# Patient Record
Sex: FEMALE | Race: BLACK OR AFRICAN AMERICAN
[De-identification: names, ages, dates, MRNs, and addresses within clinical notes are randomized per-mention and may not be internally consistent; named-entity substitution may affect disease eponyms.]

---

## 2017-03-27 ENCOUNTER — TRANSCRIPTION ENCOUNTER (OUTPATIENT)
Age: 67
End: 2017-03-27

## 2017-04-09 ENCOUNTER — EMERGENCY (EMERGENCY)
Facility: HOSPITAL | Age: 67
LOS: 1 days | Discharge: LEFT BEFORE DISCHARGE | End: 2017-04-09
Attending: EMERGENCY MEDICINE
Payer: COMMERCIAL

## 2017-04-09 VITALS — HEIGHT: 65 IN | WEIGHT: 220.02 LBS

## 2017-04-09 VITALS
WEIGHT: 167.99 LBS | RESPIRATION RATE: 18 BRPM | OXYGEN SATURATION: 98 % | HEIGHT: 62 IN | DIASTOLIC BLOOD PRESSURE: 66 MMHG | SYSTOLIC BLOOD PRESSURE: 141 MMHG | TEMPERATURE: 98 F | HEART RATE: 78 BPM

## 2017-04-09 DIAGNOSIS — R56.9 UNSPECIFIED CONVULSIONS: ICD-10-CM

## 2017-04-09 PROCEDURE — 99283 EMERGENCY DEPT VISIT LOW MDM: CPT | Mod: 25

## 2017-04-09 PROCEDURE — 93010 ELECTROCARDIOGRAM REPORT: CPT

## 2017-04-09 PROCEDURE — 99284 EMERGENCY DEPT VISIT MOD MDM: CPT

## 2017-04-09 PROCEDURE — 93005 ELECTROCARDIOGRAM TRACING: CPT

## 2017-04-09 NOTE — ED PROVIDER NOTE - MEDICAL DECISION MAKING DETAILS
Patient refuses CT head or labs at this time and is signing out AMA.  Pt understands risks including death and verbalized understanding.  PAtient states will f/u with neuro w/o fail and not drive until cleared by Neuro.  Will f/u for EEG.

## 2017-04-09 NOTE — ED ADULT TRIAGE NOTE - CHIEF COMPLAINT QUOTE
Pt with seizure lasting approximately 10-15 seconds as per EMS, pt assisted to ground, no obvious trauma noted, pt A&O x's 3, c/o headache and neck pain 8/10, pt with bells palsy Pt with seizure lasting approximately 10-15 seconds as per EMS, pt assisted to ground, no obvious trauma noted, pt A&O x's 3, c/o headache and neck pain 8/10, pt with bells palsy, pt not currently take anti-seizure medication

## 2017-04-09 NOTE — ED ADULT NURSE NOTE - CHIEF COMPLAINT QUOTE
Pt with seizure lasting approximately 10-15 seconds as per EMS, pt assisted to ground, no obvious trauma noted, pt A&O x's 3, c/o headache and neck pain 8/10, pt with bells palsy, pt not currently take anti-seizure medication

## 2017-04-09 NOTE — ED ADULT NURSE NOTE - OBJECTIVE STATEMENT
received pt AOx3 with family at bedside. s/p seizure acitivity while at [employer], family states lasting approximately 10-15 seconds, pt assisted to ground, no obvious trauma noted, c/o headache and neck pain 8/10, pt with bells palsy, pt not currently take anti-seizure medication.

## 2017-04-09 NOTE — ED ADULT NURSE REASSESSMENT NOTE - NS ED NURSE REASSESS COMMENT FT1
Pt in no acute distress, alert and oriented x 3, verbalized she wishes to leave against medical advise, all AMA forms signed by pt and Dr Duran, pt ambulates steadily and is accompanied by female visitor.

## 2017-04-09 NOTE — ED ADULT NURSE REASSESSMENT NOTE - NS ED NURSE REASSESS COMMENT FT1
Pt in no acute distress, awake, alert, verbalized understanding of need for urine sample and reports she is unable to provide sample at this time.

## 2017-04-09 NOTE — ED PROVIDER NOTE - OBJECTIVE STATEMENT
CC: seizure  Presenting symptoms: 65yo female c/o single witnessed seizure.  Patient had short post ictal period and now at baseline.  PAtient has no complaints at this time.  Patient states she has had seizures in the past, but not for a couple of years and is not currently on any antiepileptics.  Patient states she had the flu last week and has not been sleeping well and works the overnight shift.  Pertinent Positives: Seizure  Pertinent Negatives: no trauma, no CP, no SOB, no urinary symptoms, no rash, no abd pain, no N/V/D  Timing: sudden  Radiation: none  Severity: moderate  Aggravating Factors: none  Relieving Factors: none

## 2017-08-13 ENCOUNTER — TRANSCRIPTION ENCOUNTER (OUTPATIENT)
Age: 67
End: 2017-08-13

## 2018-03-24 ENCOUNTER — INPATIENT (INPATIENT)
Facility: HOSPITAL | Age: 68
LOS: 5 days | Discharge: HOME | End: 2018-03-30
Attending: INTERNAL MEDICINE | Admitting: INTERNAL MEDICINE
Payer: COMMERCIAL

## 2018-03-24 VITALS
WEIGHT: 220.02 LBS | DIASTOLIC BLOOD PRESSURE: 105 MMHG | HEART RATE: 88 BPM | OXYGEN SATURATION: 96 % | HEIGHT: 60 IN | SYSTOLIC BLOOD PRESSURE: 207 MMHG | RESPIRATION RATE: 14 BRPM

## 2018-03-24 LAB
ABO RH CONFIRMATION: SIGNIFICANT CHANGE UP
ALBUMIN SERPL ELPH-MCNC: 3.4 G/DL — SIGNIFICANT CHANGE UP (ref 3.3–5)
ALP SERPL-CCNC: 132 U/L — HIGH (ref 40–120)
ALT FLD-CCNC: 37 U/L — SIGNIFICANT CHANGE UP (ref 12–78)
ANION GAP SERPL CALC-SCNC: 7 MMOL/L — SIGNIFICANT CHANGE UP (ref 5–17)
APPEARANCE UR: CLEAR — SIGNIFICANT CHANGE UP
APTT BLD: 32.3 SEC — SIGNIFICANT CHANGE UP (ref 27.5–37.4)
AST SERPL-CCNC: 34 U/L — SIGNIFICANT CHANGE UP (ref 15–37)
BACTERIA # UR AUTO: (no result)
BASOPHILS # BLD AUTO: 0 K/UL — SIGNIFICANT CHANGE UP (ref 0–0.2)
BASOPHILS NFR BLD AUTO: 0 % — SIGNIFICANT CHANGE UP (ref 0–2)
BILIRUB SERPL-MCNC: 0.8 MG/DL — SIGNIFICANT CHANGE UP (ref 0.2–1.2)
BILIRUB UR-MCNC: NEGATIVE — SIGNIFICANT CHANGE UP
BLD GP AB SCN SERPL QL: SIGNIFICANT CHANGE UP
BUN SERPL-MCNC: 21 MG/DL — SIGNIFICANT CHANGE UP (ref 7–23)
CALCIUM SERPL-MCNC: 9 MG/DL — SIGNIFICANT CHANGE UP (ref 8.5–10.1)
CHLORIDE SERPL-SCNC: 111 MMOL/L — HIGH (ref 96–108)
CO2 SERPL-SCNC: 27 MMOL/L — SIGNIFICANT CHANGE UP (ref 22–31)
COLOR SPEC: YELLOW — SIGNIFICANT CHANGE UP
CREAT SERPL-MCNC: 0.94 MG/DL — SIGNIFICANT CHANGE UP (ref 0.5–1.3)
DIFF PNL FLD: (no result)
EOSINOPHIL # BLD AUTO: 0 K/UL — SIGNIFICANT CHANGE UP (ref 0–0.5)
EOSINOPHIL NFR BLD AUTO: 0 % — SIGNIFICANT CHANGE UP (ref 0–6)
GLUCOSE SERPL-MCNC: 105 MG/DL — HIGH (ref 70–99)
GLUCOSE UR QL: NEGATIVE MG/DL — SIGNIFICANT CHANGE UP
HCT VFR BLD CALC: 47.6 % — HIGH (ref 34.5–45)
HGB BLD-MCNC: 14.9 G/DL — SIGNIFICANT CHANGE UP (ref 11.5–15.5)
INR BLD: 1 RATIO — SIGNIFICANT CHANGE UP (ref 0.88–1.16)
KETONES UR-MCNC: NEGATIVE — SIGNIFICANT CHANGE UP
LACTATE SERPL-SCNC: 2.3 MMOL/L — HIGH (ref 0.7–2)
LACTATE SERPL-SCNC: 3 MMOL/L — HIGH (ref 0.7–2)
LACTATE SERPL-SCNC: 3.1 MMOL/L — HIGH (ref 0.7–2)
LEUKOCYTE ESTERASE UR-ACNC: (no result)
LIDOCAIN IGE QN: 88 U/L — SIGNIFICANT CHANGE UP (ref 73–393)
LYMPHOCYTES # BLD AUTO: 0.59 K/UL — LOW (ref 1–3.3)
LYMPHOCYTES # BLD AUTO: 8 % — LOW (ref 13–44)
MANUAL SMEAR VERIFICATION: SIGNIFICANT CHANGE UP
MCHC RBC-ENTMCNC: 27.2 PG — SIGNIFICANT CHANGE UP (ref 27–34)
MCHC RBC-ENTMCNC: 31.3 GM/DL — LOW (ref 32–36)
MCV RBC AUTO: 87 FL — SIGNIFICANT CHANGE UP (ref 80–100)
MONOCYTES # BLD AUTO: 0 K/UL — SIGNIFICANT CHANGE UP (ref 0–0.9)
MONOCYTES NFR BLD AUTO: 0 % — LOW (ref 2–14)
NEUTROPHILS # BLD AUTO: 6.76 K/UL — SIGNIFICANT CHANGE UP (ref 1.8–7.4)
NEUTROPHILS NFR BLD AUTO: 84 % — HIGH (ref 43–77)
NEUTS BAND # BLD: 8 % — SIGNIFICANT CHANGE UP (ref 0–8)
NITRITE UR-MCNC: POSITIVE
NRBC # BLD: 0 /100 — SIGNIFICANT CHANGE UP (ref 0–0)
NRBC # BLD: SIGNIFICANT CHANGE UP /100 WBCS (ref 0–0)
PH UR: 5 — SIGNIFICANT CHANGE UP (ref 5–8)
PLAT MORPH BLD: NORMAL — SIGNIFICANT CHANGE UP
PLATELET # BLD AUTO: 175 K/UL — SIGNIFICANT CHANGE UP (ref 150–400)
POTASSIUM SERPL-MCNC: 4.5 MMOL/L — SIGNIFICANT CHANGE UP (ref 3.5–5.3)
POTASSIUM SERPL-SCNC: 4.5 MMOL/L — SIGNIFICANT CHANGE UP (ref 3.5–5.3)
PROT SERPL-MCNC: 7.7 GM/DL — SIGNIFICANT CHANGE UP (ref 6–8.3)
PROT UR-MCNC: 15 MG/DL
PROTHROM AB SERPL-ACNC: 10.8 SEC — SIGNIFICANT CHANGE UP (ref 9.8–12.7)
RBC # BLD: 5.47 M/UL — HIGH (ref 3.8–5.2)
RBC # FLD: 12.7 % — SIGNIFICANT CHANGE UP (ref 10.3–14.5)
RBC BLD AUTO: NORMAL — SIGNIFICANT CHANGE UP
RBC CASTS # UR COMP ASSIST: SIGNIFICANT CHANGE UP /HPF (ref 0–4)
SODIUM SERPL-SCNC: 145 MMOL/L — SIGNIFICANT CHANGE UP (ref 135–145)
SP GR SPEC: 1.01 — SIGNIFICANT CHANGE UP (ref 1.01–1.02)
TYPE + AB SCN PNL BLD: SIGNIFICANT CHANGE UP
UROBILINOGEN FLD QL: 1 MG/DL
WBC # BLD: 7.35 K/UL — SIGNIFICANT CHANGE UP (ref 3.8–10.5)
WBC # FLD AUTO: 7.35 K/UL — SIGNIFICANT CHANGE UP (ref 3.8–10.5)
WBC UR QL: (no result)

## 2018-03-24 PROCEDURE — 93010 ELECTROCARDIOGRAM REPORT: CPT

## 2018-03-24 PROCEDURE — 74176 CT ABD & PELVIS W/O CONTRAST: CPT | Mod: 26

## 2018-03-24 PROCEDURE — 99285 EMERGENCY DEPT VISIT HI MDM: CPT

## 2018-03-24 RX ORDER — IBUPROFEN 200 MG
400 TABLET ORAL ONCE
Qty: 0 | Refills: 0 | Status: COMPLETED | OUTPATIENT
Start: 2018-03-24 | End: 2018-03-24

## 2018-03-24 RX ORDER — MORPHINE SULFATE 50 MG/1
2 CAPSULE, EXTENDED RELEASE ORAL THREE TIMES A DAY
Qty: 0 | Refills: 0 | Status: DISCONTINUED | OUTPATIENT
Start: 2018-03-24 | End: 2018-03-24

## 2018-03-24 RX ORDER — SODIUM CHLORIDE 9 MG/ML
1000 INJECTION INTRAMUSCULAR; INTRAVENOUS; SUBCUTANEOUS ONCE
Qty: 0 | Refills: 0 | Status: COMPLETED | OUTPATIENT
Start: 2018-03-24 | End: 2018-03-24

## 2018-03-24 RX ORDER — LOSARTAN POTASSIUM 100 MG/1
25 TABLET, FILM COATED ORAL DAILY
Qty: 0 | Refills: 0 | Status: DISCONTINUED | OUTPATIENT
Start: 2018-03-24 | End: 2018-03-25

## 2018-03-24 RX ORDER — CEFTRIAXONE 500 MG/1
1 INJECTION, POWDER, FOR SOLUTION INTRAMUSCULAR; INTRAVENOUS ONCE
Qty: 0 | Refills: 0 | Status: DISCONTINUED | OUTPATIENT
Start: 2018-03-24 | End: 2018-03-24

## 2018-03-24 RX ORDER — CEFTRIAXONE 500 MG/1
1000 INJECTION, POWDER, FOR SOLUTION INTRAMUSCULAR; INTRAVENOUS EVERY 24 HOURS
Qty: 0 | Refills: 0 | Status: DISCONTINUED | OUTPATIENT
Start: 2018-03-24 | End: 2018-03-25

## 2018-03-24 RX ORDER — FENTANYL CITRATE 50 UG/ML
12.5 INJECTION INTRAVENOUS THREE TIMES A DAY
Qty: 0 | Refills: 0 | Status: DISCONTINUED | OUTPATIENT
Start: 2018-03-24 | End: 2018-03-24

## 2018-03-24 RX ORDER — ONDANSETRON 8 MG/1
4 TABLET, FILM COATED ORAL ONCE
Qty: 0 | Refills: 0 | Status: COMPLETED | OUTPATIENT
Start: 2018-03-24 | End: 2018-03-24

## 2018-03-24 RX ORDER — ONDANSETRON 8 MG/1
4 TABLET, FILM COATED ORAL EVERY 6 HOURS
Qty: 0 | Refills: 0 | Status: DISCONTINUED | OUTPATIENT
Start: 2018-03-24 | End: 2018-03-25

## 2018-03-24 RX ORDER — SODIUM CHLORIDE 9 MG/ML
1000 INJECTION INTRAMUSCULAR; INTRAVENOUS; SUBCUTANEOUS
Qty: 0 | Refills: 0 | Status: DISCONTINUED | OUTPATIENT
Start: 2018-03-24 | End: 2018-03-25

## 2018-03-24 RX ORDER — FENTANYL CITRATE 50 UG/ML
25 INJECTION INTRAVENOUS ONCE
Qty: 0 | Refills: 0 | Status: DISCONTINUED | OUTPATIENT
Start: 2018-03-24 | End: 2018-03-24

## 2018-03-24 RX ORDER — LABETALOL HCL 100 MG
10 TABLET ORAL ONCE
Qty: 0 | Refills: 0 | Status: COMPLETED | OUTPATIENT
Start: 2018-03-24 | End: 2018-03-24

## 2018-03-24 RX ORDER — IBUPROFEN 200 MG
400 TABLET ORAL
Qty: 0 | Refills: 0 | Status: DISCONTINUED | OUTPATIENT
Start: 2018-03-24 | End: 2018-03-25

## 2018-03-24 RX ORDER — ALBUTEROL 90 UG/1
2.5 AEROSOL, METERED ORAL ONCE
Qty: 0 | Refills: 0 | Status: COMPLETED | OUTPATIENT
Start: 2018-03-24 | End: 2018-03-24

## 2018-03-24 RX ORDER — HEPARIN SODIUM 5000 [USP'U]/ML
5000 INJECTION INTRAVENOUS; SUBCUTANEOUS EVERY 8 HOURS
Qty: 0 | Refills: 0 | Status: DISCONTINUED | OUTPATIENT
Start: 2018-03-24 | End: 2018-03-25

## 2018-03-24 RX ORDER — HYDRALAZINE HCL 50 MG
10 TABLET ORAL THREE TIMES A DAY
Qty: 0 | Refills: 0 | Status: DISCONTINUED | OUTPATIENT
Start: 2018-03-24 | End: 2018-03-25

## 2018-03-24 RX ORDER — BUDESONIDE AND FORMOTEROL FUMARATE DIHYDRATE 160; 4.5 UG/1; UG/1
2 AEROSOL RESPIRATORY (INHALATION)
Qty: 0 | Refills: 0 | Status: DISCONTINUED | OUTPATIENT
Start: 2018-03-24 | End: 2018-03-25

## 2018-03-24 RX ORDER — ACETAMINOPHEN 500 MG
1000 TABLET ORAL ONCE
Qty: 0 | Refills: 0 | Status: DISCONTINUED | OUTPATIENT
Start: 2018-03-24 | End: 2018-03-24

## 2018-03-24 RX ORDER — ACETAMINOPHEN 500 MG
1000 TABLET ORAL ONCE
Qty: 0 | Refills: 0 | Status: COMPLETED | OUTPATIENT
Start: 2018-03-24 | End: 2018-03-24

## 2018-03-24 RX ORDER — FENTANYL CITRATE 50 UG/ML
50 INJECTION INTRAVENOUS ONCE
Qty: 0 | Refills: 0 | Status: DISCONTINUED | OUTPATIENT
Start: 2018-03-24 | End: 2018-03-24

## 2018-03-24 RX ORDER — SODIUM CHLORIDE 9 MG/ML
3 INJECTION INTRAMUSCULAR; INTRAVENOUS; SUBCUTANEOUS ONCE
Qty: 0 | Refills: 0 | Status: COMPLETED | OUTPATIENT
Start: 2018-03-24 | End: 2018-03-24

## 2018-03-24 RX ORDER — TAMSULOSIN HYDROCHLORIDE 0.4 MG/1
0.4 CAPSULE ORAL DAILY
Qty: 0 | Refills: 0 | Status: DISCONTINUED | OUTPATIENT
Start: 2018-03-24 | End: 2018-03-25

## 2018-03-24 RX ORDER — ALBUTEROL 90 UG/1
2.5 AEROSOL, METERED ORAL EVERY 6 HOURS
Qty: 0 | Refills: 0 | Status: DISCONTINUED | OUTPATIENT
Start: 2018-03-24 | End: 2018-03-25

## 2018-03-24 RX ADMIN — SODIUM CHLORIDE 125 MILLILITER(S): 9 INJECTION INTRAMUSCULAR; INTRAVENOUS; SUBCUTANEOUS at 14:40

## 2018-03-24 RX ADMIN — Medication 400 MILLIGRAM(S): at 14:06

## 2018-03-24 RX ADMIN — ONDANSETRON 4 MILLIGRAM(S): 8 TABLET, FILM COATED ORAL at 10:52

## 2018-03-24 RX ADMIN — SODIUM CHLORIDE 3 MILLILITER(S): 9 INJECTION INTRAMUSCULAR; INTRAVENOUS; SUBCUTANEOUS at 09:15

## 2018-03-24 RX ADMIN — CEFTRIAXONE 1000 MILLIGRAM(S): 500 INJECTION, POWDER, FOR SOLUTION INTRAMUSCULAR; INTRAVENOUS at 11:56

## 2018-03-24 RX ADMIN — ONDANSETRON 4 MILLIGRAM(S): 8 TABLET, FILM COATED ORAL at 19:17

## 2018-03-24 RX ADMIN — FENTANYL CITRATE 25 MICROGRAM(S): 50 INJECTION INTRAVENOUS at 09:47

## 2018-03-24 RX ADMIN — FENTANYL CITRATE 50 MICROGRAM(S): 50 INJECTION INTRAVENOUS at 15:24

## 2018-03-24 RX ADMIN — SODIUM CHLORIDE 1000 MILLILITER(S): 9 INJECTION INTRAMUSCULAR; INTRAVENOUS; SUBCUTANEOUS at 09:30

## 2018-03-24 RX ADMIN — FENTANYL CITRATE 25 MICROGRAM(S): 50 INJECTION INTRAVENOUS at 11:05

## 2018-03-24 RX ADMIN — SODIUM CHLORIDE 1000 MILLILITER(S): 9 INJECTION INTRAMUSCULAR; INTRAVENOUS; SUBCUTANEOUS at 20:49

## 2018-03-24 RX ADMIN — LOSARTAN POTASSIUM 25 MILLIGRAM(S): 100 TABLET, FILM COATED ORAL at 14:40

## 2018-03-24 RX ADMIN — Medication 10 MILLIGRAM(S): at 13:47

## 2018-03-24 RX ADMIN — TAMSULOSIN HYDROCHLORIDE 0.4 MILLIGRAM(S): 0.4 CAPSULE ORAL at 14:40

## 2018-03-24 RX ADMIN — FENTANYL CITRATE 25 MICROGRAM(S): 50 INJECTION INTRAVENOUS at 11:20

## 2018-03-24 RX ADMIN — Medication 400 MILLIGRAM(S): at 15:03

## 2018-03-24 RX ADMIN — ALBUTEROL 2.5 MILLIGRAM(S): 90 AEROSOL, METERED ORAL at 16:37

## 2018-03-24 RX ADMIN — FENTANYL CITRATE 25 MICROGRAM(S): 50 INJECTION INTRAVENOUS at 09:31

## 2018-03-24 RX ADMIN — Medication 400 MILLIGRAM(S): at 18:29

## 2018-03-24 NOTE — ED ADULT NURSE REASSESSMENT NOTE - NS ED NURSE REASSESS COMMENT FT1
Patient care received from AROLDO FRANCISCO. Patient A&Ox4, resting comfortably in bed. Pt reports HA 5/10, motrin administered for pain and fever. All other VSS. Safety & comfort measures in place, will continue to monitor.

## 2018-03-24 NOTE — H&P ADULT - PMH
Asthma, unspecified asthma severity, unspecified whether complicated, unspecified whether persistent    Bell's palsy    Essential hypertension    Hepatitis C

## 2018-03-24 NOTE — H&P ADULT - NSHPREVIEWOFSYSTEMS_GEN_ALL_CORE
(-)Fever, chills, cough, chest pain, sob, headache, dizziness, palpitations, n/v/d, leg swelling  (+)abdominal pain

## 2018-03-24 NOTE — H&P ADULT - NSHPSOCIALHISTORY_GEN_ALL_CORE
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 Patient is a nursing assistant.

## 2018-03-24 NOTE — H&P ADULT - ASSESSMENT
67 year old female with sudden onset of abdominal pain in setting of obstructing renal stone    1. obstructing renal stone in right proximal ureter with noted hydronephrosis  -c/w maintenance IV fluids at 125 cc per hour  -tamsulosin 0.4 mg po daily  -urology consultation  -morphine anaphylactic allergy; will use IV tyelenol and NSAIDs for now  -monitor urine output  -urine and blood cultures sent  -lactate elevated, will bolus 1 more liter of fluid and recheck level  -c/w IV ceftriaxone for now    2. h/o HTN, poor medical followup, noted elevated in ED, likely pain contributing.   -will start on daily losartan 25 mg po daily  -c/w pain control  -received dose of labetolol in ED, will keep hydralazine IV prn for SBP > 180 mmg Hg for now    3. Asthma, admits to daily albuterol in haler use, no controller medications  -c/w albuterol nebs prn  -will give one dose albuterol inhaler now  -symbicort daily bid    4. Bell's palsy dx over 10 years ago, patient never sought medical care  -would recommend outpatient neurology followup    5. Hepatitis C, noted cirrhotic changes on CT abdomen and pelvis  -outpatient GI followup recommended for possible antiviral treatment  -no signs of decompensated liver failure    DVT ppx

## 2018-03-24 NOTE — ED ADULT TRIAGE NOTE - CHIEF COMPLAINT QUOTE
right flank pain starting at 5am. right flank pain starting at 5am. received 30mg toradol iv and zofran 4mg iv by ems.

## 2018-03-24 NOTE — H&P ADULT - NSHPPHYSICALEXAM_GEN_ALL_CORE
Gen: AAOx3, NAD  HEENT: NCAT, EOMI  Neck: Supple  CV: nml S1S2, RRR  Lungs: CTABL  Abd: Soft, NT, ND, BS+, + CVA tenderness on right back.  Ext: No edema  Neuro: Non focal

## 2018-03-24 NOTE — H&P ADULT - NSHPLABSRESULTS_GEN_ALL_CORE
T(C): 37.9 (18 @ 15:42), Max: 37.9 (18 @ 13:28)  HR: 105 (18 @ 16:43) (88 - 113)  BP: 123/70 (18 @ 15:42) (123/70 - 207/105)  RR: 20 (18 @ 15:42) (14 - 20)  SpO2: 97% (18 @ 15:42) (96% - 97%)  Wt(kg): --                              14.9   7.35  )-----------( 175      ( 24 Mar 2018 09:46 )             47.6     24 Mar 2018 09:46    145    |  111    |  21     ----------------------------<  105    4.5     |  27     |  0.94     Ca    9.0        24 Mar 2018 09:46    TPro  7.7    /  Alb  3.4    /  TBili  0.8    /  DBili  x      /  AST  34     /  ALT  37     /  AlkPhos  132    24 Mar 2018 09:46    PT/INR - ( 24 Mar 2018 09:46 )   PT: 10.8 sec;   INR: 1.00 ratio         PTT - ( 24 Mar 2018 09:46 )  PTT:32.3 sec  CAPILLARY BLOOD GLUCOSE        LIVER FUNCTIONS - ( 24 Mar 2018 09:46 )  Alb: 3.4 g/dL / Pro: 7.7 gm/dL / ALK PHOS: 132 U/L / ALT: 37 U/L / AST: 34 U/L / GGT: x           Urinalysis Basic - ( 24 Mar 2018 09:13 )    Color: Yellow / Appearance: Clear / S.015 / pH: x  Gluc: x / Ketone: Negative  / Bili: Negative / Urobili: 1 mg/dL   Blood: x / Protein: 15 mg/dL / Nitrite: Positive   Leuk Esterase: Trace / RBC: 25 to 30 /HPF / WBC 6-10   Sq Epi: x / Non Sq Epi: x / Bacteria: TNTC    EXAM:  CT ABDOMEN AND PELVIS                            PROCEDURE DATE:  2018          INTERPRETATION:  Exam Type:  CT ABDOMEN AND PELVIS   Date and Time: 3/24/2018 10:15 AM  Indication: Right flank pain  Compared to: Ultrasound abdomen 10/14/2016  Technique: CT of the ABDOMEN and PELVIS:  No intravenous contrast was   administered at physician request. This limits sensitivity for certain   processes. Oral contrast was not administered.    COMMENTS:    LOWER LUNGS AND PLEURA: within normal limits.    LIVER: Changes of the liver suggestive of cirrhosis. No focal masses on   this noncontrast study.  BILE DUCTS: normal caliber.  GALLBLADDER:      no wall thickening. Gallstones are not excluded.  PANCREAS: within normal limits.  SPLEEN: within normal limits.  ADRENALS: within normal limits.    KIDNEYS, URETERS AND BLADDER: Mild right hydronephrosis secondary to a   proximal right ureter calculus measuring 0.8 x 0.6 cm. Additional   nonobstructive right lower pole intrarenal calculi. Trace to small right   perinephric fluid and infiltrative changes. No left hydronephrosis. The   left ureter is unremarkable. The bladder is within normal limits.    PELVIS: no pelvic masses.No pelvic lymphadenopathy.    BOWEL: The unopacified bowel is of normal course and caliber without   evidence of obstruction or bowel wall thickening. A normal appendix is   visualized. Scattered colonic diverticulosis.    PERITONEUM: no ascites or free air, no fluid collection.  RETROPERITONEUM: within normal limits.      VESSELS: atherosclerotic changes.      ABDOMINAL WALL: within normal limits.  BONES: Degenerative changes.     IMPRESSION:     Mild right hydronephrosis secondary to a proximal right ureter calculus   measuring 0.8 x 0.6 cm

## 2018-03-24 NOTE — ED PROVIDER NOTE - OBJECTIVE STATEMENT
68 y/o Female with a PMHx of Hepatitis C, Bell's Palsy presents to ED BIBEMS c/o right sided abd pain that radiates towards her flank pain x 4.5 hours. Pt states she went to sleep okay but woke up due to the pain. +N/V. No fever. No hx of kidney stones. Pt received 30mg Toradol IV and Zofran 4mg IV by EMS PTA. PMD Walter.

## 2018-03-24 NOTE — ED PROVIDER NOTE - CARE PLAN
Principal Discharge DX:	Renal colic on right side  Secondary Diagnosis:	HTN (hypertension)  Secondary Diagnosis:	UTI (urinary tract infection)

## 2018-03-24 NOTE — ED ADULT NURSE REASSESSMENT NOTE - NS ED NURSE REASSESS COMMENT FT1
Pt back from ct-scan c/o of nausea. Dr. Gomez made aware at pt to be medicated. Will continue to monitor.

## 2018-03-24 NOTE — ED PROVIDER NOTE - CARDIAC, MLM
Normal rate, regular rhythm.  Heart sounds S1, S2.  No murmurs, rubs or gallops. 2+ dorsalis pedis pulses bilaterally. Normal rate, regular rhythm.  Heart sounds S1, S2.  No murmurs, rubs or gallops.

## 2018-03-25 LAB
-  K. PNEUMONIAE GROUP: SIGNIFICANT CHANGE UP
ALBUMIN SERPL ELPH-MCNC: 2.5 G/DL — LOW (ref 3.3–5)
ALP SERPL-CCNC: 91 U/L — SIGNIFICANT CHANGE UP (ref 40–120)
ALT FLD-CCNC: 30 U/L — SIGNIFICANT CHANGE UP (ref 12–78)
ANION GAP SERPL CALC-SCNC: 9 MMOL/L — SIGNIFICANT CHANGE UP (ref 5–17)
APTT BLD: 30.5 SEC — SIGNIFICANT CHANGE UP (ref 27.5–37.4)
AST SERPL-CCNC: 37 U/L — SIGNIFICANT CHANGE UP (ref 15–37)
BASOPHILS # BLD AUTO: 0.09 K/UL — SIGNIFICANT CHANGE UP (ref 0–0.2)
BASOPHILS NFR BLD AUTO: 0.5 % — SIGNIFICANT CHANGE UP (ref 0–2)
BILIRUB SERPL-MCNC: 1 MG/DL — SIGNIFICANT CHANGE UP (ref 0.2–1.2)
BUN SERPL-MCNC: 20 MG/DL — SIGNIFICANT CHANGE UP (ref 7–23)
CALCIUM SERPL-MCNC: 7.7 MG/DL — LOW (ref 8.5–10.1)
CHLORIDE SERPL-SCNC: 112 MMOL/L — HIGH (ref 96–108)
CO2 SERPL-SCNC: 24 MMOL/L — SIGNIFICANT CHANGE UP (ref 22–31)
CREAT SERPL-MCNC: 0.97 MG/DL — SIGNIFICANT CHANGE UP (ref 0.5–1.3)
EOSINOPHIL # BLD AUTO: 0.04 K/UL — SIGNIFICANT CHANGE UP (ref 0–0.5)
EOSINOPHIL NFR BLD AUTO: 0.2 % — SIGNIFICANT CHANGE UP (ref 0–6)
GLUCOSE SERPL-MCNC: 93 MG/DL — SIGNIFICANT CHANGE UP (ref 70–99)
GRAM STN FLD: SIGNIFICANT CHANGE UP
HCT VFR BLD CALC: 38 % — SIGNIFICANT CHANGE UP (ref 34.5–45)
HGB BLD-MCNC: 11.9 G/DL — SIGNIFICANT CHANGE UP (ref 11.5–15.5)
IMM GRANULOCYTES NFR BLD AUTO: 1.2 % — SIGNIFICANT CHANGE UP (ref 0–1.5)
INR BLD: 1.24 RATIO — HIGH (ref 0.88–1.16)
LACTATE SERPL-SCNC: 1.2 MMOL/L — SIGNIFICANT CHANGE UP (ref 0.7–2)
LYMPHOCYTES # BLD AUTO: 1.77 K/UL — SIGNIFICANT CHANGE UP (ref 1–3.3)
LYMPHOCYTES # BLD AUTO: 10.5 % — LOW (ref 13–44)
MCHC RBC-ENTMCNC: 27.4 PG — SIGNIFICANT CHANGE UP (ref 27–34)
MCHC RBC-ENTMCNC: 31.3 GM/DL — LOW (ref 32–36)
MCV RBC AUTO: 87.6 FL — SIGNIFICANT CHANGE UP (ref 80–100)
METHOD TYPE: SIGNIFICANT CHANGE UP
MONOCYTES # BLD AUTO: 1.08 K/UL — HIGH (ref 0–0.9)
MONOCYTES NFR BLD AUTO: 6.4 % — SIGNIFICANT CHANGE UP (ref 2–14)
NEUTROPHILS # BLD AUTO: 13.66 K/UL — HIGH (ref 1.8–7.4)
NEUTROPHILS NFR BLD AUTO: 81.2 % — HIGH (ref 43–77)
NRBC # BLD: 0 /100 WBCS — SIGNIFICANT CHANGE UP (ref 0–0)
PLATELET # BLD AUTO: 155 K/UL — SIGNIFICANT CHANGE UP (ref 150–400)
POTASSIUM SERPL-MCNC: 3.5 MMOL/L — SIGNIFICANT CHANGE UP (ref 3.5–5.3)
POTASSIUM SERPL-SCNC: 3.5 MMOL/L — SIGNIFICANT CHANGE UP (ref 3.5–5.3)
PROT SERPL-MCNC: 5.9 GM/DL — LOW (ref 6–8.3)
PROTHROM AB SERPL-ACNC: 13.4 SEC — HIGH (ref 9.8–12.7)
RBC # BLD: 4.34 M/UL — SIGNIFICANT CHANGE UP (ref 3.8–5.2)
RBC # FLD: 13.2 % — SIGNIFICANT CHANGE UP (ref 10.3–14.5)
SODIUM SERPL-SCNC: 145 MMOL/L — SIGNIFICANT CHANGE UP (ref 135–145)
SPECIMEN SOURCE: SIGNIFICANT CHANGE UP
WBC # BLD: 16.84 K/UL — HIGH (ref 3.8–10.5)
WBC # FLD AUTO: 16.84 K/UL — HIGH (ref 3.8–10.5)

## 2018-03-25 RX ORDER — OXYCODONE HYDROCHLORIDE 5 MG/1
10 TABLET ORAL EVERY 6 HOURS
Qty: 0 | Refills: 0 | Status: DISCONTINUED | OUTPATIENT
Start: 2018-03-25 | End: 2018-03-25

## 2018-03-25 RX ORDER — CEFTRIAXONE 500 MG/1
1 INJECTION, POWDER, FOR SOLUTION INTRAMUSCULAR; INTRAVENOUS ONCE
Qty: 0 | Refills: 0 | Status: DISCONTINUED | OUTPATIENT
Start: 2018-03-25 | End: 2018-03-25

## 2018-03-25 RX ORDER — ONDANSETRON 8 MG/1
4 TABLET, FILM COATED ORAL EVERY 6 HOURS
Qty: 0 | Refills: 0 | Status: DISCONTINUED | OUTPATIENT
Start: 2018-03-25 | End: 2018-03-30

## 2018-03-25 RX ORDER — BUDESONIDE AND FORMOTEROL FUMARATE DIHYDRATE 160; 4.5 UG/1; UG/1
2 AEROSOL RESPIRATORY (INHALATION)
Qty: 0 | Refills: 0 | Status: DISCONTINUED | OUTPATIENT
Start: 2018-03-25 | End: 2018-03-30

## 2018-03-25 RX ORDER — CEFTRIAXONE 500 MG/1
1000 INJECTION, POWDER, FOR SOLUTION INTRAMUSCULAR; INTRAVENOUS EVERY 24 HOURS
Qty: 0 | Refills: 0 | Status: DISCONTINUED | OUTPATIENT
Start: 2018-03-25 | End: 2018-03-25

## 2018-03-25 RX ORDER — CEFTRIAXONE 500 MG/1
2000 INJECTION, POWDER, FOR SOLUTION INTRAMUSCULAR; INTRAVENOUS EVERY 24 HOURS
Qty: 0 | Refills: 0 | Status: DISCONTINUED | OUTPATIENT
Start: 2018-03-26 | End: 2018-03-28

## 2018-03-25 RX ORDER — FENTANYL CITRATE 50 UG/ML
50 INJECTION INTRAVENOUS
Qty: 0 | Refills: 0 | Status: DISCONTINUED | OUTPATIENT
Start: 2018-03-25 | End: 2018-03-25

## 2018-03-25 RX ORDER — LOSARTAN POTASSIUM 100 MG/1
25 TABLET, FILM COATED ORAL DAILY
Qty: 0 | Refills: 0 | Status: DISCONTINUED | OUTPATIENT
Start: 2018-03-25 | End: 2018-03-29

## 2018-03-25 RX ORDER — CEFTRIAXONE 500 MG/1
1000 INJECTION, POWDER, FOR SOLUTION INTRAMUSCULAR; INTRAVENOUS ONCE
Qty: 0 | Refills: 0 | Status: COMPLETED | OUTPATIENT
Start: 2018-03-25 | End: 2018-03-25

## 2018-03-25 RX ORDER — HYDRALAZINE HCL 50 MG
10 TABLET ORAL THREE TIMES A DAY
Qty: 0 | Refills: 0 | Status: DISCONTINUED | OUTPATIENT
Start: 2018-03-25 | End: 2018-03-30

## 2018-03-25 RX ORDER — ONDANSETRON 8 MG/1
4 TABLET, FILM COATED ORAL ONCE
Qty: 0 | Refills: 0 | Status: DISCONTINUED | OUTPATIENT
Start: 2018-03-25 | End: 2018-03-25

## 2018-03-25 RX ORDER — IBUPROFEN 200 MG
400 TABLET ORAL
Qty: 0 | Refills: 0 | Status: DISCONTINUED | OUTPATIENT
Start: 2018-03-25 | End: 2018-03-30

## 2018-03-25 RX ORDER — TAMSULOSIN HYDROCHLORIDE 0.4 MG/1
0.4 CAPSULE ORAL DAILY
Qty: 0 | Refills: 0 | Status: DISCONTINUED | OUTPATIENT
Start: 2018-03-25 | End: 2018-03-30

## 2018-03-25 RX ORDER — SODIUM CHLORIDE 9 MG/ML
1000 INJECTION, SOLUTION INTRAVENOUS
Qty: 0 | Refills: 0 | Status: DISCONTINUED | OUTPATIENT
Start: 2018-03-25 | End: 2018-03-25

## 2018-03-25 RX ORDER — OXYCODONE HYDROCHLORIDE 5 MG/1
10 TABLET ORAL EVERY 6 HOURS
Qty: 0 | Refills: 0 | Status: DISCONTINUED | OUTPATIENT
Start: 2018-03-25 | End: 2018-03-30

## 2018-03-25 RX ORDER — HEPARIN SODIUM 5000 [USP'U]/ML
5000 INJECTION INTRAVENOUS; SUBCUTANEOUS EVERY 8 HOURS
Qty: 0 | Refills: 0 | Status: DISCONTINUED | OUTPATIENT
Start: 2018-03-25 | End: 2018-03-30

## 2018-03-25 RX ORDER — OXYCODONE HYDROCHLORIDE 5 MG/1
5 TABLET ORAL EVERY 4 HOURS
Qty: 0 | Refills: 0 | Status: DISCONTINUED | OUTPATIENT
Start: 2018-03-25 | End: 2018-03-30

## 2018-03-25 RX ORDER — OXYCODONE HYDROCHLORIDE 5 MG/1
5 TABLET ORAL EVERY 4 HOURS
Qty: 0 | Refills: 0 | Status: DISCONTINUED | OUTPATIENT
Start: 2018-03-25 | End: 2018-03-25

## 2018-03-25 RX ADMIN — ONDANSETRON 4 MILLIGRAM(S): 8 TABLET, FILM COATED ORAL at 05:13

## 2018-03-25 RX ADMIN — OXYCODONE HYDROCHLORIDE 10 MILLIGRAM(S): 5 TABLET ORAL at 21:00

## 2018-03-25 RX ADMIN — HEPARIN SODIUM 5000 UNIT(S): 5000 INJECTION INTRAVENOUS; SUBCUTANEOUS at 05:10

## 2018-03-25 RX ADMIN — HEPARIN SODIUM 5000 UNIT(S): 5000 INJECTION INTRAVENOUS; SUBCUTANEOUS at 13:47

## 2018-03-25 RX ADMIN — SODIUM CHLORIDE 125 MILLILITER(S): 9 INJECTION INTRAMUSCULAR; INTRAVENOUS; SUBCUTANEOUS at 05:24

## 2018-03-25 RX ADMIN — SODIUM CHLORIDE 100 MILLILITER(S): 9 INJECTION, SOLUTION INTRAVENOUS at 10:06

## 2018-03-25 RX ADMIN — OXYCODONE HYDROCHLORIDE 5 MILLIGRAM(S): 5 TABLET ORAL at 11:30

## 2018-03-25 RX ADMIN — HEPARIN SODIUM 5000 UNIT(S): 5000 INJECTION INTRAVENOUS; SUBCUTANEOUS at 01:05

## 2018-03-25 RX ADMIN — BUDESONIDE AND FORMOTEROL FUMARATE DIHYDRATE 2 PUFF(S): 160; 4.5 AEROSOL RESPIRATORY (INHALATION) at 20:02

## 2018-03-25 RX ADMIN — OXYCODONE HYDROCHLORIDE 10 MILLIGRAM(S): 5 TABLET ORAL at 20:02

## 2018-03-25 RX ADMIN — LOSARTAN POTASSIUM 25 MILLIGRAM(S): 100 TABLET, FILM COATED ORAL at 05:10

## 2018-03-25 RX ADMIN — CEFTRIAXONE 1000 MILLIGRAM(S): 500 INJECTION, POWDER, FOR SOLUTION INTRAMUSCULAR; INTRAVENOUS at 10:07

## 2018-03-25 RX ADMIN — Medication 400 MILLIGRAM(S): at 23:02

## 2018-03-25 RX ADMIN — HEPARIN SODIUM 5000 UNIT(S): 5000 INJECTION INTRAVENOUS; SUBCUTANEOUS at 22:07

## 2018-03-25 RX ADMIN — CEFTRIAXONE 1000 MILLIGRAM(S): 500 INJECTION, POWDER, FOR SOLUTION INTRAMUSCULAR; INTRAVENOUS at 11:10

## 2018-03-25 RX ADMIN — Medication 400 MILLIGRAM(S): at 05:12

## 2018-03-25 RX ADMIN — OXYCODONE HYDROCHLORIDE 5 MILLIGRAM(S): 5 TABLET ORAL at 15:51

## 2018-03-25 RX ADMIN — TAMSULOSIN HYDROCHLORIDE 0.4 MILLIGRAM(S): 0.4 CAPSULE ORAL at 11:09

## 2018-03-25 RX ADMIN — SODIUM CHLORIDE 100 MILLILITER(S): 9 INJECTION, SOLUTION INTRAVENOUS at 13:44

## 2018-03-25 RX ADMIN — Medication 400 MILLIGRAM(S): at 18:01

## 2018-03-25 RX ADMIN — LOSARTAN POTASSIUM 25 MILLIGRAM(S): 100 TABLET, FILM COATED ORAL at 11:09

## 2018-03-25 NOTE — CONSULT NOTE ADULT - SUBJECTIVE AND OBJECTIVE BOX
Patient is a 67y old  Female who presents with a chief complaint of abdominal pain (24 Mar 2018 18:34)      HPI:  67 year old female with pmhx of HTN, hepatitis C, Bell's palsy presenting with sudden onset of abdominal pain 3/24. She reports the pain as 10/10, sharp in nature, radiating to back, worsened with breathing and improved with laying in bed and taking shallow breaths. She reports no fevers, chills, dysuria, urine color changes, discharge prior to event. (-) NV. In ED, wbc ct 16, UA positive, blood cx with KLPN, CT abdomen and pelvis demonstrated mild right hydro d/t right ureter calculus measuring 0.8 x 0.6 cm. She was eval by urology and cystoscopy was performed with ureteral stent placement, she was given IV rocephin.     PMH: as above    PSH: as above    Meds: per reconciliation sheet, noted below    MEDICATIONS  (STANDING):  buDESOnide  80 MICROgram(s)/formoterol 4.5 MICROgram(s) Inhaler 2 Puff(s) Inhalation two times a day  cefTRIAXone Injectable. 1000 milliGRAM(s) IV Push every 24 hours  heparin  Injectable 5000 Unit(s) SubCutaneous every 8 hours  lactated ringers. 1000 milliLiter(s) (100 mL/Hr) IV Continuous <Continuous>  losartan 25 milliGRAM(s) Oral daily  tamsulosin 0.4 milliGRAM(s) Oral daily      Allergies    morphine (Unknown)    Intolerances        Social: no smoking, no alcohol, no illegal drugs; no recent travel, no exposure to TB    Family history:  Family history of breast cancer (Sibling)      ROS:no HA, no dizziness, no sore throat, no blurry vision, no CP, no palpitations, no SOB, no cough, no abdominal pain, no diarrhea, no N/V, no leg pain, no claudication, no rash, no joint aches, no rectal pain or bleeding, no night sweats    Vital Signs Last 24 Hrs  T(C): 36.9 (25 Mar 2018 11:03), Max: 37.9 (24 Mar 2018 15:42)  T(F): 98.5 (25 Mar 2018 11:03), Max: 100.3 (24 Mar 2018 15:42)  HR: 82 (25 Mar 2018 11:03) (82 - 113)  BP: 148/72 (25 Mar 2018 11:03) (100/45 - 149/79)  BP(mean): --  RR: 20 (25 Mar 2018 11:03) (12 - 21)  SpO2: 95% (25 Mar 2018 11:03) (95% - 99%)      PE:  Constitutional: frail looking  HEENT: NC/AT, EOMI, PERRLA  Neck: supple  Back: no tenderness  Respiratory: clear  Cardiovascular: S1S2 regular, no murmurs  Abdomen: soft, not tender, not distended, positive BS  Genitourinary: deferred  Rectal: deferred  Musculoskeletal: no muscle tenderness, no joint swelling or tenderness  Extremities: no pedal edema  Neurological:  no focal deficits  Skin: no rashes    Labs:                        11.9   16.84 )-----------( 155      ( 25 Mar 2018 06:34 )             38.0     03-25    145  |  112<H>  |  20  ----------------------------<  93  3.5   |  24  |  0.97    Ca    7.7<L>      25 Mar 2018 06:34    TPro  5.9<L>  /  Alb  2.5<L>  /  TBili  1.0  /  DBili  x   /  AST  37  /  ALT  30  /  AlkPhos  91  03-25     LIVER FUNCTIONS - ( 25 Mar 2018 06:34 )  Alb: 2.5 g/dL / Pro: 5.9 gm/dL / ALK PHOS: 91 U/L / ALT: 30 U/L / AST: 37 U/L / GGT: x           Urinalysis Basic - ( 24 Mar 2018 09:13 )    Color: Yellow / Appearance: Clear / S.015 / pH: x  Gluc: x / Ketone: Negative  / Bili: Negative / Urobili: 1 mg/dL   Blood: x / Protein: 15 mg/dL / Nitrite: Positive   Leuk Esterase: Trace / RBC: 25 to 30 /HPF / WBC 6-10   Sq Epi: x / Non Sq Epi: x / Bacteria: TNTC      Culture - Blood (18 @ 14:14)    Gram Stain:   Growth in aerobic bottle: Gram Negative Rods    -  Klebsiella pneumoniae: Detec    Specimen Source: .Blood None    Organism: Blood Culture PCR    Culture Results:   Growth in aerobic bottle: Gram Negative Rods  "Due to technical problems, Proteus sp. will Not be reported as part of  the BCID panel until further notice"  ***Blood Panel PCR results on this specimen are available  approximately 3 hours after the Gram stain result.***  Gram stain, PCR, and/or culture results may not always  correspond due to difference in methodologies.  ************************************************************  This PCR assay was performed using [employer].  The following targets are tested for: Enterococcus,  vancomycin resistant enterococci, Listeria monocytogenes,  coagulase negative staphylococci, S. aureus,  methicillin resistant S. aureus, Streptococcus agalactiae  (Group B), S. pneumoniae, S. pyogenes (Group A),  Acinetobacter baumannii, Enterobacter cloacae, E. coli,  Klebsiella oxytoca, K. pneumoniae, Proteus sp.,  Serratia marcescens, Haemophilus influenzae,  Neisseria meningitidis, Pseudomonas aeruginosa, Candida  albicans, C. glabrata, C krusei, C parapsilosis,  C. tropicalis and the KPC resistance gene.    Organism Identification: Blood Culture PCR    Method Type: PCR          Radiology:    Advanced directives addressed: full resuscitation

## 2018-03-25 NOTE — BRIEF OPERATIVE NOTE - PROCEDURE
<<-----Click on this checkbox to enter Procedure Cystoscopy with insertion of stent  03/25/2018    Active  TSPEARS

## 2018-03-25 NOTE — CONSULT NOTE ADULT - ASSESSMENT
67 year old female with pmhx of HTN, hepatitis C, Bell's palsy presenting with sudden onset of abdominal pain 3/24. In ED, wbc ct 16, UA positive, blood cx with KLPN, CT abdomen and pelvis demonstrated mild right hydro d/t right ureter calculus measuring 0.8 x 0.6 cm. She was eval by urology and cystoscopy was performed with ureteral stent placement, she was given IV rocephin.     1. KLPN sepsis/R ureteral stone w/ hydro/UTI  - agree with rocephin increase to [id number]  - blood cx growing KLPN   - f/u sensitivities  - repeat cultures  - f/u urine cx  - monitor temps  -tolerating abx well so far; no side effects noted  -reason for abx use and side effects reviewed with patient  - supportive care  - f/u cbc    2. other issues - care per medicine

## 2018-03-26 LAB
-  AMIKACIN: SIGNIFICANT CHANGE UP
-  AMPICILLIN/SULBACTAM: SIGNIFICANT CHANGE UP
-  AMPICILLIN: SIGNIFICANT CHANGE UP
-  AZTREONAM: SIGNIFICANT CHANGE UP
-  CEFAZOLIN: SIGNIFICANT CHANGE UP
-  CEFEPIME: SIGNIFICANT CHANGE UP
-  CEFOXITIN: SIGNIFICANT CHANGE UP
-  CEFTRIAXONE: SIGNIFICANT CHANGE UP
-  CIPROFLOXACIN: SIGNIFICANT CHANGE UP
-  ERTAPENEM: SIGNIFICANT CHANGE UP
-  GENTAMICIN: SIGNIFICANT CHANGE UP
-  IMIPENEM: SIGNIFICANT CHANGE UP
-  LEVOFLOXACIN: SIGNIFICANT CHANGE UP
-  MEROPENEM: SIGNIFICANT CHANGE UP
-  PIPERACILLIN/TAZOBACTAM: SIGNIFICANT CHANGE UP
-  TOBRAMYCIN: SIGNIFICANT CHANGE UP
-  TRIMETHOPRIM/SULFAMETHOXAZOLE: SIGNIFICANT CHANGE UP
ANION GAP SERPL CALC-SCNC: 8 MMOL/L — SIGNIFICANT CHANGE UP (ref 5–17)
BUN SERPL-MCNC: 17 MG/DL — SIGNIFICANT CHANGE UP (ref 7–23)
CALCIUM SERPL-MCNC: 8.4 MG/DL — LOW (ref 8.5–10.1)
CHLORIDE SERPL-SCNC: 109 MMOL/L — HIGH (ref 96–108)
CO2 SERPL-SCNC: 25 MMOL/L — SIGNIFICANT CHANGE UP (ref 22–31)
CREAT SERPL-MCNC: 0.85 MG/DL — SIGNIFICANT CHANGE UP (ref 0.5–1.3)
CULTURE RESULTS: NO GROWTH — SIGNIFICANT CHANGE UP
CULTURE RESULTS: SIGNIFICANT CHANGE UP
GLUCOSE SERPL-MCNC: 109 MG/DL — HIGH (ref 70–99)
HCT VFR BLD CALC: 38.6 % — SIGNIFICANT CHANGE UP (ref 34.5–45)
HGB BLD-MCNC: 12.3 G/DL — SIGNIFICANT CHANGE UP (ref 11.5–15.5)
MCHC RBC-ENTMCNC: 27.5 PG — SIGNIFICANT CHANGE UP (ref 27–34)
MCHC RBC-ENTMCNC: 31.9 GM/DL — LOW (ref 32–36)
MCV RBC AUTO: 86.4 FL — SIGNIFICANT CHANGE UP (ref 80–100)
METHOD TYPE: SIGNIFICANT CHANGE UP
NRBC # BLD: 0 /100 WBCS — SIGNIFICANT CHANGE UP (ref 0–0)
ORGANISM # SPEC MICROSCOPIC CNT: SIGNIFICANT CHANGE UP
PLATELET # BLD AUTO: 168 K/UL — SIGNIFICANT CHANGE UP (ref 150–400)
POTASSIUM SERPL-MCNC: 4.2 MMOL/L — SIGNIFICANT CHANGE UP (ref 3.5–5.3)
POTASSIUM SERPL-SCNC: 4.2 MMOL/L — SIGNIFICANT CHANGE UP (ref 3.5–5.3)
RBC # BLD: 4.47 M/UL — SIGNIFICANT CHANGE UP (ref 3.8–5.2)
RBC # FLD: 13.2 % — SIGNIFICANT CHANGE UP (ref 10.3–14.5)
SODIUM SERPL-SCNC: 142 MMOL/L — SIGNIFICANT CHANGE UP (ref 135–145)
SPECIMEN SOURCE: SIGNIFICANT CHANGE UP
SPECIMEN SOURCE: SIGNIFICANT CHANGE UP
WBC # BLD: 19.15 K/UL — HIGH (ref 3.8–10.5)
WBC # FLD AUTO: 19.15 K/UL — HIGH (ref 3.8–10.5)

## 2018-03-26 RX ORDER — SIMETHICONE 80 MG/1
80 TABLET, CHEWABLE ORAL ONCE
Qty: 0 | Refills: 0 | Status: COMPLETED | OUTPATIENT
Start: 2018-03-26 | End: 2018-03-26

## 2018-03-26 RX ORDER — ALBUTEROL 90 UG/1
2.5 AEROSOL, METERED ORAL EVERY 6 HOURS
Qty: 0 | Refills: 0 | Status: DISCONTINUED | OUTPATIENT
Start: 2018-03-26 | End: 2018-03-30

## 2018-03-26 RX ADMIN — HEPARIN SODIUM 5000 UNIT(S): 5000 INJECTION INTRAVENOUS; SUBCUTANEOUS at 05:40

## 2018-03-26 RX ADMIN — ALBUTEROL 2.5 MILLIGRAM(S): 90 AEROSOL, METERED ORAL at 09:18

## 2018-03-26 RX ADMIN — Medication 400 MILLIGRAM(S): at 17:59

## 2018-03-26 RX ADMIN — CEFTRIAXONE 2000 MILLIGRAM(S): 500 INJECTION, POWDER, FOR SOLUTION INTRAMUSCULAR; INTRAVENOUS at 11:53

## 2018-03-26 RX ADMIN — TAMSULOSIN HYDROCHLORIDE 0.4 MILLIGRAM(S): 0.4 CAPSULE ORAL at 11:54

## 2018-03-26 RX ADMIN — SIMETHICONE 80 MILLIGRAM(S): 80 TABLET, CHEWABLE ORAL at 23:07

## 2018-03-26 RX ADMIN — Medication 400 MILLIGRAM(S): at 00:00

## 2018-03-26 RX ADMIN — LOSARTAN POTASSIUM 25 MILLIGRAM(S): 100 TABLET, FILM COATED ORAL at 05:40

## 2018-03-26 RX ADMIN — Medication 400 MILLIGRAM(S): at 05:52

## 2018-03-26 RX ADMIN — Medication 400 MILLIGRAM(S): at 23:07

## 2018-03-26 RX ADMIN — BUDESONIDE AND FORMOTEROL FUMARATE DIHYDRATE 2 PUFF(S): 160; 4.5 AEROSOL RESPIRATORY (INHALATION) at 09:02

## 2018-03-26 RX ADMIN — OXYCODONE HYDROCHLORIDE 10 MILLIGRAM(S): 5 TABLET ORAL at 17:59

## 2018-03-26 RX ADMIN — HEPARIN SODIUM 5000 UNIT(S): 5000 INJECTION INTRAVENOUS; SUBCUTANEOUS at 14:25

## 2018-03-26 RX ADMIN — Medication 400 MILLIGRAM(S): at 11:53

## 2018-03-26 RX ADMIN — HEPARIN SODIUM 5000 UNIT(S): 5000 INJECTION INTRAVENOUS; SUBCUTANEOUS at 23:07

## 2018-03-26 RX ADMIN — BUDESONIDE AND FORMOTEROL FUMARATE DIHYDRATE 2 PUFF(S): 160; 4.5 AEROSOL RESPIRATORY (INHALATION) at 20:39

## 2018-03-27 LAB
CULTURE RESULTS: SIGNIFICANT CHANGE UP
SPECIMEN SOURCE: SIGNIFICANT CHANGE UP

## 2018-03-27 RX ADMIN — HEPARIN SODIUM 5000 UNIT(S): 5000 INJECTION INTRAVENOUS; SUBCUTANEOUS at 17:53

## 2018-03-27 RX ADMIN — BUDESONIDE AND FORMOTEROL FUMARATE DIHYDRATE 2 PUFF(S): 160; 4.5 AEROSOL RESPIRATORY (INHALATION) at 08:19

## 2018-03-27 RX ADMIN — CEFTRIAXONE 2000 MILLIGRAM(S): 500 INJECTION, POWDER, FOR SOLUTION INTRAMUSCULAR; INTRAVENOUS at 11:10

## 2018-03-27 RX ADMIN — Medication 400 MILLIGRAM(S): at 13:00

## 2018-03-27 RX ADMIN — TAMSULOSIN HYDROCHLORIDE 0.4 MILLIGRAM(S): 0.4 CAPSULE ORAL at 11:44

## 2018-03-27 RX ADMIN — Medication 400 MILLIGRAM(S): at 17:53

## 2018-03-27 RX ADMIN — OXYCODONE HYDROCHLORIDE 10 MILLIGRAM(S): 5 TABLET ORAL at 12:00

## 2018-03-27 RX ADMIN — Medication 400 MILLIGRAM(S): at 18:52

## 2018-03-27 RX ADMIN — Medication 400 MILLIGRAM(S): at 11:10

## 2018-03-27 RX ADMIN — Medication 400 MILLIGRAM(S): at 23:05

## 2018-03-27 RX ADMIN — BUDESONIDE AND FORMOTEROL FUMARATE DIHYDRATE 2 PUFF(S): 160; 4.5 AEROSOL RESPIRATORY (INHALATION) at 21:09

## 2018-03-27 RX ADMIN — OXYCODONE HYDROCHLORIDE 10 MILLIGRAM(S): 5 TABLET ORAL at 11:15

## 2018-03-27 RX ADMIN — Medication 400 MILLIGRAM(S): at 05:39

## 2018-03-27 RX ADMIN — ONDANSETRON 4 MILLIGRAM(S): 8 TABLET, FILM COATED ORAL at 11:10

## 2018-03-27 RX ADMIN — HEPARIN SODIUM 5000 UNIT(S): 5000 INJECTION INTRAVENOUS; SUBCUTANEOUS at 23:06

## 2018-03-27 RX ADMIN — HEPARIN SODIUM 5000 UNIT(S): 5000 INJECTION INTRAVENOUS; SUBCUTANEOUS at 05:39

## 2018-03-27 RX ADMIN — LOSARTAN POTASSIUM 25 MILLIGRAM(S): 100 TABLET, FILM COATED ORAL at 05:39

## 2018-03-28 DIAGNOSIS — N23 UNSPECIFIED RENAL COLIC: ICD-10-CM

## 2018-03-28 LAB
HCT VFR BLD CALC: 39.9 % — SIGNIFICANT CHANGE UP (ref 34.5–45)
HGB BLD-MCNC: 12.8 G/DL — SIGNIFICANT CHANGE UP (ref 11.5–15.5)
MCHC RBC-ENTMCNC: 27.6 PG — SIGNIFICANT CHANGE UP (ref 27–34)
MCHC RBC-ENTMCNC: 32.1 GM/DL — SIGNIFICANT CHANGE UP (ref 32–36)
MCV RBC AUTO: 86.2 FL — SIGNIFICANT CHANGE UP (ref 80–100)
NRBC # BLD: 0 /100 WBCS — SIGNIFICANT CHANGE UP (ref 0–0)
PLATELET # BLD AUTO: 188 K/UL — SIGNIFICANT CHANGE UP (ref 150–400)
RBC # BLD: 4.63 M/UL — SIGNIFICANT CHANGE UP (ref 3.8–5.2)
RBC # FLD: 13 % — SIGNIFICANT CHANGE UP (ref 10.3–14.5)
WBC # BLD: 7.2 K/UL — SIGNIFICANT CHANGE UP (ref 3.8–10.5)
WBC # FLD AUTO: 7.2 K/UL — SIGNIFICANT CHANGE UP (ref 3.8–10.5)

## 2018-03-28 RX ORDER — CEFUROXIME AXETIL 250 MG
500 TABLET ORAL EVERY 12 HOURS
Qty: 0 | Refills: 0 | Status: DISCONTINUED | OUTPATIENT
Start: 2018-03-28 | End: 2018-03-30

## 2018-03-28 RX ORDER — NYSTATIN CREAM 100000 [USP'U]/G
1 CREAM TOPICAL
Qty: 0 | Refills: 0 | Status: DISCONTINUED | OUTPATIENT
Start: 2018-03-28 | End: 2018-03-30

## 2018-03-28 RX ADMIN — BUDESONIDE AND FORMOTEROL FUMARATE DIHYDRATE 2 PUFF(S): 160; 4.5 AEROSOL RESPIRATORY (INHALATION) at 08:29

## 2018-03-28 RX ADMIN — Medication 400 MILLIGRAM(S): at 12:07

## 2018-03-28 RX ADMIN — NYSTATIN CREAM 1 APPLICATION(S): 100000 CREAM TOPICAL at 18:14

## 2018-03-28 RX ADMIN — Medication 500 MILLIGRAM(S): at 18:14

## 2018-03-28 RX ADMIN — Medication 400 MILLIGRAM(S): at 18:14

## 2018-03-28 RX ADMIN — HEPARIN SODIUM 5000 UNIT(S): 5000 INJECTION INTRAVENOUS; SUBCUTANEOUS at 12:08

## 2018-03-28 RX ADMIN — TAMSULOSIN HYDROCHLORIDE 0.4 MILLIGRAM(S): 0.4 CAPSULE ORAL at 12:07

## 2018-03-28 RX ADMIN — HEPARIN SODIUM 5000 UNIT(S): 5000 INJECTION INTRAVENOUS; SUBCUTANEOUS at 22:10

## 2018-03-28 RX ADMIN — HEPARIN SODIUM 5000 UNIT(S): 5000 INJECTION INTRAVENOUS; SUBCUTANEOUS at 05:57

## 2018-03-28 RX ADMIN — LOSARTAN POTASSIUM 25 MILLIGRAM(S): 100 TABLET, FILM COATED ORAL at 05:57

## 2018-03-28 RX ADMIN — BUDESONIDE AND FORMOTEROL FUMARATE DIHYDRATE 2 PUFF(S): 160; 4.5 AEROSOL RESPIRATORY (INHALATION) at 19:28

## 2018-03-29 LAB
CULTURE RESULTS: SIGNIFICANT CHANGE UP
SPECIMEN SOURCE: SIGNIFICANT CHANGE UP

## 2018-03-29 RX ORDER — LOSARTAN POTASSIUM 100 MG/1
25 TABLET, FILM COATED ORAL ONCE
Qty: 0 | Refills: 0 | Status: COMPLETED | OUTPATIENT
Start: 2018-03-29 | End: 2018-03-29

## 2018-03-29 RX ORDER — LOSARTAN POTASSIUM 100 MG/1
50 TABLET, FILM COATED ORAL DAILY
Qty: 0 | Refills: 0 | Status: DISCONTINUED | OUTPATIENT
Start: 2018-03-29 | End: 2018-03-30

## 2018-03-29 RX ORDER — DOCUSATE SODIUM 100 MG
100 CAPSULE ORAL ONCE
Qty: 0 | Refills: 0 | Status: COMPLETED | OUTPATIENT
Start: 2018-03-29 | End: 2018-03-29

## 2018-03-29 RX ORDER — AMLODIPINE BESYLATE 2.5 MG/1
5 TABLET ORAL DAILY
Qty: 0 | Refills: 0 | Status: DISCONTINUED | OUTPATIENT
Start: 2018-03-29 | End: 2018-03-30

## 2018-03-29 RX ADMIN — Medication 100 MILLIGRAM(S): at 21:56

## 2018-03-29 RX ADMIN — HEPARIN SODIUM 5000 UNIT(S): 5000 INJECTION INTRAVENOUS; SUBCUTANEOUS at 14:47

## 2018-03-29 RX ADMIN — NYSTATIN CREAM 1 APPLICATION(S): 100000 CREAM TOPICAL at 17:28

## 2018-03-29 RX ADMIN — Medication 400 MILLIGRAM(S): at 17:28

## 2018-03-29 RX ADMIN — Medication 400 MILLIGRAM(S): at 11:02

## 2018-03-29 RX ADMIN — HEPARIN SODIUM 5000 UNIT(S): 5000 INJECTION INTRAVENOUS; SUBCUTANEOUS at 21:56

## 2018-03-29 RX ADMIN — AMLODIPINE BESYLATE 5 MILLIGRAM(S): 2.5 TABLET ORAL at 16:41

## 2018-03-29 RX ADMIN — HEPARIN SODIUM 5000 UNIT(S): 5000 INJECTION INTRAVENOUS; SUBCUTANEOUS at 05:21

## 2018-03-29 RX ADMIN — Medication 400 MILLIGRAM(S): at 11:45

## 2018-03-29 RX ADMIN — Medication 500 MILLIGRAM(S): at 05:21

## 2018-03-29 RX ADMIN — Medication 400 MILLIGRAM(S): at 18:39

## 2018-03-29 RX ADMIN — LOSARTAN POTASSIUM 25 MILLIGRAM(S): 100 TABLET, FILM COATED ORAL at 05:21

## 2018-03-29 RX ADMIN — BUDESONIDE AND FORMOTEROL FUMARATE DIHYDRATE 2 PUFF(S): 160; 4.5 AEROSOL RESPIRATORY (INHALATION) at 21:00

## 2018-03-29 RX ADMIN — LOSARTAN POTASSIUM 25 MILLIGRAM(S): 100 TABLET, FILM COATED ORAL at 16:41

## 2018-03-29 RX ADMIN — Medication 500 MILLIGRAM(S): at 17:28

## 2018-03-29 RX ADMIN — TAMSULOSIN HYDROCHLORIDE 0.4 MILLIGRAM(S): 0.4 CAPSULE ORAL at 12:37

## 2018-03-29 RX ADMIN — NYSTATIN CREAM 1 APPLICATION(S): 100000 CREAM TOPICAL at 05:20

## 2018-03-29 NOTE — PHYSICAL THERAPY INITIAL EVALUATION ADULT - PERTINENT HX OF CURRENT PROBLEM, REHAB EVAL
68 yo F admitted with abdominal pain on 3/24. Patient states she was getting out of bed when pain started. CT abdomen and pelvis demonstrated mild right hydronephrosis secondary to a proximal right ureter calculus measuring 0.8 x 0.6 cm. s/p right ureteral stent,  blood cx growing Klebsiella pneumoniae.

## 2018-03-29 NOTE — PROGRESS NOTE ADULT - ASSESSMENT
67 year old female with pmhx of HTN, hepatitis C, Bell's palsy presenting with sudden onset of abdominal pain 3/24. In ED, wbc ct 16, UA positive, blood cx with KLPN, CT abdomen and pelvis demonstrated mild right hydro d/t right ureter calculus measuring 0.8 x 0.6 cm. She was eval by urology and cystoscopy was performed with ureteral stent placement, she was given IV rocephin.     1. KLPN sepsis/R ureteral stone w/ hydro/UTI  - slowly improving  - on rocephin increase to [id number] #3  - continue with antibiotic coverage  - blood cx growing KLPN   - f/u sensitivities  - repeat cultures pending  - f/u urine cx  - monitor temps  -tolerating abx well so far; no side effects noted  -reason for abx use and side effects reviewed with patient  - supportive care  - f/u cbc    2. other issues - care per medicine
67 year old female with pmhx of HTN, hepatitis C, Bell's palsy presenting with sudden onset of abdominal pain 3/24. In ED, wbc ct 16, UA positive, blood cx with KLPN, CT abdomen and pelvis demonstrated mild right hydro d/t right ureter calculus measuring 0.8 x 0.6 cm. She was eval by urology and cystoscopy was performed with ureteral stent placement, she was given IV rocephin.     1. KLPN sepsis/R ureteral stone w/ hydro/UTI  - slowly improving  - completed 4 days of rocephin  - continue with antibiotic coverage  - blood cx growing KLPN   - sensitivities reviewed  - repeat cultures 3/26 no growth  - switch to po ceftin [id number] to complete until 4/4  - monitor temps  -tolerating abx well so far; no side effects noted  -reason for abx use and side effects reviewed with patient  - supportive care  - f/u cbc    2. other issues - care per medicine
67 year old female with sudden onset of abdominal pain in setting of obstructing renal stone    1. Obstructing renal stone in right proximal ureter with noted hydronephrosis + Klebsiella pneumoniae sepsis resolving: s/p stent by Dr. Kirkland on 3/25  -tamsulosin 0.4 mg po dailly  -morphine anaphylactic allergy; will use IV Tylenol and NSAIDs for now  -monitor urine output  -urine and blood cultures sent: blood cx growing Klebsiella pneumoniae in aerobic bottle; ID consult appreciated, Rocephin changed to po ceftin to cont till 4/4  -lactate normalized    2. h/o HTN, poor medical followup, noted elevated in ED, likely pain contributing.   -cont losartan 25 mg po daily  -c/w pain control  - will keep hydralazine IV prn for SBP > 180 mmg Hg for now    3. Asthma, admits to daily albuterol in haler use, no controller medications  -c/w albuterol nebs prn  -will give one dose albuterol inhaler now  - Symbicort daily bid    4. Bell's palsy dx over 10 years ago, patient never sought medical care  -would recommend outpatient neurology followup    5. Hepatitis C, noted cirrhotic changes on CT abdomen and pelvis  -outpatient GI followup recommended for possible antiviral treatment  -no signs of decompensated liver failure    6. Gen weakness: PT consult awaited    7. Nausea and vomiting , resolved: zofran prn for now, monitor    DVT ppx: heparin s/q    poc discussed with pt, team
67 year old female with sudden onset of abdominal pain in setting of obstructing renal stone    1. Obstructing renal stone in right proximal ureter with noted hydronephrosis + Klebsiella pneumoniae sepsis resolving: s/p stent by Dr. Kirkland on 3/25  -tamsulosin 0.4 mg po dailly  -morphine anaphylactic allergy; will use IV Tylenol and NSAIDs for now  -monitor urine output  -urine and blood cultures sent: blood cx growing Klebsiella pneumoniae in aerobic bottle; ID consult appreciated, Rocephin changed to po ceftin to cont till 4/4  -lactate normalized    2. h/o HTN, poor medical followup: uncontrolled /97  -increase losartan to 50 mg po daily  - add amlodipine 5 mg po daily from 3/29  - monitor closely  - will keep hydralazine IV prn for SBP > 180 mmg Hg for now    3. Asthma, admits to daily albuterol in haler use, no controller medications  -c/w albuterol nebs prn  -will give one dose albuterol inhaler now  - Symbicort daily bid    4. Bell's palsy dx over 10 years ago, patient never sought medical care  -would recommend outpatient neurology followup    5. Hepatitis C, noted cirrhotic changes on CT abdomen and pelvis  -outpatient GI followup recommended for possible antiviral treatment  -no signs of decompensated liver failure    6. Gen weakness: PT consult awaited    7. Nausea and vomiting , resolved: zofran prn for now, monitor    DVT ppx: heparin s/q    poc discussed with pt, team    DISPO: if BP better controlled then d/c home on 3/29
67 year old female with sudden onset of abdominal pain in setting of obstructing renal stone    1. obstructing renal stone in right proximal ureter with noted hydronephrosis: s/p stent by Dr. Kirkland on 3/25  -c/w maintenance IV fluids at 125 cc per hour  -tamsulosin 0.4 mg po dailly  -morphine anaphylactic allergy; will use IV tyelenol and NSAIDs for now  -monitor urine output  -urine and blood cultures sent: blood cx growing gram neg rods in aerobic bottle; ID consult, cont Rocephin for now  -lactate normalized  -c/w IV ceftriaxone for now    2. h/o HTN, poor medical followup, noted elevated in ED, likely pain contributing.   -cont losartan 25 mg po daily  -c/w pain control  - will keep hydralazine IV prn for SBP > 180 mmg Hg for now    3. Asthma, admits to daily albuterol in haler use, no controller medications  -c/w albuterol nebs prn  -will give one dose albuterol inhaler now  - Symbicort daily bid    4. Bell's palsy dx over 10 years ago, patient never sought medical care  -would recommend outpatient neurology followup    5. Hepatitis C, noted cirrhotic changes on CT abdomen and pelvis  -outpatient GI followup recommended for possible antiviral treatment  -no signs of decompensated liver failure    DVT ppx: heparin s/q    poc discussed with pt, team
67 year old female with sudden onset of abdominal pain in setting of obstructing renal stone    1. obstructing renal stone in right proximal ureter with noted hydronephrosis: s/p stent by Dr. Kirkland on 3/25  -tamsulosin 0.4 mg po dailly  -morphine anaphylactic allergy; will use IV tyelenol and NSAIDs for now  -monitor urine output  -urine and blood cultures sent: blood cx growing Klebsiella pneumoniae in aerobic bottle; ID consult appreciated, Rocephin increased to 2 g iv q 24 hrs   -lactate normalized    2. h/o HTN, poor medical followup, noted elevated in ED, likely pain contributing.   -cont losartan 25 mg po daily  -c/w pain control  - will keep hydralazine IV prn for SBP > 180 mmg Hg for now    3. Asthma, admits to daily albuterol in haler use, no controller medications  -c/w albuterol nebs prn  -will give one dose albuterol inhaler now  - Symbicort daily bid    4. Bell's palsy dx over 10 years ago, patient never sought medical care  -would recommend outpatient neurology followup    5. Hepatitis C, noted cirrhotic changes on CT abdomen and pelvis  -outpatient GI followup recommended for possible antiviral treatment  -no signs of decompensated liver failure    6. Gen weakness: PT consult    DVT ppx: heparin s/q    poc discussed with pt, team
67 year old female with sudden onset of abdominal pain in setting of obstructing renal stone    1. obstructing renal stone in right proximal ureter with noted hydronephrosis: s/p stent by Dr. Kirkland on 3/25  -tamsulosin 0.4 mg po dailly  -morphine anaphylactic allergy; will use IV tyelenol and NSAIDs for now  -monitor urine output  -urine and blood cultures sent: blood cx growing Klebsiella pneumoniae in aerobic bottle; ID consult appreciated, Rocephin increased to 2 g iv q 24 hrs , continue as per ID  -lactate normalized    2. h/o HTN, poor medical followup, noted elevated in ED, likely pain contributing.   -cont losartan 25 mg po daily  -c/w pain control  - will keep hydralazine IV prn for SBP > 180 mmg Hg for now    3. Asthma, admits to daily albuterol in haler use, no controller medications  -c/w albuterol nebs prn  -will give one dose albuterol inhaler now  - Symbicort daily bid    4. Bell's palsy dx over 10 years ago, patient never sought medical care  -would recommend outpatient neurology followup    5. Hepatitis C, noted cirrhotic changes on CT abdomen and pelvis  -outpatient GI followup recommended for possible antiviral treatment  -no signs of decompensated liver failure    6. Gen weakness: PT consult    7. Nausea and vomiting x 1 today: zofran prn for now, monitor    DVT ppx: heparin s/q    poc discussed with pt, team

## 2018-03-29 NOTE — PHYSICAL THERAPY INITIAL EVALUATION ADULT - GENERAL OBSERVATIONS, REHAB EVAL
Patient received sitting on the edge of the bed, sister at bedside (also a patient in the hospital at present). +HM. Patient denied pain.

## 2018-03-29 NOTE — PROGRESS NOTE ADULT - SUBJECTIVE AND OBJECTIVE BOX
Date of service: 03-26-18 @ 13:10    pt seen and examined  has some R flank pain  no dysuria  feels slightly better      ROS: no fever or chills; denies dizziness, no HA, no SOB or cough, no abdominal pain, no diarrhea or constipation;no legs pain, no rashes    MEDICATIONS  (STANDING):  buDESOnide  80 MICROgram(s)/formoterol 4.5 MICROgram(s) Inhaler 2 Puff(s) Inhalation two times a day  cefTRIAXone Injectable. 2000 milliGRAM(s) IV Push every 24 hours  heparin  Injectable 5000 Unit(s) SubCutaneous every 8 hours  ibuprofen  Tablet 400 milliGRAM(s) Oral four times a day  losartan 25 milliGRAM(s) Oral daily  tamsulosin 0.4 milliGRAM(s) Oral daily      Vital Signs Last 24 Hrs  T(C): 36.8 (26 Mar 2018 09:46), Max: 36.9 (25 Mar 2018 17:09)  T(F): 98.2 (26 Mar 2018 09:46), Max: 98.5 (25 Mar 2018 19:35)  HR: 90 (26 Mar 2018 09:46) (72 - 100)  BP: 103/48 (26 Mar 2018 09:46) (103/48 - 143/73)  BP(mean): --  RR: 18 (26 Mar 2018 09:46) (18 - 20)  SpO2: 94% (26 Mar 2018 09:46) (72% - 96%)        PE:  Constitutional: frail looking  HEENT: NC/AT, EOMI, PERRLA  Neck: supple  Back: no tenderness  Respiratory: clear  Cardiovascular: S1S2 regular, no murmurs  Abdomen: soft, not tender, not distended, positive BS  Genitourinary: deferred  Rectal: deferred  Musculoskeletal: no muscle tenderness, no joint swelling or tenderness  Extremities: no pedal edema  Neurological:  no focal deficits  Skin: no rashes    Labs:                                   12.3   19.15 )-----------( 168      ( 26 Mar 2018 06:53 )             38.6     03-26    142  |  109<H>  |  17  ----------------------------<  109<H>  4.2   |  25  |  0.85    Ca    8.4<L>      26 Mar 2018 06:53    TPro  5.9<L>  /  Alb  2.5<L>  /  TBili  1.0  /  DBili  x   /  AST  37  /  ALT  30  /  AlkPhos  91  03-25             Culture - Blood (03.24.18 @ 14:14)    Gram Stain:   Growth in aerobic bottle: Gram Negative Rods    -  Klebsiella pneumoniae: Detec    Specimen Source: .Blood None    Organism: Blood Culture PCR    Culture Results:   Growth in aerobic bottle: Gram Negative Rods  "Due to technical problems, Proteus sp. will Not be reported as part of  the BCID panel until further notice"  ***Blood Panel PCR results on this specimen are available  approximately 3 hours after the Gram stain result.***  Gram stain, PCR, and/or culture results may not always  correspond due to difference in methodologies.  ************************************************************  This PCR assay was performed using [employer].  The following targets are tested for: Enterococcus,  vancomycin resistant enterococci, Listeria monocytogenes,  coagulase negative staphylococci, S. aureus,  methicillin resistant S. aureus, Streptococcus agalactiae  (Group B), S. pneumoniae, S. pyogenes (Group A),  Acinetobacter baumannii, Enterobacter cloacae, E. coli,  Klebsiella oxytoca, K. pneumoniae, Proteus sp.,  Serratia marcescens, Haemophilus influenzae,  Neisseria meningitidis, Pseudomonas aeruginosa, Candida  albicans, C. glabrata, C krusei, C parapsilosis,  C. tropicalis and the KPC resistance gene.    Organism Identification: Blood Culture PCR    Method Type: PCR          Radiology:    Advanced directives addressed: full resuscitation
CHIEF COMPLAINT: Status post placement of right JJ stent by me for obstructing stone.     HISTORY OF PRESENT ILLNESS: Pt currently comfortable. For discharge home near future and I will follow up as outpatient for future treatment of stone and remval of JJ stent.    PAST MEDICAL & SURGICAL HISTORY:  Essential hypertension  Asthma, unspecified asthma severity, unspecified whether complicated, unspecified whether persistent  Hepatitis C  Bell's palsy  No significant past surgical history      REVIEW OF SYSTEMS:Same previous  MEDICATIONS  (STANDING):  buDESOnide  80 MICROgram(s)/formoterol 4.5 MICROgram(s) Inhaler 2 Puff(s) Inhalation two times a day  cefuroxime   Tablet 500 milliGRAM(s) Oral every 12 hours  heparin  Injectable 5000 Unit(s) SubCutaneous every 8 hours  ibuprofen  Tablet 400 milliGRAM(s) Oral four times a day  losartan 25 milliGRAM(s) Oral daily  nystatin Powder 1 Application(s) Topical two times a day  tamsulosin 0.4 milliGRAM(s) Oral daily    MEDICATIONS  (PRN):  ALBUTerol    0.083% 2.5 milliGRAM(s) Nebulizer every 6 hours PRN Shortness of Breath and/or Wheezing  hydrALAZINE Injectable 10 milliGRAM(s) IV Push three times a day PRN for SBP > 180 mm Hg, please notify MD before dose  ondansetron Injectable 4 milliGRAM(s) IV Push every 6 hours PRN Nausea and/or Vomiting  oxyCODONE    IR 5 milliGRAM(s) Oral every 4 hours PRN For moderate pain  oxyCODONE    IR 10 milliGRAM(s) Oral every 6 hours PRN For severe pain      PE:Same Previous    Allergies    morphine (Unknown)    Intolerances        SOCIAL HISTORY:Same previous    FAMILY HISTORY:  Family history of breast cancer (Sibling)      Vital Signs Last 24 Hrs  T(C): 36.6 (28 Mar 2018 17:25), Max: 37.1 (27 Mar 2018 21:39)  T(F): 97.9 (28 Mar 2018 17:25), Max: 98.7 (27 Mar 2018 21:39)  HR: 79 (28 Mar 2018 17:25) (72 - 87)  BP: 161/80 (28 Mar 2018 17:25) (120/60 - 163/79)  BP(mean): --  RR: 18 (28 Mar 2018 17:25) (18 - 18)  SpO2: 97% (28 Mar 2018 17:25) (96% - 98%)    PHYSICAL EXAM:Same previous    LABS:                        12.8   7.20  )-----------( 188      ( 28 Mar 2018 07:32 )             39.9               Urine Culture:     RADIOLOGY & ADDITIONAL STUDIES:
Date of service: 03-28-18 @ 11:34    pt seen and examined  flank pain, n/v resolved  no dysuria  feels slightly better    ROS: no fever or chills; denies dizziness, no HA, no SOB or cough, no abdominal pain, no diarrhea or constipation; no dysuria, no urinary frequency, no legs pain, no rashes    MEDICATIONS  (STANDING):  buDESOnide  80 MICROgram(s)/formoterol 4.5 MICROgram(s) Inhaler 2 Puff(s) Inhalation two times a day  cefuroxime   Tablet 500 milliGRAM(s) Oral every 12 hours  heparin  Injectable 5000 Unit(s) SubCutaneous every 8 hours  ibuprofen  Tablet 400 milliGRAM(s) Oral four times a day  losartan 25 milliGRAM(s) Oral daily  tamsulosin 0.4 milliGRAM(s) Oral daily      Vital Signs Last 24 Hrs  T(C): 36.7 (28 Mar 2018 09:50), Max: 37.1 (27 Mar 2018 21:39)  T(F): 98.1 (28 Mar 2018 09:50), Max: 98.7 (27 Mar 2018 21:39)  HR: 87 (28 Mar 2018 09:50) (70 - 87)  BP: 149/64 (28 Mar 2018 09:50) (118/56 - 163/79)  BP(mean): --  RR: 18 (28 Mar 2018 09:50) (18 - 18)  SpO2: 98% (28 Mar 2018 09:50) (96% - 98%)        PE:  Constitutional: frail looking  HEENT: NC/AT, EOMI, PERRLA  Neck: supple  Back: no tenderness  Respiratory: clear  Cardiovascular: S1S2 regular, no murmurs  Abdomen: soft, not tender, not distended, positive BS  Genitourinary: deferred  Rectal: deferred  Musculoskeletal: no muscle tenderness, no joint swelling or tenderness  Extremities: no pedal edema  Neurological:  no focal deficits  Skin: no rashes    Labs:                                              12.8   7.20  )-----------( 188      ( 28 Mar 2018 07:32 )             39.9                 Culture - Blood (03.26.18 @ 12:18)    Specimen Source: .Blood None    Culture Results:   No growth to date.    Culture - Blood (03.24.18 @ 14:14)    Gram Stain:   Growth in aerobic bottle: Gram Negative Rods    -  Klebsiella pneumoniae: Detec    Specimen Source: .Blood None    Organism: Blood Culture PCR    Culture Results:   Growth in aerobic bottle: Gram Negative Rods  "Due to technical problems, Proteus sp. will Not be reported as part of  the BCID panel until further notice"  ***Blood Panel PCR results on this specimen are available  approximately 3 hours after the Gram stain result.***  Gram stain, PCR, and/or culture results may not always  correspond due to difference in methodologies.  ************************************************************  This PCR assay was performed using [employer].  The following targets are tested for: Enterococcus,  vancomycin resistant enterococci, Listeria monocytogenes,  coagulase negative staphylococci, S. aureus,  methicillin resistant S. aureus, Streptococcus agalactiae  (Group B), S. pneumoniae, S. pyogenes (Group A),  Acinetobacter baumannii, Enterobacter cloacae, E. coli,  Klebsiella oxytoca, K. pneumoniae, Proteus sp.,  Serratia marcescens, Haemophilus influenzae,  Neisseria meningitidis, Pseudomonas aeruginosa, Candida  albicans, C. glabrata, C krusei, C parapsilosis,  C. tropicalis and the KPC resistance gene.    Organism Identification: Blood Culture PCR    Method Type: PCR          Radiology:    Advanced directives addressed: full resuscitation
c/c abd pain    HPI: 67 year old female with pmhx of HTN, hepatitis C, Bell's palsy presenting with sudden onset of abdominal pain on 3/24. Patient states she was getting out of bed when pain started. She reports the pain as 10/10, sharp in nature, radiating to back, worsened with breathing and improved with laying in bed and taking shallow breaths. She reports no nausea or vomiting. She reports no fevers, chills, dysuria, urine color changes, discharge prior to event. She denies any having any prior episodes. In ED, a CT abdomen and pelvis demonstrated mild right hydronephrosis secondary to a proximal right ureter calculus measuring 0.8 x 0.6 cm. She was given 1 L bolus of fluid, toradol in ambulance and fentanyl 50 mcg IV in ED for pain. Patient was laying in bed, continuing to have pain despite treatment and given another dose of fentanyl 25 mcg IV x 1 dose with noted improvement in pain.     3/25: s/p right ureteral stent  has some hematuria  blood cx positive for Gram neg rods in aerobic bottle      PHYSICAL EXAM:    Daily     Daily Weight in k.5 (24 Mar 2018 20:49)    ICU Vital Signs Last 24 Hrs  T(C): 36.9 (25 Mar 2018 11:03), Max: 37.9 (24 Mar 2018 13:28)  T(F): 98.5 (25 Mar 2018 11:03), Max: 100.3 (24 Mar 2018 13:28)  HR: 82 (25 Mar 2018 11:03) (82 - 113)  BP: 148/72 (25 Mar 2018 11:03) (100/45 - 192/105)  BP(mean): --  ABP: --  ABP(mean): --  RR: 20 (25 Mar 2018 11:03) (12 - 21)  SpO2: 95% (25 Mar 2018 11:03) (95% - 99%)      Constitutional: Weak and ill appearing  HEENT: Atraumatic, LILY, Normal, No congestion  Respiratory: Breath Sounds normal, no rhonchi/wheeze  Cardiovascular: N S1S2; PINA present  Gastrointestinal: Abdomen soft, non tender, Bowel Sounds present  Extremities: No edema, peripheral pulses present  Neurological: AAO x 2, no gross focal motor deficits                          11.9   16.84 )-----------( 155      ( 25 Mar 2018 06:34 )             38.0       CBC Full  -  ( 25 Mar 2018 06:34 )  WBC Count : 16.84 K/uL  Hemoglobin : 11.9 g/dL  Hematocrit : 38.0 %  Platelet Count - Automated : 155 K/uL  Mean Cell Volume : 87.6 fl  Mean Cell Hemoglobin : 27.4 pg  Mean Cell Hemoglobin Concentration : 31.3 gm/dL  Auto Neutrophil # : 13.66 K/uL  Auto Lymphocyte # : 1.77 K/uL  Auto Monocyte # : 1.08 K/uL  Auto Eosinophil # : 0.04 K/uL  Auto Basophil # : 0.09 K/uL  Auto Neutrophil % : 81.2 %  Auto Lymphocyte % : 10.5 %  Auto Monocyte % : 6.4 %  Auto Eosinophil % : 0.2 %  Auto Basophil % : 0.5 %          145  |  112<H>  |  20  ----------------------------<  93  3.5   |  24  |  0.97    Ca    7.7<L>      25 Mar 2018 06:34    TPro  5.9<L>  /  Alb  2.5<L>  /  TBili  1.0  /  DBili  x   /  AST  37  /  ALT  30  /  AlkPhos  91  03-25      LIVER FUNCTIONS - ( 25 Mar 2018 06:34 )  Alb: 2.5 g/dL / Pro: 5.9 gm/dL / ALK PHOS: 91 U/L / ALT: 30 U/L / AST: 37 U/L / GGT: x             PT/INR - ( 25 Mar 2018 06:34 )   PT: 13.4 sec;   INR: 1.24 ratio         PTT - ( 25 Mar 2018 06:34 )  PTT:30.5 sec          Urinalysis Basic - ( 24 Mar 2018 09:13 )    Color: Yellow / Appearance: Clear / S.015 / pH: x  Gluc: x / Ketone: Negative  / Bili: Negative / Urobili: 1 mg/dL   Blood: x / Protein: 15 mg/dL / Nitrite: Positive   Leuk Esterase: Trace / RBC: 25 to 30 /HPF / WBC 6-10   Sq Epi: x / Non Sq Epi: x / Bacteria: TNTC            MEDICATIONS  (STANDING):  buDESOnide  80 MICROgram(s)/formoterol 4.5 MICROgram(s) Inhaler 2 Puff(s) Inhalation two times a day  cefTRIAXone Injectable. 1000 milliGRAM(s) IV Push every 24 hours  heparin  Injectable 5000 Unit(s) SubCutaneous every 8 hours  lactated ringers. 1000 milliLiter(s) (100 mL/Hr) IV Continuous <Continuous>  losartan 25 milliGRAM(s) Oral daily  tamsulosin 0.4 milliGRAM(s) Oral daily
c/c abd pain    HPI: 67 year old female with pmhx of HTN, hepatitis C, Bell's palsy presenting with sudden onset of abdominal pain on 3/24. Patient states she was getting out of bed when pain started. She reports the pain as 10/10, sharp in nature, radiating to back, worsened with breathing and improved with laying in bed and taking shallow breaths. She reports no nausea or vomiting. She reports no fevers, chills, dysuria, urine color changes, discharge prior to event. She denies any having any prior episodes. In ED, a CT abdomen and pelvis demonstrated mild right hydronephrosis secondary to a proximal right ureter calculus measuring 0.8 x 0.6 cm. She was given 1 L bolus of fluid, toradol in ambulance and fentanyl 50 mcg IV in ED for pain. Patient was laying in bed, continuing to have pain despite treatment and given another dose of fentanyl 25 mcg IV x 1 dose with noted improvement in pain.     3/25: s/p right ureteral stent  has some hematuria  blood cx positive for Gram neg rods in aerobic bottle    3/26: blood cx growing Klebsiella pneumoniae      PHYSICAL EXAM:    Daily     Daily     ICU Vital Signs Last 24 Hrs  T(C): 36.8 (26 Mar 2018 09:46), Max: 36.9 (25 Mar 2018 17:09)  T(F): 98.2 (26 Mar 2018 09:46), Max: 98.5 (25 Mar 2018 19:35)  HR: 90 (26 Mar 2018 09:46) (72 - 100)  BP: 103/48 (26 Mar 2018 09:46) (103/48 - 143/73)  BP(mean): --  ABP: --  ABP(mean): --  RR: 18 (26 Mar 2018 09:46) (18 - 20)  SpO2: 94% (26 Mar 2018 09:46) (72% - 96%)      Constitutional: Weak and ill appearing  HEENT: Atraumatic, LILY, Normal, No congestion  Respiratory: Breath Sounds normal, no rhonchi/wheeze  Cardiovascular: N S1S2; PINA present  Gastrointestinal: Abdomen soft, non tender, Bowel Sounds present  Extremities: No edema, peripheral pulses present  Neurological: AAO x 2, no gross focal motor deficits                          12.3   19.15 )-----------( 168      ( 26 Mar 2018 06:53 )             38.6       CBC Full  -  ( 26 Mar 2018 06:53 )  WBC Count : 19.15 K/uL  Hemoglobin : 12.3 g/dL  Hematocrit : 38.6 %  Platelet Count - Automated : 168 K/uL  Mean Cell Volume : 86.4 fl  Mean Cell Hemoglobin : 27.5 pg  Mean Cell Hemoglobin Concentration : 31.9 gm/dL  Auto Neutrophil # : x  Auto Lymphocyte # : x  Auto Monocyte # : x  Auto Eosinophil # : x  Auto Basophil # : x  Auto Neutrophil % : x  Auto Lymphocyte % : x  Auto Monocyte % : x  Auto Eosinophil % : x  Auto Basophil % : x      03-26    142  |  109<H>  |  17  ----------------------------<  109<H>  4.2   |  25  |  0.85    Ca    8.4<L>      26 Mar 2018 06:53    TPro  5.9<L>  /  Alb  2.5<L>  /  TBili  1.0  /  DBili  x   /  AST  37  /  ALT  30  /  AlkPhos  91  03-25      LIVER FUNCTIONS - ( 25 Mar 2018 06:34 )  Alb: 2.5 g/dL / Pro: 5.9 gm/dL / ALK PHOS: 91 U/L / ALT: 30 U/L / AST: 37 U/L / GGT: x             PT/INR - ( 25 Mar 2018 06:34 )   PT: 13.4 sec;   INR: 1.24 ratio         PTT - ( 25 Mar 2018 06:34 )  PTT:30.5 sec                  MEDICATIONS  (STANDING):  buDESOnide  80 MICROgram(s)/formoterol 4.5 MICROgram(s) Inhaler 2 Puff(s) Inhalation two times a day  cefTRIAXone Injectable. 2000 milliGRAM(s) IV Push every 24 hours  heparin  Injectable 5000 Unit(s) SubCutaneous every 8 hours  ibuprofen  Tablet 400 milliGRAM(s) Oral four times a day  losartan 25 milliGRAM(s) Oral daily  tamsulosin 0.4 milliGRAM(s) Oral daily
c/c abd pain    HPI: 67 year old female with pmhx of HTN, hepatitis C, Bell's palsy presenting with sudden onset of abdominal pain on 3/24. Patient states she was getting out of bed when pain started. She reports the pain as 10/10, sharp in nature, radiating to back, worsened with breathing and improved with laying in bed and taking shallow breaths. She reports no nausea or vomiting. She reports no fevers, chills, dysuria, urine color changes, discharge prior to event. She denies any having any prior episodes. In ED, a CT abdomen and pelvis demonstrated mild right hydronephrosis secondary to a proximal right ureter calculus measuring 0.8 x 0.6 cm. She was given 1 L bolus of fluid, toradol in ambulance and fentanyl 50 mcg IV in ED for pain. Patient was laying in bed, continuing to have pain despite treatment and given another dose of fentanyl 25 mcg IV x 1 dose with noted improvement in pain.     3/25: s/p right ureteral stent  has some hematuria  blood cx positive for Gram neg rods in aerobic bottle    3/26: blood cx growing Klebsiella pneumoniae    3/27: c/o n/v today       PHYSICAL EXAM:    Daily     Daily     ICU Vital Signs Last 24 Hrs  T(C): 36.9 (27 Mar 2018 09:58), Max: 36.9 (27 Mar 2018 09:58)  T(F): 98.5 (27 Mar 2018 09:58), Max: 98.5 (27 Mar 2018 09:58)  HR: 83 (27 Mar 2018 09:58) (72 - 90)  BP: 155/75 (27 Mar 2018 09:58) (118/59 - 155/75)  BP(mean): --  ABP: --  ABP(mean): --  RR: 18 (27 Mar 2018 09:58) (18 - 18)  SpO2: 93% (27 Mar 2018 09:58) (93% - 96%)      Constitutional: Weak and ill appearing  HEENT: Atraumatic, LILY, Normal, No congestion  Respiratory: Breath Sounds normal, no rhonchi/wheeze  Cardiovascular: N S1S2; PINA present  Gastrointestinal: Abdomen soft, non tender, Bowel Sounds present  Extremities: No edema, peripheral pulses present  Neurological: AAO x 2, no gross focal motor deficits                          12.3   19.15 )-----------( 168      ( 26 Mar 2018 06:53 )             38.6       CBC Full  -  ( 26 Mar 2018 06:53 )  WBC Count : 19.15 K/uL  Hemoglobin : 12.3 g/dL  Hematocrit : 38.6 %  Platelet Count - Automated : 168 K/uL  Mean Cell Volume : 86.4 fl  Mean Cell Hemoglobin : 27.5 pg  Mean Cell Hemoglobin Concentration : 31.9 gm/dL  Auto Neutrophil # : x  Auto Lymphocyte # : x  Auto Monocyte # : x  Auto Eosinophil # : x  Auto Basophil # : x  Auto Neutrophil % : x  Auto Lymphocyte % : x  Auto Monocyte % : x  Auto Eosinophil % : x  Auto Basophil % : x      03-26    142  |  109<H>  |  17  ----------------------------<  109<H>  4.2   |  25  |  0.85    Ca    8.4<L>      26 Mar 2018 06:53                              MEDICATIONS  (STANDING):  buDESOnide  80 MICROgram(s)/formoterol 4.5 MICROgram(s) Inhaler 2 Puff(s) Inhalation two times a day  cefTRIAXone Injectable. 2000 milliGRAM(s) IV Push every 24 hours  heparin  Injectable 5000 Unit(s) SubCutaneous every 8 hours  ibuprofen  Tablet 400 milliGRAM(s) Oral four times a day  losartan 25 milliGRAM(s) Oral daily  tamsulosin 0.4 milliGRAM(s) Oral daily
c/c abd pain    HPI: 67 year old female with pmhx of HTN, hepatitis C, Bell's palsy presenting with sudden onset of abdominal pain on 3/24. Patient states she was getting out of bed when pain started. She reports the pain as 10/10, sharp in nature, radiating to back, worsened with breathing and improved with laying in bed and taking shallow breaths. She reports no nausea or vomiting. She reports no fevers, chills, dysuria, urine color changes, discharge prior to event. She denies any having any prior episodes. In ED, a CT abdomen and pelvis demonstrated mild right hydronephrosis secondary to a proximal right ureter calculus measuring 0.8 x 0.6 cm. She was given 1 L bolus of fluid, toradol in ambulance and fentanyl 50 mcg IV in ED for pain. Patient was laying in bed, continuing to have pain despite treatment and given another dose of fentanyl 25 mcg IV x 1 dose with noted improvement in pain.     3/25: s/p right ureteral stent  has some hematuria  blood cx positive for Gram neg rods in aerobic bottle    3/26: blood cx growing Klebsiella pneumoniae    3/27: c/o n/v today     3/28: no n/v today  eating lunch        PHYSICAL EXAM:    Daily     Daily     ICU Vital Signs Last 24 Hrs  T(C): 36.7 (28 Mar 2018 09:50), Max: 37.1 (27 Mar 2018 21:39)  T(F): 98.1 (28 Mar 2018 09:50), Max: 98.7 (27 Mar 2018 21:39)  HR: 87 (28 Mar 2018 09:50) (70 - 87)  BP: 149/64 (28 Mar 2018 09:50) (118/56 - 163/79)  BP(mean): --  ABP: --  ABP(mean): --  RR: 18 (28 Mar 2018 09:50) (18 - 18)  SpO2: 98% (28 Mar 2018 09:50) (96% - 98%)      Constitutional: Weak appearing  HEENT: Atraumatic, LILY, Normal, No congestion  Respiratory: Breath Sounds normal, no rhonchi/wheeze  Cardiovascular: N S1S2; PINA present  Gastrointestinal: Abdomen soft, non tender, Bowel Sounds present  Extremities: No edema, peripheral pulses present  Neurological: AAO x 2, no gross focal motor deficits                        12.8   7.20  )-----------( 188      ( 28 Mar 2018 07:32 )             39.9       CBC Full  -  ( 28 Mar 2018 07:32 )  WBC Count : 7.20 K/uL  Hemoglobin : 12.8 g/dL  Hematocrit : 39.9 %  Platelet Count - Automated : 188 K/uL  Mean Cell Volume : 86.2 fl  Mean Cell Hemoglobin : 27.6 pg  Mean Cell Hemoglobin Concentration : 32.1 gm/dL  Auto Neutrophil # : x  Auto Lymphocyte # : x  Auto Monocyte # : x  Auto Eosinophil # : x  Auto Basophil # : x  Auto Neutrophil % : x  Auto Lymphocyte % : x  Auto Monocyte % : x  Auto Eosinophil % : x  Auto Basophil % : x                                    MEDICATIONS  (STANDING):  buDESOnide  80 MICROgram(s)/formoterol 4.5 MICROgram(s) Inhaler 2 Puff(s) Inhalation two times a day  cefuroxime   Tablet 500 milliGRAM(s) Oral every 12 hours  heparin  Injectable 5000 Unit(s) SubCutaneous every 8 hours  ibuprofen  Tablet 400 milliGRAM(s) Oral four times a day  losartan 25 milliGRAM(s) Oral daily  nystatin Powder 1 Application(s) Topical two times a day  tamsulosin 0.4 milliGRAM(s) Oral daily
c/c abd pain    HPI: 67 year old female with pmhx of HTN, hepatitis C, Bell's palsy presenting with sudden onset of abdominal pain on 3/24. Patient states she was getting out of bed when pain started. She reports the pain as 10/10, sharp in nature, radiating to back, worsened with breathing and improved with laying in bed and taking shallow breaths. She reports no nausea or vomiting. She reports no fevers, chills, dysuria, urine color changes, discharge prior to event. She denies any having any prior episodes. In ED, a CT abdomen and pelvis demonstrated mild right hydronephrosis secondary to a proximal right ureter calculus measuring 0.8 x 0.6 cm. She was given 1 L bolus of fluid, toradol in ambulance and fentanyl 50 mcg IV in ED for pain. Patient was laying in bed, continuing to have pain despite treatment and given another dose of fentanyl 25 mcg IV x 1 dose with noted improvement in pain.     3/25: s/p right ureteral stent  has some hematuria  blood cx positive for Gram neg rods in aerobic bottle    3/26: blood cx growing Klebsiella pneumoniae    3/27: c/o n/v today     3/28: no n/v today  eating lunch    3/29: BP elevated, 178/97  feels weak and tired      PHYSICAL EXAM:    Daily     Daily     ICU Vital Signs Last 24 Hrs  T(C): 37.6 (29 Mar 2018 10:01), Max: 37.6 (29 Mar 2018 10:01)  T(F): 99.6 (29 Mar 2018 10:01), Max: 99.6 (29 Mar 2018 10:01)  HR: 73 (29 Mar 2018 10:01) (73 - 80)  BP: 178/97 (29 Mar 2018 10:01) (153/82 - 178/97)  BP(mean): --  ABP: --  ABP(mean): --  RR: 18 (29 Mar 2018 10:01) (18 - 148)  SpO2: 91% (29 Mar 2018 10:01) (91% - 98%)      Constitutional: Weak appearing  HEENT: Atraumatic, LILY, Normal, No congestion  Respiratory: Breath Sounds normal, no rhonchi/wheeze  Cardiovascular: N S1S2; PINA present  Gastrointestinal: Abdomen soft, non tender, Bowel Sounds present  Extremities: No edema, peripheral pulses present  Neurological: AAO x 2, no gross focal motor deficits                        12.8   7.20  )-----------( 188      ( 28 Mar 2018 07:32 )             39.9       CBC Full  -  ( 28 Mar 2018 07:32 )  WBC Count : 7.20 K/uL  Hemoglobin : 12.8 g/dL  Hematocrit : 39.9 %  Platelet Count - Automated : 188 K/uL  Mean Cell Volume : 86.2 fl  Mean Cell Hemoglobin : 27.6 pg  Mean Cell Hemoglobin Concentration : 32.1 gm/dL  Auto Neutrophil # : x  Auto Lymphocyte # : x  Auto Monocyte # : x  Auto Eosinophil # : x  Auto Basophil # : x  Auto Neutrophil % : x  Auto Lymphocyte % : x  Auto Monocyte % : x  Auto Eosinophil % : x  Auto Basophil % : x                                    MEDICATIONS  (STANDING):  amLODIPine   Tablet 5 milliGRAM(s) Oral daily  buDESOnide  80 MICROgram(s)/formoterol 4.5 MICROgram(s) Inhaler 2 Puff(s) Inhalation two times a day  cefuroxime   Tablet 500 milliGRAM(s) Oral every 12 hours  heparin  Injectable 5000 Unit(s) SubCutaneous every 8 hours  ibuprofen  Tablet 400 milliGRAM(s) Oral four times a day  losartan 50 milliGRAM(s) Oral daily  losartan 25 milliGRAM(s) Oral once  nystatin Powder 1 Application(s) Topical two times a day  tamsulosin 0.4 milliGRAM(s) Oral daily

## 2018-03-29 NOTE — PHYSICAL THERAPY INITIAL EVALUATION ADULT - MODALITIES TREATMENT COMMENTS
Patient returned to bed by request, call bell in reach, sister at bedside. Patient independent in functional mobility, no skilled need in this setting.  May ambulate per nursing.  Will d/c from PT. RN informed.

## 2018-03-30 ENCOUNTER — TRANSCRIPTION ENCOUNTER (OUTPATIENT)
Age: 68
End: 2018-03-30

## 2018-03-30 VITALS
OXYGEN SATURATION: 97 % | TEMPERATURE: 98 F | RESPIRATION RATE: 18 BRPM | HEART RATE: 88 BPM | SYSTOLIC BLOOD PRESSURE: 160 MMHG | DIASTOLIC BLOOD PRESSURE: 74 MMHG

## 2018-03-30 RX ORDER — NYSTATIN CREAM 100000 [USP'U]/G
1 CREAM TOPICAL
Qty: 30 | Refills: 0 | OUTPATIENT
Start: 2018-03-30 | End: 2018-04-12

## 2018-03-30 RX ORDER — AMLODIPINE BESYLATE 2.5 MG/1
10 TABLET ORAL DAILY
Qty: 0 | Refills: 0 | Status: DISCONTINUED | OUTPATIENT
Start: 2018-03-30 | End: 2018-03-30

## 2018-03-30 RX ORDER — AMLODIPINE BESYLATE 2.5 MG/1
5 TABLET ORAL ONCE
Qty: 0 | Refills: 0 | Status: COMPLETED | OUTPATIENT
Start: 2018-03-30 | End: 2018-03-30

## 2018-03-30 RX ORDER — IBUPROFEN 200 MG
1 TABLET ORAL
Qty: 0 | Refills: 0 | COMMUNITY
Start: 2018-03-30

## 2018-03-30 RX ORDER — AMLODIPINE BESYLATE 2.5 MG/1
1 TABLET ORAL
Qty: 30 | Refills: 0
Start: 2018-03-30 | End: 2018-04-28

## 2018-03-30 RX ORDER — CEFUROXIME AXETIL 250 MG
1 TABLET ORAL
Qty: 12 | Refills: 0 | OUTPATIENT
Start: 2018-03-30 | End: 2018-04-04

## 2018-03-30 RX ORDER — TAMSULOSIN HYDROCHLORIDE 0.4 MG/1
1 CAPSULE ORAL
Qty: 30 | Refills: 0 | OUTPATIENT
Start: 2018-03-30 | End: 2018-04-28

## 2018-03-30 RX ORDER — LOSARTAN POTASSIUM 100 MG/1
1 TABLET, FILM COATED ORAL
Qty: 30 | Refills: 0
Start: 2018-03-30 | End: 2018-04-28

## 2018-03-30 RX ADMIN — TAMSULOSIN HYDROCHLORIDE 0.4 MILLIGRAM(S): 0.4 CAPSULE ORAL at 13:40

## 2018-03-30 RX ADMIN — Medication 400 MILLIGRAM(S): at 05:13

## 2018-03-30 RX ADMIN — LOSARTAN POTASSIUM 50 MILLIGRAM(S): 100 TABLET, FILM COATED ORAL at 05:13

## 2018-03-30 RX ADMIN — Medication 500 MILLIGRAM(S): at 05:13

## 2018-03-30 RX ADMIN — AMLODIPINE BESYLATE 5 MILLIGRAM(S): 2.5 TABLET ORAL at 05:13

## 2018-03-30 RX ADMIN — Medication 400 MILLIGRAM(S): at 06:59

## 2018-03-30 RX ADMIN — BUDESONIDE AND FORMOTEROL FUMARATE DIHYDRATE 2 PUFF(S): 160; 4.5 AEROSOL RESPIRATORY (INHALATION) at 08:45

## 2018-03-30 RX ADMIN — AMLODIPINE BESYLATE 10 MILLIGRAM(S): 2.5 TABLET ORAL at 13:37

## 2018-03-30 RX ADMIN — HEPARIN SODIUM 5000 UNIT(S): 5000 INJECTION INTRAVENOUS; SUBCUTANEOUS at 05:13

## 2018-03-30 RX ADMIN — NYSTATIN CREAM 1 APPLICATION(S): 100000 CREAM TOPICAL at 05:14

## 2018-03-30 RX ADMIN — AMLODIPINE BESYLATE 5 MILLIGRAM(S): 2.5 TABLET ORAL at 13:37

## 2018-03-30 RX ADMIN — Medication 400 MILLIGRAM(S): at 13:37

## 2018-03-30 NOTE — DISCHARGE NOTE ADULT - PATIENT PORTAL LINK FT
You can access the [facility] Patient Portal, offered by [facility], by registering with the following website: [email or web address]

## 2018-03-30 NOTE — DISCHARGE NOTE ADULT - HOSPITAL COURSE
67 year old female with sudden onset of abdominal pain in setting of obstructing renal stone    1. Obstructing renal stone in right proximal ureter with noted hydronephrosis + Klebsiella pneumoniae sepsis resolving: s/p stent by Dr. Kirkland on 3/25  -tamsulosin 0.4 mg po dailly  -NSAIDs for now  -urine and blood cultures sent: blood cx growing Klebsiella pneumoniae in aerobic bottle; ID consult appreciated, Rocephin changed to po ceftin to cont till 4/4  -lactate normalized    2. h/o HTN, poor medical followup: uncontrolled /97  -increase losartan to 50 mg po daily  - increase  amlodipine to 10 mg po daily from 3/30  - monitor closely with your PCP for meds/dose titration for optimal BPcontrol    3. Asthma, admits to daily albuterol in haler use, no controller medications  albuterol prn      4. Bell's palsy dx over 10 years ago, patient never sought medical care  -would recommend outpatient neurology followup    5. Hepatitis C, noted cirrhotic changes on CT abdomen and pelvis  -outpatient GI followup recommended for possible antiviral treatment  -no signs of decompensated liver failure    poc discussed with pt, team    d/c home today    total time spent 40 min

## 2018-03-30 NOTE — DISCHARGE NOTE ADULT - MEDICATION SUMMARY - MEDICATIONS TO TAKE
I will START or STAY ON the medications listed below when I get home from the hospital:    ibuprofen 400 mg oral tablet  -- 1 tab(s) by mouth 4 times a day  -- Indication: For pain    losartan 50 mg oral tablet  -- 1 tab(s) by mouth once a day  -- Indication: For HTN (hypertension)    tamsulosin 0.4 mg oral capsule  -- 1 cap(s) by mouth once a day  -- Indication: For Urinary obstruction    Ventolin HFA 90 mcg/inh inhalation aerosol  -- 2 puff(s) inhaled 4 times a day, As Needed  -- Indication: For sob    amLODIPine 10 mg oral tablet  -- 1 tab(s) by mouth once a day  -- Indication: For HTN (hypertension)    cefuroxime 500 mg oral tablet  -- 1 tab(s) by mouth every 12 hours  -- Indication: For Uroosepsis/bacteremia    nystatin 100,000 units/g topical powder  -- 1 application on skin 2 times a day  -- Indication: For sup fungal

## 2018-03-30 NOTE — DISCHARGE NOTE ADULT - NURSING SECTION COMPLETE
Mom called and said that you recommended that she get:    Cetaphil cleanser  GRIPE water. Calm and colic-at [facility]    She would like those sent through to [facility] so that her insurance will pay for them. Patient/Caregiver provided printed discharge information.

## 2018-03-30 NOTE — DISCHARGE NOTE ADULT - CARE PLAN
Principal Discharge DX:	Urinary tract infection without hematuria, site unspecified  Goal:	continue ABX  Assessment and plan of treatment:	cont ceftin till 4/4  f/u with Dr. Barron in 1 week

## 2018-03-30 NOTE — DISCHARGE NOTE ADULT - CARE PROVIDER_API CALL
Chato Barron), Urology  [address] [place] [address]  Phone: [phone]  Fax: [phone]    Joey Harper), Family Medicine  [address] [place] [address]  Phone: [phone]  Fax: [phone]

## 2018-03-31 LAB
CULTURE RESULTS: SIGNIFICANT CHANGE UP
SPECIMEN SOURCE: SIGNIFICANT CHANGE UP

## 2018-04-03 DIAGNOSIS — J45.909 UNSPECIFIED ASTHMA, UNCOMPLICATED: ICD-10-CM

## 2018-04-03 DIAGNOSIS — R11.2 NAUSEA WITH VOMITING, UNSPECIFIED: ICD-10-CM

## 2018-04-03 DIAGNOSIS — A41.59 OTHER GRAM-NEGATIVE SEPSIS: ICD-10-CM

## 2018-04-03 DIAGNOSIS — Z87.891 PERSONAL HISTORY OF NICOTINE DEPENDENCE: ICD-10-CM

## 2018-04-03 DIAGNOSIS — B19.20 UNSPECIFIED VIRAL HEPATITIS C WITHOUT HEPATIC COMA: ICD-10-CM

## 2018-04-03 DIAGNOSIS — N13.6 PYONEPHROSIS: ICD-10-CM

## 2018-04-03 DIAGNOSIS — K74.60 UNSPECIFIED CIRRHOSIS OF LIVER: ICD-10-CM

## 2018-04-03 DIAGNOSIS — R10.9 UNSPECIFIED ABDOMINAL PAIN: ICD-10-CM

## 2018-04-03 DIAGNOSIS — B96.1 KLEBSIELLA PNEUMONIAE [K. PNEUMONIAE] AS THE CAUSE OF DISEASES CLASSIFIED ELSEWHERE: ICD-10-CM

## 2018-04-03 DIAGNOSIS — N39.0 URINARY TRACT INFECTION, SITE NOT SPECIFIED: ICD-10-CM

## 2018-04-03 DIAGNOSIS — I10 ESSENTIAL (PRIMARY) HYPERTENSION: ICD-10-CM

## 2018-04-12 LAB
CULTURE RESULTS: SIGNIFICANT CHANGE UP
SPECIMEN SOURCE: SIGNIFICANT CHANGE UP

## 2018-05-16 LAB
CULTURE RESULTS: SIGNIFICANT CHANGE UP
SPECIMEN SOURCE: SIGNIFICANT CHANGE UP

## 2018-07-09 ENCOUNTER — TRANSCRIPTION ENCOUNTER (OUTPATIENT)
Age: 68
End: 2018-07-09

## 2018-07-10 ENCOUNTER — APPOINTMENT (OUTPATIENT)
Dept: DERMATOLOGY | Facility: CLINIC | Age: 68
End: 2018-07-10
Payer: COMMERCIAL

## 2018-07-10 VITALS — HEIGHT: 65 IN | WEIGHT: 219 LBS | BODY MASS INDEX: 36.49 KG/M2

## 2018-07-10 DIAGNOSIS — Z86.79 PERSONAL HISTORY OF OTHER DISEASES OF THE CIRCULATORY SYSTEM: ICD-10-CM

## 2018-07-10 DIAGNOSIS — L30.1 DYSHIDROSIS [POMPHOLYX]: ICD-10-CM

## 2018-07-10 DIAGNOSIS — Z87.891 PERSONAL HISTORY OF NICOTINE DEPENDENCE: ICD-10-CM

## 2018-07-10 DIAGNOSIS — Z86.19 PERSONAL HISTORY OF OTHER INFECTIOUS AND PARASITIC DISEASES: ICD-10-CM

## 2018-07-10 DIAGNOSIS — R21 RASH AND OTHER NONSPECIFIC SKIN ERUPTION: ICD-10-CM

## 2018-07-10 DIAGNOSIS — Z87.898 PERSONAL HISTORY OF OTHER SPECIFIED CONDITIONS: ICD-10-CM

## 2018-07-10 DIAGNOSIS — Z86.69 PERSONAL HISTORY OF OTHER DISEASES OF THE NERVOUS SYSTEM AND SENSE ORGANS: ICD-10-CM

## 2018-07-10 PROCEDURE — 99203 OFFICE O/P NEW LOW 30 MIN: CPT | Mod: 25

## 2018-07-10 PROCEDURE — 11100 BX SKIN SUBCUTANEOUS&/MUCOUS MEMBRANE 1 LESION: CPT

## 2018-07-10 RX ORDER — ALBUTEROL SULFATE 90 UG/1
108 (90 BASE) AEROSOL, METERED RESPIRATORY (INHALATION)
Qty: 18 | Refills: 0 | Status: ACTIVE | COMMUNITY
Start: 2018-02-20

## 2018-07-24 LAB — CORE LAB BIOPSY: NORMAL

## 2018-07-31 ENCOUNTER — TRANSCRIPTION ENCOUNTER (OUTPATIENT)
Age: 68
End: 2018-07-31

## 2018-11-18 ENCOUNTER — EMERGENCY (EMERGENCY)
Facility: HOSPITAL | Age: 68
LOS: 1 days | Discharge: HOME | End: 2018-11-18
Attending: EMERGENCY MEDICINE
Payer: COMMERCIAL

## 2018-11-18 VITALS
WEIGHT: 210.1 LBS | SYSTOLIC BLOOD PRESSURE: 146 MMHG | RESPIRATION RATE: 20 BRPM | HEART RATE: 100 BPM | TEMPERATURE: 98 F | HEIGHT: 66 IN | DIASTOLIC BLOOD PRESSURE: 94 MMHG | OXYGEN SATURATION: 100 %

## 2018-11-18 VITALS
OXYGEN SATURATION: 100 % | SYSTOLIC BLOOD PRESSURE: 142 MMHG | DIASTOLIC BLOOD PRESSURE: 70 MMHG | HEART RATE: 76 BPM | TEMPERATURE: 98 F | RESPIRATION RATE: 16 BRPM

## 2018-11-18 LAB
ALBUMIN SERPL ELPH-MCNC: 4 G/DL — SIGNIFICANT CHANGE UP (ref 3.3–5.2)
ALP SERPL-CCNC: 111 U/L — SIGNIFICANT CHANGE UP (ref 40–120)
ALT FLD-CCNC: 31 U/L — SIGNIFICANT CHANGE UP
ANION GAP SERPL CALC-SCNC: 13 MMOL/L — SIGNIFICANT CHANGE UP (ref 5–17)
APTT BLD: 31.7 SEC — SIGNIFICANT CHANGE UP (ref 27.5–36.3)
AST SERPL-CCNC: 41 U/L — HIGH
BASOPHILS # BLD AUTO: 0 K/UL — SIGNIFICANT CHANGE UP (ref 0–0.2)
BASOPHILS NFR BLD AUTO: 0.5 % — SIGNIFICANT CHANGE UP (ref 0–2)
BILIRUB SERPL-MCNC: 0.3 MG/DL — LOW (ref 0.4–2)
BUN SERPL-MCNC: 19 MG/DL — SIGNIFICANT CHANGE UP (ref 8–20)
CALCIUM SERPL-MCNC: 9.4 MG/DL — SIGNIFICANT CHANGE UP (ref 8.6–10.2)
CHLORIDE SERPL-SCNC: 107 MMOL/L — SIGNIFICANT CHANGE UP (ref 98–107)
CK MB CFR SERPL CALC: 2.4 NG/ML — SIGNIFICANT CHANGE UP (ref 0–6.7)
CK SERPL-CCNC: 172 U/L — HIGH (ref 25–170)
CO2 SERPL-SCNC: 24 MMOL/L — SIGNIFICANT CHANGE UP (ref 22–29)
CREAT SERPL-MCNC: 0.87 MG/DL — SIGNIFICANT CHANGE UP (ref 0.5–1.3)
EOSINOPHIL # BLD AUTO: 0.2 K/UL — SIGNIFICANT CHANGE UP (ref 0–0.5)
EOSINOPHIL NFR BLD AUTO: 3.7 % — SIGNIFICANT CHANGE UP (ref 0–6)
GLUCOSE SERPL-MCNC: 107 MG/DL — SIGNIFICANT CHANGE UP (ref 70–115)
HCT VFR BLD CALC: 41.2 % — SIGNIFICANT CHANGE UP (ref 37–47)
HGB BLD-MCNC: 13.2 G/DL — SIGNIFICANT CHANGE UP (ref 12–16)
INR BLD: 1.09 RATIO — SIGNIFICANT CHANGE UP (ref 0.88–1.16)
LYMPHOCYTES # BLD AUTO: 2.6 K/UL — SIGNIFICANT CHANGE UP (ref 1–4.8)
LYMPHOCYTES # BLD AUTO: 40.6 % — SIGNIFICANT CHANGE UP (ref 20–55)
MCHC RBC-ENTMCNC: 27.8 PG — SIGNIFICANT CHANGE UP (ref 27–31)
MCHC RBC-ENTMCNC: 32 G/DL — SIGNIFICANT CHANGE UP (ref 32–36)
MCV RBC AUTO: 86.9 FL — SIGNIFICANT CHANGE UP (ref 81–99)
MONOCYTES # BLD AUTO: 0.5 K/UL — SIGNIFICANT CHANGE UP (ref 0–0.8)
MONOCYTES NFR BLD AUTO: 7.4 % — SIGNIFICANT CHANGE UP (ref 3–10)
NEUTROPHILS # BLD AUTO: 3.1 K/UL — SIGNIFICANT CHANGE UP (ref 1.8–8)
NEUTROPHILS NFR BLD AUTO: 47.6 % — SIGNIFICANT CHANGE UP (ref 37–73)
PLATELET # BLD AUTO: 221 K/UL — SIGNIFICANT CHANGE UP (ref 150–400)
POTASSIUM SERPL-MCNC: 4.2 MMOL/L — SIGNIFICANT CHANGE UP (ref 3.5–5.3)
POTASSIUM SERPL-SCNC: 4.2 MMOL/L — SIGNIFICANT CHANGE UP (ref 3.5–5.3)
PROT SERPL-MCNC: 7.4 G/DL — SIGNIFICANT CHANGE UP (ref 6.6–8.7)
PROTHROM AB SERPL-ACNC: 12.6 SEC — SIGNIFICANT CHANGE UP (ref 10–12.9)
RBC # BLD: 4.74 M/UL — SIGNIFICANT CHANGE UP (ref 4.4–5.2)
RBC # FLD: 12.9 % — SIGNIFICANT CHANGE UP (ref 11–15.6)
SODIUM SERPL-SCNC: 144 MMOL/L — SIGNIFICANT CHANGE UP (ref 135–145)
TROPONIN T SERPL-MCNC: <0.01 NG/ML — SIGNIFICANT CHANGE UP (ref 0–0.06)
TSH SERPL-MCNC: 1.28 UIU/ML — SIGNIFICANT CHANGE UP (ref 0.27–4.2)
WBC # BLD: 6.5 K/UL — SIGNIFICANT CHANGE UP (ref 4.8–10.8)
WBC # FLD AUTO: 6.5 K/UL — SIGNIFICANT CHANGE UP (ref 4.8–10.8)

## 2018-11-18 PROCEDURE — 99284 EMERGENCY DEPT VISIT MOD MDM: CPT | Mod: 25

## 2018-11-18 PROCEDURE — 84484 ASSAY OF TROPONIN QUANT: CPT

## 2018-11-18 PROCEDURE — 70450 CT HEAD/BRAIN W/O DYE: CPT | Mod: 26

## 2018-11-18 PROCEDURE — 99285 EMERGENCY DEPT VISIT HI MDM: CPT

## 2018-11-18 PROCEDURE — 72125 CT NECK SPINE W/O DYE: CPT | Mod: 26

## 2018-11-18 PROCEDURE — 82550 ASSAY OF CK (CPK): CPT

## 2018-11-18 PROCEDURE — 84443 ASSAY THYROID STIM HORMONE: CPT

## 2018-11-18 PROCEDURE — 80053 COMPREHEN METABOLIC PANEL: CPT

## 2018-11-18 PROCEDURE — 71045 X-RAY EXAM CHEST 1 VIEW: CPT

## 2018-11-18 PROCEDURE — 85610 PROTHROMBIN TIME: CPT

## 2018-11-18 PROCEDURE — 82553 CREATINE MB FRACTION: CPT

## 2018-11-18 PROCEDURE — 71045 X-RAY EXAM CHEST 1 VIEW: CPT | Mod: 26

## 2018-11-18 PROCEDURE — 72125 CT NECK SPINE W/O DYE: CPT

## 2018-11-18 PROCEDURE — 85027 COMPLETE CBC AUTOMATED: CPT

## 2018-11-18 PROCEDURE — 93005 ELECTROCARDIOGRAM TRACING: CPT

## 2018-11-18 PROCEDURE — 85730 THROMBOPLASTIN TIME PARTIAL: CPT

## 2018-11-18 PROCEDURE — 93010 ELECTROCARDIOGRAM REPORT: CPT

## 2018-11-18 PROCEDURE — 82962 GLUCOSE BLOOD TEST: CPT

## 2018-11-18 PROCEDURE — 36415 COLL VENOUS BLD VENIPUNCTURE: CPT

## 2018-11-18 PROCEDURE — 70450 CT HEAD/BRAIN W/O DYE: CPT

## 2018-11-18 NOTE — ED ADULT NURSE REASSESSMENT NOTE - NS ED NURSE REASSESS COMMENT FT1
pt able to ambulate with stand by assist without difficulty. pt denied any further dizziness. no s/s seizure like symptoms

## 2018-11-18 NOTE — ED ADULT TRIAGE NOTE - CHIEF COMPLAINT QUOTE
69y/o female c/o seizure. Pt c/o headache and leg pain, denies hx of seizures. Pt aox4, resp even unlabored. Pt has hx bells palsy.

## 2018-11-18 NOTE — ED PROVIDER NOTE - OBJECTIVE STATEMENT
67 y/o female with a hx of asthma, bells palsy, HTN and Hep c presents to the ED s/p possible SZ episode today. Pt presenting after possible SZ-like episode while she was in [employer] [facility]. One of the sisters saw pt "fall back" Pt has a hx of SZ but is not on any mediation. Denies HA, dizziness, numbness, tingling, photophobia, diplopia, change in vision/hearing/gait/mental status/speech, focal weakness, neck pain, rash, fever, chills, stiffness, CP, SOB, palpitations, diaphoresis, N/V/D/C, abdominal pain, change in urinary/bowel function, dysuria, hematuria, flank pain, or malaise. No further complaints at this time.

## 2018-11-18 NOTE — ED PROVIDER NOTE - NEUROLOGICAL, MLM
CN II-XII grossly in tact. Normal finger to nose. Normal heel to shin. No pronator drift. Normal gait without assistance. Reflexes 2+ bilaterally.

## 2018-11-18 NOTE — ED PROVIDER NOTE - MEDICAL DECISION MAKING DETAILS
ambulated in nad has chornic left droop from bells at [facility] neuro follow up advised seizre precautiosn given return to ed for intractable HA, persistent vomiting, or new onset motor/sensory deficits

## 2018-11-18 NOTE — ED ADULT NURSE NOTE - NSIMPLEMENTINTERV_GEN_ALL_ED
Implemented All Fall Risk Interventions:  [place] to call system. Call bell, personal items and telephone within reach. Instruct patient to call for assistance. Room bathroom lighting operational. Non-slip footwear when patient is off stretcher. Physically safe environment: no spills, clutter or unnecessary equipment. Stretcher in lowest position, wheels locked, appropriate side rails in place. Provide visual cue, wrist band, yellow gown, etc. Monitor gait and stability. Monitor for mental status changes and reorient to person, place, and time. Review medications for side effects contributing to fall risk. Reinforce activity limits and safety measures with patient and family.

## 2018-11-18 NOTE — ED ADULT NURSE NOTE - PSH
2018         Post Op day: 2 Days Post-OpProcedure(s) (LRB):  LEFT KNEE ARTHROPLASTY TOTAL/ LINH/ FNB (Left)  RIGHT KNEE INJECTION (Right)      Admit Date: 2018  Admit Diagnosis: Unilateral primary osteoarthritis, left knee [M17.12]    LAB:    Recent Results (from the past 24 hour(s))   HEMOGLOBIN    Collection Time: 18  4:53 AM   Result Value Ref Range    HGB 9.6 (L) 11.7 - 15.4 g/dL     Vital Signs:    Patient Vitals for the past 8 hrs:   BP Temp Pulse Resp SpO2   18 0402 120/76 98.6 °F (37 °C) 80 18 95 %   18 0031 115/74 98.9 °F (37.2 °C) 88 18 94 %     Temp (24hrs), Av.9 °F (36.6 °C), Min:96 °F (35.6 °C), Max:98.9 °F (37.2 °C)    Body mass index is 33.66 kg/(m^2). Pain Control:   Pain Assessment  Pain Scale 1: FLACC  Pain Intensity 1: 0  Pain Onset 1: yrs  Pain Location 1: Knee, Leg  Pain Orientation 1: Left  Pain Description 1: Aching  Pain Intervention(s) 1: Medication (see MAR)    Subjective: Doing well, No complaints, No SOB, No Chest Pain, No Nausea or Vomitting     Objective: Vital Signs are Stable, No Acute Distress, Alert and Oriented, Dressing is Dry,  Neurovascular exam is normal.       PT/OT:            Assistive Device: Walker (comment)        LLE AROM  L Knee Flexion: 90  L Knee Extension: 10  LLE PROM  L Knee Flexion: 60 (~post op)  L Knee Extension: -10 (~post op)    Weight Bearing Status: WBAT    Meds:  [unfilled]  [unfilled]  [unfilled]    Assessment:   Patient Active Problem List   Diagnosis Code    Low blood potassium E87.6    Osteoarthritis of left knee M17.12    Status post left knee replacement Z96.652    ADD (attention deficit disorder) F98.8    Migraine G43.909             Plan: Continue Physical Therapy, Monitor  Hbg, home today. N and V are better.         Signed By: Marcello Salcido MD No significant past surgical history

## 2018-11-18 NOTE — ED ADULT NURSE NOTE - PMH
Asthma, unspecified asthma severity, unspecified whether complicated, unspecified whether persistent    Bell's palsy    Essential hypertension    Hepatitis C    Seizure

## 2018-11-18 NOTE — ED ADULT NURSE NOTE - CHPI ED NUR SYMPTOMS NEG
no loss of consciousness/no vomiting/no change in level of consciousness/no confusion/no weakness/no fever/no blurred vision/no nausea/no numbness/no dizziness

## 2018-11-19 PROBLEM — G51.0 BELL'S PALSY: Chronic | Status: ACTIVE | Noted: 2017-04-09

## 2018-11-19 PROBLEM — I10 ESSENTIAL (PRIMARY) HYPERTENSION: Chronic | Status: ACTIVE | Noted: 2018-03-24

## 2018-11-19 PROBLEM — J45.909 UNSPECIFIED ASTHMA, UNCOMPLICATED: Chronic | Status: ACTIVE | Noted: 2018-03-24

## 2018-11-19 PROBLEM — B19.20 UNSPECIFIED VIRAL HEPATITIS C WITHOUT HEPATIC COMA: Chronic | Status: ACTIVE | Noted: 2017-04-09

## 2021-05-06 NOTE — PROGRESS NOTE ADULT - PROBLEM SELECTOR PROBLEM 1
[FreeTextEntry1] : 38M with residual hemorrhoidal skin tag\par \par I discussed the benign nature of skin tags with the patient.  At this time given that it is small and asymptomatic I did not recommend excision.  The patient will start a fiber supplement and limit cleaning of the area to reduce irritation.  Patient can return as needed.\par 
Renal colic on right side

## 2021-07-11 ENCOUNTER — EMERGENCY (EMERGENCY)
Facility: HOSPITAL | Age: 71
LOS: 0 days | Discharge: HOME | End: 2021-07-12
Attending: EMERGENCY MEDICINE
Payer: COMMERCIAL

## 2021-07-11 VITALS
HEART RATE: 91 BPM | WEIGHT: 214.07 LBS | HEIGHT: 66 IN | RESPIRATION RATE: 17 BRPM | SYSTOLIC BLOOD PRESSURE: 174 MMHG | DIASTOLIC BLOOD PRESSURE: 115 MMHG | OXYGEN SATURATION: 95 % | TEMPERATURE: 98 F

## 2021-07-11 DIAGNOSIS — Z87.19 PERSONAL HISTORY OF OTHER DISEASES OF THE DIGESTIVE SYSTEM: ICD-10-CM

## 2021-07-11 DIAGNOSIS — J45.901 UNSPECIFIED ASTHMA WITH (ACUTE) EXACERBATION: ICD-10-CM

## 2021-07-11 DIAGNOSIS — I10 ESSENTIAL (PRIMARY) HYPERTENSION: ICD-10-CM

## 2021-07-11 DIAGNOSIS — Z88.6 ALLERGY STATUS TO ANALGESIC AGENT: ICD-10-CM

## 2021-07-11 DIAGNOSIS — R07.89 OTHER CHEST PAIN: ICD-10-CM

## 2021-07-11 DIAGNOSIS — Z86.69 PERSONAL HISTORY OF OTHER DISEASES OF THE NERVOUS SYSTEM AND SENSE ORGANS: ICD-10-CM

## 2021-07-11 DIAGNOSIS — R06.02 SHORTNESS OF BREATH: ICD-10-CM

## 2021-07-11 DIAGNOSIS — Z20.822 CONTACT WITH AND (SUSPECTED) EXPOSURE TO COVID-19: ICD-10-CM

## 2021-07-11 DIAGNOSIS — G51.0 BELL'S PALSY: ICD-10-CM

## 2021-07-11 LAB
ALBUMIN SERPL ELPH-MCNC: 3.5 G/DL — SIGNIFICANT CHANGE UP (ref 3.3–5)
ALP SERPL-CCNC: 97 U/L — SIGNIFICANT CHANGE UP (ref 40–120)
ALT FLD-CCNC: 22 U/L — SIGNIFICANT CHANGE UP (ref 12–78)
ANION GAP SERPL CALC-SCNC: 2 MMOL/L — LOW (ref 5–17)
AST SERPL-CCNC: 25 U/L — SIGNIFICANT CHANGE UP (ref 15–37)
BASOPHILS # BLD AUTO: 0.06 K/UL — SIGNIFICANT CHANGE UP (ref 0–0.2)
BASOPHILS NFR BLD AUTO: 0.7 % — SIGNIFICANT CHANGE UP (ref 0–2)
BILIRUB SERPL-MCNC: 0.3 MG/DL — SIGNIFICANT CHANGE UP (ref 0.2–1.2)
BUN SERPL-MCNC: 17 MG/DL — SIGNIFICANT CHANGE UP (ref 7–23)
CALCIUM SERPL-MCNC: 9.4 MG/DL — SIGNIFICANT CHANGE UP (ref 8.5–10.1)
CHLORIDE SERPL-SCNC: 109 MMOL/L — HIGH (ref 96–108)
CO2 SERPL-SCNC: 28 MMOL/L — SIGNIFICANT CHANGE UP (ref 22–31)
CREAT SERPL-MCNC: 0.8 MG/DL — SIGNIFICANT CHANGE UP (ref 0.5–1.3)
D DIMER BLD IA.RAPID-MCNC: 164 NG/ML DDU — SIGNIFICANT CHANGE UP
EOSINOPHIL # BLD AUTO: 0.37 K/UL — SIGNIFICANT CHANGE UP (ref 0–0.5)
EOSINOPHIL NFR BLD AUTO: 4.6 % — SIGNIFICANT CHANGE UP (ref 0–6)
GLUCOSE SERPL-MCNC: 109 MG/DL — HIGH (ref 70–99)
HCT VFR BLD CALC: 41.6 % — SIGNIFICANT CHANGE UP (ref 34.5–45)
HGB BLD-MCNC: 13 G/DL — SIGNIFICANT CHANGE UP (ref 11.5–15.5)
IMM GRANULOCYTES NFR BLD AUTO: 0.2 % — SIGNIFICANT CHANGE UP (ref 0–1.5)
LYMPHOCYTES # BLD AUTO: 3.06 K/UL — SIGNIFICANT CHANGE UP (ref 1–3.3)
LYMPHOCYTES # BLD AUTO: 37.7 % — SIGNIFICANT CHANGE UP (ref 13–44)
MAGNESIUM SERPL-MCNC: 2.4 MG/DL — SIGNIFICANT CHANGE UP (ref 1.6–2.6)
MCHC RBC-ENTMCNC: 27.1 PG — SIGNIFICANT CHANGE UP (ref 27–34)
MCHC RBC-ENTMCNC: 31.3 GM/DL — LOW (ref 32–36)
MCV RBC AUTO: 86.8 FL — SIGNIFICANT CHANGE UP (ref 80–100)
MONOCYTES # BLD AUTO: 0.69 K/UL — SIGNIFICANT CHANGE UP (ref 0–0.9)
MONOCYTES NFR BLD AUTO: 8.5 % — SIGNIFICANT CHANGE UP (ref 2–14)
NEUTROPHILS # BLD AUTO: 3.92 K/UL — SIGNIFICANT CHANGE UP (ref 1.8–7.4)
NEUTROPHILS NFR BLD AUTO: 48.3 % — SIGNIFICANT CHANGE UP (ref 43–77)
NT-PROBNP SERPL-SCNC: 26 PG/ML — SIGNIFICANT CHANGE UP (ref 0–125)
PLATELET # BLD AUTO: 217 K/UL — SIGNIFICANT CHANGE UP (ref 150–400)
POTASSIUM SERPL-MCNC: 4.1 MMOL/L — SIGNIFICANT CHANGE UP (ref 3.5–5.3)
POTASSIUM SERPL-SCNC: 4.1 MMOL/L — SIGNIFICANT CHANGE UP (ref 3.5–5.3)
PROT SERPL-MCNC: 7.6 GM/DL — SIGNIFICANT CHANGE UP (ref 6–8.3)
RBC # BLD: 4.79 M/UL — SIGNIFICANT CHANGE UP (ref 3.8–5.2)
RBC # FLD: 13 % — SIGNIFICANT CHANGE UP (ref 10.3–14.5)
SODIUM SERPL-SCNC: 139 MMOL/L — SIGNIFICANT CHANGE UP (ref 135–145)
TROPONIN I SERPL-MCNC: <0.015 NG/ML — SIGNIFICANT CHANGE UP (ref 0.01–0.04)
WBC # BLD: 8.12 K/UL — SIGNIFICANT CHANGE UP (ref 3.8–10.5)
WBC # FLD AUTO: 8.12 K/UL — SIGNIFICANT CHANGE UP (ref 3.8–10.5)

## 2021-07-11 PROCEDURE — U0005: CPT

## 2021-07-11 PROCEDURE — 85025 COMPLETE CBC W/AUTO DIFF WBC: CPT

## 2021-07-11 PROCEDURE — 71045 X-RAY EXAM CHEST 1 VIEW: CPT

## 2021-07-11 PROCEDURE — 83880 ASSAY OF NATRIURETIC PEPTIDE: CPT

## 2021-07-11 PROCEDURE — 80053 COMPREHEN METABOLIC PANEL: CPT

## 2021-07-11 PROCEDURE — U0003: CPT

## 2021-07-11 PROCEDURE — 83735 ASSAY OF MAGNESIUM: CPT

## 2021-07-11 PROCEDURE — 96374 THER/PROPH/DIAG INJ IV PUSH: CPT

## 2021-07-11 PROCEDURE — 99285 EMERGENCY DEPT VISIT HI MDM: CPT

## 2021-07-11 PROCEDURE — 93005 ELECTROCARDIOGRAM TRACING: CPT

## 2021-07-11 PROCEDURE — 99284 EMERGENCY DEPT VISIT MOD MDM: CPT | Mod: 25

## 2021-07-11 PROCEDURE — 84484 ASSAY OF TROPONIN QUANT: CPT

## 2021-07-11 PROCEDURE — 85379 FIBRIN DEGRADATION QUANT: CPT

## 2021-07-11 PROCEDURE — 94640 AIRWAY INHALATION TREATMENT: CPT

## 2021-07-11 PROCEDURE — 71045 X-RAY EXAM CHEST 1 VIEW: CPT | Mod: 26

## 2021-07-11 PROCEDURE — 36415 COLL VENOUS BLD VENIPUNCTURE: CPT

## 2021-07-11 PROCEDURE — 93010 ELECTROCARDIOGRAM REPORT: CPT

## 2021-07-11 RX ORDER — IPRATROPIUM/ALBUTEROL SULFATE 18-103MCG
3 AEROSOL WITH ADAPTER (GRAM) INHALATION ONCE
Refills: 0 | Status: COMPLETED | OUTPATIENT
Start: 2021-07-11 | End: 2021-07-11

## 2021-07-11 RX ADMIN — Medication 125 MILLIGRAM(S): at 20:45

## 2021-07-11 RX ADMIN — Medication 3 MILLILITER(S): at 20:45

## 2021-07-11 NOTE — ED ADULT NURSE NOTE - OBJECTIVE STATEMENT
70 y/o a&0x4 female BIBEMS from home for c/o of right sided chest pain that started tonight while she was at [employer]. right side of chest painful to touch. denies SOB. hx of bell's palsy and HTN. respiratory wheezes heard bilaterally. Sp02 at 97% room air, Nebulizer administered at bedside. v/s are stable at this time. EKG performed at bedside. Cardiac monitor reads NS rhythm. Pt denies any chest pain at this time.

## 2021-07-11 NOTE — ED ADULT NURSE NOTE - NSIMPLEMENTINTERV_GEN_ALL_ED
Implemented All Universal Safety Interventions:  Check to call system. Call bell, personal items and telephone within reach. Instruct patient to call for assistance. Room bathroom lighting operational. Non-slip footwear when patient is off stretcher. Physically safe environment: no spills, clutter or unnecessary equipment. Stretcher in lowest position, wheels locked, appropriate side rails in place.

## 2021-07-11 NOTE — ED ADULT NURSE NOTE - CHIEF COMPLAINT QUOTE
patient BIBEMS from home c/o right sided chest pain that started tonight while she was at [employer]. right side of chest painful to touch. denies SOB. hx of bell's palsy and HTN.

## 2021-07-11 NOTE — ED PROVIDER NOTE - OBJECTIVE STATEMENT
PA: Patient is a 71 year old female with PMHx of Asthma, bell's palsy, HTN, Hep C, seizures who presents to ED c/o chest tightness, SOB, cough, wheezing and right sided CP x1 day. DENIES palpitations, fever. No sick contacts. ~Trevor Dey PA-C

## 2021-07-11 NOTE — ED PROVIDER NOTE - NSFOLLOWUPINSTRUCTIONS_ED_ALL_ED_FT
Asthma Attack Prevention, Adult      Although you may not be able to control the fact that you have asthma, you can take actions to prevent episodes of asthma (asthma attacks).      How can this condition affect me?    Asthma attacks (flare ups) can cause trouble breathing, wheezing, and coughing. They may keep you from doing activities you like to do.      What can increase my risk?  Coming into contact with things that cause asthma symptoms (asthma triggers) can put you at risk for an asthma attack. Common asthma triggers include:•Things you are allergic to (allergens), such as:  •Dust mite and cockroach droppings.      •Pet dander.      •Mold.      •Pollen from trees and grasses.      •Food allergies. This might be a specific food or added chemicals called sulfites.      •Irritants, such as:  •Weather changes including very cold, dry, or humid air.      •Smoke. This includes campfire smoke, air pollution, and tobacco smoke.      •Strong odors from aerosol sprays and fumes from perfume, candles, and household .      •Other triggers, such as:  •Certain medicines. This includes NSAIDs, such as ibuprofen and aspirin.      •Viral respiratory infections (colds), including runny nose (rhinitis) or infection in the sinuses (sinusitis).      •Activity including exercise, laughing, or crying.      •Not using inhaled medicines (corticosteroids) as told.          What actions can I take to prevent an asthma attack?    •Stay healthy. Stay up to date on all immunizations as told by your health care provider, including the yearly flu (influenza) vaccine and pneumonia vaccine.      •Many asthma attacks can be prevented by carefully following your written asthma action plan.        Follow your asthma action plan   Work with your health care provider to create a written asthma action plan. This plan should include:  •A list of your asthma triggers and how to avoid or reduce them.      •A list of symptoms that you may have during an asthma attack.      •Information about which medicine to take, when to take the medicine, and how much of the medicine to take.      •Information to help you understand your peak flow measurements.      •Daily actions that you can take to prevent (control) your asthma symptoms.      •Contact information for your health care providers.      •If you have an asthma attack, act quickly. Follow the emergency steps on your written asthma action plan. This may prevent you from needing to go to the hospital.    Monitor your asthma. To do this:•Use your peak flow meter every morning and every evening for 2–3 weeks or as told by your health care provider.  •Record the results in a journal.      •A drop in your peak flow numbers on one or more days may mean that you are starting to have an asthma attack, even if you are not having symptoms.        •When you have asthma symptoms, write them down in a journal.      •Write down in your journal how often you need to use your fast-acting rescue inhaler. If you are using your rescue inhaler more often, it may mean that your asthma is not under control. Talk with your health care provider about adjusting your asthma treatment plan to help you prevent future asthma attacks and gain better control of your condition.      Lifestyle     •Avoid or reduce contact with known outdoor allergens by staying indoors, keeping windows closed, and using air conditioning when pollen and mold counts are high.      • Do not use any products that contain nicotine or tobacco, such as cigarettes, e-cigarettes, and chewing tobacco. If you need help quitting, ask your health care provider.      •If you are overweight, consider a weight-loss plan.      •Find ways to cope with stress and your feelings, such as mindfulness, relaxation, or breathing exercises.      •Ask your health care provider if a breathing exercise program (pulmonary rehabilitation) may be helpful to control symptoms and improve your quality of life.        Medicines      •Take over-the-counter and prescription medicines only as told by your health care provider.      • Do not stop taking your medicine and do not take less medicine even if you are doing well.    •Let your health care provider know:  •How often you use your rescue inhaler.      •How often you have symptoms when you are taking your regular medicines.      •If you wake up at night because of asthma symptoms.      •If you have more trouble with your breathing when you exercise.        Activity     •Do your normal activities as told by your health care provider. Ask your health care provider what activities are safe for you.    •Some people have asthma symptoms or more asthma symptoms when they exercise. This is called exercise-induced bronchoconstriction (EIB). If you have this problem, talk with your health care provider about how to manage EIB. Some tips to follow include:  •Use your fast-acting inhaler before exercise.      •Exercise indoors if it is very cold, humid, or the pollen and mold counts are high.      •Warm up and cool down before and after exercise.      •Stop exercising right away if your asthma symptoms start or get worse.          Where to find more information    •Asthma and Allergy Foundation of [place]: www.aafa.org      •Centers for Disease Control and Prevention: www.cdc.gov      •American Lung Association: www.lung.org      •National Heart, Lung, and Blood [place]: www.nhlbi.nih.gov      •World Health Organization: www.who.int        Get help right away if:    •You have followed your written asthma action plan and your symptoms are not improving.        Summary    •Asthma attacks (flare ups) can cause trouble breathing, wheezing, and coughing. They may keep you from doing activities you normally like to do.      •Work with your health care provider to create a written asthma action plan.      • Do not stop taking your medicine and do not take less medicine even if you are doing well.      • Do not use any products that contain nicotine or tobacco, such as cigarettes, e-cigarettes, and chewing tobacco. If you need help quitting, ask your health care provider.      This information is not intended to replace advice given to you by your health care provider. Make sure you discuss any questions you have with your health care provider.

## 2021-07-11 NOTE — ED PROVIDER NOTE - PROGRESS NOTE DETAILS
Patient feels a lot better after NEB, IV steroids. Will get 2nd Trop, DC. ~Trevor Dey PA-C PA note: All labwork and studies reviewed in details with patient. Patient re-examined and re-evaluated. Patient feels much better at this time. ED evaluation, Diagnosis and management discussed with the patient in detail. Workup results discussed with ED attending, OK to [place] home. Close PMD follow up encouraged.  Strict ED return instructions discussed in detail and patient given the opportunity to ask any questions about their discharge diagnosis and instructions. Patient verbalized understanding. ~ Trevor Dey PA-C PA: Patient seen and evaluated, exam suggestive of asthma exacerbation. Will get basic labs, CXR. Reassess. ~Trevor Dey PA-C Christi HERRERA: Pt comes to the Ed complaining of chest discomfort while at [employer]. Per patient the pain is like a pressure that she has never experienced before. Hx of asthma, Hep C, HTN. Pt in ED wheezing in bilatera lungs fields, abd soft nt, nd. Agree with PA plan and management. Christi HERRERA: Pt comes to the Ed complaining of chest discomfort while at [employer]. Per patient the pain is like a pressure that she has never experienced before. Hx of asthma, Hep C, HTN. Pt in ED wheezing in bilateral lungs fields, abd soft nt, nd. Denies sick contacts, fevers. Agree with PA plan and management.

## 2021-07-11 NOTE — ED PROVIDER NOTE - CLINICAL SUMMARY MEDICAL DECISION MAKING FREE TEXT BOX
PA note: All labwork and studies reviewed in details with patient. Patient re-examined and re-evaluated. Patient feels much better at this time. ED evaluation, Diagnosis and management discussed with the patient in detail. Workup results discussed with ED attending, [place] to [place] home. Close PMD follow up encouraged.  Strict ED return instructions discussed in detail and patient given the opportunity to ask any questions about their discharge diagnosis and instructions. Patient verbalized understanding. ~ Trevor Dey PA-C

## 2021-07-11 NOTE — ED PROVIDER NOTE - PATIENT PORTAL LINK FT
You can access the FollowMyHealth Patient Portal offered by [facility] by registering at the following website: [email or web address]. By joining [employer]’s FollowMyHealth portal, you will also be able to view your health information using other applications (apps) compatible with our system.

## 2021-07-12 VITALS
OXYGEN SATURATION: 95 % | TEMPERATURE: 98 F | RESPIRATION RATE: 18 BRPM | DIASTOLIC BLOOD PRESSURE: 91 MMHG | SYSTOLIC BLOOD PRESSURE: 143 MMHG | HEART RATE: 84 BPM

## 2021-07-12 PROBLEM — R56.9 UNSPECIFIED CONVULSIONS: Chronic | Status: ACTIVE | Noted: 2018-11-18

## 2021-07-12 LAB — TROPONIN I SERPL-MCNC: <0.015 NG/ML — SIGNIFICANT CHANGE UP (ref 0.01–0.04)

## 2021-07-12 RX ORDER — ALBUTEROL 90 UG/1
2 AEROSOL, METERED ORAL
Qty: 0 | Refills: 0 | DISCHARGE
Start: 2021-07-12

## 2021-07-12 RX ORDER — ALBUTEROL 90 UG/1
2 AEROSOL, METERED ORAL
Qty: 1 | Refills: 0
Start: 2021-07-12

## 2021-07-13 NOTE — ED ADULT NURSE NOTE - OBJECTIVE STATEMENT
Initial (On Arrival) 67yo female BIBA after having witnessed seizure at [employer]. pt states she has felt dizzy for the past couple of days and cannot recall what happened during seizure. sister at bedside states she was lowered to ground during seizure and possibly hit head on floor. pt denies any loc, change in vision, numbness/tingling, headache. pt has hx of bells palsy with left sided facial droop. pt also states she has had a seizure in past and is not on any seizure meds.

## 2022-02-16 ENCOUNTER — EMERGENCY (EMERGENCY)
Facility: HOSPITAL | Age: 72
LOS: 0 days | Discharge: HOME | End: 2022-02-16
Attending: EMERGENCY MEDICINE
Payer: MEDICARE

## 2022-02-16 VITALS
TEMPERATURE: 98 F | SYSTOLIC BLOOD PRESSURE: 144 MMHG | HEART RATE: 73 BPM | OXYGEN SATURATION: 95 % | RESPIRATION RATE: 18 BRPM | DIASTOLIC BLOOD PRESSURE: 73 MMHG

## 2022-02-16 VITALS
TEMPERATURE: 98 F | DIASTOLIC BLOOD PRESSURE: 94 MMHG | SYSTOLIC BLOOD PRESSURE: 155 MMHG | HEART RATE: 92 BPM | RESPIRATION RATE: 17 BRPM | HEIGHT: 66 IN | OXYGEN SATURATION: 96 %

## 2022-02-16 DIAGNOSIS — Z88.5 ALLERGY STATUS TO NARCOTIC AGENT: ICD-10-CM

## 2022-02-16 DIAGNOSIS — R63.8 OTHER SYMPTOMS AND SIGNS CONCERNING FOOD AND FLUID INTAKE: ICD-10-CM

## 2022-02-16 DIAGNOSIS — I10 ESSENTIAL (PRIMARY) HYPERTENSION: ICD-10-CM

## 2022-02-16 DIAGNOSIS — M54.50 LOW BACK PAIN, UNSPECIFIED: ICD-10-CM

## 2022-02-16 DIAGNOSIS — Z20.822 CONTACT WITH AND (SUSPECTED) EXPOSURE TO COVID-19: ICD-10-CM

## 2022-02-16 DIAGNOSIS — M54.40 LUMBAGO WITH SCIATICA, UNSPECIFIED SIDE: ICD-10-CM

## 2022-02-16 LAB
ALBUMIN SERPL ELPH-MCNC: 3.2 G/DL — LOW (ref 3.3–5)
ALP SERPL-CCNC: 94 U/L — SIGNIFICANT CHANGE UP (ref 40–120)
ALT FLD-CCNC: 23 U/L — SIGNIFICANT CHANGE UP (ref 12–78)
ANION GAP SERPL CALC-SCNC: 4 MMOL/L — LOW (ref 5–17)
APPEARANCE UR: CLEAR — SIGNIFICANT CHANGE UP
AST SERPL-CCNC: 38 U/L — HIGH (ref 15–37)
BASOPHILS # BLD AUTO: 0.06 K/UL — SIGNIFICANT CHANGE UP (ref 0–0.2)
BASOPHILS NFR BLD AUTO: 0.9 % — SIGNIFICANT CHANGE UP (ref 0–2)
BILIRUB SERPL-MCNC: 0.4 MG/DL — SIGNIFICANT CHANGE UP (ref 0.2–1.2)
BILIRUB UR-MCNC: NEGATIVE — SIGNIFICANT CHANGE UP
BUN SERPL-MCNC: 14 MG/DL — SIGNIFICANT CHANGE UP (ref 7–23)
CALCIUM SERPL-MCNC: 9.3 MG/DL — SIGNIFICANT CHANGE UP (ref 8.5–10.1)
CHLORIDE SERPL-SCNC: 110 MMOL/L — HIGH (ref 96–108)
CO2 SERPL-SCNC: 27 MMOL/L — SIGNIFICANT CHANGE UP (ref 22–31)
COLOR SPEC: YELLOW — SIGNIFICANT CHANGE UP
CREAT SERPL-MCNC: 0.78 MG/DL — SIGNIFICANT CHANGE UP (ref 0.5–1.3)
DIFF PNL FLD: NEGATIVE — SIGNIFICANT CHANGE UP
EOSINOPHIL # BLD AUTO: 0.32 K/UL — SIGNIFICANT CHANGE UP (ref 0–0.5)
EOSINOPHIL NFR BLD AUTO: 4.7 % — SIGNIFICANT CHANGE UP (ref 0–6)
GLUCOSE SERPL-MCNC: 94 MG/DL — SIGNIFICANT CHANGE UP (ref 70–99)
GLUCOSE UR QL: NEGATIVE — SIGNIFICANT CHANGE UP
HCT VFR BLD CALC: 44.4 % — SIGNIFICANT CHANGE UP (ref 34.5–45)
HGB BLD-MCNC: 13.8 G/DL — SIGNIFICANT CHANGE UP (ref 11.5–15.5)
IMM GRANULOCYTES NFR BLD AUTO: 0.3 % — SIGNIFICANT CHANGE UP (ref 0–1.5)
KETONES UR-MCNC: NEGATIVE — SIGNIFICANT CHANGE UP
LEUKOCYTE ESTERASE UR-ACNC: ABNORMAL
LIDOCAIN IGE QN: 59 U/L — LOW (ref 73–393)
LYMPHOCYTES # BLD AUTO: 2.26 K/UL — SIGNIFICANT CHANGE UP (ref 1–3.3)
LYMPHOCYTES # BLD AUTO: 33.5 % — SIGNIFICANT CHANGE UP (ref 13–44)
MCHC RBC-ENTMCNC: 26.6 PG — LOW (ref 27–34)
MCHC RBC-ENTMCNC: 31.1 GM/DL — LOW (ref 32–36)
MCV RBC AUTO: 85.5 FL — SIGNIFICANT CHANGE UP (ref 80–100)
MONOCYTES # BLD AUTO: 0.41 K/UL — SIGNIFICANT CHANGE UP (ref 0–0.9)
MONOCYTES NFR BLD AUTO: 6.1 % — SIGNIFICANT CHANGE UP (ref 2–14)
NEUTROPHILS # BLD AUTO: 3.67 K/UL — SIGNIFICANT CHANGE UP (ref 1.8–7.4)
NEUTROPHILS NFR BLD AUTO: 54.5 % — SIGNIFICANT CHANGE UP (ref 43–77)
NITRITE UR-MCNC: NEGATIVE — SIGNIFICANT CHANGE UP
PH UR: 6.5 — SIGNIFICANT CHANGE UP (ref 5–8)
PLATELET # BLD AUTO: 256 K/UL — SIGNIFICANT CHANGE UP (ref 150–400)
POTASSIUM SERPL-MCNC: 5 MMOL/L — SIGNIFICANT CHANGE UP (ref 3.5–5.3)
POTASSIUM SERPL-SCNC: 5 MMOL/L — SIGNIFICANT CHANGE UP (ref 3.5–5.3)
PROT SERPL-MCNC: 8.1 GM/DL — SIGNIFICANT CHANGE UP (ref 6–8.3)
PROT UR-MCNC: NEGATIVE — SIGNIFICANT CHANGE UP
RBC # BLD: 5.19 M/UL — SIGNIFICANT CHANGE UP (ref 3.8–5.2)
RBC # FLD: 13.1 % — SIGNIFICANT CHANGE UP (ref 10.3–14.5)
SARS-COV-2 RNA SPEC QL NAA+PROBE: SIGNIFICANT CHANGE UP
SODIUM SERPL-SCNC: 141 MMOL/L — SIGNIFICANT CHANGE UP (ref 135–145)
SP GR SPEC: 1.01 — SIGNIFICANT CHANGE UP (ref 1.01–1.02)
UROBILINOGEN FLD QL: NEGATIVE — SIGNIFICANT CHANGE UP
WBC # BLD: 6.74 K/UL — SIGNIFICANT CHANGE UP (ref 3.8–10.5)
WBC # FLD AUTO: 6.74 K/UL — SIGNIFICANT CHANGE UP (ref 3.8–10.5)

## 2022-02-16 PROCEDURE — 81001 URINALYSIS AUTO W/SCOPE: CPT

## 2022-02-16 PROCEDURE — U0005: CPT

## 2022-02-16 PROCEDURE — 36415 COLL VENOUS BLD VENIPUNCTURE: CPT

## 2022-02-16 PROCEDURE — 80053 COMPREHEN METABOLIC PANEL: CPT

## 2022-02-16 PROCEDURE — 71045 X-RAY EXAM CHEST 1 VIEW: CPT | Mod: 26

## 2022-02-16 PROCEDURE — 83690 ASSAY OF LIPASE: CPT

## 2022-02-16 PROCEDURE — G1004: CPT

## 2022-02-16 PROCEDURE — 93010 ELECTROCARDIOGRAM REPORT: CPT

## 2022-02-16 PROCEDURE — 74177 CT ABD & PELVIS W/CONTRAST: CPT | Mod: 26,MG

## 2022-02-16 PROCEDURE — 87086 URINE CULTURE/COLONY COUNT: CPT

## 2022-02-16 PROCEDURE — 96375 TX/PRO/DX INJ NEW DRUG ADDON: CPT

## 2022-02-16 PROCEDURE — 93005 ELECTROCARDIOGRAM TRACING: CPT

## 2022-02-16 PROCEDURE — 99285 EMERGENCY DEPT VISIT HI MDM: CPT | Mod: 25

## 2022-02-16 PROCEDURE — 71045 X-RAY EXAM CHEST 1 VIEW: CPT

## 2022-02-16 PROCEDURE — U0003: CPT

## 2022-02-16 PROCEDURE — 99284 EMERGENCY DEPT VISIT MOD MDM: CPT

## 2022-02-16 PROCEDURE — 74177 CT ABD & PELVIS W/CONTRAST: CPT | Mod: MG

## 2022-02-16 PROCEDURE — 85025 COMPLETE CBC W/AUTO DIFF WBC: CPT

## 2022-02-16 PROCEDURE — 96374 THER/PROPH/DIAG INJ IV PUSH: CPT

## 2022-02-16 RX ORDER — KETOROLAC TROMETHAMINE 30 MG/ML
15 SYRINGE (ML) INJECTION ONCE
Refills: 0 | Status: DISCONTINUED | OUTPATIENT
Start: 2022-02-16 | End: 2022-02-16

## 2022-02-16 RX ORDER — CYCLOBENZAPRINE HYDROCHLORIDE 10 MG/1
5 TABLET, FILM COATED ORAL ONCE
Refills: 0 | Status: COMPLETED | OUTPATIENT
Start: 2022-02-16 | End: 2022-02-16

## 2022-02-16 RX ORDER — ONDANSETRON 8 MG/1
4 TABLET, FILM COATED ORAL ONCE
Refills: 0 | Status: COMPLETED | OUTPATIENT
Start: 2022-02-16 | End: 2022-02-16

## 2022-02-16 RX ORDER — SODIUM CHLORIDE 9 MG/ML
1000 INJECTION INTRAMUSCULAR; INTRAVENOUS; SUBCUTANEOUS ONCE
Refills: 0 | Status: COMPLETED | OUTPATIENT
Start: 2022-02-16 | End: 2022-02-16

## 2022-02-16 RX ORDER — CYCLOBENZAPRINE HYDROCHLORIDE 10 MG/1
1 TABLET, FILM COATED ORAL
Qty: 15 | Refills: 0
Start: 2022-02-16 | End: 2022-02-20

## 2022-02-16 RX ADMIN — CYCLOBENZAPRINE HYDROCHLORIDE 5 MILLIGRAM(S): 10 TABLET, FILM COATED ORAL at 11:06

## 2022-02-16 RX ADMIN — ONDANSETRON 4 MILLIGRAM(S): 8 TABLET, FILM COATED ORAL at 11:07

## 2022-02-16 RX ADMIN — SODIUM CHLORIDE 666.67 MILLILITER(S): 9 INJECTION INTRAMUSCULAR; INTRAVENOUS; SUBCUTANEOUS at 11:36

## 2022-02-16 RX ADMIN — Medication 15 MILLIGRAM(S): at 11:07

## 2022-02-16 NOTE — ED PROVIDER NOTE - PROGRESS NOTE DETAILS
NICK Luke MD:  Pt reports + feeling better; informed by ED RN that she passed ambulation trial.  Will D/C pt home with e-scripts, medical excuse from work & Spine referral.

## 2022-02-16 NOTE — ED PROVIDER NOTE - MUSCULOSKELETAL, MLM
Spine appears normal. Back diffuse pain and tenderness across lumbar area including midline. No obvious step off deformity. No focal LE swelling tenderness. Left SLR 40 degrees normal motor with LBP. Right SLR 20 to 25 degrees limited by LBP. Normal motor.

## 2022-02-16 NOTE — ED PROVIDER NOTE - CLINICAL SUMMARY MEDICAL DECISION MAKING FREE TEXT BOX
72 y/o female with a PMHx of asthma, [place] Palsy, essential HTN, hepatitis C, seizures presents ambulatory to the ED c/o progressively worsening lower back pain x3 days radiating to groin and proximal thighs. No urinary complaints. On exam, BP 90s systolic. Mild tenderness across lower abd and lumbar spine. Plan: IV fluids, EKG, chest XR, CT abd pelvis with L spine recuts, labs including COVID, urine, lipase, IV Toradol, Zofran, PO Flexeril, reassess.

## 2022-02-16 NOTE — ED ADULT NURSE NOTE - NSICDXFAMILYHX_GEN_ALL_CORE_FT
FAMILY HISTORY:  Sibling  Still living? Unknown  Family history of breast cancer, Age at diagnosis: Age Unknown

## 2022-02-16 NOTE — ED ADULT NURSE NOTE - NSICDXPASTMEDICALHX_GEN_ALL_CORE_FT
PAST MEDICAL HISTORY:  Asthma, unspecified asthma severity, unspecified whether complicated, unspecified whether persistent     Bell's palsy     Essential hypertension     Hepatitis C     Seizure

## 2022-02-16 NOTE — ED PROVIDER NOTE - PATIENT PORTAL LINK FT
You can access the FollowMyHealth Patient Portal offered by [facility] by registering at the following website: [email or web address]. By joining [employer]’s FollowMyHealth portal, you will also be able to view your health information using other applications (apps) compatible with our system.

## 2022-02-16 NOTE — ED ADULT NURSE NOTE - NSINTERVENTIONOPT_GEN_ALL_ED
Normal vision: sees adequately in most situations; can see medication labels, newsprint [place]

## 2022-02-16 NOTE — ED PROVIDER NOTE - CONSTITUTIONAL, MLM
normal... Older  female, overweight, awake, alert, oriented to person, place, time/situation and in acute distress due to pain.

## 2022-02-16 NOTE — ED PROVIDER NOTE - NSFOLLOWUPINSTRUCTIONS_ED_ALL_ED_FT
Take medication as prescribed.  Continue your regular medications as per routine.  Avoid sudden movements, heavy lifting.    Minimize bending.  If you must, bend at the knees (keeping back straight) & not at the hips.  Follow up with spine specialist as listed below.  Follow up with your own doctor.      Sciatica       Sciatica is pain, weakness, tingling, or loss of feeling (numbness) along the sciatic nerve. The sciatic nerve starts in the lower back and goes down the back of each leg. Sciatica usually goes away on its own or with treatment. Sometimes, sciatica may come back (recur).      What are the causes?    This condition happens when the sciatic nerve is pinched or has pressure put on it. This may be the result of:  •A disk in between the bones of the spine bulging out too far (herniated disk).      •Changes in the spinal disks that occur with aging.      •A condition that affects a muscle in the butt.      •Extra bone growth near the sciatic nerve.      •A break (fracture) of the area between your hip bones (pelvis).      •Pregnancy.       •Tumor. This is rare.        What increases the risk?    You are more likely to develop this condition if you:  •Play sports that put pressure or stress on the spine.      •Have poor strength and ease of movement (flexibility).      •Have had a back injury in the past.      •Have had back surgery.      •Sit for long periods of time.      •Do activities that involve bending or lifting over and over again.      •Are very overweight (obese).        What are the signs or symptoms?    Symptoms can vary from mild to very bad. They may include:•Any of these problems in the lower back, leg, hip, or butt:  •Mild tingling, loss of feeling, or dull aches.      •Burning sensations.      •Sharp pains.         •Loss of feeling in the back of the calf or the sole of the foot.      •Leg weakness.       •Very bad back pain that makes it hard to move.      These symptoms may get worse when you cough, sneeze, or laugh. They may also get worse when you sit or stand for long periods of time.      How is this treated?    This condition often gets better without any treatment. However, treatment may include:  •Changing or cutting back on physical activity when you have pain.      •Doing exercises and stretching.      •Putting ice or heat on the affected area.    •Medicines that help:   •To relieve pain and swelling.       •To relax your muscles.         •Shots (injections) of medicines that help to relieve pain, irritation, and swelling.      •Surgery.        Follow these instructions at home:    Medicines     •Take over-the-counter and prescription medicines only as told by your doctor.    •Ask your doctor if the medicine prescribed to you:  •Requires you to avoid driving or using heavy machinery.    •Can cause trouble pooping (constipation). You may need to take these steps to prevent or treat trouble pooping:  •Drink enough fluids to keep your pee (urine) pale yellow.      •Take over-the-counter or prescription medicines.      •Eat foods that are high in fiber. These include beans, whole grains, and fresh fruits and vegetables.      •Limit foods that are high in fat and sugar. These include fried or sweet foods.            Managing pain                 •If told, put ice on the affected area.  •Put ice in a plastic bag.      •Place a towel between your skin and the bag.      •Leave the ice on for 20 minutes, 2–3 times a day.      •If told, put heat on the affected area. Use the heat source that your doctor tells you to use, such as a moist heat pack or a heating pad.  •Place a towel between your skin and the heat source.      •Leave the heat on for 20–30 minutes.      •Remove the heat if your skin turns bright red. This is very important if you are unable to feel pain, heat, or cold. You may have a greater risk of getting burned.          Activity      •Return to your normal activities as told by your doctor. Ask your doctor what activities are safe for you.      •Avoid activities that make your symptoms worse.    •Take short rests during the day.  •When you rest for a long time, do some physical activity or stretching between periods of rest.      •Avoid sitting for a long time without moving. Get up and move around at least one time each hour.        •Exercise and stretch regularly, as told by your doctor.    • Do not lift anything that is heavier than 10 lb (4.5 kg) while you have symptoms of sciatica.  •Avoid lifting heavy things even when you do not have symptoms.      •Avoid lifting heavy things over and over.      •When you lift objects, always lift in a way that is safe for your body. To do this, you should:  •Bend your knees.      •Keep the object close to your body.      •Avoid twisting.        General instructions     •Stay at a healthy weight.      •Wear comfortable shoes that support your feet. Avoid wearing high heels.      •Avoid sleeping on a mattress that is too soft or too hard. You might have less pain if you sleep on a mattress that is firm enough to support your back.      •Keep all follow-up visits as told by your doctor. This is important.        Contact a doctor if:  •You have pain that:  •Wakes you up when you are sleeping.      •Gets worse when you lie down.      •Is worse than the pain you have had in the past.      •Lasts longer than 4 weeks.        •You lose weight without trying.        Get help right away if:    •You cannot control when you pee (urinate) or poop (have a bowel movement).    •You have weakness in any of these areas and it gets worse:  •Lower back.      •The area between your hip bones.      •Butt.      •Legs.        •You have redness or swelling of your back.      •You have a burning feeling when you pee.        Summary    •Sciatica is pain, weakness, tingling, or loss of feeling (numbness) along the sciatic nerve.      •This condition happens when the sciatic nerve is pinched or has pressure put on it.      •Sciatica can cause pain, tingling, or loss of feeling (numbness) in the lower back, legs, hips, and butt.      •Treatment often includes rest, exercise, medicines, and putting ice or heat on the affected area.      This information is not intended to replace advice given to you by your health care provider. Make sure you discuss any questions you have with your health care provider.

## 2022-02-16 NOTE — ED ADULT TRIAGE NOTE - CHIEF COMPLAINT QUOTE
Pt arrives to ED complaining of lower back pain with radiation to b/l groin area. Hx HTN. History of extrapyramidal symptoms

## 2022-02-16 NOTE — ED PROVIDER NOTE - ENMT, MLM
Oropharynx clear. Airway patent, Nasal mucosa clear. Mouth with mildly dry mucosa. Throat has no vesicles, no oropharyngeal exudates and uvula is midline. +dentures.

## 2022-02-16 NOTE — ED PROVIDER NOTE - OBJECTIVE STATEMENT
72 y/o female with a PMHx of asthma, [place] Palsy, essential HTN, hepatitis C, seizures presents to the ED c/o lower back pain x3 days. Pain sharp, moderate to severe and radiates to groin. +decreased appetite. Denies weakness, numbness, bladder/bowel incontinence, dysuria, burning with urination, hematuria, cough, SOB. Allergies: Morphine. No other complaints at this time. PCP: Dr. Harper.

## 2022-02-16 NOTE — ED ADULT NURSE REASSESSMENT NOTE - NS ED NURSE REASSESS COMMENT FT1
pt able to ambulate without assistance. requesting to be discharged with work note, and muscle relaxer. Pt refused cane/walking aid. MD continyohana made aware.

## 2022-02-16 NOTE — ED ADULT NURSE NOTE - NSIMPLEMENTINTERV_GEN_ALL_ED
Implemented All Fall Risk Interventions:  [place] to call system. Call bell, personal items and telephone within reach. Instruct patient to call for assistance. Room bathroom lighting operational. Non-slip footwear when patient is off stretcher. Physically safe environment: no spills, clutter or unnecessary equipment. Stretcher in lowest position, wheels locked, appropriate side rails in place. Provide visual cue, wrist band, yellow gown, etc. Monitor gait and stability. Monitor for mental status changes and reorient to person, place, and time. Review medications for side effects contributing to fall risk. Reinforce activity limits and safety measures with patient and family.

## 2022-02-16 NOTE — ED ADULT NURSE NOTE - OBJECTIVE STATEMENT
pt c/o back pain radiating down bilateral legs , lower groin pain, epigastric pain. Hx gall stones. States "this is how it felt when I had gall stones".  +Nausea , no appetite. Pt feel tired. pt experiencing chest pain upon exam EKG completed.

## 2022-02-16 NOTE — ED PROVIDER NOTE - CARE PROVIDER_API CALL
Ousmane Lopez; PhD)  Neurosurgery  [address] [facility], [address]nd [place] [address]  Phone: [phone]  Fax: [phone]  Follow Up Time:

## 2022-02-18 LAB
CULTURE RESULTS: SIGNIFICANT CHANGE UP
SPECIMEN SOURCE: SIGNIFICANT CHANGE UP

## 2022-02-23 NOTE — H&P ADULT - HISTORY OF PRESENT ILLNESS
Past Medical History:   Diagnosis Date    Anxiety     Arthritis     Asthma     Cancer     Left Breast    Depression     Diabetes mellitus, type 2     GERD (gastroesophageal reflux disease)     Hyperlipidemia     Hypertension     Overactive bladder     Seizures     Pseudo-seizures    Sleep apnea     Stroke     Thyroid disease     Urinary tract infection without hematuria 8/3/2017       Past Surgical History:   Procedure Laterality Date    APPENDECTOMY      BREAST BIOPSY Left     BREAST LUMPECTOMY Left     2016    BREAST SURGERY      CATARACT EXTRACTION Bilateral     OU done//     SECTION      CHOLECYSTECTOMY      COLONOSCOPY N/A 2020    Procedure: COLONOSCOPY;  Surgeon: jM Fernandez MD;  Location: [facility];  Service: Endoscopy;  Laterality: N/A;    CYST REMOVAL Left 2021    DILATION AND CURETTAGE OF UTERUS      EYE SURGERY         Review of patient's allergies indicates:   Allergen Reactions    Penicillins Anaphylaxis    Sulfa (sulfonamide antibiotics) Anaphylaxis    Trintellix [vortioxetine] Nausea And Vomiting and Other (See Comments)     Patient has seizures and vomits       No current facility-administered medications on file prior to encounter.     Current Outpatient Medications on File Prior to Encounter   Medication Sig    albuterol (ACCUNEB) 1.25 mg/3 mL Nebu Take 3 mLs (1.25 mg total) by nebulization every 6 (six) hours as needed (sob). Rescue    aspirin (ECOTRIN) 81 MG EC tablet Take 81 mg by mouth once daily.    blood-glucose meter kit Use as instructed. Insurance preferred.    CALCIUM CARBONATE/VITAMIN D3 (CALCIUM 500 + D ORAL) Take 1 tablet by mouth once daily. 10 mg daily    empagliflozin (JARDIANCE) 10 mg tablet Take 1 tablet (10 mg total) by mouth once daily.    fluticasone furoate-vilanteroL (BREO ELLIPTA) 100-25 mcg/dose diskus inhaler Inhale 1 puff into the lungs once daily. Controller    gabapentin (NEURONTIN) 300 MG capsule Take 1 tablet every night x 7 days. Increase  to twice a day x 7 days. Increase to three times a day.    levothyroxine (SYNTHROID) 100 MCG tablet Take 1 tablet (100 mcg total) by mouth before breakfast.    losartan (COZAAR) 50 MG tablet Take 1 tablet (50 mg total) by mouth once daily.    metFORMIN (GLUCOPHAGE-XR) 500 MG ER 24hr tablet Take 2 tablets (1,000 mg total) by mouth 2 (two) times daily with meals.    potassium chloride SA (K-DUR,KLOR-CON) 20 MEQ tablet Take 20 mEq by mouth once daily.    prazosin (MINIPRESS) 5 MG capsule Take 1 capsule (5 mg total) by mouth every evening.    sertraline (ZOLOFT) 25 MG tablet Take 1 tablet (25 mg total) by mouth once daily.    simvastatin (ZOCOR) 40 MG tablet TAKE 1 TABLET BY MOUTH EVERY DAY    ergocalciferol (ERGOCALCIFEROL) 50,000 unit Cap Take 1 capsule (50,000 Units total) by mouth every 7 days.    gabapentin (NEURONTIN) 100 MG capsule Take 1 capsule (100 mg total) by mouth 3 (three) times daily.    HYDROcodone-acetaminophen (NORCO) 5-325 mg per tablet Take 1 tablet by mouth every 6 (six) hours as needed for Pain.    nystatin (MYCOSTATIN) powder Apply topically 2 (two) times daily. for 14 days    solifenacin (VESICARE) 10 MG tablet Take 1 tablet (10 mg total) by mouth once daily.    [DISCONTINUED] thiamine 100 MG tablet Take 100 mg by mouth once daily.     Family History       Problem Relation (Age of Onset)    Breast cancer Mother    Cataracts Mother, Brother    Diabetes Mother    Heart failure Father    Hypertension Mother          Tobacco Use    Smoking status: Never Smoker    Smokeless tobacco: Never Used   Substance and Sexual Activity    Alcohol use: Yes     Comment: seldom    Drug use: No    Sexual activity: Not Currently     Review of Systems   Unable to perform ROS: Mental status change   Objective:     Vital Signs (Most Recent):  Temp: 97.6 °F (36.4 °C) (02/22/22 1450)  Pulse: 71 (02/22/22 1703)  Resp: 20 (02/22/22 1632)  BP: (!) 147/69 (02/22/22 1703)  SpO2: 99 % (02/22/22 1703)   Vital Signs (24h  Range):  Temp:  [97.6 °F (36.4 °C)-97.8 °F (36.6 °C)] 97.6 °F (36.4 °C)  Pulse:  [67-88] 71  Resp:  [20] 20  SpO2:  [97 %-100 %] 99 %  BP: (110-147)/(63-84) 147/69     Weight: 84.8 kg (187 lb)  Body mass index is 35.33 kg/m².    Physical Exam  Constitutional:       General: She is not in acute distress.     Appearance: She is well-developed.   HENT:      Head: Normocephalic and atraumatic.   Eyes:      Extraocular Movements: EOM normal.      Pupils: Pupils are equal, round, and reactive to light.   Cardiovascular:      Rate and Rhythm: Normal rate and regular rhythm.      Heart sounds: No murmur heard.  Pulmonary:      Effort: Pulmonary effort is normal. No respiratory distress.      Breath sounds: Normal breath sounds. No wheezing or rales.   Abdominal:      General: Bowel sounds are normal. There is no distension.      Palpations: Abdomen is soft.      Tenderness: There is no abdominal tenderness.   Musculoskeletal:         General: Normal range of motion.   Skin:     General: Skin is warm and dry.      Findings: No rash.   Neurological:      Mental Status: She is alert.      Cranial Nerves: No cranial nerve deficit.      Comments: Lip smacking (brother states she does not normally do this)  Right side weaker than left which is her baseline post prior stroke.  Right pronator drift  Oriented to self and location but not year or situation   Psychiatric:         Behavior: Behavior normal.         CRANIAL NERVES     CN III, IV, VI   Pupils are equal, round, and reactive to light.  Extraocular motions are normal.      Significant Labs: All pertinent labs within the past 24 hours have been reviewed.    Significant Imaging: I have reviewed all pertinent imaging results/findings within the past 24 hours.   67 year old female with pmhx of HTN, hepatitis C, Bell's palsy presenting with sudden onset of abdominal pain this AM. Patient states she was getting out of bed this AM when pain started. She reports the pain as 10/10, sharp in nature, radiating to back, worsened with breathing and improved with laying in bed and taking shallow breaths. She reports episodes of nausea and vomiting today after onset of pain, nonbloody and nonbilious. She reports no fevers, chills, dysuria, urine color changes, discharge prior to event. She denies any having any prior episodes. In ED, a CT abdomen and pelvis demonstrated mild right hydronephrosis secondary to a proximal right ureter calculus measuring 0.8 x 0.6 cm. She was given 1 L bolus of fluid, toradol in ambulance and fentanyl 50 mcg IV in ED for pain. Patient was laying in bed, continuing to have pain despite treatment and given another dose of fentanyl 25 mcg IV x 1 dose with noted improvement in pain. 67 year old female with pmhx of HTN, hepatitis C, Bell's palsy presenting with sudden onset of abdominal pain this AM. Patient states she was getting out of bed this AM when pain started. She reports the pain as 10/10, sharp in nature, radiating to back, worsened with breathing and improved with laying in bed and taking shallow breaths. She reports no nausea or vomiting. She reports no fevers, chills, dysuria, urine color changes, discharge prior to event. She denies any having any prior episodes. In ED, a CT abdomen and pelvis demonstrated mild right hydronephrosis secondary to a proximal right ureter calculus measuring 0.8 x 0.6 cm. She was given 1 L bolus of fluid, toradol in ambulance and fentanyl 50 mcg IV in ED for pain. Patient was laying in bed, continuing to have pain despite treatment and given another dose of fentanyl 25 mcg IV x 1 dose with noted improvement in pain.

## 2022-04-18 NOTE — PROGRESS NOTE ADULT - PROVIDER SPECIALTY LIST ADULT
[facility] APPOINTMENT CONFIRMATION    Date: 04/20/2022    Time: 11:30 am,     Location: [facility] Emergency Department: [facility] [address] [place] [address], [address] [facility] [address]    Provider name: RYLEY PROVIDERS: Dr. Buster Waller     Type: In-office visit    Zoom link sent (if applicable): No    Is patient currently in a facility? No    Alternative contact information: n/a     Appointment confirmed: Yes    \"We strongly encourage only one visitor to accompany the patient. To ensure [facility].\"     Please ask if patient and visitor are able to keep their mask on at all time. If they are unable to keep their mask on then the appointment will need to be virtual.     COVID Universal Screening Question    “Do you have a fever, cough, chills, vomiting, diarrhea, headache, or runny nose?” Yes/No: No     Covid-19 Screening questionnaire complete: Yes/No/NA: Yes    \"We do have free parking available in the main hospital parking lot. However, parking can be difficult to find so we ask that you arrive 40 minutes prior to your appointment.\"    
Hospitalist
Infectious Disease
Urology
Infectious Disease

## 2022-07-18 ENCOUNTER — NON-APPOINTMENT (OUTPATIENT)
Age: 72
End: 2022-07-18

## 2022-11-15 ENCOUNTER — EMERGENCY (EMERGENCY)
Facility: HOSPITAL | Age: 72
LOS: 1 days | Discharge: HOME | End: 2022-11-15
Attending: STUDENT IN AN ORGANIZED HEALTH CARE EDUCATION/TRAINING PROGRAM
Payer: COMMERCIAL

## 2022-11-15 VITALS — HEIGHT: 64 IN | WEIGHT: 220.9 LBS

## 2022-11-15 VITALS
RESPIRATION RATE: 17 BRPM | SYSTOLIC BLOOD PRESSURE: 166 MMHG | OXYGEN SATURATION: 97 % | HEART RATE: 85 BPM | DIASTOLIC BLOOD PRESSURE: 88 MMHG

## 2022-11-15 DIAGNOSIS — Z88.6 ALLERGY STATUS TO ANALGESIC AGENT: ICD-10-CM

## 2022-11-15 DIAGNOSIS — M54.50 LOW BACK PAIN, UNSPECIFIED: ICD-10-CM

## 2022-11-15 DIAGNOSIS — N20.0 CALCULUS OF KIDNEY: ICD-10-CM

## 2022-11-15 DIAGNOSIS — G51.0 BELL'S PALSY: ICD-10-CM

## 2022-11-15 DIAGNOSIS — J06.9 ACUTE UPPER RESPIRATORY INFECTION, UNSPECIFIED: ICD-10-CM

## 2022-11-15 DIAGNOSIS — R11.0 NAUSEA: ICD-10-CM

## 2022-11-15 DIAGNOSIS — R10.30 LOWER ABDOMINAL PAIN, UNSPECIFIED: ICD-10-CM

## 2022-11-15 DIAGNOSIS — R82.998 OTHER ABNORMAL FINDINGS IN URINE: ICD-10-CM

## 2022-11-15 DIAGNOSIS — I10 ESSENTIAL (PRIMARY) HYPERTENSION: ICD-10-CM

## 2022-11-15 DIAGNOSIS — B19.20 UNSPECIFIED VIRAL HEPATITIS C WITHOUT HEPATIC COMA: ICD-10-CM

## 2022-11-15 DIAGNOSIS — R56.9 UNSPECIFIED CONVULSIONS: ICD-10-CM

## 2022-11-15 DIAGNOSIS — J45.909 UNSPECIFIED ASTHMA, UNCOMPLICATED: ICD-10-CM

## 2022-11-15 LAB
ALBUMIN SERPL ELPH-MCNC: 3.6 G/DL — SIGNIFICANT CHANGE UP (ref 3.3–5)
ALP SERPL-CCNC: 107 U/L — SIGNIFICANT CHANGE UP (ref 40–120)
ALT FLD-CCNC: 28 U/L — SIGNIFICANT CHANGE UP (ref 12–78)
ANION GAP SERPL CALC-SCNC: 4 MMOL/L — LOW (ref 5–17)
APPEARANCE UR: CLEAR — SIGNIFICANT CHANGE UP
AST SERPL-CCNC: 21 U/L — SIGNIFICANT CHANGE UP (ref 15–37)
BASOPHILS # BLD AUTO: 0.05 K/UL — SIGNIFICANT CHANGE UP (ref 0–0.2)
BASOPHILS NFR BLD AUTO: 0.6 % — SIGNIFICANT CHANGE UP (ref 0–2)
BILIRUB SERPL-MCNC: 0.3 MG/DL — SIGNIFICANT CHANGE UP (ref 0.2–1.2)
BILIRUB UR-MCNC: NEGATIVE — SIGNIFICANT CHANGE UP
BUN SERPL-MCNC: 14 MG/DL — SIGNIFICANT CHANGE UP (ref 7–23)
CALCIUM SERPL-MCNC: 8.9 MG/DL — SIGNIFICANT CHANGE UP (ref 8.5–10.1)
CHLORIDE SERPL-SCNC: 107 MMOL/L — SIGNIFICANT CHANGE UP (ref 96–108)
CO2 SERPL-SCNC: 28 MMOL/L — SIGNIFICANT CHANGE UP (ref 22–31)
COLOR SPEC: YELLOW — SIGNIFICANT CHANGE UP
CREAT SERPL-MCNC: 0.84 MG/DL — SIGNIFICANT CHANGE UP (ref 0.5–1.3)
DIFF PNL FLD: ABNORMAL
EGFR: 75 ML/MIN/1.73M2 — SIGNIFICANT CHANGE UP
EOSINOPHIL # BLD AUTO: 0.07 K/UL — SIGNIFICANT CHANGE UP (ref 0–0.5)
EOSINOPHIL NFR BLD AUTO: 0.9 % — SIGNIFICANT CHANGE UP (ref 0–6)
GLUCOSE SERPL-MCNC: 115 MG/DL — HIGH (ref 70–99)
GLUCOSE UR QL: NEGATIVE — SIGNIFICANT CHANGE UP
HCT VFR BLD CALC: 45.5 % — HIGH (ref 34.5–45)
HGB BLD-MCNC: 14.5 G/DL — SIGNIFICANT CHANGE UP (ref 11.5–15.5)
IMM GRANULOCYTES NFR BLD AUTO: 0.6 % — SIGNIFICANT CHANGE UP (ref 0–0.9)
KETONES UR-MCNC: NEGATIVE — SIGNIFICANT CHANGE UP
LEUKOCYTE ESTERASE UR-ACNC: ABNORMAL
LYMPHOCYTES # BLD AUTO: 1.11 K/UL — SIGNIFICANT CHANGE UP (ref 1–3.3)
LYMPHOCYTES # BLD AUTO: 13.8 % — SIGNIFICANT CHANGE UP (ref 13–44)
MCHC RBC-ENTMCNC: 27.2 PG — SIGNIFICANT CHANGE UP (ref 27–34)
MCHC RBC-ENTMCNC: 31.9 GM/DL — LOW (ref 32–36)
MCV RBC AUTO: 85.2 FL — SIGNIFICANT CHANGE UP (ref 80–100)
MONOCYTES # BLD AUTO: 0.14 K/UL — SIGNIFICANT CHANGE UP (ref 0–0.9)
MONOCYTES NFR BLD AUTO: 1.7 % — LOW (ref 2–14)
NEUTROPHILS # BLD AUTO: 6.6 K/UL — SIGNIFICANT CHANGE UP (ref 1.8–7.4)
NEUTROPHILS NFR BLD AUTO: 82.4 % — HIGH (ref 43–77)
NITRITE UR-MCNC: NEGATIVE — SIGNIFICANT CHANGE UP
PH UR: 6 — SIGNIFICANT CHANGE UP (ref 5–8)
PLATELET # BLD AUTO: 269 K/UL — SIGNIFICANT CHANGE UP (ref 150–400)
POTASSIUM SERPL-MCNC: 4.2 MMOL/L — SIGNIFICANT CHANGE UP (ref 3.5–5.3)
POTASSIUM SERPL-SCNC: 4.2 MMOL/L — SIGNIFICANT CHANGE UP (ref 3.5–5.3)
PROT SERPL-MCNC: 8.6 GM/DL — HIGH (ref 6–8.3)
PROT UR-MCNC: NEGATIVE — SIGNIFICANT CHANGE UP
RBC # BLD: 5.34 M/UL — HIGH (ref 3.8–5.2)
RBC # FLD: 13 % — SIGNIFICANT CHANGE UP (ref 10.3–14.5)
SODIUM SERPL-SCNC: 139 MMOL/L — SIGNIFICANT CHANGE UP (ref 135–145)
SP GR SPEC: 1.02 — SIGNIFICANT CHANGE UP (ref 1.01–1.02)
UROBILINOGEN FLD QL: NEGATIVE — SIGNIFICANT CHANGE UP
WBC # BLD: 8.02 K/UL — SIGNIFICANT CHANGE UP (ref 3.8–10.5)
WBC # FLD AUTO: 8.02 K/UL — SIGNIFICANT CHANGE UP (ref 3.8–10.5)

## 2022-11-15 PROCEDURE — 87086 URINE CULTURE/COLONY COUNT: CPT

## 2022-11-15 PROCEDURE — 71046 X-RAY EXAM CHEST 2 VIEWS: CPT

## 2022-11-15 PROCEDURE — 36415 COLL VENOUS BLD VENIPUNCTURE: CPT

## 2022-11-15 PROCEDURE — 80053 COMPREHEN METABOLIC PANEL: CPT

## 2022-11-15 PROCEDURE — 74176 CT ABD & PELVIS W/O CONTRAST: CPT | Mod: MA

## 2022-11-15 PROCEDURE — 96374 THER/PROPH/DIAG INJ IV PUSH: CPT

## 2022-11-15 PROCEDURE — 74176 CT ABD & PELVIS W/O CONTRAST: CPT | Mod: 26,MA

## 2022-11-15 PROCEDURE — 99285 EMERGENCY DEPT VISIT HI MDM: CPT

## 2022-11-15 PROCEDURE — 85025 COMPLETE CBC W/AUTO DIFF WBC: CPT

## 2022-11-15 PROCEDURE — 99284 EMERGENCY DEPT VISIT MOD MDM: CPT | Mod: 25

## 2022-11-15 PROCEDURE — 81001 URINALYSIS AUTO W/SCOPE: CPT

## 2022-11-15 PROCEDURE — 71046 X-RAY EXAM CHEST 2 VIEWS: CPT | Mod: 26

## 2022-11-15 RX ORDER — LIDOCAINE 4 G/100G
1 CREAM TOPICAL ONCE
Refills: 0 | Status: COMPLETED | OUTPATIENT
Start: 2022-11-15 | End: 2022-11-15

## 2022-11-15 RX ORDER — IBUPROFEN 200 MG
1 TABLET ORAL
Qty: 20 | Refills: 0
Start: 2022-11-15 | End: 2022-11-19

## 2022-11-15 RX ORDER — SODIUM CHLORIDE 9 MG/ML
1000 INJECTION INTRAMUSCULAR; INTRAVENOUS; SUBCUTANEOUS ONCE
Refills: 0 | Status: COMPLETED | OUTPATIENT
Start: 2022-11-15 | End: 2022-11-15

## 2022-11-15 RX ORDER — ACETAMINOPHEN 500 MG
650 TABLET ORAL ONCE
Refills: 0 | Status: COMPLETED | OUTPATIENT
Start: 2022-11-15 | End: 2022-11-15

## 2022-11-15 RX ORDER — KETOROLAC TROMETHAMINE 30 MG/ML
15 SYRINGE (ML) INJECTION ONCE
Refills: 0 | Status: DISCONTINUED | OUTPATIENT
Start: 2022-11-15 | End: 2022-11-15

## 2022-11-15 RX ADMIN — LIDOCAINE 1 PATCH: 4 CREAM TOPICAL at 15:44

## 2022-11-15 RX ADMIN — SODIUM CHLORIDE 1000 MILLILITER(S): 9 INJECTION INTRAMUSCULAR; INTRAVENOUS; SUBCUTANEOUS at 15:44

## 2022-11-15 RX ADMIN — Medication 15 MILLIGRAM(S): at 15:44

## 2022-11-15 NOTE — ED STATDOCS - PHYSICAL EXAMINATION
GENERAL: non-toxic appearing, in NAD  HEAD: atraumatic, normocephalic  EYES: vision grossly intact, no conjunctivitis or discharge  EARS: hearing grossly intact  NOSE: no nasal discharge, epistaxis   CARDIAC: RRR, normal S1S2,  no appreciable murmurs, no cyanosis, cap refill < 2 seconds  PULM: no respiratory distress, oxygen saturation on RA wnl, CTAB, no crackles, rales, rhonchi, or wheezing  GI: abdomen nondistended, soft, nontender, no guarding or rebound tenderness, no palpable masses. Mild right CVA TTP  NEURO: awake and alert, follows commands, normal speech, PERRLA, EOMI, no focal motor or sensory deficits, normal gait  MSK: spine appears normal, no joint swelling or erythema, no gross deformities of extremities  EXT: no peripheral edema, calf tenderness, redness or swelling  SKIN: warm, dry, and intact, no rashes  PSYCH: appropriate mood and affect

## 2022-11-15 NOTE — ED STATDOCS - CARE PROVIDER_API CALL
Joey Harper  [facility] MEDICINE  [address] [place] [address]  Phone: [phone]  Fax: [phone]  Established Patient  Follow Up Time:

## 2022-11-15 NOTE — ED STATDOCS - NS ED ATTENDING STATEMENT MOD
This was a shared visit with the MIHIR. I reviewed and verified the documentation and independently performed the documented:

## 2022-11-15 NOTE — ED STATDOCS - PATIENT PORTAL LINK FT
You can access the FollowMyHealth Patient Portal offered by [facility] by registering at the following website: [email or web address]. By joining [employer]’s FollowMyHealth portal, you will also be able to view your health information using other applications (apps) compatible with our system.

## 2022-11-15 NOTE — ED STATDOCS - CARE PLAN
1 Principal Discharge DX:	Musculoskeletal back pain  Secondary Diagnosis:	Cough  Secondary Diagnosis:	Viral URI with cough  Secondary Diagnosis:	Right renal stone

## 2022-11-15 NOTE — ED STATDOCS - PROGRESS NOTE DETAILS
71 y/o Female presents to ED c/o Right side low back pain for 2 days.  Pain radiates to R groin and is associate with nausea.  Pt also reports cough, HA, sore throat.  Coughing makes pain worse.  Pt was seen  in UC today and Covid / Flu testing was Neg.  Pt sent to ED because they couldn't do anything for back pain.  Pt took Advil yesterday s relief.  Denies hematuria, dysuria.  Will f/U Labs, UA, Ct, CXR.  Re-eval s/p meds.  On exam, Neg rash to back.  (+) R CVA tenderness to perc.  Molly Juarez PA-C WBC not elevated.  Renal fxn and LFTs WNL.  U/A with mod leuk esterase, trace blood.  CT scan with 8mm Right renal stone.  Neg hydroureteronephorsis or obstructive urolithiasis. No acute findings   to account for the symptomatology.  CXR with clear lungs.  No change from 2/22.  Pt given pain meds in ED with mild improvement.  Likely MS pain with coughing.  Will dc home to cont pain meds.  f/U with Dr. Harper.  Molly Juarez PA-C WBC not elevated.  Renal fxn and LFTs WNL.  U/A with mod leuk esterase, trace blood.  CT scan with 8mm Right renal stone.  Neg hydroureteronephorsis or obstructive urolithiasis. No acute findings   to account for the symptomatology.  CXR with clear lungs.  No change from 2/22.  Pt given pain meds in ED with mild improvement.  Likely MS pain with coughing.  Will dc home to cont pain meds.  f/U with Dr. Harper.  Cont. Albuterol inhaler for cough.  Ibuprofen for pain.  Molly Juarez PA-C

## 2022-11-15 NOTE — ED STATDOCS - NS ED ROS FT
GENERAL: no fever, chills, fatigue, weight loss, night sweats  HEENT: no eye pain, discharge, conjunctivitis, ear pain, hearing loss, rhinorrhea, congestion, throat pain  CARDIAC: no chest pain, palpitations, lightheadedness, syncope  PULM: no dyspnea, wheezing  GI: no abdominal pain, nausea, vomiting, diarrhea, constipation, melena, hematochezia  : no urinary dysuria, frequency, incontinence, hematuria  NEURO: no headache, changes in vision, motor weakness, sensory changes  MSK: no joint pain, joint swelling, myalgias +lower back pain  SKIN: no rashes  HEME: no active bleeding, excessive bruising

## 2022-11-15 NOTE — ED STATDOCS - ADDITIONAL NOTES AND INSTRUCTIONS:
Pt with Viral URI with cough and musculoskeletal Back pain should be able to return to work on Friday, Nov 18th, 2022.

## 2022-11-15 NOTE — ED STATDOCS - ATTENDING APP SHARED VISIT CONTRIBUTION OF CARE
I, Reginaldo Martinez MD, personally saw the patient with ACP.  I have personally performed a face-to-face diagnostic evaluation on this patient. I have reviewed the ACP note and agree with the history, exam, and plan of care, except as noted. I personally saw the patient and performed a substantive portion of the visit including all aspects of the medical decision making.

## 2022-11-15 NOTE — ED STATDOCS - OBJECTIVE STATEMENT
73 y/o female w/ a PMHx of asthma, [place] Palsy, essential HTN, hepatitis C, kidney stones, and seizures presents to the ED c/o right lower back pain radiating to the groin and nausea x2 days. Pt states pain is worse when she coughs. Pt endorses taking Advil w/o improvement. Allergies: Morphine.

## 2022-11-16 LAB
CULTURE RESULTS: SIGNIFICANT CHANGE UP
SPECIMEN SOURCE: SIGNIFICANT CHANGE UP

## 2022-11-21 NOTE — ED ADULT NURSE NOTE - NS ED NOTE ABUSE SUSPICION NEGLECT YN
[facility]  700/06  HOSPITAL MEDICINE PROGRESS NOTE   Patient: Joellen Dow  Today's Date: 2022    YOB: 1939  Admission Date: 2022    MRN: [id number]  Inpatient LOS: 2    Attending: Scott Alexander MD  Hospital Day: Hospital Day: 4    Subjective   HISTORY AND SUBJECTIVE COMPLAINTS     Chief Complaint:   Shortness of breath    Interval History / Subjective:   No major issues overnight  She states that she is not short of breath  No chest pains, abdominal pains      Hospital Course:  Joellen Dow is a 83 year old female who presented on 2022 with complaints of No chief complaint on file.  .    ROS:  Pertinent systems negative except as above.    Objective   PHYSICAL EXAMINATION     Vital 24 Hour Range Most Recent Value   Temperature Temp  Min: 97.6 °F (36.4 °C)  Max: 98.2 °F (36.8 °C) 97.7 °F (36.5 °C)   Pulse Pulse  Min: 81  Max: 98 96   Respiratory Resp  Min: 17  Max: 33 (!) 25   Blood Pressure BP  Min: 108/62  Max: 137/75 129/58   Pulse Oximetry SpO2  Min: 76 %  Max: 100 % 91 %   Arterial BP No data recorded     O2 O2 Flow Rate (L/min)  Av.4 L/min  Min: 2 L/min   Min taken time: 22 1700  Max: 3 L/min   Max taken time: 22 0451       Recorded Intake and Output:    Intake/Output Summary (Last 24 hours) at 2022 1834  Last data filed at 2022 1700  Gross per 24 hour   Intake 1320 ml   Output 3200 ml   Net -1880 ml      Recorded Last Stool Occurrence: 1 (22 1000)     Vital Most Recent Value First Value   Weight 75.8 kg (167 lb 1.7 oz) Weight: 79.2 kg (174 lb 11.2 oz)   Height 5' 4\" (162.6 cm) Height: 5' 3.78\" (162 cm)   BMI 28.68 N/A     General: Looks well no apparent distress  CV: regular rate and rhythm and no murmurs, rubs, or thrills  Elevated JVD  Resp: clear to auscultation bilaterally and diminished bilateral bases  Abd: soft, nontender and nondistended  Ext: moves all four ext freely. 2+ bilateral lower ext edema  Skin: no rashes,  lesions, or ulcers noted  Neuro: normal sensation   Psych: normal judgement and insight    TEST RESULTS     Labs/imaging: have been reviewed and pertinent findings discussed in the Assessment and Plan.       ANCILLARY ORDERS     Diet:  Consistent Carb Moderate (45-75 Gm/meal), Caffeine/decaf No Diet  Telemetry: On  Consults:    IP CONSULT TO CARDIAC REHAB  IP CONSULT TO CARDIOLOGY  Therapy Orders:   PT and OT Orders Placed this Encounter   Procedures   • Occupational Therapy   • Physical Therapy       ADVANCED DIRECTIVES     Code Status: Selective Treatment/DNR             ASSESSMENT AND PLAN     Patient is a 83 year old female with shortness of breath    Acute on chronic diastolic/systolic heart failure  --heart failure protocol  --cardiology input appreciated--she may need heart cath(both right and left)  --she may have INGRID--  --Milrinone, lasix drips  Prn levophed ordered for lower MAP  ---discussed with cardiology at this point would continue the current drips plan for stress test tomorrow    Type 2 DM   --sliding scale    Essential hypertension  --monitor BP closely    Coronary artery disease  --medical management    Chronic kidney disease stage IIIb  --monitor while heart failure management takes place  Currently tolerating diuresis             DISCHARGE PLANNING     The patient's treatment plans were discussed with patient  Called and discussed with daughter and explained medical plan    Discharge Planning       Barriers to discharge: Patient is not medically ready and needs to remain in the hospital today due to heart failure management  Anticipated discharge destination: Home  Expected Discharge Date: 11/21/2022                Scott Alexander MD  Hospitalist  11/20/2022  6:34 PM       No

## 2023-02-23 ENCOUNTER — EMERGENCY (EMERGENCY)
Facility: HOSPITAL | Age: 73
LOS: 0 days | Discharge: HOME | End: 2023-02-23
Attending: EMERGENCY MEDICINE
Payer: COMMERCIAL

## 2023-02-23 ENCOUNTER — TRANSCRIPTION ENCOUNTER (OUTPATIENT)
Age: 73
End: 2023-02-23

## 2023-02-23 ENCOUNTER — INPATIENT (INPATIENT)
Facility: HOSPITAL | Age: 73
LOS: 7 days | Discharge: NURSING, REHAB OR LONG-TERM CARE | End: 2023-03-03
Attending: STUDENT IN AN ORGANIZED HEALTH CARE EDUCATION/TRAINING PROGRAM | Admitting: STUDENT IN AN ORGANIZED HEALTH CARE EDUCATION/TRAINING PROGRAM
Payer: COMMERCIAL

## 2023-02-23 VITALS
SYSTOLIC BLOOD PRESSURE: 171 MMHG | RESPIRATION RATE: 43 BRPM | DIASTOLIC BLOOD PRESSURE: 127 MMHG | HEART RATE: 112 BPM | OXYGEN SATURATION: 94 %

## 2023-02-23 VITALS — OXYGEN SATURATION: 99 % | HEART RATE: 57 BPM

## 2023-02-23 VITALS
DIASTOLIC BLOOD PRESSURE: 97 MMHG | HEART RATE: 102 BPM | SYSTOLIC BLOOD PRESSURE: 159 MMHG | OXYGEN SATURATION: 99 % | RESPIRATION RATE: 20 BRPM

## 2023-02-23 DIAGNOSIS — J45.909 UNSPECIFIED ASTHMA, UNCOMPLICATED: ICD-10-CM

## 2023-02-23 DIAGNOSIS — R06.02 SHORTNESS OF BREATH: ICD-10-CM

## 2023-02-23 DIAGNOSIS — Z20.822 CONTACT WITH AND (SUSPECTED) EXPOSURE TO COVID-19: ICD-10-CM

## 2023-02-23 DIAGNOSIS — Z88.6 ALLERGY STATUS TO ANALGESIC AGENT: ICD-10-CM

## 2023-02-23 DIAGNOSIS — R06.03 ACUTE RESPIRATORY DISTRESS: ICD-10-CM

## 2023-02-23 DIAGNOSIS — R29.700 NIHSS SCORE 0: ICD-10-CM

## 2023-02-23 DIAGNOSIS — R06.1 STRIDOR: ICD-10-CM

## 2023-02-23 DIAGNOSIS — A41.9 SEPSIS, UNSPECIFIED ORGANISM: ICD-10-CM

## 2023-02-23 DIAGNOSIS — R00.0 TACHYCARDIA, UNSPECIFIED: ICD-10-CM

## 2023-02-23 DIAGNOSIS — I10 ESSENTIAL (PRIMARY) HYPERTENSION: ICD-10-CM

## 2023-02-23 DIAGNOSIS — R06.2 WHEEZING: ICD-10-CM

## 2023-02-23 DIAGNOSIS — G51.0 BELL'S PALSY: ICD-10-CM

## 2023-02-23 DIAGNOSIS — G40.909 EPILEPSY, UNSPECIFIED, NOT INTRACTABLE, WITHOUT STATUS EPILEPTICUS: ICD-10-CM

## 2023-02-23 LAB
ALBUMIN SERPL ELPH-MCNC: 3.3 G/DL — SIGNIFICANT CHANGE UP (ref 3.3–5)
ALBUMIN SERPL ELPH-MCNC: 3.9 G/DL — SIGNIFICANT CHANGE UP (ref 3.3–5)
ALP SERPL-CCNC: 72 U/L — SIGNIFICANT CHANGE UP (ref 40–120)
ALP SERPL-CCNC: 99 U/L — SIGNIFICANT CHANGE UP (ref 40–120)
ALT FLD-CCNC: 23 U/L — SIGNIFICANT CHANGE UP (ref 12–78)
ALT FLD-CCNC: 9 U/L — SIGNIFICANT CHANGE UP (ref 4–33)
ANION GAP SERPL CALC-SCNC: 10 MMOL/L — SIGNIFICANT CHANGE UP (ref 7–14)
ANION GAP SERPL CALC-SCNC: 6 MMOL/L — SIGNIFICANT CHANGE UP (ref 5–17)
APPEARANCE UR: CLEAR — SIGNIFICANT CHANGE UP
APTT BLD: 29.6 SEC — SIGNIFICANT CHANGE UP (ref 27–36.3)
AST SERPL-CCNC: 15 U/L — SIGNIFICANT CHANGE UP (ref 15–37)
AST SERPL-CCNC: 23 U/L — SIGNIFICANT CHANGE UP (ref 4–32)
BASE EXCESS BLDV CALC-SCNC: 2.4 MMOL/L — SIGNIFICANT CHANGE UP
BASOPHILS # BLD AUTO: 0.05 K/UL — SIGNIFICANT CHANGE UP (ref 0–0.2)
BASOPHILS NFR BLD AUTO: 0.4 % — SIGNIFICANT CHANGE UP (ref 0–2)
BILIRUB SERPL-MCNC: 0.2 MG/DL — SIGNIFICANT CHANGE UP (ref 0.2–1.2)
BILIRUB SERPL-MCNC: <0.2 MG/DL — SIGNIFICANT CHANGE UP (ref 0.2–1.2)
BILIRUB UR-MCNC: NEGATIVE — SIGNIFICANT CHANGE UP
BLD GP AB SCN SERPL QL: NEGATIVE — SIGNIFICANT CHANGE UP
BLOOD GAS ARTERIAL COMPREHENSIVE RESULT: SIGNIFICANT CHANGE UP
BUN SERPL-MCNC: 16 MG/DL — SIGNIFICANT CHANGE UP (ref 7–23)
BUN SERPL-MCNC: 20 MG/DL — SIGNIFICANT CHANGE UP (ref 7–23)
CALCIUM SERPL-MCNC: 7.6 MG/DL — LOW (ref 8.4–10.5)
CALCIUM SERPL-MCNC: 9.3 MG/DL — SIGNIFICANT CHANGE UP (ref 8.5–10.1)
CHLORIDE SERPL-SCNC: 109 MMOL/L — HIGH (ref 96–108)
CHLORIDE SERPL-SCNC: 112 MMOL/L — HIGH (ref 98–107)
CO2 BLDV-SCNC: 28 MMOL/L — HIGH (ref 22–26)
CO2 SERPL-SCNC: 18 MMOL/L — LOW (ref 22–31)
CO2 SERPL-SCNC: 25 MMOL/L — SIGNIFICANT CHANGE UP (ref 22–31)
COLOR SPEC: SIGNIFICANT CHANGE UP
CREAT SERPL-MCNC: 0.63 MG/DL — SIGNIFICANT CHANGE UP (ref 0.5–1.3)
CREAT SERPL-MCNC: 0.94 MG/DL — SIGNIFICANT CHANGE UP (ref 0.5–1.3)
DIFF PNL FLD: NEGATIVE — SIGNIFICANT CHANGE UP
EGFR: 65 ML/MIN/1.73M2 — SIGNIFICANT CHANGE UP
EGFR: 95 ML/MIN/1.73M2 — SIGNIFICANT CHANGE UP
EOSINOPHIL # BLD AUTO: 0.05 K/UL — SIGNIFICANT CHANGE UP (ref 0–0.5)
EOSINOPHIL NFR BLD AUTO: 0.4 % — SIGNIFICANT CHANGE UP (ref 0–6)
FLUAV AG NPH QL: SIGNIFICANT CHANGE UP
FLUBV AG NPH QL: SIGNIFICANT CHANGE UP
GLUCOSE SERPL-MCNC: 137 MG/DL — HIGH (ref 70–99)
GLUCOSE SERPL-MCNC: 154 MG/DL — HIGH (ref 70–99)
GLUCOSE UR QL: NEGATIVE — SIGNIFICANT CHANGE UP
HCO3 BLDV-SCNC: 26 MMOL/L — SIGNIFICANT CHANGE UP (ref 22–29)
HCT VFR BLD CALC: 39.7 % — SIGNIFICANT CHANGE UP (ref 34.5–45)
HCT VFR BLD CALC: 43.9 % — SIGNIFICANT CHANGE UP (ref 34.5–45)
HGB BLD-MCNC: 12.2 G/DL — SIGNIFICANT CHANGE UP (ref 11.5–15.5)
HGB BLD-MCNC: 13.9 G/DL — SIGNIFICANT CHANGE UP (ref 11.5–15.5)
IMM GRANULOCYTES NFR BLD AUTO: 0.6 % — SIGNIFICANT CHANGE UP (ref 0–0.9)
INR BLD: 1.15 RATIO — SIGNIFICANT CHANGE UP (ref 0.88–1.16)
KETONES UR-MCNC: NEGATIVE — SIGNIFICANT CHANGE UP
LACTATE SERPL-SCNC: 1.1 MMOL/L — SIGNIFICANT CHANGE UP (ref 0.7–2)
LEUKOCYTE ESTERASE UR-ACNC: NEGATIVE — SIGNIFICANT CHANGE UP
LYMPHOCYTES # BLD AUTO: 18.2 % — SIGNIFICANT CHANGE UP (ref 13–44)
LYMPHOCYTES # BLD AUTO: 2.36 K/UL — SIGNIFICANT CHANGE UP (ref 1–3.3)
MAGNESIUM SERPL-MCNC: 1.9 MG/DL — SIGNIFICANT CHANGE UP (ref 1.6–2.6)
MAGNESIUM SERPL-MCNC: 2.8 MG/DL — HIGH (ref 1.6–2.6)
MCHC RBC-ENTMCNC: 26.3 PG — LOW (ref 27–34)
MCHC RBC-ENTMCNC: 27.1 PG — SIGNIFICANT CHANGE UP (ref 27–34)
MCHC RBC-ENTMCNC: 30.7 GM/DL — LOW (ref 32–36)
MCHC RBC-ENTMCNC: 31.7 GM/DL — LOW (ref 32–36)
MCV RBC AUTO: 85.6 FL — SIGNIFICANT CHANGE UP (ref 80–100)
MONOCYTES # BLD AUTO: 0.66 K/UL — SIGNIFICANT CHANGE UP (ref 0–0.9)
MONOCYTES NFR BLD AUTO: 5.1 % — SIGNIFICANT CHANGE UP (ref 2–14)
NEUTROPHILS # BLD AUTO: 9.74 K/UL — HIGH (ref 1.8–7.4)
NEUTROPHILS NFR BLD AUTO: 75.3 % — SIGNIFICANT CHANGE UP (ref 43–77)
NITRITE UR-MCNC: NEGATIVE — SIGNIFICANT CHANGE UP
NRBC # BLD: 0 /100 WBCS — SIGNIFICANT CHANGE UP (ref 0–0)
NRBC # FLD: 0 K/UL — SIGNIFICANT CHANGE UP (ref 0–0)
NT-PROBNP SERPL-SCNC: 70 PG/ML — SIGNIFICANT CHANGE UP (ref 0–125)
PCO2 BLDV: 38 MMHG — LOW (ref 39–42)
PH BLDV: 7.45 — HIGH (ref 7.32–7.43)
PH UR: 6 — SIGNIFICANT CHANGE UP (ref 5–8)
PHOSPHATE SERPL-MCNC: 3.3 MG/DL — SIGNIFICANT CHANGE UP (ref 2.5–4.5)
PLATELET # BLD AUTO: 228 K/UL — SIGNIFICANT CHANGE UP (ref 150–400)
PLATELET # BLD AUTO: 270 K/UL — SIGNIFICANT CHANGE UP (ref 150–400)
PO2 BLDV: 152 MMHG — SIGNIFICANT CHANGE UP
POTASSIUM SERPL-MCNC: 3.8 MMOL/L — SIGNIFICANT CHANGE UP (ref 3.5–5.3)
POTASSIUM SERPL-MCNC: 4.2 MMOL/L — SIGNIFICANT CHANGE UP (ref 3.5–5.3)
POTASSIUM SERPL-SCNC: 3.8 MMOL/L — SIGNIFICANT CHANGE UP (ref 3.5–5.3)
POTASSIUM SERPL-SCNC: 4.2 MMOL/L — SIGNIFICANT CHANGE UP (ref 3.5–5.3)
PROT SERPL-MCNC: 6 G/DL — SIGNIFICANT CHANGE UP (ref 6–8.3)
PROT SERPL-MCNC: 8.8 GM/DL — HIGH (ref 6–8.3)
PROT UR-MCNC: ABNORMAL
PROTHROM AB SERPL-ACNC: 13.4 SEC — SIGNIFICANT CHANGE UP (ref 10.5–13.4)
RBC # BLD: 4.64 M/UL — SIGNIFICANT CHANGE UP (ref 3.8–5.2)
RBC # BLD: 5.13 M/UL — SIGNIFICANT CHANGE UP (ref 3.8–5.2)
RBC # FLD: 13 % — SIGNIFICANT CHANGE UP (ref 10.3–14.5)
RH IG SCN BLD-IMP: POSITIVE — SIGNIFICANT CHANGE UP
RSV RNA NPH QL NAA+NON-PROBE: SIGNIFICANT CHANGE UP
SAO2 % BLDV: 99 % — SIGNIFICANT CHANGE UP
SARS-COV-2 RNA SPEC QL NAA+PROBE: SIGNIFICANT CHANGE UP
SODIUM SERPL-SCNC: 140 MMOL/L — SIGNIFICANT CHANGE UP (ref 135–145)
SP GR SPEC: 1.02 — SIGNIFICANT CHANGE UP (ref 1.01–1.05)
TROPONIN I, HIGH SENSITIVITY RESULT: 5.24 NG/L — SIGNIFICANT CHANGE UP
UROBILINOGEN FLD QL: SIGNIFICANT CHANGE UP
WBC # BLD: 12.94 K/UL — HIGH (ref 3.8–10.5)
WBC # BLD: 9.05 K/UL — SIGNIFICANT CHANGE UP (ref 3.8–10.5)
WBC # FLD AUTO: 12.94 K/UL — HIGH (ref 3.8–10.5)
WBC # FLD AUTO: 9.05 K/UL — SIGNIFICANT CHANGE UP (ref 3.8–10.5)

## 2023-02-23 PROCEDURE — 0241U: CPT

## 2023-02-23 PROCEDURE — 96375 TX/PRO/DX INJ NEW DRUG ADDON: CPT

## 2023-02-23 PROCEDURE — 83605 ASSAY OF LACTIC ACID: CPT

## 2023-02-23 PROCEDURE — 99291 CRITICAL CARE FIRST HOUR: CPT

## 2023-02-23 PROCEDURE — 71045 X-RAY EXAM CHEST 1 VIEW: CPT | Mod: 26,76

## 2023-02-23 PROCEDURE — 93005 ELECTROCARDIOGRAM TRACING: CPT

## 2023-02-23 PROCEDURE — 93010 ELECTROCARDIOGRAM REPORT: CPT

## 2023-02-23 PROCEDURE — 84484 ASSAY OF TROPONIN QUANT: CPT

## 2023-02-23 PROCEDURE — 87040 BLOOD CULTURE FOR BACTERIA: CPT

## 2023-02-23 PROCEDURE — 80053 COMPREHEN METABOLIC PANEL: CPT

## 2023-02-23 PROCEDURE — 94640 AIRWAY INHALATION TREATMENT: CPT

## 2023-02-23 PROCEDURE — 96372 THER/PROPH/DIAG INJ SC/IM: CPT | Mod: XU

## 2023-02-23 PROCEDURE — 85025 COMPLETE CBC W/AUTO DIFF WBC: CPT

## 2023-02-23 PROCEDURE — 99291 CRITICAL CARE FIRST HOUR: CPT | Mod: 25

## 2023-02-23 PROCEDURE — 99291 CRITICAL CARE FIRST HOUR: CPT | Mod: GC

## 2023-02-23 PROCEDURE — 83735 ASSAY OF MAGNESIUM: CPT

## 2023-02-23 PROCEDURE — 83880 ASSAY OF NATRIURETIC PEPTIDE: CPT

## 2023-02-23 PROCEDURE — 71045 X-RAY EXAM CHEST 1 VIEW: CPT | Mod: 26

## 2023-02-23 PROCEDURE — 71045 X-RAY EXAM CHEST 1 VIEW: CPT

## 2023-02-23 PROCEDURE — 82803 BLOOD GASES ANY COMBINATION: CPT

## 2023-02-23 PROCEDURE — 36415 COLL VENOUS BLD VENIPUNCTURE: CPT

## 2023-02-23 PROCEDURE — 96374 THER/PROPH/DIAG INJ IV PUSH: CPT

## 2023-02-23 RX ORDER — MAGNESIUM SULFATE 500 MG/ML
1 VIAL (ML) INJECTION ONCE
Refills: 0 | Status: COMPLETED | OUTPATIENT
Start: 2023-02-23 | End: 2023-02-23

## 2023-02-23 RX ORDER — EPINEPHRINE 0.3 MG/.3ML
0.3 INJECTION INTRAMUSCULAR; SUBCUTANEOUS ONCE
Refills: 0 | Status: COMPLETED | OUTPATIENT
Start: 2023-02-23 | End: 2023-02-23

## 2023-02-23 RX ORDER — MIDAZOLAM HYDROCHLORIDE 1 MG/ML
4 INJECTION, SOLUTION INTRAMUSCULAR; INTRAVENOUS ONCE
Refills: 0 | Status: DISCONTINUED | OUTPATIENT
Start: 2023-02-23 | End: 2023-02-23

## 2023-02-23 RX ORDER — FENTANYL CITRATE 50 UG/ML
0.5 INJECTION INTRAVENOUS
Qty: 2500 | Refills: 0 | Status: DISCONTINUED | OUTPATIENT
Start: 2023-02-23 | End: 2023-02-25

## 2023-02-23 RX ORDER — EPINEPHRINE 11.25MG/ML
0.5 SOLUTION, NON-ORAL INHALATION ONCE
Refills: 0 | Status: COMPLETED | OUTPATIENT
Start: 2023-02-23 | End: 2023-02-23

## 2023-02-23 RX ORDER — FENTANYL CITRATE 50 UG/ML
100 INJECTION INTRAVENOUS ONCE
Refills: 0 | Status: DISCONTINUED | OUTPATIENT
Start: 2023-02-23 | End: 2023-02-23

## 2023-02-23 RX ORDER — PROPOFOL 10 MG/ML
10 INJECTION, EMULSION INTRAVENOUS
Qty: 1000 | Refills: 0 | Status: DISCONTINUED | OUTPATIENT
Start: 2023-02-23 | End: 2023-02-25

## 2023-02-23 RX ORDER — CHLORHEXIDINE GLUCONATE 213 G/1000ML
1 SOLUTION TOPICAL
Refills: 0 | Status: DISCONTINUED | OUTPATIENT
Start: 2023-02-23 | End: 2023-02-25

## 2023-02-23 RX ORDER — FENTANYL CITRATE 50 UG/ML
25 INJECTION INTRAVENOUS ONCE
Refills: 0 | Status: DISCONTINUED | OUTPATIENT
Start: 2023-02-23 | End: 2023-02-23

## 2023-02-23 RX ORDER — MIDAZOLAM HYDROCHLORIDE 1 MG/ML
2 INJECTION, SOLUTION INTRAMUSCULAR; INTRAVENOUS ONCE
Refills: 0 | Status: DISCONTINUED | OUTPATIENT
Start: 2023-02-23 | End: 2023-02-23

## 2023-02-23 RX ORDER — SODIUM CHLORIDE 9 MG/ML
500 INJECTION INTRAMUSCULAR; INTRAVENOUS; SUBCUTANEOUS ONCE
Refills: 0 | Status: COMPLETED | OUTPATIENT
Start: 2023-02-23 | End: 2023-02-23

## 2023-02-23 RX ORDER — MAGNESIUM SULFATE 500 MG/ML
2 VIAL (ML) INJECTION ONCE
Refills: 0 | Status: COMPLETED | OUTPATIENT
Start: 2023-02-23 | End: 2023-02-23

## 2023-02-23 RX ADMIN — SODIUM CHLORIDE 500 MILLILITER(S): 9 INJECTION INTRAMUSCULAR; INTRAVENOUS; SUBCUTANEOUS at 16:00

## 2023-02-23 RX ADMIN — PROPOFOL 5.9 MICROGRAM(S)/KG/MIN: 10 INJECTION, EMULSION INTRAVENOUS at 20:35

## 2023-02-23 RX ADMIN — Medication 0.5 MILLILITER(S): at 17:45

## 2023-02-23 RX ADMIN — FENTANYL CITRATE 4.92 MICROGRAM(S)/KG/HR: 50 INJECTION INTRAVENOUS at 20:35

## 2023-02-23 RX ADMIN — MIDAZOLAM HYDROCHLORIDE 2 MILLIGRAM(S): 1 INJECTION, SOLUTION INTRAMUSCULAR; INTRAVENOUS at 18:11

## 2023-02-23 RX ADMIN — Medication 150 GRAM(S): at 16:00

## 2023-02-23 RX ADMIN — MIDAZOLAM HYDROCHLORIDE 4 MILLIGRAM(S): 1 INJECTION, SOLUTION INTRAMUSCULAR; INTRAVENOUS at 19:35

## 2023-02-23 RX ADMIN — FENTANYL CITRATE 100 MICROGRAM(S): 50 INJECTION INTRAVENOUS at 22:42

## 2023-02-23 RX ADMIN — Medication 125 MILLIGRAM(S): at 16:00

## 2023-02-23 RX ADMIN — FENTANYL CITRATE 25 MICROGRAM(S): 50 INJECTION INTRAVENOUS at 18:35

## 2023-02-23 RX ADMIN — Medication 100 GRAM(S): at 22:25

## 2023-02-23 RX ADMIN — EPINEPHRINE 0.3 MILLIGRAM(S): 0.3 INJECTION INTRAMUSCULAR; SUBCUTANEOUS at 16:00

## 2023-02-23 NOTE — H&P ADULT - NSHPOUTPATIENTPROVIDERS_GEN_ALL_CORE
Feng Mcneal DO ([facility])  [phone] Feng Mcneal DO ([facility])  [phone] Feng Mcneal DO ([facility])  [phone]

## 2023-02-23 NOTE — H&P ADULT - ASSESSMENT
71yo F w/ pmhx asthma, HTN, seizures, hepatitis was sent to ED from outpatient pulm office for wheezing, found to have stridors intubated for airway protection.     PLAN    [ NEURO ]  - pt is sedated and intubated   - on Fentanyl and Propofol      [ CARDIO ]  #HTN  - on Amlodipine and Losartan at home  - can continue once PO access      [ RESPIRATORY ]  # Stridor?  - hx of periepiglottic thickening???  - in [place] ED found to have wheezes and stridor  - s/p Solumedrol, Epinephrine, IVF and Mg   - Intubated for airway protection  - ENT to evaluate patient; follow on rec  - CXR, ABG      [ GASTRO/NUTRITION ]  - no active issues  - will place OG tube      [ ENDO]  #T2DM  - NPO pending tube placement  - once on diet ISS  - monitor fingersticks q6h while NPO or on continuous feeds      [ RENAL/ ]  - no active issues    [ METABOLIC ]  - no active issues    [ INFECTIOUS ]  # Mild Leukocytosis    [ HEME ]  - no active issues    [ SKIN ]  - no active issues   73yo F w/ pmhx asthma, HTN, seizures, hepatitis was sent to ED from outpatient pulm office for wheezing, found to have stridors intubated for airway protection.     PLAN    [ NEURO ]  - pt is sedated and intubated   - on Fentanyl and Propofol      [ CARDIO ]  #HTN  - on Amlodipine and Losartan at home  - can continue once PO access      [ RESPIRATORY ]  # Stridor?  - hx of periepiglottic thickening???  - in [place] ED found to have wheezes and stridor  - s/p Solumedrol, Epinephrine, IVF and Mg   - Intubated for airway protection  - ENT to evaluate patient; follow on rec  - CXR, ABG      [ GASTRO/NUTRITION ]  - no active issues  - will place OG tube      [ ENDO]  #T2DM  - NPO pending tube placement  - once on diet ISS  - monitor fingersticks q6h while NPO or on continuous feeds      [ RENAL/ ]  - no active issues    [ METABOLIC ]  - no active issues    [ INFECTIOUS ]  # Mild Leukocytosis    [ HEME ]  - no active issues    [ SKIN ]  - no active issues   71yo F w/ pmhx asthma, HTN, seizures, hepatitis was sent to ED from outpatient pulm office for wheezing, found to have stridors intubated for airway protection.     PLAN    [ NEURO ]  - pt is sedated and intubated   - on Fentanyl and Propofol      [ CARDIO ]  #HTN  - on Amlodipine and Losartan at home  - can continue once PO access      [ RESPIRATORY ]  # Stridor?  - hx of periepiglottic thickening???  - in [place] ED found to have wheezes and stridor  - s/p Solumedrol, Epinephrine, IVF and Mg   - Intubated for airway protection  - ENT to evaluate patient; follow on rec  - CXR, ABG      [ GASTRO/NUTRITION ]  - no active issues  - will place OG tube      [ ENDO]  #T2DM  - NPO pending tube placement  - once on diet ISS  - monitor fingersticks q6h while NPO or on continuous feeds      [ RENAL/ ]  - no active issues    [ METABOLIC ]  - no active issues    [ INFECTIOUS ]  # Mild Leukocytosis    [ HEME ]  - no active issues    [ SKIN ]  - no active issues   73yo F w/ pmhx asthma, HTN, seizures, hepatitis was sent to ED from outpatient pulm office for wheezing, found to have stridors intubated for airway protection.     PLAN    [ NEURO ]  - pt is sedated and intubated   - on Fentanyl and Propofol      [ CARDIO ]  #HTN  - on Amlodipine and Losartan at home  - can continue once PO access      [ RESPIRATORY ]  # Stridor?  - hx of aryepiglottic folds thickening  - in [place] ED found to have wheezes and stridor  - s/p Solumedrol, Epinephrine, IVF and Mg   - Intubated for airway protection  - ENT to evaluate patient; follow on rec  - CXR, ABG      [ GASTRO/NUTRITION ]  - no active issues  - will place OG tube      [ ENDO]  #T2DM  - NPO pending tube placement  - once on diet ISS  - monitor fingersticks q6h while NPO or on continuous feeds      [ RENAL/ ]  - no active issues    [ METABOLIC ]  - no active issues    [ INFECTIOUS ]  # Mild Leukocytosis    [ HEME ]  - no active issues    [ SKIN ]  - no active issues   73yo F w/ pmhx asthma, HTN, seizures, hepatitis was sent to ED from outpatient pulm office for wheezing, found to have stridors intubated for airway protection.     PLAN    [ NEURO ]  - pt is sedated and intubated   - on Fentanyl and Propofol      [ CARDIO ]  #HTN  - on Amlodipine and Losartan at home  - can continue once PO access      [ RESPIRATORY ]  # Stridor?  - hx of aryepiglottic folds thickening  - in [place] ED found to have wheezes and stridor  - s/p Solumedrol, Epinephrine, IVF and Mg   - Intubated for airway protection  - ENT to evaluate patient; follow on rec  - CXR, ABG      [ GASTRO/NUTRITION ]  - no active issues  - will place OG tube      [ ENDO]  #T2DM  - NPO pending tube placement  - once on diet ISS  - monitor fingersticks q6h while NPO or on continuous feeds      [ RENAL/ ]  - no active issues    [ METABOLIC ]  - no active issues    [ INFECTIOUS ]  # Mild Leukocytosis    [ HEME ]  - no active issues    [ SKIN ]  - no active issues   73yo F w/ pmhx asthma, HTN, seizures, hepatitis was sent to ED from outpatient pulm office for wheezing, found to have stridors intubated for airway protection.     PLAN    [ NEURO ]  - pt is sedated and intubated   - on Fentanyl and Propofol      [ CARDIO ]  #HTN  - on Amlodipine and Losartan at home  - can continue once PO access      [ RESPIRATORY ]  # Stridor?  - hx of aryepiglottic folds thickening  - in [place] ED found to have wheezes and stridor  - s/p Solumedrol, Epinephrine, IVF and Mg   - Intubated for airway protection  - ENT to evaluate patient; follow on rec  - CXR, ABG      [ GASTRO/NUTRITION ]  - no active issues  - will place OG tube      [ ENDO]  #T2DM  - NPO pending tube placement  - once on diet ISS  - monitor fingersticks q6h while NPO or on continuous feeds      [ RENAL/ ]  - no active issues    [ METABOLIC ]  - no active issues    [ INFECTIOUS ]  # Mild Leukocytosis    [ HEME ]  - no active issues    [ SKIN ]  - no active issues   73yo F w/ pmhx asthma, HTN, seizures, hepatitis was sent to ED from outpatient pulm office for wheezing, found to have stridors intubated for airway protection.     PLAN    [ NEURO ]  - pt is sedated and intubated   - on Fentanyl and Propofol; wean off as tolerated  - goal RASS 0      [ CARDIO ]  #HTN  - on Amlodipine and Losartan at home  - can continue once PO access      [ RESPIRATORY ]  # Hypoxic Respiratory failure (unknown etiology)  - sent by outpatient Pulm for stridor vs wheezing; accepted by ENT to evaluate for upper airway obstruction; pt decompensated while in ED at [place] intubate there   - hx of aryepiglottic folds thickening  - in [place] ED found to have wheezes and stridor  - s/p Solumedrol, Epinephrine, IVF and Mg; originally improved after steroids but respiratory status worsened before transfer >> intubated for airway protection  - ENT to evaluate patient; follow on rec  - CXR, ABG  - if further wheezing; will treat as asthma exacerbation with Steroids and Duonebs      [ GASTRO/NUTRITION ]  - no active issues  - will place OG tube      [ ENDO]  #T2DM  - NPO pending tube placement  - once on diet ISS  - monitor fingersticks q6h while NPO or on continuous feeds      [ RENAL/ ]  - no active issues    [ METABOLIC ]  - no active issues    [ INFECTIOUS ]  # Mild Leukocytosis  - could be 2/2 to epi  - repeat CBC here  - monitor off Abx; monitor temp curve    [ HEME ]  - no active issues    [ SKIN ]  - no active issues   71yo F w/ pmhx asthma, HTN, seizures, hepatitis was sent to ED from outpatient pulm office for wheezing, found to have stridors intubated for airway protection.     PLAN    [ NEURO ]  - pt is sedated and intubated   - on Fentanyl and Propofol; wean off as tolerated  - goal RASS 0      [ CARDIO ]  #HTN  - on Amlodipine and Losartan at home  - can continue once PO access      [ RESPIRATORY ]  # Hypoxic Respiratory failure (unknown etiology)  - sent by outpatient Pulm for stridor vs wheezing; accepted by ENT to evaluate for upper airway obstruction; pt decompensated while in ED at [place] intubate there   - hx of aryepiglottic folds thickening  - in [place] ED found to have wheezes and stridor  - s/p Solumedrol, Epinephrine, IVF and Mg; originally improved after steroids but respiratory status worsened before transfer >> intubated for airway protection  - ENT to evaluate patient; follow on rec  - CXR, ABG  - if further wheezing; will treat as asthma exacerbation with Steroids and Duonebs      [ GASTRO/NUTRITION ]  - no active issues  - will place OG tube      [ ENDO]  #T2DM  - NPO pending tube placement  - once on diet ISS  - monitor fingersticks q6h while NPO or on continuous feeds      [ RENAL/ ]  - no active issues    [ METABOLIC ]  - no active issues    [ INFECTIOUS ]  # Mild Leukocytosis  - could be 2/2 to epi  - repeat CBC here  - monitor off Abx; monitor temp curve    [ HEME ]  - no active issues    [ SKIN ]  - no active issues   73yo F w/ pmhx asthma, HTN, seizures, hepatitis was sent to ED from outpatient pulm office for wheezing, found to have stridors intubated for airway protection.     PLAN    [ NEURO ]  - pt is sedated and intubated   - on Fentanyl and Propofol; wean off as tolerated  - goal RASS 0      [ CARDIO ]  #HTN  - on Amlodipine and Losartan at home  - can continue once PO access      [ RESPIRATORY ]  # Hypoxic Respiratory failure (unknown etiology)  - sent by outpatient Pulm for stridor vs wheezing; accepted by ENT to evaluate for upper airway obstruction; pt decompensated while in ED at [place] intubate there   - hx of aryepiglottic folds thickening  - in [place] ED found to have wheezes and stridor  - s/p Solumedrol, Epinephrine, IVF and Mg; originally improved after steroids but respiratory status worsened before transfer >> intubated for airway protection  - ENT to evaluate patient; follow on rec  - CXR, ABG  - if further wheezing; will treat as asthma exacerbation with Steroids and Duonebs      [ GASTRO/NUTRITION ]  - no active issues  - will place OG tube      [ ENDO]  #T2DM  - NPO pending tube placement  - once on diet ISS  - monitor fingersticks q6h while NPO or on continuous feeds      [ RENAL/ ]  - no active issues    [ METABOLIC ]  - no active issues    [ INFECTIOUS ]  # Mild Leukocytosis  - could be 2/2 to epi  - repeat CBC here  - monitor off Abx; monitor temp curve    [ HEME ]  - no active issues    [ SKIN ]  - no active issues

## 2023-02-23 NOTE — ED PROVIDER NOTE - CONSTITUTIONAL, MLM
Well appearing, awake, alert, oriented to person, place, time/situation and in no apparent distress. normal... Well appearing, awake, alert, oriented to person, place, time/situation and in no mild distress due to wheezing and stridor, with difficulty breathing.

## 2023-02-23 NOTE — ED ADULT NURSE NOTE - OBJECTIVE STATEMENT
Pt presents to ED sent in by pulmonologist for epiglottic abnormality. Pt came in in respiratory distress, audible wheezing. Pt denies fever, chills, cough, pt having difficulty speaking. Pt hx of morphine allergy.

## 2023-02-23 NOTE — ED ADULT NURSE REASSESSMENT NOTE - NS ED NURSE REASSESS COMMENT FT1
Patient intubated by anesthesia team at 1740. Positive color change on capnography, + lung sounds. Pt to be transferred to [facility], awaiting transport. Patient intubated by anesthesia team at 1740. Positive color change on capnography, + lung sounds. ETT size 5.5, 22 @ lip. Pt to be transferred to [facility], awaiting transport.

## 2023-02-23 NOTE — PATIENT PROFILE ADULT - PATIENT'S PREFERRED PRONOUN
Attempted to reach patients parent to help schedule a hospital follow up appointment. LM for RC.   Her/She

## 2023-02-23 NOTE — ED PROVIDER NOTE - PROGRESS NOTE DETAILS
Timo Bonilla for attending Dr. Banuelos: contacted anesthesia who was kind enough to see pt in ED. Pt with interval improvement of stridor after epi steroids. ICU saw pt in ED, no ENT available at [facility], will contact transfer center. Timo Bonilla for attending Dr. Banuelos: Spoke with pt sister Katherin Wesley. Pt relation as healthcare proxy if pt unable to make her own decisions. Spoke to pt and sister, explained risks of either approach of prophylactic intubation or transfer w/o prophylactic intubation. Pt becoming increasingly SOB, wheezing, stridors real risk of losing airway if intervention not done in ED. Scrnomi Bonilla for attending Dr. Banuelos: Spoke with ENT and anesthesiologist, anesthesiologist recommends intubation and is present to perform intubation. Accepting ER agreeable with intubation, transfer paperwork signed Timo Bonilla for attending Dr. Banuelos: Spoke with pt sister Ms. Katherin Wesley (with patient's permission and consent, as patient is AAOx4, but with persistent stridor). Pt's sister as healthcare proxy if pt unable to make her own decisions, patient agreeable, RN witnessed. Spoke to pt and sister, explained risks of either approach of prophylactic intubation to protect airway given worsening stridor and work of breathing prior to transfer or transfer w/o prophylactic intubation but running the risk of worsening breathing and potential loss of airway in trasnfer. Pt becoming increasingly SOB, wheezing, stridors that initially improved with meds, now returning to initial severity, real risk of losing airway if intervention not done in ED. Patient and family, agreeable with prophylactic intubation, given high risk of small airway access, anesthesia in ED. Dr. Kaye agreeable and also recommends prophylactic intubation. Anesthesia to perform intubation, and wishes to do so in the ED. Patient at no point hypoxic, however patient is with work of breathing. Timo Bonilla for attending Dr. Banuelos: contacted anesthesia (Dr Yung and Dr. Kaye on team) who was kind enough to see pt in ED. Pt with interval improvement of stridor after epi steroids and treatments but still with stirdor. ICU saw pt in ED via Dr. Schuster (ICU was called as anesthesia initially suggested OR intubation and potential need for high risk airway management), no ENT available at [facility], so ICU defers to ED to transfer patient to an ER where ENT is available. will contact transfer center. Timo Bonilla for attending Dr. Banuelos: Spoke with ENT via transfer, and anesthesiologist in the ED who recommends intubation and is present to perform intubation. Accepting ER attending agreeable with intubation, transfer paperwork signed, family present in the ED prior to transfer. Anesthesia, ICU consult in person and ENT consult via transfer center is appreciated.

## 2023-02-23 NOTE — ED ADULT TRIAGE NOTE - CHIEF COMPLAINT QUOTE
Pt presented to the ER with c/o wheezing. Pt stated that it has been going on all week and worsened today. Pt was sent to the ER from Dr. Mcneal's office. Pt noted to have audible wheezing when arriving to the ER. Pt brought right to the trauma room.

## 2023-02-23 NOTE — ED PROVIDER NOTE - RESPIRATORY, MLM
Diffuse wheezing. Stridor. Tachypneic with retractions. Diffuse wheezing. Stridor. Tachypneic with retractions. + work of breathing.

## 2023-02-23 NOTE — PATIENT PROFILE ADULT - FUNCTIONAL SCREEN CURRENT LEVEL: SWALLOWING (IF SCORE 2 OR MORE FOR ANY ITEM, CONSULT REHAB SERVICES), MLM)
chely pt transferred intubated and sedated YAKELIN to assess if patient can tolerate regular diet as patient is on full liquid diet.

## 2023-02-23 NOTE — PATIENT PROFILE ADULT - FALL HARM RISK - UNIVERSAL INTERVENTIONS
Bed in lowest position, wheels locked, appropriate side rails in place/Call bell, personal items and telephone in reach/Instruct patient to call for assistance before getting out of bed or chair/Non-slip footwear when patient is out of bed/[place] to call system/Physically safe environment - no spills, clutter or unnecessary equipment/Purposeful Proactive Rounding/Room/bathroom lighting operational, light cord in reach Bed in lowest position, wheels locked, appropriate side rails in place/Call bell, personal items and telephone in reach/Instruct patient to call for assistance before getting out of bed or chair/Non-slip footwear when patient is out of bed/[place] to call system/Physically safe environment - no spills, clutter or unnecessary equipment/Purposeful Proactive Rounding/Room/bathroom lighting operational, light cord in reach Bed in lowest position, wheels locked, appropriate side rails in place/Call bell, personal items and telephone in reach/Instruct patient to call for assistance before getting out of bed or chair/Non-slip footwear when patient is out of bed/[place] to call system/Physically safe environment - no spills, clutter or unnecessary equipment/Purposeful Proactive Rounding/Room/bathroom lighting operational, light cord in reach

## 2023-02-23 NOTE — PATIENT PROFILE ADULT - FALL HARM RISK - RISK INTERVENTIONS
Assistance OOB with selected safe patient handling equipment/Assistance with ambulation/Communicate Fall Risk and Risk Factors to all staff, patient, and family/Reinforce activity limits and safety measures with patient and family/Visual Cue: Yellow wristband/Bed in lowest position, wheels locked, appropriate side rails in place/Call bell, personal items and telephone in reach/Instruct patient to call for assistance before getting out of bed or chair/Non-slip footwear when patient is out of bed/[place] to call system/Physically safe environment - no spills, clutter or unnecessary equipment/Purposeful Proactive Rounding/Room/bathroom lighting operational, light cord in reach Assistance OOB with selected safe patient handling equipment/Assistance with ambulation/Communicate Fall Risk and Risk Factors to all staff, patient, and family/Reinforce activity limits and safety measures with patient and family/Visual Cue: Yellow wristband/Bed in lowest position, wheels locked, appropriate side rails in place/Call bell, personal items and telephone in reach/Instruct patient to call for assistance before getting out of bed or chair/Non-slip footwear when patient is out of bed/[place] to call system/Physically safe environment - no spills, clutter or unnecessary equipment/Purposeful Proactive Rounding/Room/bathroom lighting operational, light cord in reach Assistance OOB with selected safe patient handling equipment/Assistance with ambulation/Communicate Fall Risk and Risk Factors to all staff, patient, and family/Reinforce activity limits and safety measures with patient and family/Visual Cue: Yellow wristband/Bed in lowest position, wheels locked, appropriate side rails in place/Call bell, personal items and telephone in reach/Instruct patient to call for assistance before getting out of bed or chair/Non-slip footwear when patient is out of bed/[place] to call system/Physically safe environment - no spills, clutter or unnecessary equipment/Purposeful Proactive Rounding/Room/bathroom lighting operational, light cord in reach

## 2023-02-23 NOTE — ED PROVIDER NOTE - CLINICAL SUMMARY MEDICAL DECISION MAKING FREE TEXT BOX
Pt with interval improvement of breathing after meds. Plan: ICU consulted, anesthesia consulted, admission. Pt with interval improvement of breathing after meds. Plan: ICU consulted, anesthesia consulted, admission.    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 Anesthesia ultimate agreeable to intubate in ED, should patient and family wish for prophylactic intubation as risk of airway loss in transfer is very high. After lengthy discussion of risk and benefits by ER and anesthesia, patient agreeable with intubation, intubation to be done with anesthesia. Labs and imaging ordered. X-ray to my eyes shows placement of the tube above the bonifacio with good spacing. Please refer to intubation note by anesthesia on particulars of the procedures. Patient handed off to anesthesia and taken to [facility].

## 2023-02-23 NOTE — ED PROVIDER NOTE - CRITICAL CARE ATTENDING CONTRIBUTION TO CARE
Need for immediate intervention as high risk of airway loss, work of breathing  Multiple re-evaluation, periods of bedside observation  ordering meds, imaging, reviewing imaging   consultation with ICU, anesthesia, ENT, transfer center  documentation pertinent to discussion of goals of care, with patient/family, discussion of risk and benefit of procedure with patient and family  etc.

## 2023-02-23 NOTE — PATIENT PROFILE ADULT - FUNCTIONAL ASSESSMENT - DAILY ACTIVITY 1.
Called pt pt did not answer. LVM for pt to schedule appt with PCP    1 = Total assistance 3 = A little assistance

## 2023-02-23 NOTE — PATIENT PROFILE ADULT - NS PRO AD PATIENT TYPE
chely pt transferred intubated and sedated chely pt transferred intubated and sedated/Health Care Proxy (HCP) Health Care Proxy (HCP)

## 2023-02-23 NOTE — ED ADULT NURSE NOTE - NAME OF THE PERSON WHO RECEIVED REPORT AT THE FACILITY THE PATIENT IS TRANSFERRING TO
attempted to call at 1850, no answer from [facility], transfer center aware. [place]

## 2023-02-23 NOTE — PATIENT PROFILE ADULT - NSPRESCRALCFREQ_GEN_A_NUR
chely pt transferred intubated and sedated Pt was a previous alcoholic now sober for 30 years./Never

## 2023-02-23 NOTE — ED PROVIDER NOTE - DIFFERENTIAL DIAGNOSIS
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 Anesthesia ultimate agreeable to intubate in ED, should patient and family wish for prophylactic intubation as risk of airway loss in transfer is very high. After lengthy discussion of risk and benefits by ER and anesthesia, patient agreeable with intubation, intubation to be done with anesthesia. Labs and imaging ordered. X-ray to my eyes shows placement of the tube above the bonifacio with good spacing. Please refer to intubation note by anesthesia on particulars of the procedures. Patient handed off to anesthesia and taken to [facility]. Differential Diagnosis

## 2023-02-23 NOTE — H&P ADULT - ATTENDING COMMENTS
Ms. Joann Wesley is a 73 yo F w/ PMH of asthma, aryepiglottic fold thickening, HTN, HCV, seizure disorder, [place] palsy who was sent to the [place] ED by her pulmonologist Dr. Mcneal.  Per report he was concerned about vocal cord dysfunction. Upon arrival to the ED she was found to be in respiratory distress with concern for stridor vs wheezing. She received solumedrol 125 mg, Mg 2 Mg, epinephrine with improvement initially. ENT was consulted, initially planned to be transferred to [facility] ED for ENT evaluation. However she later decompensated and required intubation by anesthesia at [facility]. Pt then transferred to [facility] MICU. Upon discussion with Dr. Mcghee - unclear if source of respiratory distress is  from  upper airway obstruction vs pulmonary wheeze.     On exam, she is now intubated and sedated. She has end expiratory wheezes bilaterally. HR regular rate and rhythm. Abd, soft, NT/ND, extremities warm, no edema. Labs reviewed.     Ms. Wesley was sent to the ED with wheezing vs stridor and was found to be in respiratory distress. Unclear etiology for respiratory failure - possible upper airway obstruction -? edema vs vocal cord dysfunction vs asthma exacerbation.     - on propofol and fentanyl gtt - lighten sedation as tolerated goal RASS 0   - check ABG, CXR   - sp solumedrol, Mg, epinephrine at outside hospital   - if persistent wheezing would continue IV steroids, start albuterol/ipratropium nebs  - monitor off antibiotics  for now   - ENT consulted, appreciate any further recommendations. Ms. Joann Wesley is a 73 yo F w/ PMH of asthma, aryepiglottic fold thickening, HTN, HCV, seizure disorder, [place] palsy who was sent to the [place] ED by her pulmonologist Dr. Mcneal.  Per report he was concerned about vocal cord dysfunction. Upon arrival to the ED she was found to be in respiratory distress with concern for stridor vs wheezing. She received solumedrol 125 mg, Mg 2 Mg, epinephrine with improvement initially. ENT was consulted, initially planned to be transferred to [facility] ED for ENT evaluation. However she later decompensated and required intubation by anesthesia at [facility]. Pt then transferred to [facility] MICU. Upon discussion with Dr. Mcghee - unclear if source of respiratory distress is  from  upper airway obstruction vs pulmonary wheeze.     On exam, she is now intubated and sedated. She has end expiratory wheezes bilaterally. HR regular rate and rhythm. Abd, soft, NT/ND, extremities warm, no edema. Labs reviewed.     Ms. Wesley was sent to the ED with wheezing vs stridor and was found to be in respiratory distress. Unclear etiology for respiratory failure - possible upper airway obstruction -? edema vs vocal cord dysfunction vs asthma exacerbation.     - on propofol and fentanyl gtt - lighten sedation as tolerated goal RASS 0   - check ABG, CXR   - sp solumedrol, Mg, epinephrine at outside hospital   - if persistent wheezing would continue IV steroids, start albuterol/ipratropium nebs  - monitor off antibiotics  for now   - ENT consulted, appreciate any further recommendations. Ms. Joann Wesley is a 71 yo F w/ PMH of asthma, aryepiglottic fold thickening, HTN, HCV, seizure disorder, [place] palsy who was sent to the [place] ED by her pulmonologist Dr. Mcneal.  Per report he was concerned about vocal cord dysfunction. Upon arrival to the ED she was found to be in respiratory distress with concern for stridor vs wheezing. She received solumedrol 125 mg, Mg 2 Mg, epinephrine with improvement initially. ENT was consulted, initially planned to be transferred to [facility] ED for ENT evaluation. However she later decompensated and required intubation by anesthesia at [facility]. Pt then transferred to [facility] MICU. Upon discussion with Dr. Mcghee - unclear if source of respiratory distress is  from  upper airway obstruction vs pulmonary wheeze.     On exam, she is now intubated and sedated. She has end expiratory wheezes bilaterally. HR regular rate and rhythm. Abd, soft, NT/ND, extremities warm, no edema. Labs reviewed.     Ms. Wesley was sent to the ED with wheezing vs stridor and was found to be in respiratory distress. Unclear etiology for respiratory failure - possible upper airway obstruction -? edema vs vocal cord dysfunction vs asthma exacerbation.     - on propofol and fentanyl gtt - lighten sedation as tolerated goal RASS 0   - check ABG, CXR   - sp solumedrol, Mg, epinephrine at outside hospital   - if persistent wheezing would continue IV steroids, start albuterol/ipratropium nebs  - monitor off antibiotics  for now   - ENT consulted, appreciate any further recommendations. Ms. Joann Wesley is a 73 yo F w/ PMH of asthma, aryepiglottic fold thickening, HTN, HCV, seizure disorder, [place] palsy who was sent to the [place] ED by her pulmonologist Dr. Mcneal.  Per report he was concerned about vocal cord dysfunction. Upon arrival to the ED she was found to be in respiratory distress with concern for stridor vs wheezing. She received solumedrol 125 mg, Mg 2 Mg, epinephrine with improvement initially. ENT was consulted, initially planned to be transferred to [facility] ED for ENT evaluation. However she later decompensated and required intubation by anesthesia at [facility]. Pt then transferred to [facility] MICU. Upon discussion with Dr. Mcghee - unclear if source of respiratory distress is  from  upper airway obstruction vs pulmonary wheeze.     On exam, she is now intubated and sedated. ET size 5.5. She has end expiratory wheezes bilaterally. HR regular rate and rhythm. Abd, soft, NT/ND, extremities warm, no edema. Labs reviewed.     Ms. Wesley was sent to the ED with wheezing vs stridor and was found to be in respiratory distress. Unclear etiology for respiratory failure - possible upper airway obstruction -? laryngeal edema vs vocal cord dysfunction vs asthma exacerbation.     - on propofol and fentanyl gtt - lighten sedation as tolerated goal RASS 0   - check ABG, CXR   - currently on VC//+5/18/30% FiO2, synchronous with vent, minimal secretions, PIP 30 plateau 21. No evidence of autopeep.   - sp solumedrol, Mg, epinephrine at outside hospital   -  continue IV steroids, start albuterol/ipratropium nebs  - monitor off antibiotics  for now   - ENT consulted, appreciate any further recommendations. Ms. Joann Wesley is a 73 yo F w/ PMH of asthma, aryepiglottic fold thickening, HTN, HCV, seizure disorder, [place] palsy who was sent to the [place] ED by her pulmonologist Dr. Mcneal.  Per report he was concerned about vocal cord dysfunction. Upon arrival to the ED she was found to be in respiratory distress with concern for stridor vs wheezing. She received solumedrol 125 mg, Mg 2 Mg, epinephrine with improvement initially. ENT was consulted, initially planned to be transferred to [facility] ED for ENT evaluation. However she later decompensated and required intubation by anesthesia at [facility]. Pt then transferred to [facility] MICU. Upon discussion with Dr. Mcghee - unclear if source of respiratory distress is  from  upper airway obstruction vs pulmonary wheeze.     On exam, she is now intubated and sedated. ET size 5.5. She has end expiratory wheezes bilaterally. HR regular rate and rhythm. Abd, soft, NT/ND, extremities warm, no edema. Labs reviewed.     Ms. Wesley was sent to the ED with wheezing vs stridor and was found to be in respiratory distress. Unclear etiology for respiratory failure - possible upper airway obstruction -? laryngeal edema vs vocal cord dysfunction vs asthma exacerbation.     - on propofol and fentanyl gtt - lighten sedation as tolerated goal RASS 0   - check ABG, CXR   - currently on VC//+5/18/30% FiO2, synchronous with vent, minimal secretions, PIP 30 plateau 21. No evidence of autopeep.   - sp solumedrol, Mg, epinephrine at outside hospital   -  continue IV steroids, start albuterol/ipratropium nebs  - monitor off antibiotics  for now   - ENT consulted, appreciate any further recommendations. Ms. Joann Wesley is a 73 yo F w/ PMH of asthma, aryepiglottic fold thickening, HTN, HCV, seizure disorder, [place] palsy who was sent to the [place] ED by her pulmonologist Dr. Mcneal.  Per report he was concerned about vocal cord dysfunction. Upon arrival to the ED she was found to be in respiratory distress with concern for stridor vs wheezing. She received solumedrol 125 mg, Mg 2 Mg, epinephrine with improvement initially. ENT was consulted, initially planned to be transferred to [facility] ED for ENT evaluation. However she later decompensated and required intubation by anesthesia at [facility]. Pt then transferred to [facility] MICU. Upon discussion with Dr. Mcghee - unclear if source of respiratory distress is  from  upper airway obstruction vs pulmonary wheeze.     On exam, she is now intubated and sedated. ET size 5.5. She has end expiratory wheezes bilaterally. HR regular rate and rhythm. Abd, soft, NT/ND, extremities warm, no edema. Labs reviewed.     Ms. Wesley was sent to the ED with wheezing vs stridor and was found to be in respiratory distress. Unclear etiology for respiratory failure - possible upper airway obstruction -? laryngeal edema vs vocal cord dysfunction vs asthma exacerbation.     - on propofol and fentanyl gtt - lighten sedation as tolerated goal RASS 0   - check ABG, CXR   - currently on VC//+5/18/30% FiO2, synchronous with vent, minimal secretions, PIP 30 plateau 21. No evidence of autopeep.   - sp solumedrol, Mg, epinephrine at outside hospital   -  continue IV steroids, start albuterol/ipratropium nebs  - monitor off antibiotics  for now   - ENT consulted, appreciate any further recommendations.

## 2023-02-23 NOTE — PATIENT PROFILE ADULT - VISION (WITH CORRECTIVE LENSES IF THE PATIENT USUALLY WEARS THEM):
chely pt transferred intubated and sedated chely pt transferred intubated and sedated/Partially impaired: cannot see medication labels or newsprint, but can see obstacles in path, and the surrounding layout; can count fingers at arm's length

## 2023-02-23 NOTE — H&P ADULT - HISTORY OF PRESENT ILLNESS
73 y/o female with a PMHx of asthma, aryepiglottic folds thickening, [place]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 Mcneal for wheezing. Pt was seen in office and was sent to the ED to r/o VCD.    Patient evaluated in [place] ED and found to have stridor . Patient  with interval improvement of stridor after epi, magnesium and steroids. ICU saw pt in ED, no ENT available at [facility] so patient was transferred after hypoxic respiratory failure requiring intubation.     ED team spoke with patient's sister Katherin Wesley. Pt relation as healthcare proxy if pt unable to make her own decisions.    73 y/o female with a PMHx of asthma, aryepiglottic folds thickening, [place]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 Mcneal for wheezing. Pt was seen in office and was sent to the ED to r/o VCD.    Patient evaluated in [place] ED and found to have stridor . Patient  with interval improvement of stridor after epi, magnesium and steroids. ICU saw pt in ED, no ENT available at [facility] so patient was transferred after hypoxic respiratory failure requiring intubation.     ED team spoke with patient's sister Katherin Wesley. Pt relation as healthcare proxy if pt unable to make her own decisions.    71 y/o female with a PMHx of asthma, aryepiglottic folds thickening, [place]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 Mcneal for wheezing. Pt was seen in office and was sent to the ED to r/o VCD.    Patient evaluated in [place] ED and found to have stridor . Patient  with interval improvement of stridor after epi, magnesium and steroids. ICU saw pt in ED, no ENT available at [facility] so patient was transferred after hypoxic respiratory failure requiring intubation.     ED team spoke with patient's sister Katherin Wesley. Pt relation as healthcare proxy if pt unable to make her own decisions.    71 y/o female with a PMHx of asthma, aryepiglottic folds thickening, [place]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 Mcneal for wheezing. Pt was seen in office and was sent to the ED to r/o vocal cord dysfunction. After contacting Dr Mcneal for further collateral, he states that he had been following her for her asthma, but she presented to the office for 2 weeks of SOB. At the time, he heard glottic wheezing with good air flow and able to speak long sentences and no labored respirations and no concern for airway obstruction. He sent her into ED for steroids and further imaging.     Patient evaluated in [place] ED and found to have stridor . Patient  with interval improvement of stridor after epi, magnesium and steroids. ICU saw pt in ED, no ENT available at [facility] so patient was transferred after hypoxic respiratory failure requiring intubation.     ED team spoke with patient's sister Katherin Wesley. Pt relation as healthcare proxy if pt unable to make her own decisions.    73 y/o female with a PMHx of asthma, aryepiglottic folds thickening, [place]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 Mcneal for wheezing. Pt was seen in office and was sent to the ED to r/o vocal cord dysfunction. After contacting Dr Mcneal for further collateral, he states that he had been following her for her asthma, but she presented to the office for 2 weeks of SOB. At the time, he heard glottic wheezing with good air flow and able to speak long sentences and no labored respirations and no concern for airway obstruction. He sent her into ED for steroids and further imaging.     Patient evaluated in [place] ED and found to have stridor . Patient  with interval improvement of stridor after epi, magnesium and steroids. ICU saw pt in ED, no ENT available at [facility] so patient was transferred after hypoxic respiratory failure requiring intubation.     ED team spoke with patient's sister Katherin Wesley. Pt relation as healthcare proxy if pt unable to make her own decisions.    73 y/o female with a PMHx of asthma, aryepiglottic folds thickening, [place]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 Mcneal for wheezing. Pt was seen in office and was sent to the ED to r/o vocal cord dysfunction. After contacting Dr Mcneal for further collateral, he states that he had been following her for her asthma, but she presented to the office for 2 weeks of SOB. At the time, he heard glottic wheezing with good air flow and able to speak long sentences and no labored respirations and no concern for airway obstruction. He sent her into ED for steroids and further imaging.     Patient evaluated in [place] ED and found to have stridor . Patient  with interval improvement of stridor after epi, magnesium and steroids. ICU saw pt in ED, no ENT available at [facility] so patient was transferred after hypoxic respiratory failure requiring intubation.     ED team spoke with patient's sister Katherin Wesley. Pt relation as healthcare proxy if pt unable to make her own decisions.    73 y/o female with a PMHx of asthma, aryepiglottic folds thickening, [place]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 Mcneal for wheezing. Pt was seen in office and was sent to the ED to r/o vocal cord dysfunction. After contacting Dr Mcneal for further collateral, he states that he had been following her for her asthma, but she presented to the office for 2 weeks of SOB. At the time, he heard glottic wheezing with good air flow and able to speak long sentences and no labored respirations and no concern for airway obstruction. 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 He sent her into ED for steroids and further imaging.     Patient evaluated in [place] ED and found to have stridor . Patient  with interval improvement of stridor after epi, magnesium and steroids. ICU saw pt in ED, no ENT available at [facility] so patient was transferred after hypoxic respiratory failure requiring intubation.     ED team spoke with patient's sister Katherin Wesley. Pt relation as healthcare proxy if pt unable to make her own decisions.    71 y/o female with a PMHx of asthma, aryepiglottic folds thickening, [place]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 Mcneal for wheezing. Pt was seen in office and was sent to the ED to r/o vocal cord dysfunction. After contacting Dr Mcneal for further collateral, he states that he had been following her for her asthma, but she presented to the office for 2 weeks of SOB. At the time, he heard glottic wheezing with good air flow and able to speak long sentences and no labored respirations and no concern for airway obstruction. 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 He sent her into ED for steroids and further imaging.     Patient evaluated in [place] ED and found to have stridor . Patient  with interval improvement of stridor after epi, magnesium and steroids. ICU saw pt in ED, no ENT available at [facility] so patient was transferred after hypoxic respiratory failure requiring intubation.     ED team spoke with patient's sister Katherin Wesley. Pt relation as healthcare proxy if pt unable to make her own decisions.    71 y/o female with a PMHx of asthma, aryepiglottic folds thickening, [place]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 Mcneal for wheezing. Pt was seen in office and was sent to the ED to r/o vocal cord dysfunction. After contacting Dr Mcneal for further collateral, he states that he had been following her for her asthma, but she presented to the office for 2 weeks of SOB. At the time, he heard glottic wheezing with good air flow and able to speak long sentences and no labored respirations and no concern for airway obstruction. 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 He sent her into ED for steroids and further imaging.     Patient evaluated in [place] ED and found to have stridor . Patient  with interval improvement of stridor after epi, magnesium and steroids. ICU saw pt in ED, no ENT available at [facility] so patient was transferred after hypoxic respiratory failure requiring intubation.     ED team spoke with patient's sister Katherin Wesley. Pt relation as healthcare proxy if pt unable to make her own decisions.

## 2023-02-23 NOTE — ED PROVIDER NOTE - OBJECTIVE STATEMENT
71 y/o female with a PMHx of asthma, aryepiglottic folds thickening, [place]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 Mcneal for wheezing. Pt was seen in office and was sent to the ED to r/o VCD. 71 y/o female with a PMHx of asthma, aryepiglottic folds thickening? (documented in the script note sent in by pulmonologist Dr. Mcneal), [place] Palsy, essential HTN, hepatitis C, seizure disorder, presents to the ED BIBA sent by Dr. Mcneal for wheezing, stridor, difficulty speaking. Pt was seen in office and was sent to the ED to r/o VCD (vocal cord disease). In the emergency room patient is in severe respiratory distress, wheezing stridorous, unable to speaking more than a few words before going short of breath, however confirming questions by nodding or giving short answers. No fever or chills. No known hx of sever or anaphylactic allergic reaction. No hypoxia at the presentation.

## 2023-02-23 NOTE — H&P ADULT - NSHPPHYSICALEXAM_GEN_ALL_CORE
PHYSICAL EXAM    Vital Signs Last 24 Hrs  T(C): --  T(F): --  HR: --  BP: --  BP(mean): --  RR: --  SpO2: --          GENERAL: NAD, lying comfortably in bed   HEAD:  Atraumatic, Normocephalic  EYES: EOMI b/l, PERRLA b/l, conjunctiva and sclera clear  NECK: Supple, No JVD, No LAD   CHEST/LUNG: Clear to auscultation bilaterally; No wheeze or rhonchi  HEART: Regular rate and rhythm; S1 and S2 present, No murmurs, rubs, or gallops  ABDOMEN: Soft, Nontender, Nondistended; Bowel sounds present  EXTREMITIES:  2+ Peripheral Pulses, No clubbing, cyanosis, or edema  NEURO: AAOx3, non-focal   SKIN: No rashes or lesions PHYSICAL EXAM      Vital Signs Last 24 Hrs  T(C): --  T(F): --  HR: --  BP: --  BP(mean): --  RR: --  SpO2: --          GENERAL: NAD, lying comfortably in bed   HEAD:  Atraumatic, Normocephalic  EYES: EOMI b/l, PERRLA b/l, conjunctiva and sclera clear  NECK: Supple, No JVD, No LAD   CHEST/LUNG: Clear to auscultation bilaterally; No wheeze or rhonchi  HEART: Regular rate and rhythm; S1 and S2 present, No murmurs, rubs, or gallops  ABDOMEN: Soft, Nontender, Nondistended; Bowel sounds present  EXTREMITIES:  2+ Peripheral Pulses, No clubbing, cyanosis, or edema  NEURO: sedated, intubated   SKIN: No rashes or lesions

## 2023-02-23 NOTE — ED PROVIDER NOTE - CARDIAC, MLM
Normal rate, regular rhythm.  Heart sounds S1, S2.  No murmurs, rubs or gallops. tachycardic rate, regular rhythm.  Heart sounds S1, S2.  No rubs or gallops. 2+ pulses in bilateral dp and radial arteries. Cap refill less than 2 seconds.

## 2023-02-24 ENCOUNTER — TRANSCRIPTION ENCOUNTER (OUTPATIENT)
Age: 73
End: 2023-02-24

## 2023-02-24 DIAGNOSIS — J96.01 ACUTE RESPIRATORY FAILURE WITH HYPOXIA: ICD-10-CM

## 2023-02-24 LAB
ALBUMIN SERPL ELPH-MCNC: 3.8 G/DL — SIGNIFICANT CHANGE UP (ref 3.3–5)
ALP SERPL-CCNC: 79 U/L — SIGNIFICANT CHANGE UP (ref 40–120)
ALT FLD-CCNC: 12 U/L — SIGNIFICANT CHANGE UP (ref 4–33)
ANION GAP SERPL CALC-SCNC: 12 MMOL/L — SIGNIFICANT CHANGE UP (ref 7–14)
AST SERPL-CCNC: 14 U/L — SIGNIFICANT CHANGE UP (ref 4–32)
BASE EXCESS BLDV CALC-SCNC: 0.2 MMOL/L — SIGNIFICANT CHANGE UP (ref -2–3)
BASOPHILS # BLD AUTO: 0.01 K/UL — SIGNIFICANT CHANGE UP (ref 0–0.2)
BASOPHILS NFR BLD AUTO: 0.1 % — SIGNIFICANT CHANGE UP (ref 0–2)
BILIRUB SERPL-MCNC: <0.2 MG/DL — SIGNIFICANT CHANGE UP (ref 0.2–1.2)
BUN SERPL-MCNC: 18 MG/DL — SIGNIFICANT CHANGE UP (ref 7–23)
CA-I SERPL-SCNC: 1.2 MMOL/L — SIGNIFICANT CHANGE UP (ref 1.15–1.33)
CALCIUM SERPL-MCNC: 9.2 MG/DL — SIGNIFICANT CHANGE UP (ref 8.4–10.5)
CHLORIDE BLDV-SCNC: 107 MMOL/L — SIGNIFICANT CHANGE UP (ref 96–108)
CHLORIDE SERPL-SCNC: 104 MMOL/L — SIGNIFICANT CHANGE UP (ref 98–107)
CO2 BLDV-SCNC: 24.5 MMOL/L — SIGNIFICANT CHANGE UP (ref 22–26)
CO2 SERPL-SCNC: 21 MMOL/L — LOW (ref 22–31)
CREAT SERPL-MCNC: 0.64 MG/DL — SIGNIFICANT CHANGE UP (ref 0.5–1.3)
EGFR: 95 ML/MIN/1.73M2 — SIGNIFICANT CHANGE UP
EOSINOPHIL # BLD AUTO: 0 K/UL — SIGNIFICANT CHANGE UP (ref 0–0.5)
EOSINOPHIL NFR BLD AUTO: 0 % — SIGNIFICANT CHANGE UP (ref 0–6)
GAS PNL BLDV: 137 MMOL/L — SIGNIFICANT CHANGE UP (ref 136–145)
GAS PNL BLDV: SIGNIFICANT CHANGE UP
GLUCOSE BLDV-MCNC: 168 MG/DL — HIGH (ref 70–99)
GLUCOSE SERPL-MCNC: 167 MG/DL — HIGH (ref 70–99)
HCO3 BLDV-SCNC: 24 MMOL/L — SIGNIFICANT CHANGE UP (ref 22–29)
HCT VFR BLD CALC: 39.3 % — SIGNIFICANT CHANGE UP (ref 34.5–45)
HCT VFR BLDA CALC: 39 % — SIGNIFICANT CHANGE UP (ref 34.5–46.5)
HGB BLD CALC-MCNC: 13.1 G/DL — SIGNIFICANT CHANGE UP (ref 11.7–16.1)
HGB BLD-MCNC: 12.4 G/DL — SIGNIFICANT CHANGE UP (ref 11.5–15.5)
IANC: 7.29 K/UL — SIGNIFICANT CHANGE UP (ref 1.8–7.4)
IMM GRANULOCYTES NFR BLD AUTO: 1.1 % — HIGH (ref 0–0.9)
LACTATE BLDV-MCNC: 2.3 MMOL/L — HIGH (ref 0.5–2)
LYMPHOCYTES # BLD AUTO: 1.67 K/UL — SIGNIFICANT CHANGE UP (ref 1–3.3)
LYMPHOCYTES # BLD AUTO: 18.2 % — SIGNIFICANT CHANGE UP (ref 13–44)
MAGNESIUM SERPL-MCNC: 2.6 MG/DL — SIGNIFICANT CHANGE UP (ref 1.6–2.6)
MCHC RBC-ENTMCNC: 26.6 PG — LOW (ref 27–34)
MCHC RBC-ENTMCNC: 31.6 GM/DL — LOW (ref 32–36)
MCV RBC AUTO: 84.2 FL — SIGNIFICANT CHANGE UP (ref 80–100)
MONOCYTES # BLD AUTO: 0.1 K/UL — SIGNIFICANT CHANGE UP (ref 0–0.9)
MONOCYTES NFR BLD AUTO: 1.1 % — LOW (ref 2–14)
NEUTROPHILS # BLD AUTO: 7.29 K/UL — SIGNIFICANT CHANGE UP (ref 1.8–7.4)
NEUTROPHILS NFR BLD AUTO: 79.5 % — HIGH (ref 43–77)
NRBC # BLD: 0 /100 WBCS — SIGNIFICANT CHANGE UP (ref 0–0)
NRBC # FLD: 0 K/UL — SIGNIFICANT CHANGE UP (ref 0–0)
PCO2 BLDV: 33 MMHG — LOW (ref 39–52)
PH BLDV: 7.46 — HIGH (ref 7.32–7.43)
PHOSPHATE SERPL-MCNC: 2.6 MG/DL — SIGNIFICANT CHANGE UP (ref 2.5–4.5)
PLATELET # BLD AUTO: 245 K/UL — SIGNIFICANT CHANGE UP (ref 150–400)
PO2 BLDV: 56 MMHG — HIGH (ref 25–45)
POTASSIUM BLDV-SCNC: 4 MMOL/L — SIGNIFICANT CHANGE UP (ref 3.5–5.1)
POTASSIUM SERPL-MCNC: 3.8 MMOL/L — SIGNIFICANT CHANGE UP (ref 3.5–5.3)
POTASSIUM SERPL-SCNC: 3.8 MMOL/L — SIGNIFICANT CHANGE UP (ref 3.5–5.3)
PROT SERPL-MCNC: 7 G/DL — SIGNIFICANT CHANGE UP (ref 6–8.3)
RBC # BLD: 4.67 M/UL — SIGNIFICANT CHANGE UP (ref 3.8–5.2)
RBC # FLD: 12.9 % — SIGNIFICANT CHANGE UP (ref 10.3–14.5)
SAO2 % BLDV: 90.4 % — HIGH (ref 67–88)
SODIUM SERPL-SCNC: 137 MMOL/L — SIGNIFICANT CHANGE UP (ref 135–145)
WBC # BLD: 9.17 K/UL — SIGNIFICANT CHANGE UP (ref 3.8–10.5)
WBC # FLD AUTO: 9.17 K/UL — SIGNIFICANT CHANGE UP (ref 3.8–10.5)

## 2023-02-24 PROCEDURE — 31525 DX LARYNGOSCOPY EXCL NB: CPT | Mod: GC

## 2023-02-24 PROCEDURE — 31622 DX BRONCHOSCOPE/WASH: CPT | Mod: GC

## 2023-02-24 PROCEDURE — 71045 X-RAY EXAM CHEST 1 VIEW: CPT | Mod: 26

## 2023-02-24 PROCEDURE — 99291 CRITICAL CARE FIRST HOUR: CPT | Mod: GC

## 2023-02-24 RX ORDER — SODIUM CHLORIDE 9 MG/ML
500 INJECTION, SOLUTION INTRAVENOUS ONCE
Refills: 0 | Status: COMPLETED | OUTPATIENT
Start: 2023-02-24 | End: 2023-02-24

## 2023-02-24 RX ORDER — ALBUTEROL 90 UG/1
1 AEROSOL, METERED ORAL EVERY 4 HOURS
Refills: 0 | Status: DISCONTINUED | OUTPATIENT
Start: 2023-02-24 | End: 2023-03-03

## 2023-02-24 RX ORDER — NOREPINEPHRINE BITARTRATE/D5W 8 MG/250ML
0.05 PLASTIC BAG, INJECTION (ML) INTRAVENOUS
Qty: 8 | Refills: 0 | Status: DISCONTINUED | OUTPATIENT
Start: 2023-02-24 | End: 2023-02-25

## 2023-02-24 RX ORDER — ALBUTEROL 90 UG/1
2.5 AEROSOL, METERED ORAL EVERY 6 HOURS
Refills: 0 | Status: DISCONTINUED | OUTPATIENT
Start: 2023-02-24 | End: 2023-03-03

## 2023-02-24 RX ORDER — ALBUTEROL 90 UG/1
4 AEROSOL, METERED ORAL EVERY 6 HOURS
Refills: 0 | Status: DISCONTINUED | OUTPATIENT
Start: 2023-02-24 | End: 2023-02-26

## 2023-02-24 RX ORDER — HEPARIN SODIUM 5000 [USP'U]/ML
5000 INJECTION INTRAVENOUS; SUBCUTANEOUS EVERY 8 HOURS
Refills: 0 | Status: DISCONTINUED | OUTPATIENT
Start: 2023-02-24 | End: 2023-03-03

## 2023-02-24 RX ORDER — IPRATROPIUM/ALBUTEROL SULFATE 18-103MCG
3 AEROSOL WITH ADAPTER (GRAM) INHALATION EVERY 6 HOURS
Refills: 0 | Status: DISCONTINUED | OUTPATIENT
Start: 2023-02-24 | End: 2023-02-24

## 2023-02-24 RX ADMIN — Medication 9.22 MICROGRAM(S)/KG/MIN: at 18:24

## 2023-02-24 RX ADMIN — PROPOFOL 5.9 MICROGRAM(S)/KG/MIN: 10 INJECTION, EMULSION INTRAVENOUS at 08:42

## 2023-02-24 RX ADMIN — FENTANYL CITRATE 4.92 MICROGRAM(S)/KG/HR: 50 INJECTION INTRAVENOUS at 19:38

## 2023-02-24 RX ADMIN — PROPOFOL 5.9 MICROGRAM(S)/KG/MIN: 10 INJECTION, EMULSION INTRAVENOUS at 19:37

## 2023-02-24 RX ADMIN — ALBUTEROL 4 PUFF(S): 90 AEROSOL, METERED ORAL at 20:07

## 2023-02-24 RX ADMIN — Medication 3 MILLILITER(S): at 10:03

## 2023-02-24 RX ADMIN — SODIUM CHLORIDE 500 MILLILITER(S): 9 INJECTION, SOLUTION INTRAVENOUS at 01:26

## 2023-02-24 RX ADMIN — Medication 9.22 MICROGRAM(S)/KG/MIN: at 19:38

## 2023-02-24 RX ADMIN — CHLORHEXIDINE GLUCONATE 1 APPLICATION(S): 213 SOLUTION TOPICAL at 05:09

## 2023-02-24 RX ADMIN — ALBUTEROL 4 PUFF(S): 90 AEROSOL, METERED ORAL at 15:53

## 2023-02-24 RX ADMIN — HEPARIN SODIUM 5000 UNIT(S): 5000 INJECTION INTRAVENOUS; SUBCUTANEOUS at 22:59

## 2023-02-24 RX ADMIN — FENTANYL CITRATE 4.92 MICROGRAM(S)/KG/HR: 50 INJECTION INTRAVENOUS at 08:42

## 2023-02-24 RX ADMIN — Medication 40 MILLIGRAM(S): at 11:17

## 2023-02-24 RX ADMIN — SODIUM CHLORIDE 500 MILLILITER(S): 9 INJECTION, SOLUTION INTRAVENOUS at 12:31

## 2023-02-24 RX ADMIN — HEPARIN SODIUM 5000 UNIT(S): 5000 INJECTION INTRAVENOUS; SUBCUTANEOUS at 14:07

## 2023-02-24 NOTE — PROGRESS NOTE ADULT - SUBJECTIVE AND OBJECTIVE BOX
INTERVAL HPI/OVERNIGHT EVENTS:    SUBJECTIVE: Patient seen and examined at bedside.       VITAL SIGNS:  ICU Vital Signs Last 24 Hrs  T(C): 36.6 (2023 04:00), Max: 37.2 (2023 19:50)  T(F): 97.8 (2023 04:00), Max: 98.9 (2023 19:50)  HR: 46 (2023 07:17) (45 - 91)  BP: 159/107 (2023 06:00) (99/61 - 159/107)  BP(mean): 114 (2023 06:00) (71 - 114)  ABP: --  ABP(mean): --  RR: 15 (2023 06:00) (15 - 18)  SpO2: 99% (2023 07:17) (97% - 100%)    O2 Parameters below as of 2023 06:00  Patient On (Oxygen Delivery Method): ventilator    O2 Concentration (%): 30      Mode: AC/ CMV (Assist Control/ Continuous Mandatory Ventilation), RR (machine): 16, TV (machine): 400, FiO2: 50, PEEP: 5, ITime: 0.64, MAP: 12, PIP: 32  Plateau pressure:   P/F ratio:     02-23 @ 07:01  -  02-24 @ 07:00  --------------------------------------------------------  IN: 429.7 mL / OUT: 645 mL / NET: -215.3 mL      CAPILLARY BLOOD GLUCOSE        ECG:    PHYSICAL EXAM:    General:   HEENT:   Neck:   Respiratory:   Cardiovascular:   Abdomen:   Extremities:  Neurological:    MEDICATIONS:  MEDICATIONS  (STANDING):  chlorhexidine 2% Cloths 1 Application(s) Topical <User Schedule>  fentaNYL   Infusion. 0.5 MICROgram(s)/kG/Hr (4.92 mL/Hr) IV Continuous <Continuous>  propofol Infusion 10 MICROgram(s)/kG/Min (5.9 mL/Hr) IV Continuous <Continuous>    MEDICATIONS  (PRN):      ALLERGIES:  Allergies    No Known Allergies    Intolerances        LABS:                        12.4   9.17  )-----------( 245      ( 2023 00:30 )             39.3         137  |  104  |  18  ----------------------------<  167<H>  3.8   |  21<L>  |  0.64    Ca    9.2      2023 00:30  Phos  2.6       Mg     2.60         TPro  7.0  /  Alb  3.8  /  TBili  <0.2  /  DBili  x   /  AST  14  /  ALT  12  /  AlkPhos  79  02-24    PT/INR - ( 2023 20:30 )   PT: 13.4 sec;   INR: 1.15 ratio         PTT - ( 2023 20:30 )  PTT:29.6 sec  Urinalysis Basic - ( 2023 19:56 )    Color: Light Yellow / Appearance: Clear / S.024 / pH: x  Gluc: x / Ketone: Negative  / Bili: Negative / Urobili: <2 mg/dL   Blood: x / Protein: Trace / Nitrite: Negative   Leuk Esterase: Negative / RBC: x / WBC x   Sq Epi: x / Non Sq Epi: x / Bacteria: x        RADIOLOGY & ADDITIONAL TESTS: Reviewed.   INTERVAL HPI/OVERNIGHT EVENTS: No overnight events. Pt intubated and sedated.     SUBJECTIVE: Patient seen and examined at bedside. Unable to provide subjective.       VITAL SIGNS:  ICU Vital Signs Last 24 Hrs  T(C): 36.6 (2023 04:00), Max: 37.2 (2023 19:50)  T(F): 97.8 (2023 04:00), Max: 98.9 (2023 19:50)  HR: 46 (2023 07:17) (45 - 91)  BP: 159/107 (2023 06:00) (99/61 - 159/107)  BP(mean): 114 (2023 06:00) (71 - 114)  ABP: --  ABP(mean): --  RR: 15 (2023 06:00) (15 - 18)  SpO2: 99% (2023 07:17) (97% - 100%)    O2 Parameters below as of 2023 06:00  Patient On (Oxygen Delivery Method): ventilator    O2 Concentration (%): 30      Mode: AC/ CMV (Assist Control/ Continuous Mandatory Ventilation), RR (machine): 16, TV (machine): 400, FiO2: 50, PEEP: 5, ITime: 0.64, MAP: 12, PIP: 32  Plateau pressure:   P/F ratio:     02-23 @ 07:01  -  02-24 @ 07:00  --------------------------------------------------------  IN: 429.7 mL / OUT: 645 mL / NET: -215.3 mL      CAPILLARY BLOOD GLUCOSE        ECG:    PHYSICAL EXAM:    GENERAL: NAD, lying comfortably in bed, sedate intubated   HEAD:  Atraumatic, Normocephalic  EYES: conjunctiva and sclera clear  NECK: Supple, No JVD, No LAD   CHEST/LUNG: Clear to auscultation bilaterally; No wheeze or rhonchi  HEART: Regular rate and rhythm; S1 and S2 present, No murmurs, rubs, or gallops  ABDOMEN: Soft, Nontender, Nondistended; Bowel sounds present  EXTREMITIES:  2+ Peripheral Pulses, No clubbing, cyanosis, or edema  NEURO: AAOx0 but arousable  SKIN: No rashes or lesions      MEDICATIONS:  MEDICATIONS  (STANDING):  chlorhexidine 2% Cloths 1 Application(s) Topical <User Schedule>  fentaNYL   Infusion. 0.5 MICROgram(s)/kG/Hr (4.92 mL/Hr) IV Continuous <Continuous>  propofol Infusion 10 MICROgram(s)/kG/Min (5.9 mL/Hr) IV Continuous <Continuous>    MEDICATIONS  (PRN):      ALLERGIES:  Allergies    No Known Allergies    Intolerances        LABS:                        12.4   9.17  )-----------( 245      ( 2023 00:30 )             39.3     -    137  |  104  |  18  ----------------------------<  167<H>  3.8   |  21<L>  |  0.64    Ca    9.2      2023 00:30  Phos  2.6     -  Mg     2.60         TPro  7.0  /  Alb  3.8  /  TBili  <0.2  /  DBili  x   /  AST  14  /  ALT  12  /  AlkPhos  79  -24    PT/INR - ( 2023 20:30 )   PT: 13.4 sec;   INR: 1.15 ratio         PTT - ( 2023 20:30 )  PTT:29.6 sec  Urinalysis Basic - ( 2023 19:56 )    Color: Light Yellow / Appearance: Clear / S.024 / pH: x  Gluc: x / Ketone: Negative  / Bili: Negative / Urobili: <2 mg/dL   Blood: x / Protein: Trace / Nitrite: Negative   Leuk Esterase: Negative / RBC: x / WBC x   Sq Epi: x / Non Sq Epi: x / Bacteria: x        RADIOLOGY & ADDITIONAL TESTS: Reviewed.

## 2023-02-24 NOTE — CONSULT NOTE ADULT - PROBLEM SELECTOR RECOMMENDATION 9
- ENT to take to the OR for direct laryngoscopy with trial of extubation in the OR   - D/w attending

## 2023-02-24 NOTE — CONSULT NOTE ADULT - SUBJECTIVE AND OBJECTIVE BOX
CC: Respiratory distress     HPI: 72 F with a PMHx of asthma,[place] Palsy, essential HTN, hepatitis C, seizure presented to [facility] ED for   wheezing. Pt was seen in pulmonary office and was sent to the ED to r/o vocal cord dysfunction. .Per chart notes  pt has chronic hx of aryepiglottic fold thickening and was  sent her into ED for steroids and further imaging. Patient evaluated in [place] ED and found to have stridor . Patient  with interval improvement of stridor after epi, magnesium and steroids. ICU saw pt in ED, no ENT available at  so patient was transferred after intubation. ENT consulted. Pt seen and examined at bedside. Pt intubated and sedated. unable to provide ROS              PAST MEDICAL & SURGICAL HISTORY:  Asthma      H/O Bell&#x27;s palsy      HTN (hypertension)      Seizure      Hepatitis C      No significant past surgical history        Allergies    No Known Allergies    Intolerances      MEDICATIONS  (STANDING):  albuterol    90 MICROgram(s) HFA Inhaler 4 Puff(s) Inhalation every 6 hours  chlorhexidine 2% Cloths 1 Application(s) Topical <User Schedule>  fentaNYL   Infusion. 0.5 MICROgram(s)/kG/Hr (4.92 mL/Hr) IV Continuous <Continuous>  heparin   Injectable 5000 Unit(s) SubCutaneous every 8 hours  lactated ringers Bolus 500 milliLiter(s) IV Bolus once  methylPREDNISolone sodium succinate Injectable 40 milliGRAM(s) IV Push daily  norepinephrine Infusion 0.05 MICROgram(s)/kG/Min (9.22 mL/Hr) IV Continuous <Continuous>  propofol Infusion 10 MICROgram(s)/kG/Min (5.9 mL/Hr) IV Continuous <Continuous>    MEDICATIONS  (PRN):       Family history: No pertinent family history in first degree relatives    ROS:   unable to provide to to medical state     Vital Signs Last 24 Hrs  T(C): 35 (24 Feb 2023 12:00), Max: 37.2 (23 Feb 2023 19:50)  T(F): 95 (24 Feb 2023 12:00), Max: 98.9 (23 Feb 2023 19:50)  HR: 62 (24 Feb 2023 16:02) (44 - 91)  BP: 106/51 (24 Feb 2023 15:00) (79/41 - 159/107)  BP(mean): 65 (24 Feb 2023 15:00) (50 - 114)  RR: 16 (24 Feb 2023 15:00) (14 - 18)  SpO2: 99% (24 Feb 2023 16:02) (97% - 100%)    Parameters below as of 24 Feb 2023 16:02  Patient On (Oxygen Delivery Method): ventilator                              12.4   9.17  )-----------( 245      ( 24 Feb 2023 00:30 )             39.3    02-24    137  |  104  |  18  ----------------------------<  167<H>  3.8   |  21<L>  |  0.64    Ca    9.2      24 Feb 2023 00:30  Phos  2.6     02-24  Mg     2.60     02-24    TPro  7.0  /  Alb  3.8  /  TBili  <0.2  /  DBili  x   /  AST  14  /  ALT  12  /  AlkPhos  79  02-24   PT/INR - ( 23 Feb 2023 20:30 )   PT: 13.4 sec;   INR: 1.15 ratio         PTT - ( 23 Feb 2023 20:30 )  PTT:29.6 sec    PHYSICAL EXAM:  Gen: intubated and sedated   Skin: No rashes, bruises, or lesions  Head: Normocephalic, Atraumatic  Face: no edema, erythema, or fluctuance.   Eyes: no scleral injection  Ears:  Right : external ear without abnormalities   Left external ear without abnormalities   Nose: Nares bilaterally patent, no discharge   Neck: Flat, supple, no lymphadenopathy, trachea midline, no masses  Lymphatic: No lymphadenopathy  Resp: Intubated and sedated            CC: Respiratory distress     HPI: 72 F with a PMHx of asthma,[place] Palsy, essential HTN, hepatitis C, seizure presented to [facility] ED for   wheezing. Pt was seen in pulmonary office and was sent to the ED to r/o vocal cord dysfunction. .Per chart notes  pt has chronic hx of aryepiglottic fold thickening and was  sent her into ED for steroids and further imaging. Patient evaluated in [place] ED and found to have stridor . Patient  with interval improvement of stridor after epi, magnesium and steroids. ICU saw pt in ED, no ENT available at [facility] so patient was transferred after intubation. ENT consulted. Pt seen and examined at bedside. Pt intubated and sedated. unable to provide ROS              PAST MEDICAL & SURGICAL HISTORY:  Asthma      H/O Bell&#x27;s palsy      HTN (hypertension)      Seizure      Hepatitis C      No significant past surgical history        Allergies    No Known Allergies    Intolerances      MEDICATIONS  (STANDING):  albuterol    90 MICROgram(s) HFA Inhaler 4 Puff(s) Inhalation every 6 hours  chlorhexidine 2% Cloths 1 Application(s) Topical <User Schedule>  fentaNYL   Infusion. 0.5 MICROgram(s)/kG/Hr (4.92 mL/Hr) IV Continuous <Continuous>  heparin   Injectable 5000 Unit(s) SubCutaneous every 8 hours  lactated ringers Bolus 500 milliLiter(s) IV Bolus once  methylPREDNISolone sodium succinate Injectable 40 milliGRAM(s) IV Push daily  norepinephrine Infusion 0.05 MICROgram(s)/kG/Min (9.22 mL/Hr) IV Continuous <Continuous>  propofol Infusion 10 MICROgram(s)/kG/Min (5.9 mL/Hr) IV Continuous <Continuous>    MEDICATIONS  (PRN):       Family history: No pertinent family history in first degree relatives    ROS:   unable to provide to to medical state     Vital Signs Last 24 Hrs  T(C): 35 (24 Feb 2023 12:00), Max: 37.2 (23 Feb 2023 19:50)  T(F): 95 (24 Feb 2023 12:00), Max: 98.9 (23 Feb 2023 19:50)  HR: 62 (24 Feb 2023 16:02) (44 - 91)  BP: 106/51 (24 Feb 2023 15:00) (79/41 - 159/107)  BP(mean): 65 (24 Feb 2023 15:00) (50 - 114)  RR: 16 (24 Feb 2023 15:00) (14 - 18)  SpO2: 99% (24 Feb 2023 16:02) (97% - 100%)    Parameters below as of 24 Feb 2023 16:02  Patient On (Oxygen Delivery Method): ventilator                              12.4   9.17  )-----------( 245      ( 24 Feb 2023 00:30 )             39.3    02-24    137  |  104  |  18  ----------------------------<  167<H>  3.8   |  21<L>  |  0.64    Ca    9.2      24 Feb 2023 00:30  Phos  2.6     02-24  Mg     2.60     02-24    TPro  7.0  /  Alb  3.8  /  TBili  <0.2  /  DBili  x   /  AST  14  /  ALT  12  /  AlkPhos  79  02-24   PT/INR - ( 23 Feb 2023 20:30 )   PT: 13.4 sec;   INR: 1.15 ratio         PTT - ( 23 Feb 2023 20:30 )  PTT:29.6 sec    PHYSICAL EXAM:  Gen: intubated and sedated   Skin: No rashes, bruises, or lesions  Head: Normocephalic, Atraumatic  Face: no edema, erythema, or fluctuance.   Eyes: no scleral injection  Ears:  Right : external ear without abnormalities   Left external ear without abnormalities   Nose: Nares bilaterally patent, no discharge   Neck: Flat, supple, no lymphadenopathy, trachea midline, no masses  Lymphatic: No lymphadenopathy  Resp: Intubated and sedated            CC: Respiratory distress     HPI: 72 F with a PMHx of asthma,[place] Palsy, essential HTN, hepatitis C, seizure presented to [facility] ED for   wheezing. Pt was seen in pulmonary office and was sent to the ED to r/o vocal cord dysfunction. .Per chart notes  pt has chronic hx of aryepiglottic fold thickening and was  sent her into ED for steroids and further imaging. Patient evaluated in [place] ED and found to have stridor . Patient  with interval improvement of stridor after epi, magnesium and steroids. ICU saw pt in ED, no ENT available at [facility] so patient was transferred after intubation. ENT consulted. Pt seen and examined at bedside. Pt intubated and sedated. unable to provide ROS              PAST MEDICAL & SURGICAL HISTORY:  Asthma      H/O Bell&#x27;s palsy      HTN (hypertension)      Seizure      Hepatitis C      No significant past surgical history        Allergies    No Known Allergies    Intolerances      MEDICATIONS  (STANDING):  albuterol    90 MICROgram(s) HFA Inhaler 4 Puff(s) Inhalation every 6 hours  chlorhexidine 2% Cloths 1 Application(s) Topical <User Schedule>  fentaNYL   Infusion. 0.5 MICROgram(s)/kG/Hr (4.92 mL/Hr) IV Continuous <Continuous>  heparin   Injectable 5000 Unit(s) SubCutaneous every 8 hours  lactated ringers Bolus 500 milliLiter(s) IV Bolus once  methylPREDNISolone sodium succinate Injectable 40 milliGRAM(s) IV Push daily  norepinephrine Infusion 0.05 MICROgram(s)/kG/Min (9.22 mL/Hr) IV Continuous <Continuous>  propofol Infusion 10 MICROgram(s)/kG/Min (5.9 mL/Hr) IV Continuous <Continuous>    MEDICATIONS  (PRN):       Family history: No pertinent family history in first degree relatives    ROS:   unable to provide to to medical state     Vital Signs Last 24 Hrs  T(C): 35 (24 Feb 2023 12:00), Max: 37.2 (23 Feb 2023 19:50)  T(F): 95 (24 Feb 2023 12:00), Max: 98.9 (23 Feb 2023 19:50)  HR: 62 (24 Feb 2023 16:02) (44 - 91)  BP: 106/51 (24 Feb 2023 15:00) (79/41 - 159/107)  BP(mean): 65 (24 Feb 2023 15:00) (50 - 114)  RR: 16 (24 Feb 2023 15:00) (14 - 18)  SpO2: 99% (24 Feb 2023 16:02) (97% - 100%)    Parameters below as of 24 Feb 2023 16:02  Patient On (Oxygen Delivery Method): ventilator                              12.4   9.17  )-----------( 245      ( 24 Feb 2023 00:30 )             39.3    02-24    137  |  104  |  18  ----------------------------<  167<H>  3.8   |  21<L>  |  0.64    Ca    9.2      24 Feb 2023 00:30  Phos  2.6     02-24  Mg     2.60     02-24    TPro  7.0  /  Alb  3.8  /  TBili  <0.2  /  DBili  x   /  AST  14  /  ALT  12  /  AlkPhos  79  02-24   PT/INR - ( 23 Feb 2023 20:30 )   PT: 13.4 sec;   INR: 1.15 ratio         PTT - ( 23 Feb 2023 20:30 )  PTT:29.6 sec    PHYSICAL EXAM:  Gen: intubated and sedated   Skin: No rashes, bruises, or lesions  Head: Normocephalic, Atraumatic  Face: no edema, erythema, or fluctuance.   Eyes: no scleral injection  Ears:  Right : external ear without abnormalities   Left external ear without abnormalities   Nose: Nares bilaterally patent, no discharge   Neck: Flat, supple, no lymphadenopathy, trachea midline, no masses  Lymphatic: No lymphadenopathy  Resp: Intubated and sedated

## 2023-02-24 NOTE — PROGRESS NOTE ADULT - ATTENDING COMMENTS
72 year old woman with history of asthma, seizures and hepatitis transferred from [facility] after being intubated in the ED with stridor needed ENT evaluation    - ENT consultation to discuss need for further imaging  - minimal wheezing on exam with low peak pressures on ventilator  - continue vent support, steroids and bronchodilators    case discussed in detail with patients son and sister at bedside 72 year old woman with history of asthma, seizures and hepatitis transferred from [facility] after being intubated in the ED with stridor needed ENT evaluation    - ENT consultation to discuss need for further imaging  - minimal wheezing on exam with low peak pressures on ventilator  - continue vent support, steroids and bronchodilators    case discussed in detail with patients son and sister at bedside 72 year old woman with history of asthma, seizures and hepatitis transferred from [facility] after being intubated in the ED with stridor needed ENT evaluation    - ENT consultation to discuss need for further imaging  - minimal wheezing on exam with low peak pressures on ventilator  - continue vent support, steroids and bronchodilators    case discussed in detail with patients son and sister at bedside

## 2023-02-24 NOTE — BRIEF OPERATIVE NOTE - OPERATION/FINDINGS
s/p direct laryngoscopy, bronchoscopy, extubation in the OR s/p direct laryngoscopy, bronchoscopy, extubation in the OR. Findings unremarkable, grade 1 view. Normal upper and lower airway. s/p direct laryngoscopy, bronchoscopy, extubation in the OR. Findings unremarkable, grade 1 view. Normal upper and lower airway. Extubated without issue. s/p direct laryngoscopy, bronchoscopy, extubation in the OR. Findings unremarkable, grade 1 view. No aryepiglottic fold thickening. Normal upper and lower airway. Extubated without issue.

## 2023-02-24 NOTE — PROGRESS NOTE ADULT - ASSESSMENT
71yo F w/ pmhx asthma, HTN, seizures, hepatitis was sent to ED from outpatient pulm office for wheezing, found to have stridors intubated for airway protection.     PLAN    [ NEURO ]  - pt is sedated and intubated   - on Fentanyl and Propofol; wean off as tolerated  - goal RASS 0      [ CARDIO ]  #HTN  - on Amlodipine and Losartan at home  - can continue once PO access      [ RESPIRATORY ]  # Hypoxic Respiratory failure (unknown etiology)  - sent by outpatient Pulm for stridor vs wheezing; accepted by ENT to evaluate for upper airway obstruction>> in [place] ED found to have wheezes and stridor intubated there   -- Collateral from outpt pulm: pt was wheezing in the office; sent in to receive IV steroids  - hx of aryepiglottic folds thickening; chronic  - s/p Solumedrol, Epinephrine, IVF and Mg; originally improved after steroids but respiratory status worsened before transfer  - ENT to evaluate patient; follow on rec  - CXR, ABG  - if further wheezing; will treat as asthma exacerbation with Steroids and Duonebs      [ GASTRO/NUTRITION ]  - no active issues  - will place OG tube      [ ENDO]  #T2DM  - NPO pending tube placement  - once on diet ISS  - monitor fingersticks q6h while NPO or on continuous feeds      [ RENAL/ ]  - no active issues    [ METABOLIC ]  # Elevated Lactate  - mildly elevated; trend lactate      [ INFECTIOUS ]  - no active issues  - mild leukocytosis seen at [place] now resolved    [ HEME ]  - no active issues    [ SKIN ]  - no active issues   73yo F w/ pmhx asthma, HTN, seizures, hepatitis was sent to ED from outpatient pulm office for wheezing, found to have stridors intubated for airway protection.     PLAN    [ NEURO ]  - pt is sedated and intubated   - on Fentanyl and Propofol; wean off as tolerated  - goal RASS 0      [ CARDIO ]  #HTN  - on Amlodipine and Losartan at home  - can continue once PO access      [ RESPIRATORY ]  # Hypoxic Respiratory failure (unknown etiology)  - sent by outpatient Pulm for stridor vs wheezing; accepted by ENT to evaluate for upper airway obstruction>> in [place] ED found to have wheezes and stridor intubated there   -- Collateral from outpt pulm: pt was wheezing in the office; sent in to receive IV steroids  - hx of aryepiglottic folds thickening; chronic  - s/p Solumedrol, Epinephrine, IVF and Mg; originally improved after steroids but respiratory status worsened before transfer  - ENT to evaluate patient; follow on rec  - CXR, ABG  - if further wheezing; will treat as asthma exacerbation with Steroids and Duonebs      [ GASTRO/NUTRITION ]  - no active issues  - will place OG tube      [ ENDO]  #T2DM  - NPO pending tube placement  - once on diet ISS  - monitor fingersticks q6h while NPO or on continuous feeds      [ RENAL/ ]  - no active issues    [ METABOLIC ]  # Elevated Lactate  - mildly elevated; trend lactate      [ INFECTIOUS ]  - no active issues  - mild leukocytosis seen at [place] now resolved    [ HEME ]  - no active issues    [ SKIN ]  - no active issues   71yo F w/ pmhx asthma, HTN, seizures, hepatitis was sent to ED from outpatient pulm office for wheezing, found to have stridors intubated for airway protection.     PLAN    [ NEURO ]  - pt is sedated and intubated   - on Fentanyl and Propofol; wean off as tolerated  - goal RASS 0      [ CARDIO ]  #HTN  - on Amlodipine and Losartan at home  - can continue once PO access      [ RESPIRATORY ]  # Hypoxic Respiratory failure (unknown etiology)  - sent by outpatient Pulm for stridor vs wheezing; accepted by ENT to evaluate for upper airway obstruction>> in [place] ED found to have wheezes and stridor intubated there   -- Collateral from outpt pulm: pt was wheezing in the office; sent in to receive IV steroids  - hx of aryepiglottic folds thickening; chronic  - s/p Solumedrol, Epinephrine, IVF and Mg; originally improved after steroids but respiratory status worsened before transfer  - ENT to evaluate patient; follow on rec  - CXR, ABG  - if further wheezing; will treat as asthma exacerbation with Steroids and Duonebs      [ GASTRO/NUTRITION ]  - no active issues  - will place OG tube      [ ENDO]  #T2DM  - NPO pending tube placement  - once on diet ISS  - monitor fingersticks q6h while NPO or on continuous feeds      [ RENAL/ ]  - no active issues    [ METABOLIC ]  # Elevated Lactate  - mildly elevated; trend lactate      [ INFECTIOUS ]  - no active issues  - mild leukocytosis seen at [place] now resolved    [ HEME ]  - no active issues    [ SKIN ]  - no active issues

## 2023-02-24 NOTE — BRIEF OPERATIVE NOTE - NSICDXBRIEFPROCEDURE_GEN_ALL_CORE_FT
PROCEDURES:  Controlled endotracheal extubation in operating room 24-Feb-2023 20:29:26  Marcie Mast

## 2023-02-24 NOTE — CONSULT NOTE ADULT - ASSESSMENT
72 F with a PMHx of asthma,[place] Palsy, essential HTN, hepatitis C, seizures transferred from [facility] with sudden respiratory distress requiring intubation and ENT evaluation Limited history able to be obtained. PT to be taken to the OR today with ENT  72 F with a PMHx of asthma,[place] Palsy, essential HTN, hepatitis C, seizures transferred from [facility] with sudden respiratory distress requiring intubation and ENT evaluation Limited history able to be obtained. PT to be taken to the OR today with ENT  72 F with a PMHx of asthma,[place] Palsy, essential HTN, hepatitis C, seizures transferred from [facility] with sudden respiratory distress requiring intubation and ENT evaluation Limited history able to be obtained. PT to be taken to the OR today with ENT

## 2023-02-25 LAB
ANION GAP SERPL CALC-SCNC: 10 MMOL/L — SIGNIFICANT CHANGE UP (ref 7–14)
B PERT DNA SPEC QL NAA+PROBE: SIGNIFICANT CHANGE UP
B PERT+PARAPERT DNA PNL SPEC NAA+PROBE: SIGNIFICANT CHANGE UP
BASOPHILS # BLD AUTO: 0.02 K/UL — SIGNIFICANT CHANGE UP (ref 0–0.2)
BASOPHILS NFR BLD AUTO: 0.1 % — SIGNIFICANT CHANGE UP (ref 0–2)
BORDETELLA PARAPERTUSSIS (RAPRVP): SIGNIFICANT CHANGE UP
BUN SERPL-MCNC: 23 MG/DL — SIGNIFICANT CHANGE UP (ref 7–23)
C PNEUM DNA SPEC QL NAA+PROBE: SIGNIFICANT CHANGE UP
CALCIUM SERPL-MCNC: 8.8 MG/DL — SIGNIFICANT CHANGE UP (ref 8.4–10.5)
CHLORIDE SERPL-SCNC: 107 MMOL/L — SIGNIFICANT CHANGE UP (ref 98–107)
CO2 SERPL-SCNC: 25 MMOL/L — SIGNIFICANT CHANGE UP (ref 22–31)
CREAT SERPL-MCNC: 0.72 MG/DL — SIGNIFICANT CHANGE UP (ref 0.5–1.3)
EGFR: 90 ML/MIN/1.73M2 — SIGNIFICANT CHANGE UP
EOSINOPHIL # BLD AUTO: 0 K/UL — SIGNIFICANT CHANGE UP (ref 0–0.5)
EOSINOPHIL NFR BLD AUTO: 0 % — SIGNIFICANT CHANGE UP (ref 0–6)
FLUAV SUBTYP SPEC NAA+PROBE: SIGNIFICANT CHANGE UP
FLUBV RNA SPEC QL NAA+PROBE: SIGNIFICANT CHANGE UP
GLUCOSE BLDC GLUCOMTR-MCNC: 107 MG/DL — HIGH (ref 70–99)
GLUCOSE SERPL-MCNC: 113 MG/DL — HIGH (ref 70–99)
HADV DNA SPEC QL NAA+PROBE: SIGNIFICANT CHANGE UP
HCOV 229E RNA SPEC QL NAA+PROBE: SIGNIFICANT CHANGE UP
HCOV HKU1 RNA SPEC QL NAA+PROBE: SIGNIFICANT CHANGE UP
HCOV NL63 RNA SPEC QL NAA+PROBE: SIGNIFICANT CHANGE UP
HCOV OC43 RNA SPEC QL NAA+PROBE: SIGNIFICANT CHANGE UP
HCT VFR BLD CALC: 41.8 % — SIGNIFICANT CHANGE UP (ref 34.5–45)
HCV AB S/CO SERPL IA: 15.83 S/CO — HIGH (ref 0–0.99)
HCV AB SERPL-IMP: REACTIVE
HGB BLD-MCNC: 13 G/DL — SIGNIFICANT CHANGE UP (ref 11.5–15.5)
HMPV RNA SPEC QL NAA+PROBE: SIGNIFICANT CHANGE UP
HPIV1 RNA SPEC QL NAA+PROBE: SIGNIFICANT CHANGE UP
HPIV2 RNA SPEC QL NAA+PROBE: SIGNIFICANT CHANGE UP
HPIV3 RNA SPEC QL NAA+PROBE: SIGNIFICANT CHANGE UP
HPIV4 RNA SPEC QL NAA+PROBE: SIGNIFICANT CHANGE UP
IANC: 11.68 K/UL — HIGH (ref 1.8–7.4)
IMM GRANULOCYTES NFR BLD AUTO: 1.1 % — HIGH (ref 0–0.9)
LYMPHOCYTES # BLD AUTO: 1.97 K/UL — SIGNIFICANT CHANGE UP (ref 1–3.3)
LYMPHOCYTES # BLD AUTO: 13.2 % — SIGNIFICANT CHANGE UP (ref 13–44)
M PNEUMO DNA SPEC QL NAA+PROBE: SIGNIFICANT CHANGE UP
MCHC RBC-ENTMCNC: 26.6 PG — LOW (ref 27–34)
MCHC RBC-ENTMCNC: 31.1 GM/DL — LOW (ref 32–36)
MCV RBC AUTO: 85.7 FL — SIGNIFICANT CHANGE UP (ref 80–100)
MONOCYTES # BLD AUTO: 1.07 K/UL — HIGH (ref 0–0.9)
MONOCYTES NFR BLD AUTO: 7.2 % — SIGNIFICANT CHANGE UP (ref 2–14)
NEUTROPHILS # BLD AUTO: 11.68 K/UL — HIGH (ref 1.8–7.4)
NEUTROPHILS NFR BLD AUTO: 78.4 % — HIGH (ref 43–77)
NRBC # BLD: 0 /100 WBCS — SIGNIFICANT CHANGE UP (ref 0–0)
NRBC # FLD: 0 K/UL — SIGNIFICANT CHANGE UP (ref 0–0)
PLATELET # BLD AUTO: 275 K/UL — SIGNIFICANT CHANGE UP (ref 150–400)
POTASSIUM SERPL-MCNC: 4.2 MMOL/L — SIGNIFICANT CHANGE UP (ref 3.5–5.3)
POTASSIUM SERPL-SCNC: 4.2 MMOL/L — SIGNIFICANT CHANGE UP (ref 3.5–5.3)
RAPID RVP RESULT: SIGNIFICANT CHANGE UP
RBC # BLD: 4.88 M/UL — SIGNIFICANT CHANGE UP (ref 3.8–5.2)
RBC # FLD: 13.2 % — SIGNIFICANT CHANGE UP (ref 10.3–14.5)
RSV RNA SPEC QL NAA+PROBE: SIGNIFICANT CHANGE UP
RV+EV RNA SPEC QL NAA+PROBE: SIGNIFICANT CHANGE UP
SARS-COV-2 RNA SPEC QL NAA+PROBE: SIGNIFICANT CHANGE UP
SODIUM SERPL-SCNC: 142 MMOL/L — SIGNIFICANT CHANGE UP (ref 135–145)
WBC # BLD: 14.9 K/UL — HIGH (ref 3.8–10.5)
WBC # FLD AUTO: 14.9 K/UL — HIGH (ref 3.8–10.5)

## 2023-02-25 PROCEDURE — 99291 CRITICAL CARE FIRST HOUR: CPT | Mod: GC

## 2023-02-25 RX ORDER — ONDANSETRON 8 MG/1
4 TABLET, FILM COATED ORAL ONCE
Refills: 0 | Status: COMPLETED | OUTPATIENT
Start: 2023-02-25 | End: 2023-02-25

## 2023-02-25 RX ORDER — ACETAMINOPHEN 500 MG
1000 TABLET ORAL ONCE
Refills: 0 | Status: COMPLETED | OUTPATIENT
Start: 2023-02-25 | End: 2023-02-25

## 2023-02-25 RX ADMIN — ALBUTEROL 2.5 MILLIGRAM(S): 90 AEROSOL, METERED ORAL at 21:37

## 2023-02-25 RX ADMIN — ONDANSETRON 4 MILLIGRAM(S): 8 TABLET, FILM COATED ORAL at 03:38

## 2023-02-25 RX ADMIN — HEPARIN SODIUM 5000 UNIT(S): 5000 INJECTION INTRAVENOUS; SUBCUTANEOUS at 13:54

## 2023-02-25 RX ADMIN — ALBUTEROL 2.5 MILLIGRAM(S): 90 AEROSOL, METERED ORAL at 09:50

## 2023-02-25 RX ADMIN — Medication 1000 MILLIGRAM(S): at 04:00

## 2023-02-25 RX ADMIN — Medication 400 MILLIGRAM(S): at 03:39

## 2023-02-25 RX ADMIN — HEPARIN SODIUM 5000 UNIT(S): 5000 INJECTION INTRAVENOUS; SUBCUTANEOUS at 05:32

## 2023-02-25 RX ADMIN — HEPARIN SODIUM 5000 UNIT(S): 5000 INJECTION INTRAVENOUS; SUBCUTANEOUS at 21:27

## 2023-02-25 RX ADMIN — Medication 40 MILLIGRAM(S): at 05:32

## 2023-02-25 RX ADMIN — ALBUTEROL 2.5 MILLIGRAM(S): 90 AEROSOL, METERED ORAL at 15:23

## 2023-02-25 NOTE — CHART NOTE - NSCHARTNOTEFT_GEN_A_CORE
MAR Accept Note  Transfer to:  Medicine  Accepting Attending Physician: Dr. Bah  Assigned Room:  8N 80    Labs and data reviewed.   No findings precluding transfer of service.       HPI/MICU COURSE:   Please refer to MICU transfer note for full details. Briefly, this is a 72F with a PMHx of asthma, aryepiglottic folds thickening, [place] Palsy, essential HTN, hepatitis C, seizure who initially was BIBA to [place] ED, sent by Dr. Mcneal for wheezing. Pt was seen in office and was sent to the ED to r/o vocal cord dysfunction. Dr Mcneal stated that he had been following her for her asthma; she presented to the office for 2 weeks of SOB. Patient evaluated in [place] ED and found to have stridor. Patient with interval improvement of stridor after epi, magnesium and steroids. Patient intubated for hypoxic respiratory failure and stridor. ICU saw pt in ED, no ENT available at [facility] so patient was transferred to [facility] ICU. During MICU admission the patient was treated for asthma exacerbation with Albuterol and Solumedrol. ENT was consulted for evaluation who took patient to OR to extubate and evaluate. ENT determined she had normal airways and no aryepiglottic folds thickening. She was transferred back to MICU. She was on NC and was weaned off to room air. The patient reported feeling well, was hemodynamically stable and ready to be transferred to floors.    FOR FOLLOW-UP:  [ ] monitor respiratory status; continue Albuterol and Steroids for asthma  [ ] f/u PT MAR Accept Note  Transfer to:  Medicine  Accepting Attending Physician: Dr. Bah  Assigned Room:  8N 80    Labs and data reviewed.   No findings precluding transfer of service.       HPI/MICU COURSE:   Please refer to MICU transfer note for full details. Briefly, this is a 72F with a PMHx of asthma, aryepiglottic folds thickening, [place] Palsy, essential HTN, hepatitis C, seizure who initially was BIBA to [place] ED, sent by Dr. Mcneal for wheezing. Pt was seen in office and was sent to the ED to r/o vocal cord dysfunction. Dr Mcneal stated that he had been following her for her asthma; she presented to the office for 2 weeks of SOB. Patient evaluated in [place] ED and found to have stridor. Patient with interval improvement of stridor after epi, magnesium and steroids. Patient intubated for hypoxic respiratory failure and stridor. ICU saw pt in ED, no ENT available at [facility] so patient was transferred to [facility] ICU. During MICU admission the patient was treated for asthma exacerbation with Albuterol and Solumedrol. ENT was consulted for evaluation who took patient to OR to extubate and evaluate. ENT determined she had normal airways and no aryepiglottic folds thickening. She was transferred back to MICU. She was on NC and was weaned off to room air. The patient reported feeling well, was hemodynamically stable and ready to be transferred to floors.    FOR FOLLOW-UP:  [ ] monitor respiratory status; continue Albuterol and Steroids for asthma  [ ] f/u PT MAR Accept Note  Transfer to:  Medicine  Accepting Attending Physician: Dr. Bah  Assigned Room:  8N 80    Labs and data reviewed.   No findings precluding transfer of service.       HPI/MICU COURSE:   Please refer to MICU transfer note for full details. Briefly, this is a 72F with a PMHx of asthma, aryepiglottic folds thickening, [place] Palsy, essential HTN, hepatitis C, seizure who initially was BIBA to [place] ED, sent by Dr. Mcneal for wheezing. Pt was seen in office and was sent to the ED to r/o vocal cord dysfunction. Dr Mcneal stated that he had been following her for her asthma; she presented to the office for 2 weeks of SOB. Patient evaluated in [place] ED and found to have stridor. Patient with interval improvement of stridor after epi, magnesium and steroids. Patient intubated for hypoxic respiratory failure and stridor. ICU saw pt in ED, no ENT available at [facility] so patient was transferred to [facility] ICU. During MICU admission the patient was treated for asthma exacerbation with Albuterol and Solumedrol. ENT was consulted for evaluation who took patient to OR to extubate and evaluate. ENT determined she had normal airways and no aryepiglottic folds thickening. She was transferred back to MICU. She was on NC and was weaned off to room air. The patient reported feeling well, was hemodynamically stable and ready to be transferred to floors.    FOR FOLLOW-UP:  [ ] monitor respiratory status; continue Albuterol and Steroids for asthma  [ ] f/u PT

## 2023-02-25 NOTE — CHART NOTE - NSCHARTNOTEFT_GEN_A_CORE
MICU Transfer Note  ---------------------------    Transfer from: MICU  Transfer to:  ( x ) Medicine    (  ) Telemetry    (  ) RCU    (  ) Palliative    (  ) Stroke Unit    (  ) _______________  Accepting Physician:    HPI:  71 y/o female with a PMHx of asthma, aryepiglottic folds thickening, [place]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 Mcneal for wheezing. Pt was seen in office and was sent to the ED to r/o vocal cord dysfunction. After contacting Dr Mcneal for further collateral, he states that he had been following her for her asthma, but she presented to the office for 2 weeks of SOB. At the time, he heard glottic wheezing with good air flow and able to speak long sentences and no labored respirations and no concern for airway obstruction. 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 He sent her into ED for steroids and further imaging.     Patient evaluated in [place] ED and found to have stridor . Patient  with interval improvement of stridor after epi, magnesium and steroids. ICU saw pt in ED, no ENT available at [facility] so patient was transferred after hypoxic respiratory failure requiring intubation.     ED team spoke with patient's sister Katherin Wesley. Pt relation as healthcare proxy if pt unable to make her own decisions.    (23 Feb 2023 20:22)      MICU COURSE  During MICU admission the patient was treated for asthma exacerbation with Albuterol and Solumedrol. ENT was consulted for evaluation who took patient to OR to extubate and evaluate. ENT determined she had normal airways and no aryepiglottic folds thickening. She was transferred back to MICU. She was on NC and was weaned off to room air. The patient reported feeling well, was hemodynamically stable and ready to be transferred to floors.      OBJECTIVE --  Vital Signs Last 24 Hrs  T(C): 36.6 (25 Feb 2023 12:00), Max: 37.1 (24 Feb 2023 16:00)  T(F): 97.9 (25 Feb 2023 12:00), Max: 98.7 (24 Feb 2023 16:00)  HR: 71 (25 Feb 2023 14:00) (51 - 92)  BP: 159/73 (25 Feb 2023 14:00) (106/51 - 173/78)  BP(mean): 96 (25 Feb 2023 14:00) (65 - 102)  RR: 21 (25 Feb 2023 14:00) (10 - 21)  SpO2: 95% (25 Feb 2023 14:00) (92% - 100%)    Parameters below as of 25 Feb 2023 12:00  Patient On (Oxygen Delivery Method): room air        I&O's Summary    24 Feb 2023 07:01  -  25 Feb 2023 07:00  --------------------------------------------------------  IN: 913.7 mL / OUT: 1190 mL / NET: -276.3 mL    25 Feb 2023 07:01  -  25 Feb 2023 14:10  --------------------------------------------------------  IN: 0 mL / OUT: 345 mL / NET: -345 mL        MEDICATIONS  (STANDING):  albuterol    0.083% 2.5 milliGRAM(s) Nebulizer every 6 hours  albuterol    90 MICROgram(s) HFA Inhaler 1 Puff(s) Inhalation every 4 hours  albuterol    90 MICROgram(s) HFA Inhaler 4 Puff(s) Inhalation every 6 hours  chlorhexidine 2% Cloths 1 Application(s) Topical <User Schedule>  heparin   Injectable 5000 Unit(s) SubCutaneous every 8 hours  methylPREDNISolone sodium succinate Injectable 40 milliGRAM(s) IV Push daily    MEDICATIONS  (PRN):        LABS                                            13.0                  Neurophils% (auto):   78.4   (02-25 @ 01:40):    14.90)-----------(275          Lymphocytes% (auto):  13.2                                          41.8                   Eosinphils% (auto):   0.0      Manual%: Neutrophils x    ; Lymphocytes x    ; Eosinophils x    ; Bands%: x    ; Blasts x                                    142    |  107    |  23                  Calcium: 8.8   / iCa: x      (02-25 @ 01:40)    ----------------------------<  113       Magnesium: x                                4.2     |  25     |  0.72             Phosphorous: x                  ASSESSMENT & PLAN:   73yo F w/ pmhx asthma, HTN, seizures, hepatitis was sent to ED from outpatient pulm office for wheezing, found to have stridors intubated for airway protection.     PLAN    [ NEURO ]  - pt is off sedation; AOx3      [ CARDIO ]  #HTN  - on Amlodipine and Losartan at home; holding iso soft BPs >> now w/ increasing BP  - restart home meds      [ RESPIRATORY ]  # Hypoxic Respiratory failure (unknown etiology)  - sent to [facility] ED by outpatient Pulm for stridor vs wheezing; pt decompensated while in ED at [place] intubate there, transferred to [facility] for ENT evaluation  - s/p Solumedrol, Epinephrine, IVF and Mg; originally improved after steroids but respiratory status worsened before transfer >> intubated for airway protection  ---- hx of aryepiglottic folds thickening? >> oupt pulm reported this was seen on CXR not confirmed  - if further wheezing; will treat as asthma exacerbation with Steroids and Duonebs  - extubated in the OR by ENTon 2/24; normal exam >> no aryepiglottic folds thickening      [ GASTRO/NUTRITION ]  - no active issues  - can start diet      [ ENDO]  #T2DM  - CC diet  - once on diet ISS  - monitor fingersticks q6h while NPO or on continuous feeds      [ RENAL/ ]  - no active issues    [ METABOLIC ]  - no active issues    [ INFECTIOUS ]  - no active issues    [ HEME ]  - no active issues    [ SKIN ]  - no active issues          For Follow-Up:  [ ] monitor respiratory status; continue Albuterol and Steroids for asthma MICU Transfer Note  ---------------------------    Transfer from: MICU  Transfer to:  ( x ) Medicine    (  ) Telemetry    (  ) RCU    (  ) Palliative    (  ) Stroke Unit    (  ) _______________  Accepting Physician:    HPI:  73 y/o female with a PMHx of asthma, aryepiglottic folds thickening, [place]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 Mcneal for wheezing. Pt was seen in office and was sent to the ED to r/o vocal cord dysfunction. After contacting Dr Mcneal for further collateral, he states that he had been following her for her asthma, but she presented to the office for 2 weeks of SOB. At the time, he heard glottic wheezing with good air flow and able to speak long sentences and no labored respirations and no concern for airway obstruction. 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 He sent her into ED for steroids and further imaging.     Patient evaluated in [place] ED and found to have stridor . Patient  with interval improvement of stridor after epi, magnesium and steroids. ICU saw pt in ED, no ENT available at  so patient was transferred after hypoxic respiratory failure requiring intubation.     ED team spoke with patient's sister Katherin Wesley. Pt relation as healthcare proxy if pt unable to make her own decisions.    (23 Feb 2023 20:22)      MICU COURSE  During MICU admission the patient was treated for asthma exacerbation with Albuterol and Solumedrol. ENT was consulted for evaluation who took patient to OR to extubate and evaluate. ENT determined she had normal airways and no aryepiglottic folds thickening. She was transferred back to MICU. She was on NC and was weaned off to room air. The patient reported feeling well, was hemodynamically stable and ready to be transferred to floors.      OBJECTIVE --  Vital Signs Last 24 Hrs  T(C): 36.6 (25 Feb 2023 12:00), Max: 37.1 (24 Feb 2023 16:00)  T(F): 97.9 (25 Feb 2023 12:00), Max: 98.7 (24 Feb 2023 16:00)  HR: 71 (25 Feb 2023 14:00) (51 - 92)  BP: 159/73 (25 Feb 2023 14:00) (106/51 - 173/78)  BP(mean): 96 (25 Feb 2023 14:00) (65 - 102)  RR: 21 (25 Feb 2023 14:00) (10 - 21)  SpO2: 95% (25 Feb 2023 14:00) (92% - 100%)    Parameters below as of 25 Feb 2023 12:00  Patient On (Oxygen Delivery Method): room air        I&O's Summary    24 Feb 2023 07:01  -  25 Feb 2023 07:00  --------------------------------------------------------  IN: 913.7 mL / OUT: 1190 mL / NET: -276.3 mL    25 Feb 2023 07:01  -  25 Feb 2023 14:10  --------------------------------------------------------  IN: 0 mL / OUT: 345 mL / NET: -345 mL        MEDICATIONS  (STANDING):  albuterol    0.083% 2.5 milliGRAM(s) Nebulizer every 6 hours  albuterol    90 MICROgram(s) HFA Inhaler 1 Puff(s) Inhalation every 4 hours  albuterol    90 MICROgram(s) HFA Inhaler 4 Puff(s) Inhalation every 6 hours  chlorhexidine 2% Cloths 1 Application(s) Topical <User Schedule>  heparin   Injectable 5000 Unit(s) SubCutaneous every 8 hours  methylPREDNISolone sodium succinate Injectable 40 milliGRAM(s) IV Push daily    MEDICATIONS  (PRN):        LABS                                            13.0                  Neurophils% (auto):   78.4   (02-25 @ 01:40):    14.90)-----------(275          Lymphocytes% (auto):  13.2                                          41.8                   Eosinphils% (auto):   0.0      Manual%: Neutrophils x    ; Lymphocytes x    ; Eosinophils x    ; Bands%: x    ; Blasts x                                    142    |  107    |  23                  Calcium: 8.8   / iCa: x      (02-25 @ 01:40)    ----------------------------<  113       Magnesium: x                                4.2     |  25     |  0.72             Phosphorous: x                  ASSESSMENT & PLAN:   71yo F w/ pmhx asthma, HTN, seizures, hepatitis was sent to ED from outpatient pulm office for wheezing, found to have stridors intubated for airway protection.     PLAN    [ NEURO ]  - pt is off sedation; AOx3      [ CARDIO ]  #HTN  - on Amlodipine and Losartan at home; holding iso soft BPs >> now w/ increasing BP  - restart home meds      [ RESPIRATORY ]  # Hypoxic Respiratory failure (unknown etiology)  - sent to [facility] ED by outpatient Pulm for stridor vs wheezing; pt decompensated while in ED at [place] intubate there, transferred to [facility] for ENT evaluation  - s/p Solumedrol, Epinephrine, IVF and Mg; originally improved after steroids but respiratory status worsened before transfer >> intubated for airway protection  ---- hx of aryepiglottic folds thickening? >> oupt pulm reported this was seen on CXR not confirmed  - if further wheezing; will treat as asthma exacerbation with Steroids and Duonebs  - extubated in the OR by ENTon 2/24; normal exam >> no aryepiglottic folds thickening      [ GASTRO/NUTRITION ]  - no active issues  - can start diet      [ ENDO]  #T2DM  - CC diet  - once on diet ISS  - monitor fingersticks q6h while NPO or on continuous feeds      [ RENAL/ ]  - no active issues    [ METABOLIC ]  - no active issues    [ INFECTIOUS ]  - no active issues    [ HEME ]  - no active issues    [ SKIN ]  - no active issues          For Follow-Up:  [ ] monitor respiratory status; continue Albuterol and Steroids for asthma MICU Transfer Note  ---------------------------    Transfer from: MICU  Transfer to:  ( x ) Medicine    (  ) Telemetry    (  ) RCU    (  ) Palliative    (  ) Stroke Unit    (  ) _______________  Accepting Physician:    HPI:  73 y/o female with a PMHx of asthma, aryepiglottic folds thickening, [place]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 Mcneal for wheezing. Pt was seen in office and was sent to the ED to r/o vocal cord dysfunction. After contacting Dr Mcneal for further collateral, he states that he had been following her for her asthma, but she presented to the office for 2 weeks of SOB. At the time, he heard glottic wheezing with good air flow and able to speak long sentences and no labored respirations and no concern for airway obstruction. 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 He sent her into ED for steroids and further imaging.     Patient evaluated in [place] ED and found to have stridor . Patient  with interval improvement of stridor after epi, magnesium and steroids. ICU saw pt in ED, no ENT available at [facility] so patient was transferred after hypoxic respiratory failure requiring intubation.     ED team spoke with patient's sister Katherin Wesley. Pt relation as healthcare proxy if pt unable to make her own decisions.    (23 Feb 2023 20:22)      MICU COURSE  During MICU admission the patient was treated for asthma exacerbation with Albuterol and Solumedrol. ENT was consulted for evaluation who took patient to OR to extubate and evaluate. ENT determined she had normal airways and no aryepiglottic folds thickening. She was transferred back to MICU. She was on NC and was weaned off to room air. The patient reported feeling well, was hemodynamically stable and ready to be transferred to floors.      OBJECTIVE --  Vital Signs Last 24 Hrs  T(C): 36.6 (25 Feb 2023 12:00), Max: 37.1 (24 Feb 2023 16:00)  T(F): 97.9 (25 Feb 2023 12:00), Max: 98.7 (24 Feb 2023 16:00)  HR: 71 (25 Feb 2023 14:00) (51 - 92)  BP: 159/73 (25 Feb 2023 14:00) (106/51 - 173/78)  BP(mean): 96 (25 Feb 2023 14:00) (65 - 102)  RR: 21 (25 Feb 2023 14:00) (10 - 21)  SpO2: 95% (25 Feb 2023 14:00) (92% - 100%)    Parameters below as of 25 Feb 2023 12:00  Patient On (Oxygen Delivery Method): room air        I&O's Summary    24 Feb 2023 07:01  -  25 Feb 2023 07:00  --------------------------------------------------------  IN: 913.7 mL / OUT: 1190 mL / NET: -276.3 mL    25 Feb 2023 07:01  -  25 Feb 2023 14:10  --------------------------------------------------------  IN: 0 mL / OUT: 345 mL / NET: -345 mL        MEDICATIONS  (STANDING):  albuterol    0.083% 2.5 milliGRAM(s) Nebulizer every 6 hours  albuterol    90 MICROgram(s) HFA Inhaler 1 Puff(s) Inhalation every 4 hours  albuterol    90 MICROgram(s) HFA Inhaler 4 Puff(s) Inhalation every 6 hours  chlorhexidine 2% Cloths 1 Application(s) Topical <User Schedule>  heparin   Injectable 5000 Unit(s) SubCutaneous every 8 hours  methylPREDNISolone sodium succinate Injectable 40 milliGRAM(s) IV Push daily    MEDICATIONS  (PRN):        LABS                                            13.0                  Neurophils% (auto):   78.4   (02-25 @ 01:40):    14.90)-----------(275          Lymphocytes% (auto):  13.2                                          41.8                   Eosinphils% (auto):   0.0      Manual%: Neutrophils x    ; Lymphocytes x    ; Eosinophils x    ; Bands%: x    ; Blasts x                                    142    |  107    |  23                  Calcium: 8.8   / iCa: x      (02-25 @ 01:40)    ----------------------------<  113       Magnesium: x                                4.2     |  25     |  0.72             Phosphorous: x                  ASSESSMENT & PLAN:   73yo F w/ pmhx asthma, HTN, seizures, hepatitis was sent to ED from outpatient pulm office for wheezing, found to have stridors intubated for airway protection.     PLAN    [ NEURO ]  - pt is off sedation; AOx3      [ CARDIO ]  #HTN  - on Amlodipine and Losartan at home; holding iso soft BPs >> now w/ increasing BP  - restart home meds      [ RESPIRATORY ]  # Hypoxic Respiratory failure (unknown etiology)  - sent to [facility] ED by outpatient Pulm for stridor vs wheezing; pt decompensated while in ED at [place] intubate there, transferred to [facility] for ENT evaluation  - s/p Solumedrol, Epinephrine, IVF and Mg; originally improved after steroids but respiratory status worsened before transfer >> intubated for airway protection  ---- hx of aryepiglottic folds thickening? >> oupt pulm reported this was seen on CXR not confirmed  - if further wheezing; will treat as asthma exacerbation with Steroids and Duonebs  - extubated in the OR by ENTon 2/24; normal exam >> no aryepiglottic folds thickening      [ GASTRO/NUTRITION ]  - no active issues  - can start diet      [ ENDO]  #T2DM  - CC diet  - once on diet ISS  - monitor fingersticks q6h while NPO or on continuous feeds      [ RENAL/ ]  - no active issues    [ METABOLIC ]  - no active issues    [ INFECTIOUS ]  - no active issues    [ HEME ]  - no active issues    [ SKIN ]  - no active issues          For Follow-Up:  [ ] monitor respiratory status; continue Albuterol and Steroids for asthma MICU Transfer Note  ---------------------------    Transfer from: MICU  Transfer to:  ( x ) Medicine    (  ) Telemetry    (  ) RCU    (  ) Palliative    (  ) Stroke Unit    (  ) _______________  Accepting Physician:    HPI:  71 y/o female with a PMHx of asthma, aryepiglottic folds thickening, [place]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 Mcneal for wheezing. Pt was seen in office and was sent to the ED to r/o vocal cord dysfunction. After contacting Dr Mcneal for further collateral, he states that he had been following her for her asthma, but she presented to the office for 2 weeks of SOB. At the time, he heard glottic wheezing with good air flow and able to speak long sentences and no labored respirations and no concern for airway obstruction. 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 He sent her into ED for steroids and further imaging.     Patient evaluated in [place] ED and found to have stridor . Patient  with interval improvement of stridor after epi, magnesium and steroids. ICU saw pt in ED, no ENT available at [facility] so patient was transferred after hypoxic respiratory failure requiring intubation.     ED team spoke with patient's sister Katherin Wesley. Pt relation as healthcare proxy if pt unable to make her own decisions.    (23 Feb 2023 20:22)      MICU COURSE  During MICU admission the patient was treated for asthma exacerbation with Albuterol and Solumedrol. ENT was consulted for evaluation who took patient to OR to extubate and evaluate. ENT determined she had normal airways and no aryepiglottic folds thickening. She was transferred back to MICU. She was on NC and was weaned off to room air. The patient reported feeling well, was hemodynamically stable and ready to be transferred to floors.      OBJECTIVE --  Vital Signs Last 24 Hrs  T(C): 36.6 (25 Feb 2023 12:00), Max: 37.1 (24 Feb 2023 16:00)  T(F): 97.9 (25 Feb 2023 12:00), Max: 98.7 (24 Feb 2023 16:00)  HR: 71 (25 Feb 2023 14:00) (51 - 92)  BP: 159/73 (25 Feb 2023 14:00) (106/51 - 173/78)  BP(mean): 96 (25 Feb 2023 14:00) (65 - 102)  RR: 21 (25 Feb 2023 14:00) (10 - 21)  SpO2: 95% (25 Feb 2023 14:00) (92% - 100%)    Parameters below as of 25 Feb 2023 12:00  Patient On (Oxygen Delivery Method): room air        I&O's Summary    24 Feb 2023 07:01  -  25 Feb 2023 07:00  --------------------------------------------------------  IN: 913.7 mL / OUT: 1190 mL / NET: -276.3 mL    25 Feb 2023 07:01  -  25 Feb 2023 14:10  --------------------------------------------------------  IN: 0 mL / OUT: 345 mL / NET: -345 mL        MEDICATIONS  (STANDING):  albuterol    0.083% 2.5 milliGRAM(s) Nebulizer every 6 hours  albuterol    90 MICROgram(s) HFA Inhaler 1 Puff(s) Inhalation every 4 hours  albuterol    90 MICROgram(s) HFA Inhaler 4 Puff(s) Inhalation every 6 hours  chlorhexidine 2% Cloths 1 Application(s) Topical <User Schedule>  heparin   Injectable 5000 Unit(s) SubCutaneous every 8 hours  methylPREDNISolone sodium succinate Injectable 40 milliGRAM(s) IV Push daily    MEDICATIONS  (PRN):        LABS                                            13.0                  Neurophils% (auto):   78.4   (02-25 @ 01:40):    14.90)-----------(275          Lymphocytes% (auto):  13.2                                          41.8                   Eosinphils% (auto):   0.0      Manual%: Neutrophils x    ; Lymphocytes x    ; Eosinophils x    ; Bands%: x    ; Blasts x                                    142    |  107    |  23                  Calcium: 8.8   / iCa: x      (02-25 @ 01:40)    ----------------------------<  113       Magnesium: x                                4.2     |  25     |  0.72             Phosphorous: x                  ASSESSMENT & PLAN:   73yo F w/ pmhx asthma, HTN, seizures, hepatitis was sent to ED from outpatient pulm office for wheezing, found to have stridors intubated for airway protection.     PLAN    [ NEURO ]  - pt is off sedation; AOx3      [ CARDIO ]  #HTN  - on Amlodipine and Losartan at home; holding iso soft BPs >> now w/ increasing BP  - restart home meds      [ RESPIRATORY ]  # Hypoxic Respiratory failure (unknown etiology)  - sent to [facility] ED by outpatient Pulm for stridor vs wheezing; pt decompensated while in ED at [place] intubate there, transferred to [facility] for ENT evaluation  - s/p Solumedrol, Epinephrine, IVF and Mg; originally improved after steroids but respiratory status worsened before transfer >> intubated for airway protection  ---- hx of aryepiglottic folds thickening? >> oupt pulm reported this was seen on CXR not confirmed  - if further wheezing; will treat as asthma exacerbation with Steroids and Duonebs  - extubated in the OR by ENTon 2/24; normal exam >> no aryepiglottic folds thickening      [ GASTRO/NUTRITION ]  - no active issues  - can start diet      [ ENDO]  #T2DM  - CC diet  - once on diet ISS  - monitor fingersticks q6h while NPO or on continuous feeds      [ RENAL/ ]  - no active issues    [ METABOLIC ]  - no active issues    [ INFECTIOUS ]  - no active issues    [ HEME ]  - no active issues    [ SKIN ]  - no active issues          For Follow-Up:  [ ] monitor respiratory status; continue Albuterol and Steroids for asthma  [ ] f/u PT MICU Transfer Note  ---------------------------    Transfer from: MICU  Transfer to:  ( x ) Medicine    (  ) Telemetry    (  ) RCU    (  ) Palliative    (  ) Stroke Unit    (  ) _______________  Accepting Physician:    HPI:  73 y/o female with a PMHx of asthma, aryepiglottic folds thickening, [place]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 Mcneal for wheezing. Pt was seen in office and was sent to the ED to r/o vocal cord dysfunction. After contacting Dr Mcneal for further collateral, he states that he had been following her for her asthma, but she presented to the office for 2 weeks of SOB. At the time, he heard glottic wheezing with good air flow and able to speak long sentences and no labored respirations and no concern for airway obstruction. 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 He sent her into ED for steroids and further imaging.     Patient evaluated in [place] ED and found to have stridor . Patient  with interval improvement of stridor after epi, magnesium and steroids. ICU saw pt in ED, no ENT available at [facility] so patient was transferred after hypoxic respiratory failure requiring intubation.     ED team spoke with patient's sister Katherin Wesley. Pt relation as healthcare proxy if pt unable to make her own decisions.    (23 Feb 2023 20:22)      MICU COURSE  During MICU admission the patient was treated for asthma exacerbation with Albuterol and Solumedrol. ENT was consulted for evaluation who took patient to OR to extubate and evaluate. ENT determined she had normal airways and no aryepiglottic folds thickening. She was transferred back to MICU. She was on NC and was weaned off to room air. The patient reported feeling well, was hemodynamically stable and ready to be transferred to floors.      OBJECTIVE --  Vital Signs Last 24 Hrs  T(C): 36.6 (25 Feb 2023 12:00), Max: 37.1 (24 Feb 2023 16:00)  T(F): 97.9 (25 Feb 2023 12:00), Max: 98.7 (24 Feb 2023 16:00)  HR: 71 (25 Feb 2023 14:00) (51 - 92)  BP: 159/73 (25 Feb 2023 14:00) (106/51 - 173/78)  BP(mean): 96 (25 Feb 2023 14:00) (65 - 102)  RR: 21 (25 Feb 2023 14:00) (10 - 21)  SpO2: 95% (25 Feb 2023 14:00) (92% - 100%)    Parameters below as of 25 Feb 2023 12:00  Patient On (Oxygen Delivery Method): room air        I&O's Summary    24 Feb 2023 07:01  -  25 Feb 2023 07:00  --------------------------------------------------------  IN: 913.7 mL / OUT: 1190 mL / NET: -276.3 mL    25 Feb 2023 07:01  -  25 Feb 2023 14:10  --------------------------------------------------------  IN: 0 mL / OUT: 345 mL / NET: -345 mL        MEDICATIONS  (STANDING):  albuterol    0.083% 2.5 milliGRAM(s) Nebulizer every 6 hours  albuterol    90 MICROgram(s) HFA Inhaler 1 Puff(s) Inhalation every 4 hours  albuterol    90 MICROgram(s) HFA Inhaler 4 Puff(s) Inhalation every 6 hours  chlorhexidine 2% Cloths 1 Application(s) Topical <User Schedule>  heparin   Injectable 5000 Unit(s) SubCutaneous every 8 hours  methylPREDNISolone sodium succinate Injectable 40 milliGRAM(s) IV Push daily    MEDICATIONS  (PRN):        LABS                                            13.0                  Neurophils% (auto):   78.4   (02-25 @ 01:40):    14.90)-----------(275          Lymphocytes% (auto):  13.2                                          41.8                   Eosinphils% (auto):   0.0      Manual%: Neutrophils x    ; Lymphocytes x    ; Eosinophils x    ; Bands%: x    ; Blasts x                                    142    |  107    |  23                  Calcium: 8.8   / iCa: x      (02-25 @ 01:40)    ----------------------------<  113       Magnesium: x                                4.2     |  25     |  0.72             Phosphorous: x                  ASSESSMENT & PLAN:   73yo F w/ pmhx asthma, HTN, seizures, hepatitis was sent to ED from outpatient pulm office for wheezing, found to have stridors intubated for airway protection.     PLAN    [ NEURO ]  - pt is off sedation; AOx3      [ CARDIO ]  #HTN  - on Amlodipine and Losartan at home; holding iso soft BPs >> now w/ increasing BP  - restart home meds      [ RESPIRATORY ]  # Hypoxic Respiratory failure (unknown etiology)  - sent to [facility] ED by outpatient Pulm for stridor vs wheezing; pt decompensated while in ED at [place] intubate there, transferred to [facility] for ENT evaluation  - s/p Solumedrol, Epinephrine, IVF and Mg; originally improved after steroids but respiratory status worsened before transfer >> intubated for airway protection  ---- hx of aryepiglottic folds thickening? >> oupt pulm reported this was seen on CXR not confirmed  - if further wheezing; will treat as asthma exacerbation with Steroids and Duonebs  - extubated in the OR by ENTon 2/24; normal exam >> no aryepiglottic folds thickening      [ GASTRO/NUTRITION ]  - no active issues  - can start diet      [ ENDO]  #T2DM  - CC diet  - once on diet ISS  - monitor fingersticks q6h while NPO or on continuous feeds      [ RENAL/ ]  - no active issues    [ METABOLIC ]  - no active issues    [ INFECTIOUS ]  - no active issues    [ HEME ]  - no active issues    [ SKIN ]  - no active issues          For Follow-Up:  [ ] monitor respiratory status; continue Albuterol and Steroids for asthma  [ ] f/u PT MICU Transfer Note  ---------------------------    Transfer from: MICU  Transfer to:  ( x ) Medicine    (  ) Telemetry    (  ) RCU    (  ) Palliative    (  ) Stroke Unit    (  ) _______________  Accepting Physician:    HPI:  71 y/o female with a PMHx of asthma, aryepiglottic folds thickening, [place]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 Mcneal for wheezing. Pt was seen in office and was sent to the ED to r/o vocal cord dysfunction. After contacting Dr Mcneal for further collateral, he states that he had been following her for her asthma, but she presented to the office for 2 weeks of SOB. At the time, he heard glottic wheezing with good air flow and able to speak long sentences and no labored respirations and no concern for airway obstruction. 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 He sent her into ED for steroids and further imaging.     Patient evaluated in [place] ED and found to have stridor . Patient  with interval improvement of stridor after epi, magnesium and steroids. ICU saw pt in ED, no ENT available at [facility] so patient was transferred after hypoxic respiratory failure requiring intubation.     ED team spoke with patient's sister Katherin Wesley. Pt relation as healthcare proxy if pt unable to make her own decisions.    (23 Feb 2023 20:22)      MICU COURSE  During MICU admission the patient was treated for asthma exacerbation with Albuterol and Solumedrol. ENT was consulted for evaluation who took patient to OR to extubate and evaluate. ENT determined she had normal airways and no aryepiglottic folds thickening. She was transferred back to MICU. She was on NC and was weaned off to room air. The patient reported feeling well, was hemodynamically stable and ready to be transferred to floors.      OBJECTIVE --  Vital Signs Last 24 Hrs  T(C): 36.6 (25 Feb 2023 12:00), Max: 37.1 (24 Feb 2023 16:00)  T(F): 97.9 (25 Feb 2023 12:00), Max: 98.7 (24 Feb 2023 16:00)  HR: 71 (25 Feb 2023 14:00) (51 - 92)  BP: 159/73 (25 Feb 2023 14:00) (106/51 - 173/78)  BP(mean): 96 (25 Feb 2023 14:00) (65 - 102)  RR: 21 (25 Feb 2023 14:00) (10 - 21)  SpO2: 95% (25 Feb 2023 14:00) (92% - 100%)    Parameters below as of 25 Feb 2023 12:00  Patient On (Oxygen Delivery Method): room air        I&O's Summary    24 Feb 2023 07:01  -  25 Feb 2023 07:00  --------------------------------------------------------  IN: 913.7 mL / OUT: 1190 mL / NET: -276.3 mL    25 Feb 2023 07:01  -  25 Feb 2023 14:10  --------------------------------------------------------  IN: 0 mL / OUT: 345 mL / NET: -345 mL        MEDICATIONS  (STANDING):  albuterol    0.083% 2.5 milliGRAM(s) Nebulizer every 6 hours  albuterol    90 MICROgram(s) HFA Inhaler 1 Puff(s) Inhalation every 4 hours  albuterol    90 MICROgram(s) HFA Inhaler 4 Puff(s) Inhalation every 6 hours  chlorhexidine 2% Cloths 1 Application(s) Topical <User Schedule>  heparin   Injectable 5000 Unit(s) SubCutaneous every 8 hours  methylPREDNISolone sodium succinate Injectable 40 milliGRAM(s) IV Push daily    MEDICATIONS  (PRN):        LABS                                            13.0                  Neurophils% (auto):   78.4   (02-25 @ 01:40):    14.90)-----------(275          Lymphocytes% (auto):  13.2                                          41.8                   Eosinphils% (auto):   0.0      Manual%: Neutrophils x    ; Lymphocytes x    ; Eosinophils x    ; Bands%: x    ; Blasts x                                    142    |  107    |  23                  Calcium: 8.8   / iCa: x      (02-25 @ 01:40)    ----------------------------<  113       Magnesium: x                                4.2     |  25     |  0.72             Phosphorous: x                  ASSESSMENT & PLAN:   71yo F w/ pmhx asthma, HTN, seizures, hepatitis was sent to ED from outpatient pulm office for wheezing, found to have stridors intubated for airway protection.     PLAN    [ NEURO ]  - pt is off sedation; AOx3      [ CARDIO ]  #HTN  - on Amlodipine and Losartan at home; holding iso soft BPs >> now w/ increasing BP  - restart home meds      [ RESPIRATORY ]  # Hypoxic Respiratory failure (unknown etiology)  - sent to [facility] ED by outpatient Pulm for stridor vs wheezing; pt decompensated while in ED at [place] intubate there, transferred to [facility] for ENT evaluation  - s/p Solumedrol, Epinephrine, IVF and Mg; originally improved after steroids but respiratory status worsened before transfer >> intubated for airway protection  ---- hx of aryepiglottic folds thickening? >> oupt pulm reported this was seen on CXR not confirmed  - if further wheezing; will treat as asthma exacerbation with Steroids and Duonebs  - extubated in the OR by ENTon 2/24; normal exam >> no aryepiglottic folds thickening      [ GASTRO/NUTRITION ]  - no active issues  - can start diet      [ ENDO]  #T2DM  - CC diet  - once on diet ISS  - monitor fingersticks q6h while NPO or on continuous feeds      [ RENAL/ ]  - no active issues    [ METABOLIC ]  - no active issues    [ INFECTIOUS ]  - no active issues    [ HEME ]  - no active issues    [ SKIN ]  - no active issues          For Follow-Up:  [ ] monitor respiratory status; continue Albuterol and Steroids for asthma  [ ] f/u PT MICU Transfer Note  ---------------------------    Transfer from: MICU  Transfer to:  ( x ) Medicine    (  ) Telemetry    (  ) RCU    (  ) Palliative    (  ) Stroke Unit    (  ) _______________  Accepting Physician:    HPI:  71 y/o female with a PMHx of asthma, aryepiglottic folds thickening, [place]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 Mcneal for wheezing. Pt was seen in office and was sent to the ED to r/o vocal cord dysfunction. After contacting Dr Mcneal for further collateral, he states that he had been following her for her asthma, but she presented to the office for 2 weeks of SOB. At the time, he heard glottic wheezing with good air flow and able to speak long sentences and no labored respirations and no concern for airway obstruction. 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 He sent her into ED for steroids and further imaging.     Patient evaluated in [place] ED and found to have stridor . Patient  with interval improvement of stridor after epi, magnesium and steroids. ICU saw pt in ED, no ENT available at [facility] so patient was transferred after hypoxic respiratory failure requiring intubation.     ED team spoke with patient's sister Katherin Wesley. Pt relation as healthcare proxy if pt unable to make her own decisions.    (23 Feb 2023 20:22)      MICU COURSE  During MICU admission the patient was treated for asthma exacerbation with Albuterol and Solumedrol. ENT was consulted for evaluation who took patient to OR to extubate and evaluate. ENT determined she had normal airways and no aryepiglottic folds thickening. She was transferred back to MICU. She was on NC and was weaned off to room air. The patient reported feeling well, was hemodynamically stable and ready to be transferred to floors.      OBJECTIVE --  Vital Signs Last 24 Hrs  T(C): 36.6 (25 Feb 2023 12:00), Max: 37.1 (24 Feb 2023 16:00)  T(F): 97.9 (25 Feb 2023 12:00), Max: 98.7 (24 Feb 2023 16:00)  HR: 71 (25 Feb 2023 14:00) (51 - 92)  BP: 159/73 (25 Feb 2023 14:00) (106/51 - 173/78)  BP(mean): 96 (25 Feb 2023 14:00) (65 - 102)  RR: 21 (25 Feb 2023 14:00) (10 - 21)  SpO2: 95% (25 Feb 2023 14:00) (92% - 100%)    Parameters below as of 25 Feb 2023 12:00  Patient On (Oxygen Delivery Method): room air        I&O's Summary    24 Feb 2023 07:01  -  25 Feb 2023 07:00  --------------------------------------------------------  IN: 913.7 mL / OUT: 1190 mL / NET: -276.3 mL    25 Feb 2023 07:01  -  25 Feb 2023 14:10  --------------------------------------------------------  IN: 0 mL / OUT: 345 mL / NET: -345 mL        MEDICATIONS  (STANDING):  albuterol    0.083% 2.5 milliGRAM(s) Nebulizer every 6 hours  albuterol    90 MICROgram(s) HFA Inhaler 1 Puff(s) Inhalation every 4 hours  albuterol    90 MICROgram(s) HFA Inhaler 4 Puff(s) Inhalation every 6 hours  chlorhexidine 2% Cloths 1 Application(s) Topical <User Schedule>  heparin   Injectable 5000 Unit(s) SubCutaneous every 8 hours  methylPREDNISolone sodium succinate Injectable 40 milliGRAM(s) IV Push daily    MEDICATIONS  (PRN):        LABS                                            13.0                  Neurophils% (auto):   78.4   (02-25 @ 01:40):    14.90)-----------(275          Lymphocytes% (auto):  13.2                                          41.8                   Eosinphils% (auto):   0.0      Manual%: Neutrophils x    ; Lymphocytes x    ; Eosinophils x    ; Bands%: x    ; Blasts x                                    142    |  107    |  23                  Calcium: 8.8   / iCa: x      (02-25 @ 01:40)    ----------------------------<  113       Magnesium: x                                4.2     |  25     |  0.72             Phosphorous: x                  ASSESSMENT & PLAN:   71yo F w/ pmhx asthma, HTN, seizures, hepatitis was sent to ED from outpatient pulm office for wheezing, found to have stridors intubated for airway protection.     PLAN    [ NEURO ]  - pt is off sedation; AOx3      [ CARDIO ]  #HTN  - on Amlodipine and Losartan at home; holding iso soft BPs >> now w/ increasing BP  - restart home meds      [ RESPIRATORY ]  # Hypoxic Respiratory failure (unknown etiology)  - sent to [facility] ED by outpatient Pulm for stridor vs wheezing; pt decompensated while in ED at [place] intubate there, transferred to [facility] for ENT evaluation  - s/p Solumedrol, Epinephrine, IVF and Mg; originally improved after steroids but respiratory status worsened before transfer >> intubated for airway protection  ---- hx of aryepiglottic folds thickening? >> oupt pulm reported this was seen on CXR not confirmed  - if further wheezing; will treat as asthma exacerbation with Steroids and Duonebs  - extubated in the OR by ENTon 2/24; normal exam >> no aryepiglottic folds thickening      [ GASTRO/NUTRITION ]  - no active issues  - can start diet      [ ENDO]  #T2DM  - CC diet  - once on diet ISS  - monitor fingersticks q6h while NPO or on continuous feeds      [ RENAL/ ]  - no active issues    [ METABOLIC ]  - no active issues    [ INFECTIOUS ]  - no active issues  - mild leukocytosis likely 2/2 to starting steroids    [ HEME ]  - no active issues    [ SKIN ]  - no active issues          For Follow-Up:  [ ] monitor respiratory status; continue Albuterol and Steroids for asthma  [ ] f/u PT MICU Transfer Note  ---------------------------    Transfer from: MICU  Transfer to:  ( x ) Medicine    (  ) Telemetry    (  ) RCU    (  ) Palliative    (  ) Stroke Unit    (  ) _______________  Accepting Physician:    HPI:  73 y/o female with a PMHx of asthma, aryepiglottic folds thickening, [place]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 Mcneal for wheezing. Pt was seen in office and was sent to the ED to r/o vocal cord dysfunction. After contacting Dr Mcneal for further collateral, he states that he had been following her for her asthma, but she presented to the office for 2 weeks of SOB. At the time, he heard glottic wheezing with good air flow and able to speak long sentences and no labored respirations and no concern for airway obstruction. 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 He sent her into ED for steroids and further imaging.     Patient evaluated in [place] ED and found to have stridor . Patient  with interval improvement of stridor after epi, magnesium and steroids. ICU saw pt in ED, no ENT available at [facility] so patient was transferred after hypoxic respiratory failure requiring intubation.     ED team spoke with patient's sister Katherin Wesley. Pt relation as healthcare proxy if pt unable to make her own decisions.    (23 Feb 2023 20:22)      MICU COURSE  During MICU admission the patient was treated for asthma exacerbation with Albuterol and Solumedrol. ENT was consulted for evaluation who took patient to OR to extubate and evaluate. ENT determined she had normal airways and no aryepiglottic folds thickening. She was transferred back to MICU. She was on NC and was weaned off to room air. The patient reported feeling well, was hemodynamically stable and ready to be transferred to floors.      OBJECTIVE --  Vital Signs Last 24 Hrs  T(C): 36.6 (25 Feb 2023 12:00), Max: 37.1 (24 Feb 2023 16:00)  T(F): 97.9 (25 Feb 2023 12:00), Max: 98.7 (24 Feb 2023 16:00)  HR: 71 (25 Feb 2023 14:00) (51 - 92)  BP: 159/73 (25 Feb 2023 14:00) (106/51 - 173/78)  BP(mean): 96 (25 Feb 2023 14:00) (65 - 102)  RR: 21 (25 Feb 2023 14:00) (10 - 21)  SpO2: 95% (25 Feb 2023 14:00) (92% - 100%)    Parameters below as of 25 Feb 2023 12:00  Patient On (Oxygen Delivery Method): room air        I&O's Summary    24 Feb 2023 07:01  -  25 Feb 2023 07:00  --------------------------------------------------------  IN: 913.7 mL / OUT: 1190 mL / NET: -276.3 mL    25 Feb 2023 07:01  -  25 Feb 2023 14:10  --------------------------------------------------------  IN: 0 mL / OUT: 345 mL / NET: -345 mL        MEDICATIONS  (STANDING):  albuterol    0.083% 2.5 milliGRAM(s) Nebulizer every 6 hours  albuterol    90 MICROgram(s) HFA Inhaler 1 Puff(s) Inhalation every 4 hours  albuterol    90 MICROgram(s) HFA Inhaler 4 Puff(s) Inhalation every 6 hours  chlorhexidine 2% Cloths 1 Application(s) Topical <User Schedule>  heparin   Injectable 5000 Unit(s) SubCutaneous every 8 hours  methylPREDNISolone sodium succinate Injectable 40 milliGRAM(s) IV Push daily    MEDICATIONS  (PRN):        LABS                                            13.0                  Neurophils% (auto):   78.4   (02-25 @ 01:40):    14.90)-----------(275          Lymphocytes% (auto):  13.2                                          41.8                   Eosinphils% (auto):   0.0      Manual%: Neutrophils x    ; Lymphocytes x    ; Eosinophils x    ; Bands%: x    ; Blasts x                                    142    |  107    |  23                  Calcium: 8.8   / iCa: x      (02-25 @ 01:40)    ----------------------------<  113       Magnesium: x                                4.2     |  25     |  0.72             Phosphorous: x                  ASSESSMENT & PLAN:   71yo F w/ pmhx asthma, HTN, seizures, hepatitis was sent to ED from outpatient pulm office for wheezing, found to have stridors intubated for airway protection.     PLAN    [ NEURO ]  - pt is off sedation; AOx3      [ CARDIO ]  #HTN  - on Amlodipine and Losartan at home; holding iso soft BPs >> now w/ increasing BP  - restart home meds      [ RESPIRATORY ]  # Hypoxic Respiratory failure (unknown etiology)  - sent to [facility] ED by outpatient Pulm for stridor vs wheezing; pt decompensated while in ED at [place] intubate there, transferred to [facility] for ENT evaluation  - s/p Solumedrol, Epinephrine, IVF and Mg; originally improved after steroids but respiratory status worsened before transfer >> intubated for airway protection  ---- hx of aryepiglottic folds thickening? >> oupt pulm reported this was seen on CXR not confirmed  - if further wheezing; will treat as asthma exacerbation with Steroids and Duonebs  - extubated in the OR by ENTon 2/24; normal exam >> no aryepiglottic folds thickening      [ GASTRO/NUTRITION ]  - no active issues  - can start diet      [ ENDO]  #T2DM  - CC diet  - once on diet ISS  - monitor fingersticks q6h while NPO or on continuous feeds      [ RENAL/ ]  - no active issues    [ METABOLIC ]  - no active issues    [ INFECTIOUS ]  - no active issues  - mild leukocytosis likely 2/2 to starting steroids    [ HEME ]  - no active issues    [ SKIN ]  - no active issues          For Follow-Up:  [ ] monitor respiratory status; continue Albuterol and Steroids for asthma  [ ] f/u PT MICU Transfer Note  ---------------------------    Transfer from: MICU  Transfer to:  ( x ) Medicine    (  ) Telemetry    (  ) RCU    (  ) Palliative    (  ) Stroke Unit    (  ) _______________  Accepting Physician:    HPI:  71 y/o female with a PMHx of asthma, aryepiglottic folds thickening, [place]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 Mcneal for wheezing. Pt was seen in office and was sent to the ED to r/o vocal cord dysfunction. After contacting Dr Mcneal for further collateral, he states that he had been following her for her asthma, but she presented to the office for 2 weeks of SOB. At the time, he heard glottic wheezing with good air flow and able to speak long sentences and no labored respirations and no concern for airway obstruction. 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 He sent her into ED for steroids and further imaging.     Patient evaluated in [place] ED and found to have stridor . Patient  with interval improvement of stridor after epi, magnesium and steroids. ICU saw pt in ED, no ENT available at [facility] so patient was transferred after hypoxic respiratory failure requiring intubation.     ED team spoke with patient's sister Katherin Wesley. Pt relation as healthcare proxy if pt unable to make her own decisions.    (23 Feb 2023 20:22)      MICU COURSE  During MICU admission the patient was treated for asthma exacerbation with Albuterol and Solumedrol. ENT was consulted for evaluation who took patient to OR to extubate and evaluate. ENT determined she had normal airways and no aryepiglottic folds thickening. She was transferred back to MICU. She was on NC and was weaned off to room air. The patient reported feeling well, was hemodynamically stable and ready to be transferred to floors.      OBJECTIVE --  Vital Signs Last 24 Hrs  T(C): 36.6 (25 Feb 2023 12:00), Max: 37.1 (24 Feb 2023 16:00)  T(F): 97.9 (25 Feb 2023 12:00), Max: 98.7 (24 Feb 2023 16:00)  HR: 71 (25 Feb 2023 14:00) (51 - 92)  BP: 159/73 (25 Feb 2023 14:00) (106/51 - 173/78)  BP(mean): 96 (25 Feb 2023 14:00) (65 - 102)  RR: 21 (25 Feb 2023 14:00) (10 - 21)  SpO2: 95% (25 Feb 2023 14:00) (92% - 100%)    Parameters below as of 25 Feb 2023 12:00  Patient On (Oxygen Delivery Method): room air        I&O's Summary    24 Feb 2023 07:01  -  25 Feb 2023 07:00  --------------------------------------------------------  IN: 913.7 mL / OUT: 1190 mL / NET: -276.3 mL    25 Feb 2023 07:01  -  25 Feb 2023 14:10  --------------------------------------------------------  IN: 0 mL / OUT: 345 mL / NET: -345 mL        MEDICATIONS  (STANDING):  albuterol    0.083% 2.5 milliGRAM(s) Nebulizer every 6 hours  albuterol    90 MICROgram(s) HFA Inhaler 1 Puff(s) Inhalation every 4 hours  albuterol    90 MICROgram(s) HFA Inhaler 4 Puff(s) Inhalation every 6 hours  chlorhexidine 2% Cloths 1 Application(s) Topical <User Schedule>  heparin   Injectable 5000 Unit(s) SubCutaneous every 8 hours  methylPREDNISolone sodium succinate Injectable 40 milliGRAM(s) IV Push daily    MEDICATIONS  (PRN):        LABS                                            13.0                  Neurophils% (auto):   78.4   (02-25 @ 01:40):    14.90)-----------(275          Lymphocytes% (auto):  13.2                                          41.8                   Eosinphils% (auto):   0.0      Manual%: Neutrophils x    ; Lymphocytes x    ; Eosinophils x    ; Bands%: x    ; Blasts x                                    142    |  107    |  23                  Calcium: 8.8   / iCa: x      (02-25 @ 01:40)    ----------------------------<  113       Magnesium: x                                4.2     |  25     |  0.72             Phosphorous: x                  ASSESSMENT & PLAN:   71yo F w/ pmhx asthma, HTN, seizures, hepatitis was sent to ED from outpatient pulm office for wheezing, found to have stridors intubated for airway protection.     PLAN    [ NEURO ]  - pt is off sedation; AOx3      [ CARDIO ]  #HTN  - on Amlodipine and Losartan at home; holding iso soft BPs >> now w/ increasing BP  - restart home meds      [ RESPIRATORY ]  # Hypoxic Respiratory failure (unknown etiology)  - sent to [facility] ED by outpatient Pulm for stridor vs wheezing; pt decompensated while in ED at [place] intubate there, transferred to [facility] for ENT evaluation  - s/p Solumedrol, Epinephrine, IVF and Mg; originally improved after steroids but respiratory status worsened before transfer >> intubated for airway protection  ---- hx of aryepiglottic folds thickening? >> oupt pulm reported this was seen on CXR not confirmed  - if further wheezing; will treat as asthma exacerbation with Steroids and Duonebs  - extubated in the OR by ENTon 2/24; normal exam >> no aryepiglottic folds thickening      [ GASTRO/NUTRITION ]  - no active issues  - can start diet      [ ENDO]  #T2DM  - CC diet  - once on diet ISS  - monitor fingersticks q6h while NPO or on continuous feeds      [ RENAL/ ]  - no active issues    [ METABOLIC ]  - no active issues    [ INFECTIOUS ]  - no active issues  - mild leukocytosis likely 2/2 to starting steroids    [ HEME ]  - no active issues    [ SKIN ]  - no active issues          For Follow-Up:  [ ] monitor respiratory status; continue Albuterol and Steroids for asthma  [ ] f/u PT MICU Transfer Note  ---------------------------    Transfer from: MICU  Transfer to:  ( x ) Medicine [id number]    (  ) Telemetry    (  ) RCU    (  ) Palliative    (  ) Stroke Unit    (  ) _______________  Accepting Physician: Olvin    HPI:  71 y/o female with a PMHx of asthma, aryepiglottic folds thickening, [place]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 Mcneal for wheezing. Pt was seen in office and was sent to the ED to r/o vocal cord dysfunction. After contacting Dr Mcneal for further collateral, he states that he had been following her for her asthma, but she presented to the office for 2 weeks of SOB. At the time, he heard glottic wheezing with good air flow and able to speak long sentences and no labored respirations and no concern for airway obstruction. 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 He sent her into ED for steroids and further imaging.     Patient evaluated in [place] ED and found to have stridor . Patient  with interval improvement of stridor after epi, magnesium and steroids. ICU saw pt in ED, no ENT available at [facility] so patient was transferred after hypoxic respiratory failure requiring intubation.     ED team spoke with patient's sister Katherin Wesley. Pt relation as healthcare proxy if pt unable to make her own decisions.    (23 Feb 2023 20:22)      MICU COURSE  During MICU admission the patient was treated for asthma exacerbation with Albuterol and Solumedrol. ENT was consulted for evaluation who took patient to OR to extubate and evaluate. ENT determined she had normal airways and no aryepiglottic folds thickening. She was transferred back to MICU. She was on NC and was weaned off to room air. The patient reported feeling well, was hemodynamically stable and ready to be transferred to floors.      OBJECTIVE --  Vital Signs Last 24 Hrs  T(C): 36.6 (25 Feb 2023 12:00), Max: 37.1 (24 Feb 2023 16:00)  T(F): 97.9 (25 Feb 2023 12:00), Max: 98.7 (24 Feb 2023 16:00)  HR: 71 (25 Feb 2023 14:00) (51 - 92)  BP: 159/73 (25 Feb 2023 14:00) (106/51 - 173/78)  BP(mean): 96 (25 Feb 2023 14:00) (65 - 102)  RR: 21 (25 Feb 2023 14:00) (10 - 21)  SpO2: 95% (25 Feb 2023 14:00) (92% - 100%)    Parameters below as of 25 Feb 2023 12:00  Patient On (Oxygen Delivery Method): room air        I&O's Summary    24 Feb 2023 07:01  -  25 Feb 2023 07:00  --------------------------------------------------------  IN: 913.7 mL / OUT: 1190 mL / NET: -276.3 mL    25 Feb 2023 07:01  -  25 Feb 2023 14:10  --------------------------------------------------------  IN: 0 mL / OUT: 345 mL / NET: -345 mL        MEDICATIONS  (STANDING):  albuterol    0.083% 2.5 milliGRAM(s) Nebulizer every 6 hours  albuterol    90 MICROgram(s) HFA Inhaler 1 Puff(s) Inhalation every 4 hours  albuterol    90 MICROgram(s) HFA Inhaler 4 Puff(s) Inhalation every 6 hours  chlorhexidine 2% Cloths 1 Application(s) Topical <User Schedule>  heparin   Injectable 5000 Unit(s) SubCutaneous every 8 hours  methylPREDNISolone sodium succinate Injectable 40 milliGRAM(s) IV Push daily    MEDICATIONS  (PRN):        LABS                                            13.0                  Neurophils% (auto):   78.4   (02-25 @ 01:40):    14.90)-----------(275          Lymphocytes% (auto):  13.2                                          41.8                   Eosinphils% (auto):   0.0      Manual%: Neutrophils x    ; Lymphocytes x    ; Eosinophils x    ; Bands%: x    ; Blasts x                                    142    |  107    |  23                  Calcium: 8.8   / iCa: x      (02-25 @ 01:40)    ----------------------------<  113       Magnesium: x                                4.2     |  25     |  0.72             Phosphorous: x                  ASSESSMENT & PLAN:   71yo F w/ pmhx asthma, HTN, seizures, hepatitis was sent to ED from outpatient pulm office for wheezing, found to have stridors intubated for airway protection.     PLAN    [ NEURO ]  - pt is off sedation; AOx3      [ CARDIO ]  #HTN  - on Amlodipine and Losartan at home; holding iso soft BPs >> now w/ increasing BP  - restart home meds      [ RESPIRATORY ]  # Hypoxic Respiratory failure (unknown etiology)  - sent to [facility] ED by outpatient Pulm for stridor vs wheezing; pt decompensated while in ED at [facility] intubate there, transferred to [facility] for ENT evaluation  - s/p Solumedrol, Epinephrine, IVF and Mg; originally improved after steroids but respiratory status worsened before transfer >> intubated for airway protection  ---- hx of aryepiglottic folds thickening? >> oupt pulm reported this was seen on CXR not confirmed  - if further wheezing; will treat as asthma exacerbation with Steroids and Duonebs  - extubated in the OR by ENTon 2/24; normal exam >> no aryepiglottic folds thickening      [ GASTRO/NUTRITION ]  - no active issues  - can start diet      [ ENDO]  #T2DM  - CC diet  - once on diet ISS  - monitor fingersticks q6h while NPO or on continuous feeds      [ RENAL/ ]  - no active issues    [ METABOLIC ]  - no active issues    [ INFECTIOUS ]  - no active issues  - mild leukocytosis likely 2/2 to starting steroids    [ HEME ]  - no active issues    [ SKIN ]  - no active issues          For Follow-Up:  [ ] monitor respiratory status; continue Albuterol and Steroids for asthma  [ ] f/u PT MICU Transfer Note  ---------------------------    Transfer from: MICU  Transfer to:  ( x ) Medicine 806B    (  ) Telemetry    (  ) RCU    (  ) Palliative    (  ) Stroke Unit    (  ) _______________  Accepting Physician: Olvin    HPI:  73 y/o female with a PMHx of asthma, aryepiglottic folds thickening, [place]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 Mcneal for wheezing. Pt was seen in office and was sent to the ED to r/o vocal cord dysfunction. After contacting Dr Mcneal for further collateral, he states that he had been following her for her asthma, but she presented to the office for 2 weeks of SOB. At the time, he heard glottic wheezing with good air flow and able to speak long sentences and no labored respirations and no concern for airway obstruction. 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 He sent her into ED for steroids and further imaging.     Patient evaluated in [place] ED and found to have stridor . Patient  with interval improvement of stridor after epi, magnesium and steroids. ICU saw pt in ED, no ENT available at [facility] so patient was transferred after hypoxic respiratory failure requiring intubation.     ED team spoke with patient's sister Katherin Wesley. Pt relation as healthcare proxy if pt unable to make her own decisions.    (23 Feb 2023 20:22)      MICU COURSE  During MICU admission the patient was treated for asthma exacerbation with Albuterol and Solumedrol. ENT was consulted for evaluation who took patient to OR to extubate and evaluate. ENT determined she had normal airways and no aryepiglottic folds thickening. She was transferred back to MICU. She was on NC and was weaned off to room air. The patient reported feeling well, was hemodynamically stable and ready to be transferred to floors.      OBJECTIVE --  Vital Signs Last 24 Hrs  T(C): 36.6 (25 Feb 2023 12:00), Max: 37.1 (24 Feb 2023 16:00)  T(F): 97.9 (25 Feb 2023 12:00), Max: 98.7 (24 Feb 2023 16:00)  HR: 71 (25 Feb 2023 14:00) (51 - 92)  BP: 159/73 (25 Feb 2023 14:00) (106/51 - 173/78)  BP(mean): 96 (25 Feb 2023 14:00) (65 - 102)  RR: 21 (25 Feb 2023 14:00) (10 - 21)  SpO2: 95% (25 Feb 2023 14:00) (92% - 100%)    Parameters below as of 25 Feb 2023 12:00  Patient On (Oxygen Delivery Method): room air        I&O's Summary    24 Feb 2023 07:01  -  25 Feb 2023 07:00  --------------------------------------------------------  IN: 913.7 mL / OUT: 1190 mL / NET: -276.3 mL    25 Feb 2023 07:01  -  25 Feb 2023 14:10  --------------------------------------------------------  IN: 0 mL / OUT: 345 mL / NET: -345 mL        MEDICATIONS  (STANDING):  albuterol    0.083% 2.5 milliGRAM(s) Nebulizer every 6 hours  albuterol    90 MICROgram(s) HFA Inhaler 1 Puff(s) Inhalation every 4 hours  albuterol    90 MICROgram(s) HFA Inhaler 4 Puff(s) Inhalation every 6 hours  chlorhexidine 2% Cloths 1 Application(s) Topical <User Schedule>  heparin   Injectable 5000 Unit(s) SubCutaneous every 8 hours  methylPREDNISolone sodium succinate Injectable 40 milliGRAM(s) IV Push daily    MEDICATIONS  (PRN):        LABS                                            13.0                  Neurophils% (auto):   78.4   (02-25 @ 01:40):    14.90)-----------(275          Lymphocytes% (auto):  13.2                                          41.8                   Eosinphils% (auto):   0.0      Manual%: Neutrophils x    ; Lymphocytes x    ; Eosinophils x    ; Bands%: x    ; Blasts x                                    142    |  107    |  23                  Calcium: 8.8   / iCa: x      (02-25 @ 01:40)    ----------------------------<  113       Magnesium: x                                4.2     |  25     |  0.72             Phosphorous: x                  ASSESSMENT & PLAN:   73yo F w/ pmhx asthma, HTN, seizures, hepatitis was sent to ED from outpatient pulm office for wheezing, found to have stridors intubated for airway protection.     PLAN    [ NEURO ]  - pt is off sedation; AOx3      [ CARDIO ]  #HTN  - on Amlodipine and Losartan at home; holding iso soft BPs >> now w/ increasing BP  - restart home meds      [ RESPIRATORY ]  # Hypoxic Respiratory failure (unknown etiology)  - sent to [facility] ED by outpatient Pulm for stridor vs wheezing; pt decompensated while in ED at [facility] intubate there, transferred to [facility] for ENT evaluation  - s/p Solumedrol, Epinephrine, IVF and Mg; originally improved after steroids but respiratory status worsened before transfer >> intubated for airway protection  ---- hx of aryepiglottic folds thickening? >> oupt pulm reported this was seen on CXR not confirmed  - if further wheezing; will treat as asthma exacerbation with Steroids and Duonebs  - extubated in the OR by ENTon 2/24; normal exam >> no aryepiglottic folds thickening      [ GASTRO/NUTRITION ]  - no active issues  - can start diet      [ ENDO]  #T2DM  - CC diet  - once on diet ISS  - monitor fingersticks q6h while NPO or on continuous feeds      [ RENAL/ ]  - no active issues    [ METABOLIC ]  - no active issues    [ INFECTIOUS ]  - no active issues  - mild leukocytosis likely 2/2 to starting steroids    [ HEME ]  - no active issues    [ SKIN ]  - no active issues          For Follow-Up:  [ ] monitor respiratory status; continue Albuterol and Steroids for asthma  [ ] f/u PT MICU Transfer Note  ---------------------------    Transfer from: MICU  Transfer to:  ( x ) Medicine 806B    (  ) Telemetry    (  ) RCU    (  ) Palliative    (  ) Stroke Unit    (  ) _______________  Accepting Physician: Olvin    HPI:  73 y/o female with a PMHx of asthma, aryepiglottic folds thickening, [place]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 Mcneal for wheezing. Pt was seen in office and was sent to the ED to r/o vocal cord dysfunction. After contacting Dr Mcneal for further collateral, he states that he had been following her for her asthma, but she presented to the office for 2 weeks of SOB. At the time, he heard glottic wheezing with good air flow and able to speak long sentences and no labored respirations and no concern for airway obstruction. 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 He sent her into ED for steroids and further imaging.     Patient evaluated in [place] ED and found to have stridor . Patient  with interval improvement of stridor after epi, magnesium and steroids. ICU saw pt in ED, no ENT available at [facility] so patient was transferred after hypoxic respiratory failure requiring intubation.     ED team spoke with patient's sister Katherin Wesley. Pt relation as healthcare proxy if pt unable to make her own decisions.    (23 Feb 2023 20:22)      MICU COURSE  During MICU admission the patient was treated for asthma exacerbation with Albuterol and Solumedrol. ENT was consulted for evaluation who took patient to OR to extubate and evaluate. ENT determined she had normal airways and no aryepiglottic folds thickening. She was transferred back to MICU. She was on NC and was weaned off to room air. The patient reported feeling well, was hemodynamically stable and ready to be transferred to floors.      OBJECTIVE --  Vital Signs Last 24 Hrs  T(C): 36.6 (25 Feb 2023 12:00), Max: 37.1 (24 Feb 2023 16:00)  T(F): 97.9 (25 Feb 2023 12:00), Max: 98.7 (24 Feb 2023 16:00)  HR: 71 (25 Feb 2023 14:00) (51 - 92)  BP: 159/73 (25 Feb 2023 14:00) (106/51 - 173/78)  BP(mean): 96 (25 Feb 2023 14:00) (65 - 102)  RR: 21 (25 Feb 2023 14:00) (10 - 21)  SpO2: 95% (25 Feb 2023 14:00) (92% - 100%)    Parameters below as of 25 Feb 2023 12:00  Patient On (Oxygen Delivery Method): room air        I&O's Summary    24 Feb 2023 07:01  -  25 Feb 2023 07:00  --------------------------------------------------------  IN: 913.7 mL / OUT: 1190 mL / NET: -276.3 mL    25 Feb 2023 07:01  -  25 Feb 2023 14:10  --------------------------------------------------------  IN: 0 mL / OUT: 345 mL / NET: -345 mL        MEDICATIONS  (STANDING):  albuterol    0.083% 2.5 milliGRAM(s) Nebulizer every 6 hours  albuterol    90 MICROgram(s) HFA Inhaler 1 Puff(s) Inhalation every 4 hours  albuterol    90 MICROgram(s) HFA Inhaler 4 Puff(s) Inhalation every 6 hours  chlorhexidine 2% Cloths 1 Application(s) Topical <User Schedule>  heparin   Injectable 5000 Unit(s) SubCutaneous every 8 hours  methylPREDNISolone sodium succinate Injectable 40 milliGRAM(s) IV Push daily    MEDICATIONS  (PRN):        LABS                                            13.0                  Neurophils% (auto):   78.4   (02-25 @ 01:40):    14.90)-----------(275          Lymphocytes% (auto):  13.2                                          41.8                   Eosinphils% (auto):   0.0      Manual%: Neutrophils x    ; Lymphocytes x    ; Eosinophils x    ; Bands%: x    ; Blasts x                                    142    |  107    |  23                  Calcium: 8.8   / iCa: x      (02-25 @ 01:40)    ----------------------------<  113       Magnesium: x                                4.2     |  25     |  0.72             Phosphorous: x                  ASSESSMENT & PLAN:   71yo F w/ pmhx asthma, HTN, seizures, hepatitis was sent to ED from outpatient pulm office for wheezing, found to have stridors intubated for airway protection.     PLAN    [ NEURO ]  - pt is off sedation; AOx3      [ CARDIO ]  #HTN  - on Amlodipine and Losartan at home; holding iso soft BPs >> now w/ increasing BP  - restart home meds      [ RESPIRATORY ]  # Hypoxic Respiratory failure (unknown etiology)  - sent to [facility] ED by outpatient Pulm for stridor vs wheezing; pt decompensated while in ED at [place] intubate there, transferred to [facility] for ENT evaluation  - s/p Solumedrol, Epinephrine, IVF and Mg; originally improved after steroids but respiratory status worsened before transfer >> intubated for airway protection  ---- hx of aryepiglottic folds thickening? >> oupt pulm reported this was seen on CXR not confirmed  - if further wheezing; will treat as asthma exacerbation with Steroids and Duonebs  - extubated in the OR by ENTon 2/24; normal exam >> no aryepiglottic folds thickening      [ GASTRO/NUTRITION ]  - no active issues  - can start diet      [ ENDO]  #T2DM  - CC diet  - once on diet ISS  - monitor fingersticks q6h while NPO or on continuous feeds      [ RENAL/ ]  - no active issues    [ METABOLIC ]  - no active issues    [ INFECTIOUS ]  - no active issues  - mild leukocytosis likely 2/2 to starting steroids    [ HEME ]  - no active issues    [ SKIN ]  - no active issues          For Follow-Up:  [ ] monitor respiratory status; continue Albuterol and Steroids for asthma  [ ] f/u PT

## 2023-02-25 NOTE — PROGRESS NOTE ADULT - ATTENDING COMMENTS
This is a 73 y/o F with PMHx of asthma, HTN, seizure disorder who presented from OSH for concern for hypoxemic respiratory failure 2/2 asthma exacerbation. Extubated in OR on 2/24 and scoped by ENT to evaluate for alternative causes of respiratory distress and aberrant upper airway anatomy. Airway appeared unremarkable on ENT evaluation and she was sent back to MICU extubated for airway monitoring.    1) Acute hypoxemic respiratory failure 2/2 asthma exacerbation - Oxygenation improved. Still has mild-moderate b/l expiratory wheezing on lung exam today. Continue steroids and bronchodilators. Check RVP.  2) HTN - BP well controlled.  3) DM - Monitor FSG. Continue ISS.  4) DVT ppx - heparin subQ    Full Code    Ready for transfer to floor. This is a 71 y/o F with PMHx of asthma, HTN, seizure disorder who presented from OSH for concern for hypoxemic respiratory failure 2/2 asthma exacerbation. Extubated in OR on 2/24 and scoped by ENT to evaluate for alternative causes of respiratory distress and aberrant upper airway anatomy. Airway appeared unremarkable on ENT evaluation and she was sent back to MICU extubated for airway monitoring.    1) Acute hypoxemic respiratory failure 2/2 asthma exacerbation - Oxygenation improved. Still has mild-moderate b/l expiratory wheezing on lung exam today. Continue steroids and bronchodilators. Check RVP.  2) HTN - BP well controlled.  3) DM - Monitor FSG. Continue ISS.  4) DVT ppx - heparin subQ    Full Code    Ready for transfer to floor.

## 2023-02-25 NOTE — PROGRESS NOTE ADULT - ASSESSMENT
73yo F w/ pmhx asthma, HTN, seizures, hepatitis was sent to ED from outpatient pulm office for wheezing, found to have stridors intubated for airway protection.     PLAN    [ NEURO ]  - pt is off sedation; AOx3      [ CARDIO ]  #HTN  - on Amlodipine and Losartan at home; holding iso soft BPs      [ RESPIRATORY ]  # Hypoxic Respiratory failure (unknown etiology)  - sent by outpatient Pulm for stridor vs wheezing; accepted by ENT to evaluate for upper airway obstruction; pt decompensated while in ED at [place] intubate there   - hx of aryepiglottic folds thickening  - in [facility] ED found to have wheezes and stridor  - s/p Solumedrol, Epinephrine, IVF and Mg; originally improved after steroids but respiratory status worsened before transfer >> intubated for airway protection  - ENT to evaluate patient; follow on rec  - CXR, ABG  - if further wheezing; will treat as asthma exacerbation with Steroids and Duonebs  - extubated in the OR by ENTon 2/24; normal exam      [ GASTRO/NUTRITION ]  - no active issues  - can start diet      [ ENDO]  #T2DM  - CC diet  - once on diet ISS  - monitor fingersticks q6h while NPO or on continuous feeds      [ RENAL/ ]  - no active issues    [ METABOLIC ]  - no active issues    [ INFECTIOUS ]  - no active issues    [ HEME ]  - no active issues    [ SKIN ]  - no active issues   73yo F w/ pmhx asthma, HTN, seizures, hepatitis was sent to ED from outpatient pulm office for wheezing, found to have stridors intubated for airway protection.     PLAN    [ NEURO ]  - pt is off sedation; AOx3      [ CARDIO ]  #HTN  - on Amlodipine and Losartan at home; holding iso soft BPs      [ RESPIRATORY ]  # Hypoxic Respiratory failure (unknown etiology)  - sent by outpatient Pulm for stridor vs wheezing; accepted by ENT to evaluate for upper airway obstruction; pt decompensated while in ED at [place] intubate there   - hx of aryepiglottic folds thickening  - in [place] [facility] ED found to have wheezes and stridor  - s/p Solumedrol, Epinephrine, IVF and Mg; originally improved after steroids but respiratory status worsened before transfer >> intubated for airway protection  - ENT to evaluate patient; follow on rec  - CXR, ABG  - if further wheezing; will treat as asthma exacerbation with Steroids and Duonebs  - extubated in the OR by ENTon 2/24; normal exam      [ GASTRO/NUTRITION ]  - no active issues  - can start diet      [ ENDO]  #T2DM  - CC diet  - once on diet ISS  - monitor fingersticks q6h while NPO or on continuous feeds      [ RENAL/ ]  - no active issues    [ METABOLIC ]  - no active issues    [ INFECTIOUS ]  - no active issues    [ HEME ]  - no active issues    [ SKIN ]  - no active issues   73yo F w/ pmhx asthma, HTN, seizures, hepatitis was sent to ED from outpatient pulm office for wheezing, found to have stridors intubated for airway protection.     PLAN    [ NEURO ]  - pt is off sedation; AOx3      [ CARDIO ]  #HTN  - on Amlodipine and Losartan at home; holding iso soft BPs      [ RESPIRATORY ]  # Hypoxic Respiratory failure (unknown etiology)  - sent by outpatient Pulm for stridor vs wheezing; accepted by ENT to evaluate for upper airway obstruction; pt decompensated while in ED at [place] intubate there   - hx of aryepiglottic folds thickening  - in [facility] ED found to have wheezes and stridor  - s/p Solumedrol, Epinephrine, IVF and Mg; originally improved after steroids but respiratory status worsened before transfer >> intubated for airway protection  - ENT to evaluate patient; follow on rec  - CXR, ABG  - if further wheezing; will treat as asthma exacerbation with Steroids and Duonebs  - extubated in the OR by ENTon 2/24; normal exam      [ GASTRO/NUTRITION ]  - no active issues  - can start diet      [ ENDO]  #T2DM  - CC diet  - once on diet ISS  - monitor fingersticks q6h while NPO or on continuous feeds      [ RENAL/ ]  - no active issues    [ METABOLIC ]  - no active issues    [ INFECTIOUS ]  - no active issues    [ HEME ]  - no active issues    [ SKIN ]  - no active issues   71yo F w/ pmhx asthma, HTN, seizures, hepatitis was sent to ED from outpatient pulm office for wheezing, found to have stridors intubated for airway protection.     PLAN    [ NEURO ]  - pt is off sedation; AOx3      [ CARDIO ]  #HTN  - on Amlodipine and Losartan at home; holding iso soft BPs >> now w/ increasing BP  - restart home meds      [ RESPIRATORY ]  # Hypoxic Respiratory failure (unknown etiology)  - sent to [facility] ED by outpatient Pulm for stridor vs wheezing; pt decompensated while in ED at [place] intubate there, transferred to [facility] for ENT evaluation  - s/p Solumedrol, Epinephrine, IVF and Mg; originally improved after steroids but respiratory status worsened before transfer >> intubated for airway protection  ---- hx of aryepiglottic folds thickening? >> oupt pulm reported this was seen on CXR not confirmed  - if further wheezing; will treat as asthma exacerbation with Steroids and Duonebs  - extubated in the OR by ENTon 2/24; normal exam >> no aryepiglottic folds thickening      [ GASTRO/NUTRITION ]  - no active issues  - can start diet      [ ENDO]  #T2DM  - CC diet  - once on diet ISS  - monitor fingersticks q6h while NPO or on continuous feeds      [ RENAL/ ]  - no active issues    [ METABOLIC ]  - no active issues    [ INFECTIOUS ]  - no active issues    [ HEME ]  - no active issues    [ SKIN ]  - no active issues   73yo F w/ pmhx asthma, HTN, seizures, hepatitis was sent to ED from outpatient pulm office for wheezing, found to have stridors intubated for airway protection.     PLAN    [ NEURO ]  - pt is off sedation; AOx3      [ CARDIO ]  #HTN  - on Amlodipine and Losartan at home; holding iso soft BPs >> now w/ increasing BP  - restart home meds      [ RESPIRATORY ]  # Hypoxic Respiratory failure (unknown etiology)  - sent to [facility] ED by outpatient Pulm for stridor vs wheezing; pt decompensated while in ED at [place] intubate there, transferred to [facility] for ENT evaluation  - s/p Solumedrol, Epinephrine, IVF and Mg; originally improved after steroids but respiratory status worsened before transfer >> intubated for airway protection  ---- hx of aryepiglottic folds thickening? >> oupt pulm reported this was seen on CXR not confirmed  - if further wheezing; will treat as asthma exacerbation with Steroids and Duonebs  - extubated in the OR by ENTon 2/24; normal exam >> no aryepiglottic folds thickening      [ GASTRO/NUTRITION ]  - no active issues  - can start diet      [ ENDO]  #T2DM  - CC diet  - once on diet ISS  - monitor fingersticks q6h while NPO or on continuous feeds      [ RENAL/ ]  - no active issues    [ METABOLIC ]  - no active issues    [ INFECTIOUS ]  - no active issues    [ HEME ]  - no active issues    [ SKIN ]  - no active issues   73yo F w/ pmhx asthma, HTN, seizures, hepatitis was sent to ED from outpatient pulm office for wheezing, found to have stridors intubated for airway protection.     PLAN    [ NEURO ]  - pt is off sedation; AOx3      [ CARDIO ]  #HTN  - on Amlodipine and Losartan at home; holding iso soft BPs >> now w/ increasing BP  - restart home meds      [ RESPIRATORY ]  # Hypoxic Respiratory failure (unknown etiology)  - sent to [facility] ED by outpatient Pulm for stridor vs wheezing; pt decompensated while in ED at [place] intubate there, transferred to [facility] for ENT evaluation  - s/p Solumedrol, Epinephrine, IVF and Mg; originally improved after steroids but respiratory status worsened before transfer >> intubated for airway protection  ---- hx of aryepiglottic folds thickening? >> oupt pulm reported this was seen on CXR not confirmed  - if further wheezing; will treat as asthma exacerbation with Steroids and Duonebs  - extubated in the OR by ENTon 2/24; normal exam >> no aryepiglottic folds thickening      [ GASTRO/NUTRITION ]  - no active issues  - can start diet      [ ENDO]  #T2DM  - CC diet  - once on diet ISS  - monitor fingersticks q6h while NPO or on continuous feeds      [ RENAL/ ]  - no active issues    [ METABOLIC ]  - no active issues    [ INFECTIOUS ]  - no active issues    [ HEME ]  - no active issues    [ SKIN ]  - no active issues

## 2023-02-25 NOTE — PROGRESS NOTE ADULT - SUBJECTIVE AND OBJECTIVE BOX
INTERVAL HPI/OVERNIGHT EVENTS:    SUBJECTIVE: Patient seen and examined at bedside.       VITAL SIGNS:  ICU Vital Signs Last 24 Hrs  T(C): 36.7 (2023 04:00), Max: 37.1 (2023 16:00)  T(F): 98 (2023 04:00), Max: 98.7 (2023 16:00)  HR: 73 (2023 07:19) (44 - 92)  BP: 114/97 (2023 07:00) (79/41 - 173/78)  BP(mean): 101 (2023 07:00) (50 - 102)  ABP: --  ABP(mean): --  RR: 13 (2023 07:00) (11 - 20)  SpO2: 97% (2023 07:19) (96% - 100%)    O2 Parameters below as of 2023 07:00  Patient On (Oxygen Delivery Method): nasal cannula w/ humidification  O2 Flow (L/min): 3        Mode: standby  Plateau pressure:   P/F ratio:     -24 @ 07:01  -  02-25 @ 07:00  --------------------------------------------------------  IN: 913.7 mL / OUT: 1190 mL / NET: -276.3 mL      CAPILLARY BLOOD GLUCOSE      POCT Blood Glucose.: 107 mg/dL (2023 03:48)    ECG:    PHYSICAL EXAM:    General:   HEENT:   Neck:   Respiratory:   Cardiovascular:   Abdomen:   Extremities:  Neurological:    MEDICATIONS:  MEDICATIONS  (STANDING):  albuterol    0.083% 2.5 milliGRAM(s) Nebulizer every 6 hours  albuterol    90 MICROgram(s) HFA Inhaler 1 Puff(s) Inhalation every 4 hours  albuterol    90 MICROgram(s) HFA Inhaler 4 Puff(s) Inhalation every 6 hours  chlorhexidine 2% Cloths 1 Application(s) Topical <User Schedule>  heparin   Injectable 5000 Unit(s) SubCutaneous every 8 hours  methylPREDNISolone sodium succinate Injectable 40 milliGRAM(s) IV Push daily    MEDICATIONS  (PRN):      ALLERGIES:  Allergies    No Known Allergies    Intolerances        LABS:                        13.0   14.90 )-----------( 275      ( 2023 01:40 )             41.8         142  |  107  |  23  ----------------------------<  113<H>  4.2   |  25  |  0.72    Ca    8.8      2023 01:40  Phos  2.6       Mg     2.60         TPro  7.0  /  Alb  3.8  /  TBili  <0.2  /  DBili  x   /  AST  14  /  ALT  12  /  AlkPhos  79      PT/INR - ( 2023 20:30 )   PT: 13.4 sec;   INR: 1.15 ratio         PTT - ( 2023 20:30 )  PTT:29.6 sec  Urinalysis Basic - ( 2023 19:56 )    Color: Light Yellow / Appearance: Clear / S.024 / pH: x  Gluc: x / Ketone: Negative  / Bili: Negative / Urobili: <2 mg/dL   Blood: x / Protein: Trace / Nitrite: Negative   Leuk Esterase: Negative / RBC: x / WBC x   Sq Epi: x / Non Sq Epi: x / Bacteria: x        RADIOLOGY & ADDITIONAL TESTS: Reviewed.   INTERVAL HPI/OVERNIGHT EVENTS: No overnight events.     SUBJECTIVE: Patient seen and examined at bedside. Pt reported feeling well; only complain was throat soreness.      VITAL SIGNS:  ICU Vital Signs Last 24 Hrs  T(C): 36.7 (2023 04:00), Max: 37.1 (2023 16:00)  T(F): 98 (2023 04:00), Max: 98.7 (2023 16:00)  HR: 73 (2023 07:19) (44 - 92)  BP: 114/97 (2023 07:00) (79/41 - 173/78)  BP(mean): 101 (2023 07:00) (50 - 102)  ABP: --  ABP(mean): --  RR: 13 (2023 07:00) (11 - 20)  SpO2: 97% (2023 07:19) (96% - 100%)    O2 Parameters below as of 2023 07:00  Patient On (Oxygen Delivery Method): nasal cannula w/ humidification  O2 Flow (L/min): 3        Mode: standby  Plateau pressure:   P/F ratio:     -24 @ 07:01  -  02- @ 07:00  --------------------------------------------------------  IN: 913.7 mL / OUT: 1190 mL / NET: -276.3 mL      CAPILLARY BLOOD GLUCOSE      POCT Blood Glucose.: 107 mg/dL (2023 03:48)    ECG:    PHYSICAL EXAM:  PHYSICAL EXAM    Vital Signs Last 24 Hrs  T(C): 36.6 (2023 12:00), Max: 37.1 (2023 16:00)  T(F): 97.9 (2023 12:00), Max: 98.7 (2023 16:00)  HR: 71 (2023 14:00) (51 - 92)  BP: 159/73 (2023 14:00) (106/55 - 173/78)  BP(mean): 96 (2023 14:00) (66 - 102)  RR: 21 (2023 14:00) (10 - 21)  SpO2: 95% (2023 14:00) (92% - 100%)    Parameters below as of 2023 12:00  Patient On (Oxygen Delivery Method): room air          GENERAL: NAD, lying comfortably in bed   HEAD:  Atraumatic, Normocephalic  EYES: EOMI b/l, PERRLA b/l, conjunctiva and sclera clear  NECK: Supple, No JVD, No LAD   CHEST/LUNG: Clear to auscultation bilaterally; No wheeze or rhonchi  HEART: Regular rate and rhythm; S1 and S2 present, No murmurs, rubs, or gallops  ABDOMEN: Soft, Nontender, Nondistended; Bowel sounds present  EXTREMITIES:  2+ Peripheral Pulses, No clubbing, cyanosis, or edema  NEURO: AAOx3, non-focal   SKIN: No rashes or lesions        MEDICATIONS:  MEDICATIONS  (STANDING):  albuterol    0.083% 2.5 milliGRAM(s) Nebulizer every 6 hours  albuterol    90 MICROgram(s) HFA Inhaler 1 Puff(s) Inhalation every 4 hours  albuterol    90 MICROgram(s) HFA Inhaler 4 Puff(s) Inhalation every 6 hours  chlorhexidine 2% Cloths 1 Application(s) Topical <User Schedule>  heparin   Injectable 5000 Unit(s) SubCutaneous every 8 hours  methylPREDNISolone sodium succinate Injectable 40 milliGRAM(s) IV Push daily    MEDICATIONS  (PRN):      ALLERGIES:  Allergies    No Known Allergies    Intolerances        LABS:                        13.0   14.90 )-----------( 275      ( 2023 01:40 )             41.8     02-25    142  |  107  |  23  ----------------------------<  113<H>  4.2   |  25  |  0.72    Ca    8.8      2023 01:40  Phos  2.6     02-24  Mg     2.60     02-24    TPro  7.0  /  Alb  3.8  /  TBili  <0.2  /  DBili  x   /  AST  14  /  ALT  12  /  AlkPhos  79  02-24    PT/INR - ( 2023 20:30 )   PT: 13.4 sec;   INR: 1.15 ratio         PTT - ( 2023 20:30 )  PTT:29.6 sec  Urinalysis Basic - ( 2023 19:56 )    Color: Light Yellow / Appearance: Clear / S.024 / pH: x  Gluc: x / Ketone: Negative  / Bili: Negative / Urobili: <2 mg/dL   Blood: x / Protein: Trace / Nitrite: Negative   Leuk Esterase: Negative / RBC: x / WBC x   Sq Epi: x / Non Sq Epi: x / Bacteria: x        RADIOLOGY & ADDITIONAL TESTS: Reviewed.

## 2023-02-26 DIAGNOSIS — J45.901 UNSPECIFIED ASTHMA WITH (ACUTE) EXACERBATION: ICD-10-CM

## 2023-02-26 DIAGNOSIS — I10 ESSENTIAL (PRIMARY) HYPERTENSION: ICD-10-CM

## 2023-02-26 DIAGNOSIS — B19.20 UNSPECIFIED VIRAL HEPATITIS C WITHOUT HEPATIC COMA: ICD-10-CM

## 2023-02-26 DIAGNOSIS — Z29.9 ENCOUNTER FOR PROPHYLACTIC MEASURES, UNSPECIFIED: ICD-10-CM

## 2023-02-26 LAB
ANION GAP SERPL CALC-SCNC: 9 MMOL/L — SIGNIFICANT CHANGE UP (ref 7–14)
APPEARANCE UR: CLEAR — SIGNIFICANT CHANGE UP
BASOPHILS # BLD AUTO: 0.02 K/UL — SIGNIFICANT CHANGE UP (ref 0–0.2)
BASOPHILS NFR BLD AUTO: 0.2 % — SIGNIFICANT CHANGE UP (ref 0–2)
BILIRUB UR-MCNC: NEGATIVE — SIGNIFICANT CHANGE UP
BUN SERPL-MCNC: 21 MG/DL — SIGNIFICANT CHANGE UP (ref 7–23)
CALCIUM SERPL-MCNC: 8.9 MG/DL — SIGNIFICANT CHANGE UP (ref 8.4–10.5)
CHLORIDE SERPL-SCNC: 107 MMOL/L — SIGNIFICANT CHANGE UP (ref 98–107)
CO2 SERPL-SCNC: 25 MMOL/L — SIGNIFICANT CHANGE UP (ref 22–31)
COLOR SPEC: YELLOW — SIGNIFICANT CHANGE UP
CREAT SERPL-MCNC: 0.75 MG/DL — SIGNIFICANT CHANGE UP (ref 0.5–1.3)
DIFF PNL FLD: NEGATIVE — SIGNIFICANT CHANGE UP
EGFR: 86 ML/MIN/1.73M2 — SIGNIFICANT CHANGE UP
EOSINOPHIL # BLD AUTO: 0.06 K/UL — SIGNIFICANT CHANGE UP (ref 0–0.5)
EOSINOPHIL NFR BLD AUTO: 0.6 % — SIGNIFICANT CHANGE UP (ref 0–6)
GLUCOSE SERPL-MCNC: 85 MG/DL — SIGNIFICANT CHANGE UP (ref 70–99)
GLUCOSE UR QL: NEGATIVE — SIGNIFICANT CHANGE UP
HCT VFR BLD CALC: 42 % — SIGNIFICANT CHANGE UP (ref 34.5–45)
HGB BLD-MCNC: 12.8 G/DL — SIGNIFICANT CHANGE UP (ref 11.5–15.5)
IANC: 4.84 K/UL — SIGNIFICANT CHANGE UP (ref 1.8–7.4)
IMM GRANULOCYTES NFR BLD AUTO: 0.6 % — SIGNIFICANT CHANGE UP (ref 0–0.9)
KETONES UR-MCNC: NEGATIVE — SIGNIFICANT CHANGE UP
LEUKOCYTE ESTERASE UR-ACNC: NEGATIVE — SIGNIFICANT CHANGE UP
LYMPHOCYTES # BLD AUTO: 46.3 % — HIGH (ref 13–44)
LYMPHOCYTES # BLD AUTO: 5.04 K/UL — HIGH (ref 1–3.3)
MAGNESIUM SERPL-MCNC: 2.2 MG/DL — SIGNIFICANT CHANGE UP (ref 1.6–2.6)
MCHC RBC-ENTMCNC: 26.3 PG — LOW (ref 27–34)
MCHC RBC-ENTMCNC: 30.5 GM/DL — LOW (ref 32–36)
MCV RBC AUTO: 86.2 FL — SIGNIFICANT CHANGE UP (ref 80–100)
MONOCYTES # BLD AUTO: 0.85 K/UL — SIGNIFICANT CHANGE UP (ref 0–0.9)
MONOCYTES NFR BLD AUTO: 7.8 % — SIGNIFICANT CHANGE UP (ref 2–14)
MRSA PCR RESULT.: SIGNIFICANT CHANGE UP
NEUTROPHILS # BLD AUTO: 4.84 K/UL — SIGNIFICANT CHANGE UP (ref 1.8–7.4)
NEUTROPHILS NFR BLD AUTO: 44.5 % — SIGNIFICANT CHANGE UP (ref 43–77)
NITRITE UR-MCNC: NEGATIVE — SIGNIFICANT CHANGE UP
NRBC # BLD: 0 /100 WBCS — SIGNIFICANT CHANGE UP (ref 0–0)
NRBC # FLD: 0 K/UL — SIGNIFICANT CHANGE UP (ref 0–0)
PH UR: 6 — SIGNIFICANT CHANGE UP (ref 5–8)
PHOSPHATE SERPL-MCNC: 3.6 MG/DL — SIGNIFICANT CHANGE UP (ref 2.5–4.5)
PLATELET # BLD AUTO: 222 K/UL — SIGNIFICANT CHANGE UP (ref 150–400)
POTASSIUM SERPL-MCNC: 3.8 MMOL/L — SIGNIFICANT CHANGE UP (ref 3.5–5.3)
POTASSIUM SERPL-SCNC: 3.8 MMOL/L — SIGNIFICANT CHANGE UP (ref 3.5–5.3)
PROT UR-MCNC: ABNORMAL
RBC # BLD: 4.87 M/UL — SIGNIFICANT CHANGE UP (ref 3.8–5.2)
RBC # FLD: 13.1 % — SIGNIFICANT CHANGE UP (ref 10.3–14.5)
S AUREUS DNA NOSE QL NAA+PROBE: SIGNIFICANT CHANGE UP
SODIUM SERPL-SCNC: 141 MMOL/L — SIGNIFICANT CHANGE UP (ref 135–145)
SP GR SPEC: 1.03 — SIGNIFICANT CHANGE UP (ref 1.01–1.05)
UROBILINOGEN FLD QL: ABNORMAL
WBC # BLD: 10.88 K/UL — HIGH (ref 3.8–10.5)
WBC # FLD AUTO: 10.88 K/UL — HIGH (ref 3.8–10.5)

## 2023-02-26 PROCEDURE — 99233 SBSQ HOSP IP/OBS HIGH 50: CPT

## 2023-02-26 RX ORDER — ACETAMINOPHEN 500 MG
650 TABLET ORAL EVERY 6 HOURS
Refills: 0 | Status: DISCONTINUED | OUTPATIENT
Start: 2023-02-26 | End: 2023-03-03

## 2023-02-26 RX ORDER — CHLORHEXIDINE GLUCONATE 213 G/1000ML
1 SOLUTION TOPICAL DAILY
Refills: 0 | Status: DISCONTINUED | OUTPATIENT
Start: 2023-02-26 | End: 2023-03-03

## 2023-02-26 RX ADMIN — HEPARIN SODIUM 5000 UNIT(S): 5000 INJECTION INTRAVENOUS; SUBCUTANEOUS at 13:53

## 2023-02-26 RX ADMIN — Medication 650 MILLIGRAM(S): at 10:35

## 2023-02-26 RX ADMIN — HEPARIN SODIUM 5000 UNIT(S): 5000 INJECTION INTRAVENOUS; SUBCUTANEOUS at 06:04

## 2023-02-26 RX ADMIN — Medication 650 MILLIGRAM(S): at 16:48

## 2023-02-26 RX ADMIN — Medication 650 MILLIGRAM(S): at 17:37

## 2023-02-26 RX ADMIN — ALBUTEROL 2.5 MILLIGRAM(S): 90 AEROSOL, METERED ORAL at 03:49

## 2023-02-26 RX ADMIN — ALBUTEROL 2.5 MILLIGRAM(S): 90 AEROSOL, METERED ORAL at 09:03

## 2023-02-26 RX ADMIN — Medication 40 MILLIGRAM(S): at 06:05

## 2023-02-26 RX ADMIN — Medication 650 MILLIGRAM(S): at 11:35

## 2023-02-26 RX ADMIN — CHLORHEXIDINE GLUCONATE 1 APPLICATION(S): 213 SOLUTION TOPICAL at 16:55

## 2023-02-26 RX ADMIN — HEPARIN SODIUM 5000 UNIT(S): 5000 INJECTION INTRAVENOUS; SUBCUTANEOUS at 21:41

## 2023-02-26 RX ADMIN — ALBUTEROL 2.5 MILLIGRAM(S): 90 AEROSOL, METERED ORAL at 22:38

## 2023-02-26 NOTE — PHYSICAL THERAPY INITIAL EVALUATION ADULT - PERTINENT HX OF CURRENT PROBLEM, REHAB EVAL
72 year old female with a PMHx of asthma, aryepiglottic folds thickening, [place]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 Mcneal for wheezing. 72 year old female with a PMHx of asthma, aryepiglottic folds thickening, [place]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 Mcneal for wheezing. 72 year old female with a PMHx of asthma, aryepiglottic folds thickening, [place]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 Mcneal for wheezing.

## 2023-02-26 NOTE — PROGRESS NOTE ADULT - SUBJECTIVE AND OBJECTIVE BOX
Clay Mata MD  [employer]ist  Pager [phone]  Email: [email or web address]  Available on [employer] Teams        PROGRESS NOTE:     Patient is a 72y old  Female who presents with a chief complaint of     SUBJECTIVE / OVERNIGHT EVENTS:  Patient seen and examined this morning. Patient complains of hoarseness and headache, otherwise no issues breathing.  ADDITIONAL REVIEW OF SYSTEMS:  No f/c/n/v    MEDICATIONS  (STANDING):  albuterol    0.083% 2.5 milliGRAM(s) Nebulizer every 6 hours  albuterol    90 MICROgram(s) HFA Inhaler 1 Puff(s) Inhalation every 4 hours  chlorhexidine 2% Cloths 1 Application(s) Topical daily  heparin   Injectable 5000 Unit(s) SubCutaneous every 8 hours  methylPREDNISolone sodium succinate Injectable 40 milliGRAM(s) IV Push daily    MEDICATIONS  (PRN):      CAPILLARY BLOOD GLUCOSE        I&O's Summary    2023 07:01  -  2023 07:00  --------------------------------------------------------  IN: 0 mL / OUT: 1753 mL / NET: -1753 mL        PHYSICAL EXAM:  Vital Signs Last 24 Hrs  T(C): 36.7 (2023 06:05), Max: 37 (2023 22:25)  T(F): 98.1 (2023 06:05), Max: 98.6 (2023 22:25)  HR: 70 (2023 09:18) (64 - 81)  BP: 146/80 (2023 06:05) (122/55 - 159/73)  BP(mean): 96 (2023 14:00) (73 - 96)  RR: 16 (2023 06:05) (11 - 21)  SpO2: 98% (2023 09:18) (92% - 100%)    Parameters below as of 2023 09:18  Patient On (Oxygen Delivery Method): room air        CONSTITUTIONAL: NAD, well-developed, hoarse, difficult to hear (likely from intubation)  RESPIRATORY: Normal respiratory effort; lungs are clear to auscultation bilaterally  CARDIOVASCULAR: Regular rate and rhythm, normal S1 and S2, no murmur/rub/gallop; No lower extremity edema; Peripheral pulses are 2+ bilaterally  ABDOMEN: Nontender to palpation, normoactive bowel sounds, no rebound/guarding;   MUSCLOSKELETAL: no clubbing or cyanosis of digits; no joint swelling or tenderness to palpation  PSYCH: A+O to person, place, and time; affect appropriate    LABS:                        12.8   10.88 )-----------( 222      ( 2023 05:55 )             42.0     -    141  |  107  |  21  ----------------------------<  85  3.8   |  25  |  0.75    Ca    8.9      2023 05:55  Phos  3.6       Mg     2.20                 Urinalysis Basic - ( 2023 06:12 )    Color: Yellow / Appearance: Clear / S.032 / pH: x  Gluc: x / Ketone: Negative  / Bili: Negative / Urobili: 3 mg/dL   Blood: x / Protein: Trace / Nitrite: Negative   Leuk Esterase: Negative / RBC: x / WBC x   Sq Epi: x / Non Sq Epi: x / Bacteria: x        Culture - Blood (collected 2023 20:30)  Source: .Blood Blood  Preliminary Report (2023 01:01):    No growth to date.        RADIOLOGY & ADDITIONAL TESTS:  Results Reviewed:   Imaging Personally Reviewed:  Electrocardiogram Personally Reviewed:    COORDINATION OF CARE:  Care Discussed with Consultants/Other Providers [Y/N]:  Prior or Outpatient Records Reviewed [Y/N]:   Clay Mata MD  Academic Hospitalist  Pager [phone]  Email: [email or web address]  Available on [employer] Teams        PROGRESS NOTE:     Patient is a 72y old  Female who presents with a chief complaint of     SUBJECTIVE / OVERNIGHT EVENTS:  Patient seen and examined this morning. Patient complains of hoarseness and headache, otherwise no issues breathing.  ADDITIONAL REVIEW OF SYSTEMS:  No f/c/n/v    MEDICATIONS  (STANDING):  albuterol    0.083% 2.5 milliGRAM(s) Nebulizer every 6 hours  albuterol    90 MICROgram(s) HFA Inhaler 1 Puff(s) Inhalation every 4 hours  chlorhexidine 2% Cloths 1 Application(s) Topical daily  heparin   Injectable 5000 Unit(s) SubCutaneous every 8 hours  methylPREDNISolone sodium succinate Injectable 40 milliGRAM(s) IV Push daily    MEDICATIONS  (PRN):      CAPILLARY BLOOD GLUCOSE        I&O's Summary    2023 07:01  -  2023 07:00  --------------------------------------------------------  IN: 0 mL / OUT: 1753 mL / NET: -1753 mL        PHYSICAL EXAM:  Vital Signs Last 24 Hrs  T(C): 36.7 (2023 06:05), Max: 37 (2023 22:25)  T(F): 98.1 (2023 06:05), Max: 98.6 (2023 22:25)  HR: 70 (2023 09:18) (64 - 81)  BP: 146/80 (2023 06:05) (122/55 - 159/73)  BP(mean): 96 (2023 14:00) (73 - 96)  RR: 16 (2023 06:05) (11 - 21)  SpO2: 98% (2023 09:18) (92% - 100%)    Parameters below as of 2023 09:18  Patient On (Oxygen Delivery Method): room air        CONSTITUTIONAL: NAD, well-developed, hoarse, difficult to hear (likely from intubation)  RESPIRATORY: Normal respiratory effort; lungs are clear to auscultation bilaterally  CARDIOVASCULAR: Regular rate and rhythm, normal S1 and S2, no murmur/rub/gallop; No lower extremity edema; Peripheral pulses are 2+ bilaterally  ABDOMEN: Nontender to palpation, normoactive bowel sounds, no rebound/guarding;   MUSCLOSKELETAL: no clubbing or cyanosis of digits; no joint swelling or tenderness to palpation  PSYCH: A+O to person, place, and time; affect appropriate    LABS:                        12.8   10.88 )-----------( 222      ( 2023 05:55 )             42.0     -    141  |  107  |  21  ----------------------------<  85  3.8   |  25  |  0.75    Ca    8.9      2023 05:55  Phos  3.6       Mg     2.20                 Urinalysis Basic - ( 2023 06:12 )    Color: Yellow / Appearance: Clear / S.032 / pH: x  Gluc: x / Ketone: Negative  / Bili: Negative / Urobili: 3 mg/dL   Blood: x / Protein: Trace / Nitrite: Negative   Leuk Esterase: Negative / RBC: x / WBC x   Sq Epi: x / Non Sq Epi: x / Bacteria: x        Culture - Blood (collected 2023 20:30)  Source: .Blood Blood  Preliminary Report (2023 01:01):    No growth to date.        RADIOLOGY & ADDITIONAL TESTS:  Results Reviewed:   Imaging Personally Reviewed:  Electrocardiogram Personally Reviewed:    COORDINATION OF CARE:  Care Discussed with Consultants/Other Providers [Y/N]:  Prior or Outpatient Records Reviewed [Y/N]:   Clay Mata MD  Academic Hospitalist  Pager [phone]  Email: [email or web address]  Available on [employer] Teams        PROGRESS NOTE:     Patient is a 72y old  Female who presents with a chief complaint of     SUBJECTIVE / OVERNIGHT EVENTS:  Patient seen and examined this morning. Patient complains of hoarseness and headache, otherwise no issues breathing.  ADDITIONAL REVIEW OF SYSTEMS:  No f/c/n/v    MEDICATIONS  (STANDING):  albuterol    0.083% 2.5 milliGRAM(s) Nebulizer every 6 hours  albuterol    90 MICROgram(s) HFA Inhaler 1 Puff(s) Inhalation every 4 hours  chlorhexidine 2% Cloths 1 Application(s) Topical daily  heparin   Injectable 5000 Unit(s) SubCutaneous every 8 hours  methylPREDNISolone sodium succinate Injectable 40 milliGRAM(s) IV Push daily    MEDICATIONS  (PRN):      CAPILLARY BLOOD GLUCOSE        I&O's Summary    2023 07:01  -  2023 07:00  --------------------------------------------------------  IN: 0 mL / OUT: 1753 mL / NET: -1753 mL        PHYSICAL EXAM:  Vital Signs Last 24 Hrs  T(C): 36.7 (2023 06:05), Max: 37 (2023 22:25)  T(F): 98.1 (2023 06:05), Max: 98.6 (2023 22:25)  HR: 70 (2023 09:18) (64 - 81)  BP: 146/80 (2023 06:05) (122/55 - 159/73)  BP(mean): 96 (2023 14:00) (73 - 96)  RR: 16 (2023 06:05) (11 - 21)  SpO2: 98% (2023 09:18) (92% - 100%)    Parameters below as of 2023 09:18  Patient On (Oxygen Delivery Method): room air        CONSTITUTIONAL: NAD, well-developed, hoarse, difficult to hear (likely from intubation)  RESPIRATORY: Normal respiratory effort; lungs are clear to auscultation bilaterally  CARDIOVASCULAR: Regular rate and rhythm, normal S1 and S2, no murmur/rub/gallop; No lower extremity edema; Peripheral pulses are 2+ bilaterally  ABDOMEN: Nontender to palpation, normoactive bowel sounds, no rebound/guarding;   MUSCLOSKELETAL: no clubbing or cyanosis of digits; no joint swelling or tenderness to palpation  PSYCH: A+O to person, place, and time; affect appropriate    LABS:                        12.8   10.88 )-----------( 222      ( 2023 05:55 )             42.0     -    141  |  107  |  21  ----------------------------<  85  3.8   |  25  |  0.75    Ca    8.9      2023 05:55  Phos  3.6       Mg     2.20                 Urinalysis Basic - ( 2023 06:12 )    Color: Yellow / Appearance: Clear / S.032 / pH: x  Gluc: x / Ketone: Negative  / Bili: Negative / Urobili: 3 mg/dL   Blood: x / Protein: Trace / Nitrite: Negative   Leuk Esterase: Negative / RBC: x / WBC x   Sq Epi: x / Non Sq Epi: x / Bacteria: x        Culture - Blood (collected 2023 20:30)  Source: .Blood Blood  Preliminary Report (2023 01:01):    No growth to date.        RADIOLOGY & ADDITIONAL TESTS:  Results Reviewed:   Imaging Personally Reviewed:  Electrocardiogram Personally Reviewed:    COORDINATION OF CARE:  Care Discussed with Consultants/Other Providers [Y/N]:  Prior or Outpatient Records Reviewed [Y/N]:

## 2023-02-26 NOTE — PHYSICAL THERAPY INITIAL EVALUATION ADULT - LEVEL OF INDEPENDENCE: GAIT, REHAB EVAL
Unable to perform at this time secondary to SOB and increased work of breathing with minimal exertion, triggering wheezing fit, pt requesting to return to bed

## 2023-02-26 NOTE — PROGRESS NOTE ADULT - ASSESSMENT
72F with a PMHx of asthma, aryepiglottic folds thickening, [place] Palsy, essential HTN, hepatitis C, seizure who initially was BIBA to [place] ED, sent by Dr. Mcneal for wheezing. Pt was seen in office and was sent to the ED to r/o vocal cord dysfunction. Dr Mcneal stated that he had been following her for her asthma; she presented to the office for 2 weeks of SOB. Patient evaluated in [place] ED and found to have stridor. Patient with interval improvement of stridor after epi, magnesium and steroids. Patient intubated for hypoxic respiratory failure and stridor. ICU saw pt in ED, no ENT available at [facility] so patient was transferred to [facility] ICU. During MICU admission the patient was treated for asthma exacerbation with Albuterol and Solumedrol. ENT was consulted for evaluation who took patient to OR to extubate and evaluate. ENT determined she had normal airways and no aryepiglottic folds thickening. She was transferred back to MICU. She was on NC and was weaned off to room air. The patient reported feeling well, was hemodynamically stable and ready to be transferred to floors.    72F with a PMHx of asthma, aryepiglottic folds thickening, [place] Palsy, essential HTN, hepatitis C, seizure who initially was BIBA to [place] ED, sent by Dr. Mcneal for wheezing. Pt was seen in office and was sent to the ED to r/o vocal cord dysfunction. Dr Mcneal stated that he had been following her for her asthma; she presented to the office for 2 weeks of SOB. Patient evaluated in [place] ED and found to have stridor. Patient with interval improvement of stridor after epi, magnesium and steroids. Patient intubated for hypoxic respiratory failure and stridor. ICU saw pt in ED, no ENT available at [facility] so patient was transferred to [facility] ICU. During MICU admission the patient was treated for asthma exacerbation with Albuterol and Solumedrol. ENT was consulted for evaluation who took patient to OR to extubate and evaluate. ENT determined she had normal airways and no aryepiglottic folds thickening. She was transferred back to MICU. She was on NC and was weaned off to room air. The patient reported feeling well, was hemodynamically stable and ready to be transferred to floors.    72F with a PMHx of asthma, aryepiglottic folds thickening, [place] Palsy, essential HTN, hepatitis C, seizure who initially was BIBA to [place] ED, sent by Dr. Mcneal for wheezing. Pt was seen in office and was sent to the ED to r/o vocal cord dysfunction. Dr Mcneal stated that he had been following her for her asthma; she presented to the office for 2 weeks of SOB. Patient evaluated in [place] ED and found to have stridor. Patient with interval improvement of stridor after epi, magnesium and steroids. Patient intubated for hypoxic respiratory failure and stridor. ICU saw pt in ED, no ENT available at [facility] so patient was transferred to [facility] ICU. During MICU admission the patient was treated for asthma exacerbation with Albuterol and Solumedrol. ENT was consulted for evaluation who took patient to OR to extubate and evaluate. ENT determined she had normal airways and no aryepiglottic folds thickening. She was transferred back to MICU. She was on NC and was weaned off to room air. The patient reported feeling well, was hemodynamically stable and ready to be transferred to floors.

## 2023-02-26 NOTE — PHYSICAL THERAPY INITIAL EVALUATION ADULT - GENERAL OBSERVATIONS, REHAB EVAL
Pt received semi-supine in bed, +primafit, +telemetry, all lines intact, in NAD. Pt agreeable to participate in PT evaluation

## 2023-02-26 NOTE — PHYSICAL THERAPY INITIAL EVALUATION ADULT - IMPAIRMENTS CONTRIBUTING IMPAIRED BED MOBILITY, REHAB EVAL
"            New Patient Consultation requested by PCP  Patient here with both parents    CC: History of \"gluten sensitivity\", dental enamel problems, cavities, vitamin D deficiency    HPI: The mother reports that Liliana has had digestive issues since she was infant up until 3-1/2 years of age during which time she had constipation.  Around that time she developed a rash which was diagnosed as \"dermatitis herpetiformis\".  However, she never saw dermatology or had a biopsy.  She was placed on a gluten-free diet and the rash reportedly cleared up, returning if she was exposed to gluten.  She continued to have constipation until she was about 6 years of age.    About 2 years ago Liliana decided that she wanted to come off of the gluten-free diet.  She has been on a gluten-containing diet ever since.  According to the mother's recollection about 6 months after stopping the gluten-free diet she began having multiple cavities.  The mother reports that in January 2021 the dentist said that her \"enamel is falling off\".  The father disputes this saying that when he is up with a dentist this was not reported. Liliana admits that she does not regularly brush her teeth nor does she floss.    Due to these concerns she was seen at [facility] pediatric clinic in March 2021.  Laboratory investigations were done, see review of records.  She was diagnosed with vitamin D deficiency.  Celiac testing was negative.  She was recently seen by a [place] pediatric primary care provider who recommended vitamin D supplementation with 50,000 international units weekly.  However, she has not been taking this because she \"forgets\".    Lilaina reports that she does not like to drink milk.  She has some dairy in her diet such as cheese or yogurt but it does not sound consistent.  She otherwise drinks water and juice.  She lives primarily with her father.    Symptoms  No abdominal pain  No nausea or vomiting  No reflux  No dysphagia  No bloating or " excessive gassiness  BM once a day.  She was not able to indicate what type of stool she is having.  It is nonpainful.  No blood.    Review of records  Labs 3/31/2021, see scanned records.  1. TSH normal at 2.45 with a normal free T4 of 1.2. TPO mildly elevated at 11 (<9)  2.  Total IgA 99, TTG IgA 1  3.  Comprehensive metabolic panel normal.  CBC normal.  4.  Vitamin D level 6  5.  Ferritin 12.  No inflammatory markers checked.  6.  Gluten IgE negative.  Gluten IgG elevated.    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   Respiratory: negative for chest pain or cough  Cardiac: negative for palpitations, chest pain, dyspnea  Gastrointestinal: negative for abdominal pain, vomiting, diarrhea, blood in the stool, jaundice  Genitourinary: negative dysuria, urgency, enuresis  Skin: negative for rash or pruritis  Hematologic: negative for easy bruisability, bleeding gums, lymphadenopathy  Allergic/Immunologic: negative for recurrent bacterial infections  Endocrine: negative for hair loss  Musculoskeletal: negative joint pain or swelling, muscle weakness  Neurologic:  negative for headache, dizziness, syncope  Psychiatric: negative for depression and anxiety    Allergies   Allergen Reactions     Aspartame And Phenylalanine      Gluten Meal      Nausea and upset stomach     Current Outpatient Medications   Medication Sig     NO ACTIVE MEDICATIONS  (Patient not taking: Reported on 7/12/2021)     order for DME Equipment being ordered: EXOS Thumb Spica, XS (Patient not taking: Reported on 7/12/2021)     order for DME Equipment being ordered: right wrist with thumb spica (Patient not taking: Reported on 4/12/2019)     Vitamin D, Cholecalciferol, 400 UNITS CHEW  (Patient not taking: Reported on 7/12/2021)     vitamin D3 (CHOLECALCIFEROL)  "1.25 MG (89064 UT) capsule Take 1 capsule (50,000 Units) by mouth every 7 days (Patient not taking: Reported on 8/24/2021)     No current facility-administered medications for this visit.       PMHX: Full-term product of a normal pregnancy.  No hospitalizations or surgeries.  Menarche was at 16 years of age and her periods are regular.    FAM/SOC: 18-year-old sister has anxiety and possible ASD.  17-year-old brother has ASD.  The mother has \"gluten issues\", she feels better on a gluten-free diet and has not had celiac testing.  She also has reactive hypoglycemia.  The father has Hashimoto's thyroiditis.  He is also had most multiple strokes.  He was adopted and his family history is not known.  Otherwise there is no other family history of chronic autoimmune diseases or celiac disease.    Physical exam:    Vital Signs: Ht 1.783 m (5' 10.2\")   Wt 58.9 kg (129 lb 13.6 oz)   BMI 18.53 kg/m  . (>99 %ile (Z= 2.49) based on CDC (Girls, 2-20 Years) Stature-for-age data based on Stature recorded on 8/24/2021. 72 %ile (Z= 0.59) based on CDC (Girls, 2-20 Years) weight-for-age data using vitals from 8/24/2021. Body mass index is 18.53 kg/m . 28 %ile (Z= -0.58) based on CDC (Girls, 2-20 Years) BMI-for-age based on BMI available as of 8/24/2021.)  Constitutional: Healthy, alert and no distress  Head: Normocephalic. No masses, lesions, tenderness or abnormalities  Neck: Neck supple.  EYE: LUCIANO, EOMI  ENT: Ears: Normal position, Nose: No discharge and Mouth: Normal, moist mucous membranes. Teeth appear discolored (yellow hue) with plaque. Gums appear moderately red without bleeding.   Cardiovascular: Heart: Regular rate and rhythm  Respiratory: Lungs clear to auscultation bilaterally.  Gastrointestinal: Abdomen:, Soft, Nontender, Nondistended, Normal bowel sounds, No hepatomegaly, No splenomegaly, Rectal: Deferred  Musculoskeletal: Extremities warm, well perfused.   Skin: No suspicious lesions or rashes  Neurologic: " negative  Hematologic/Lymphatic/Immunologic: Normal cervical lymph nodes    Assessment/Plan: 17-year-old girl with a history of vitamin D deficiency and poor oral health.  I reviewed the previous laboratory results in great detail.  She does not have celiac disease, her TTG IgA is negative on a gluten-containing diet.  Gluten IgG is not an indication of allergy.  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      At this point her issues are likely primarily due to nutritional deficiencies in her diet.  I will have them meet with our pediatric dietitian today to review sources of calcium and vitamin D as well as an overall healthy diet. I instructed her to begin her vitamin D supplementation with 50,000 units once a week for 8 weeks and also take 2,000 international units of vitamin D3 daily.  Her vitamin D level can be rechecked when she is here next month to see pediatric endocrinology. I stressed with all of them the importance of following through with this.     I also recommended that she take 1500 mg of calcium in the form of chewable Tums on a daily basis.  I reviewed with her the importance of personal hygiene including brushing her teeth twice a day and flossing every evening.    I personally reviewed results of laboratory evaluation, imaging studies and past medical records that were available during this outpatient visit.      Rommel Roque, MS, APRN, CPNP  Pediatric Nurse Practitioner  Pediatric Gastroenterology, Hepatology and Nutrition  [facility]    Call Center: [phone]  [facility]: [phone]  [facility]: [phone]    CC  Patient Care Team:  No Ref-Primary, Physician as PCP - General  EBONI WARNER     impaired balance/impaired postural control/decreased strength

## 2023-02-26 NOTE — PROGRESS NOTE ADULT - TIME BILLING
New MICU transfer to the floors, patient was intubated     20mins-Chart and previous hospitalizations reviewed as well as labs, vitals prior to patient being seen  11mins-Meds reviewed and prior outpatient workup   20 mins-Patient seen and examined and plan discussed, all questions were answered to the best of my ability  30 mins-Charting/documentation and orders

## 2023-02-26 NOTE — PHYSICAL THERAPY INITIAL EVALUATION ADULT - ADDITIONAL COMMENTS
Pt lives in an apartment with sister, +3 steps to enter, +13 to negotiate inside. Pt was independent with all functional mobility and ADL performance without an assistive device prior to admission.     Pt left semi-supine in bed, all lines intact, all needs in reach, bed alarm set, in NAD. AROLDO chong.

## 2023-02-27 DIAGNOSIS — R49.0 DYSPHONIA: ICD-10-CM

## 2023-02-27 LAB
ANION GAP SERPL CALC-SCNC: 11 MMOL/L — SIGNIFICANT CHANGE UP (ref 7–14)
BASE EXCESS BLDV CALC-SCNC: 2.7 MMOL/L — SIGNIFICANT CHANGE UP (ref -2–3)
BASOPHILS # BLD AUTO: 0.02 K/UL — SIGNIFICANT CHANGE UP (ref 0–0.2)
BASOPHILS NFR BLD AUTO: 0.2 % — SIGNIFICANT CHANGE UP (ref 0–2)
BLOOD GAS VENOUS COMPREHENSIVE RESULT: SIGNIFICANT CHANGE UP
BUN SERPL-MCNC: 17 MG/DL — SIGNIFICANT CHANGE UP (ref 7–23)
CALCIUM SERPL-MCNC: 8.9 MG/DL — SIGNIFICANT CHANGE UP (ref 8.4–10.5)
CHLORIDE BLDV-SCNC: 102 MMOL/L — SIGNIFICANT CHANGE UP (ref 96–108)
CHLORIDE SERPL-SCNC: 103 MMOL/L — SIGNIFICANT CHANGE UP (ref 98–107)
CO2 BLDV-SCNC: 29.3 MMOL/L — HIGH (ref 22–26)
CO2 SERPL-SCNC: 24 MMOL/L — SIGNIFICANT CHANGE UP (ref 22–31)
CREAT SERPL-MCNC: 0.74 MG/DL — SIGNIFICANT CHANGE UP (ref 0.5–1.3)
EGFR: 87 ML/MIN/1.73M2 — SIGNIFICANT CHANGE UP
EOSINOPHIL # BLD AUTO: 0.06 K/UL — SIGNIFICANT CHANGE UP (ref 0–0.5)
EOSINOPHIL NFR BLD AUTO: 0.6 % — SIGNIFICANT CHANGE UP (ref 0–6)
GAS PNL BLDV: 134 MMOL/L — LOW (ref 136–145)
GAS PNL BLDV: SIGNIFICANT CHANGE UP
GLUCOSE BLDV-MCNC: 104 MG/DL — HIGH (ref 70–99)
GLUCOSE SERPL-MCNC: 104 MG/DL — HIGH (ref 70–99)
HCO3 BLDV-SCNC: 28 MMOL/L — SIGNIFICANT CHANGE UP (ref 22–29)
HCT VFR BLD CALC: 42.3 % — SIGNIFICANT CHANGE UP (ref 34.5–45)
HCT VFR BLDA CALC: 41 % — SIGNIFICANT CHANGE UP (ref 34.5–46.5)
HCV RNA FLD QL NAA+PROBE: SIGNIFICANT CHANGE UP
HCV RNA SPEC QL PROBE+SIG AMP: SIGNIFICANT CHANGE UP
HGB BLD CALC-MCNC: 13.6 G/DL — SIGNIFICANT CHANGE UP (ref 11.7–16.1)
HGB BLD-MCNC: 13.2 G/DL — SIGNIFICANT CHANGE UP (ref 11.5–15.5)
IANC: 6.3 K/UL — SIGNIFICANT CHANGE UP (ref 1.8–7.4)
IMM GRANULOCYTES NFR BLD AUTO: 0.7 % — SIGNIFICANT CHANGE UP (ref 0–0.9)
LACTATE BLDV-MCNC: 1.1 MMOL/L — SIGNIFICANT CHANGE UP (ref 0.5–2)
LYMPHOCYTES # BLD AUTO: 2.83 K/UL — SIGNIFICANT CHANGE UP (ref 1–3.3)
LYMPHOCYTES # BLD AUTO: 28.8 % — SIGNIFICANT CHANGE UP (ref 13–44)
MAGNESIUM SERPL-MCNC: 2 MG/DL — SIGNIFICANT CHANGE UP (ref 1.6–2.6)
MCHC RBC-ENTMCNC: 26.4 PG — LOW (ref 27–34)
MCHC RBC-ENTMCNC: 31.2 GM/DL — LOW (ref 32–36)
MCV RBC AUTO: 84.6 FL — SIGNIFICANT CHANGE UP (ref 80–100)
MONOCYTES # BLD AUTO: 0.54 K/UL — SIGNIFICANT CHANGE UP (ref 0–0.9)
MONOCYTES NFR BLD AUTO: 5.5 % — SIGNIFICANT CHANGE UP (ref 2–14)
NEUTROPHILS # BLD AUTO: 6.3 K/UL — SIGNIFICANT CHANGE UP (ref 1.8–7.4)
NEUTROPHILS NFR BLD AUTO: 64.2 % — SIGNIFICANT CHANGE UP (ref 43–77)
NRBC # BLD: 0 /100 WBCS — SIGNIFICANT CHANGE UP (ref 0–0)
NRBC # FLD: 0 K/UL — SIGNIFICANT CHANGE UP (ref 0–0)
PCO2 BLDV: 44 MMHG — SIGNIFICANT CHANGE UP (ref 39–52)
PH BLDV: 7.41 — SIGNIFICANT CHANGE UP (ref 7.32–7.43)
PHOSPHATE SERPL-MCNC: 3.7 MG/DL — SIGNIFICANT CHANGE UP (ref 2.5–4.5)
PLATELET # BLD AUTO: 227 K/UL — SIGNIFICANT CHANGE UP (ref 150–400)
PO2 BLDV: 71 MMHG — HIGH (ref 25–45)
POTASSIUM BLDV-SCNC: 3.8 MMOL/L — SIGNIFICANT CHANGE UP (ref 3.5–5.1)
POTASSIUM SERPL-MCNC: 3.9 MMOL/L — SIGNIFICANT CHANGE UP (ref 3.5–5.3)
POTASSIUM SERPL-SCNC: 3.9 MMOL/L — SIGNIFICANT CHANGE UP (ref 3.5–5.3)
RBC # BLD: 5 M/UL — SIGNIFICANT CHANGE UP (ref 3.8–5.2)
RBC # FLD: 12.7 % — SIGNIFICANT CHANGE UP (ref 10.3–14.5)
SAO2 % BLDV: 95.8 % — HIGH (ref 67–88)
SODIUM SERPL-SCNC: 138 MMOL/L — SIGNIFICANT CHANGE UP (ref 135–145)
WBC # BLD: 9.82 K/UL — SIGNIFICANT CHANGE UP (ref 3.8–10.5)
WBC # FLD AUTO: 9.82 K/UL — SIGNIFICANT CHANGE UP (ref 3.8–10.5)

## 2023-02-27 PROCEDURE — 99232 SBSQ HOSP IP/OBS MODERATE 35: CPT

## 2023-02-27 RX ORDER — ONDANSETRON 8 MG/1
4 TABLET, FILM COATED ORAL ONCE
Refills: 0 | Status: COMPLETED | OUTPATIENT
Start: 2023-02-27 | End: 2023-02-27

## 2023-02-27 RX ORDER — POLYETHYLENE GLYCOL 3350 17 G/17G
17 POWDER, FOR SOLUTION ORAL DAILY
Refills: 0 | Status: DISCONTINUED | OUTPATIENT
Start: 2023-02-27 | End: 2023-03-03

## 2023-02-27 RX ORDER — SENNA PLUS 8.6 MG/1
10 TABLET ORAL AT BEDTIME
Refills: 0 | Status: DISCONTINUED | OUTPATIENT
Start: 2023-02-27 | End: 2023-03-03

## 2023-02-27 RX ADMIN — SENNA PLUS 10 MILLILITER(S): 8.6 TABLET ORAL at 21:46

## 2023-02-27 RX ADMIN — ALBUTEROL 2.5 MILLIGRAM(S): 90 AEROSOL, METERED ORAL at 09:25

## 2023-02-27 RX ADMIN — ONDANSETRON 4 MILLIGRAM(S): 8 TABLET, FILM COATED ORAL at 05:07

## 2023-02-27 RX ADMIN — Medication 100 MILLIGRAM(S): at 05:50

## 2023-02-27 RX ADMIN — Medication 40 MILLIGRAM(S): at 05:08

## 2023-02-27 RX ADMIN — HEPARIN SODIUM 5000 UNIT(S): 5000 INJECTION INTRAVENOUS; SUBCUTANEOUS at 05:08

## 2023-02-27 RX ADMIN — ALBUTEROL 2.5 MILLIGRAM(S): 90 AEROSOL, METERED ORAL at 21:10

## 2023-02-27 RX ADMIN — HEPARIN SODIUM 5000 UNIT(S): 5000 INJECTION INTRAVENOUS; SUBCUTANEOUS at 15:18

## 2023-02-27 RX ADMIN — ALBUTEROL 2.5 MILLIGRAM(S): 90 AEROSOL, METERED ORAL at 04:52

## 2023-02-27 RX ADMIN — CHLORHEXIDINE GLUCONATE 1 APPLICATION(S): 213 SOLUTION TOPICAL at 15:23

## 2023-02-27 RX ADMIN — HEPARIN SODIUM 5000 UNIT(S): 5000 INJECTION INTRAVENOUS; SUBCUTANEOUS at 21:46

## 2023-02-27 RX ADMIN — POLYETHYLENE GLYCOL 3350 17 GRAM(S): 17 POWDER, FOR SOLUTION ORAL at 15:19

## 2023-02-27 NOTE — PROVIDER CONTACT NOTE (OTHER) - ASSESSMENT
Wheezing noted in anterior lung fields prior to albuterol nebulizer. Clear anterior lung sounds s/p nebulizer. SpO2 ranging from 93%-97%. RR 16. No SOB or dyspnea noted. Pt. c/o funny feeling in throat, throat tightness, and nausea. Pt. coughed clear sputum. Notably hypophonic.
Pt. c/o burning and dysuria when urinating. Urine is clear and tahir in color. No odor noted.

## 2023-02-27 NOTE — PROVIDER CONTACT NOTE (OTHER) - SITUATION
Pt. c/o "funny feeling in throat"
Pt. c/o burning and dysuria when urinating. Pt. landa was removed earlier in the afternoon

## 2023-02-27 NOTE — PROGRESS NOTE ADULT - SUBJECTIVE AND OBJECTIVE BOX
Clay Mata MD  Academic Hospitalist  Pager [phone]  Email: [email or web address]  Available on [employer] Teams        PROGRESS NOTE:     Patient is a 72y old  Female who presents with a chief complaint of Shortness of breath (2023 09:57)      SUBJECTIVE / OVERNIGHT EVENTS:  Patient seen and examined this morning. Patient complains of hoarseness and headache, otherwise no issues breathing.  ADDITIONAL REVIEW OF SYSTEMS:  No f/c/n/v      MEDICATIONS  (STANDING):  albuterol    0.083% 2.5 milliGRAM(s) Nebulizer every 6 hours  albuterol    90 MICROgram(s) HFA Inhaler 1 Puff(s) Inhalation every 4 hours  chlorhexidine 2% Cloths 1 Application(s) Topical daily  heparin   Injectable 5000 Unit(s) SubCutaneous every 8 hours  methylPREDNISolone sodium succinate Injectable 40 milliGRAM(s) IV Push daily  polyethylene glycol 3350 17 Gram(s) Oral daily  senna Syrup 10 milliLiter(s) Oral at bedtime    MEDICATIONS  (PRN):  acetaminophen     Tablet .. 650 milliGRAM(s) Oral every 6 hours PRN Temp greater or equal to 38C (100.4F), Mild Pain (1 - 3), Moderate Pain (4 - 6)  bisacodyl Suppository 10 milliGRAM(s) Rectal daily PRN Constipation      CAPILLARY BLOOD GLUCOSE        I&O's Summary    2023 07:  -  2023 07:00  --------------------------------------------------------  IN: 0 mL / OUT: 800 mL / NET: -800 mL    2023 07:01  -  2023 13:52  --------------------------------------------------------  IN: 0 mL / OUT: 800 mL / NET: -800 mL        PHYSICAL EXAM:  Vital Signs Last 24 Hrs  T(C): 36.6 (2023 13:35), Max: 36.8 (2023 11:45)  T(F): 97.8 (2023 13:35), Max: 98.2 (2023 11:45)  HR: 85 (2023 13:35) (66 - 85)  BP: 155/87 (2023 13:35) (155/87 - 165/93)  BP(mean): --  RR: 17 (2023 13:35) (16 - 18)  SpO2: 97% (2023 13:35) (96% - 100%)    Parameters below as of 2023 13:35  Patient On (Oxygen Delivery Method): room air        CONSTITUTIONAL: NAD, well-developed, hoarseness worsening since yesterday  RESPIRATORY: Normal respiratory effort; lungs are clear to auscultation bilaterally  CARDIOVASCULAR: Regular rate and rhythm, normal S1 and S2, no murmur/rub/gallop; No lower extremity edema; Peripheral pulses are 2+ bilaterally  ABDOMEN: Nontender to palpation, normoactive bowel sounds, no rebound/guarding;   MUSCLOSKELETAL: no clubbing or cyanosis of digits; no joint swelling or tenderness to palpation  PSYCH: A+O to person, place, and time; affect appropriate      LABS:                        13.2   9.82  )-----------( 227      ( 2023 07:38 )             42.3         138  |  103  |  17  ----------------------------<  104<H>  3.9   |  24  |  0.74    Ca    8.9      2023 07:38  Phos  3.7       Mg     2.00                 Urinalysis Basic - ( 2023 06:12 )    Color: Yellow / Appearance: Clear / S.032 / pH: x  Gluc: x / Ketone: Negative  / Bili: Negative / Urobili: 3 mg/dL   Blood: x / Protein: Trace / Nitrite: Negative   Leuk Esterase: Negative / RBC: x / WBC x   Sq Epi: x / Non Sq Epi: x / Bacteria: x          RADIOLOGY & ADDITIONAL TESTS:  Results Reviewed:   Imaging Personally Reviewed:  Electrocardiogram Personally Reviewed:    COORDINATION OF CARE:  Care Discussed with Consultants/Other Providers [Y/N]:  Prior or Outpatient Records Reviewed [Y/N]:   Clay Mata MD  Academic Hospitalist  Pager [phone]  Email: [email or web address]  Available on [employer] Teams        PROGRESS NOTE:     Patient is a 72y old  Female who presents with a chief complaint of Shortness of breath (2023 09:57)      SUBJECTIVE / OVERNIGHT EVENTS:  Patient seen and examined this morning. Patient complains of hoarseness and headache, otherwise no issues breathing.  ADDITIONAL REVIEW OF SYSTEMS:  No f/c/n/v      MEDICATIONS  (STANDING):  albuterol    0.083% 2.5 milliGRAM(s) Nebulizer every 6 hours  albuterol    90 MICROgram(s) HFA Inhaler 1 Puff(s) Inhalation every 4 hours  chlorhexidine 2% Cloths 1 Application(s) Topical daily  heparin   Injectable 5000 Unit(s) SubCutaneous every 8 hours  methylPREDNISolone sodium succinate Injectable 40 milliGRAM(s) IV Push daily  polyethylene glycol 3350 17 Gram(s) Oral daily  senna Syrup 10 milliLiter(s) Oral at bedtime    MEDICATIONS  (PRN):  acetaminophen     Tablet .. 650 milliGRAM(s) Oral every 6 hours PRN Temp greater or equal to 38C (100.4F), Mild Pain (1 - 3), Moderate Pain (4 - 6)  bisacodyl Suppository 10 milliGRAM(s) Rectal daily PRN Constipation      CAPILLARY BLOOD GLUCOSE        I&O's Summary    2023 07:  -  2023 07:00  --------------------------------------------------------  IN: 0 mL / OUT: 800 mL / NET: -800 mL    2023 07:01  -  2023 13:52  --------------------------------------------------------  IN: 0 mL / OUT: 800 mL / NET: -800 mL        PHYSICAL EXAM:  Vital Signs Last 24 Hrs  T(C): 36.6 (2023 13:35), Max: 36.8 (2023 11:45)  T(F): 97.8 (2023 13:35), Max: 98.2 (2023 11:45)  HR: 85 (2023 13:35) (66 - 85)  BP: 155/87 (2023 13:35) (155/87 - 165/93)  BP(mean): --  RR: 17 (2023 13:35) (16 - 18)  SpO2: 97% (2023 13:35) (96% - 100%)    Parameters below as of 2023 13:35  Patient On (Oxygen Delivery Method): room air        CONSTITUTIONAL: NAD, well-developed, hoarseness worsening since yesterday  RESPIRATORY: Normal respiratory effort; lungs are clear to auscultation bilaterally  CARDIOVASCULAR: Regular rate and rhythm, normal S1 and S2, no murmur/rub/gallop; No lower extremity edema; Peripheral pulses are 2+ bilaterally  ABDOMEN: Nontender to palpation, normoactive bowel sounds, no rebound/guarding;   MUSCLOSKELETAL: no clubbing or cyanosis of digits; no joint swelling or tenderness to palpation  PSYCH: A+O to person, place, and time; affect appropriate      LABS:                        13.2   9.82  )-----------( 227      ( 2023 07:38 )             42.3         138  |  103  |  17  ----------------------------<  104<H>  3.9   |  24  |  0.74    Ca    8.9      2023 07:38  Phos  3.7       Mg     2.00                 Urinalysis Basic - ( 2023 06:12 )    Color: Yellow / Appearance: Clear / S.032 / pH: x  Gluc: x / Ketone: Negative  / Bili: Negative / Urobili: 3 mg/dL   Blood: x / Protein: Trace / Nitrite: Negative   Leuk Esterase: Negative / RBC: x / WBC x   Sq Epi: x / Non Sq Epi: x / Bacteria: x          RADIOLOGY & ADDITIONAL TESTS:  Results Reviewed:   Imaging Personally Reviewed:  Electrocardiogram Personally Reviewed:    COORDINATION OF CARE:  Care Discussed with Consultants/Other Providers [Y/N]:  Prior or Outpatient Records Reviewed [Y/N]:   Clay Mata MD  Academic Hospitalist  Pager [phone]  Email: [email or web address]  Available on [employer] Teams        PROGRESS NOTE:     Patient is a 72y old  Female who presents with a chief complaint of Shortness of breath (2023 09:57)      SUBJECTIVE / OVERNIGHT EVENTS:  Patient seen and examined this morning. Patient complains of hoarseness and headache, otherwise no issues breathing.  ADDITIONAL REVIEW OF SYSTEMS:  No f/c/n/v      MEDICATIONS  (STANDING):  albuterol    0.083% 2.5 milliGRAM(s) Nebulizer every 6 hours  albuterol    90 MICROgram(s) HFA Inhaler 1 Puff(s) Inhalation every 4 hours  chlorhexidine 2% Cloths 1 Application(s) Topical daily  heparin   Injectable 5000 Unit(s) SubCutaneous every 8 hours  methylPREDNISolone sodium succinate Injectable 40 milliGRAM(s) IV Push daily  polyethylene glycol 3350 17 Gram(s) Oral daily  senna Syrup 10 milliLiter(s) Oral at bedtime    MEDICATIONS  (PRN):  acetaminophen     Tablet .. 650 milliGRAM(s) Oral every 6 hours PRN Temp greater or equal to 38C (100.4F), Mild Pain (1 - 3), Moderate Pain (4 - 6)  bisacodyl Suppository 10 milliGRAM(s) Rectal daily PRN Constipation      CAPILLARY BLOOD GLUCOSE        I&O's Summary    2023 07:  -  2023 07:00  --------------------------------------------------------  IN: 0 mL / OUT: 800 mL / NET: -800 mL    2023 07:01  -  2023 13:52  --------------------------------------------------------  IN: 0 mL / OUT: 800 mL / NET: -800 mL        PHYSICAL EXAM:  Vital Signs Last 24 Hrs  T(C): 36.6 (2023 13:35), Max: 36.8 (2023 11:45)  T(F): 97.8 (2023 13:35), Max: 98.2 (2023 11:45)  HR: 85 (2023 13:35) (66 - 85)  BP: 155/87 (2023 13:35) (155/87 - 165/93)  BP(mean): --  RR: 17 (2023 13:35) (16 - 18)  SpO2: 97% (2023 13:35) (96% - 100%)    Parameters below as of 2023 13:35  Patient On (Oxygen Delivery Method): room air        CONSTITUTIONAL: NAD, well-developed, hoarseness worsening since yesterday  RESPIRATORY: Normal respiratory effort; lungs are clear to auscultation bilaterally  CARDIOVASCULAR: Regular rate and rhythm, normal S1 and S2, no murmur/rub/gallop; No lower extremity edema; Peripheral pulses are 2+ bilaterally  ABDOMEN: Nontender to palpation, normoactive bowel sounds, no rebound/guarding;   MUSCLOSKELETAL: no clubbing or cyanosis of digits; no joint swelling or tenderness to palpation  PSYCH: A+O to person, place, and time; affect appropriate      LABS:                        13.2   9.82  )-----------( 227      ( 2023 07:38 )             42.3         138  |  103  |  17  ----------------------------<  104<H>  3.9   |  24  |  0.74    Ca    8.9      2023 07:38  Phos  3.7       Mg     2.00                 Urinalysis Basic - ( 2023 06:12 )    Color: Yellow / Appearance: Clear / S.032 / pH: x  Gluc: x / Ketone: Negative  / Bili: Negative / Urobili: 3 mg/dL   Blood: x / Protein: Trace / Nitrite: Negative   Leuk Esterase: Negative / RBC: x / WBC x   Sq Epi: x / Non Sq Epi: x / Bacteria: x          RADIOLOGY & ADDITIONAL TESTS:  Results Reviewed:   Imaging Personally Reviewed:  Electrocardiogram Personally Reviewed:    COORDINATION OF CARE:  Care Discussed with Consultants/Other Providers [Y/N]:  Prior or Outpatient Records Reviewed [Y/N]:

## 2023-02-27 NOTE — CHART NOTE - NSCHARTNOTEFT_GEN_A_CORE
[facility] MEDICINE NIGHT COVERAGE - Medicine Subsequent Hospital Care Note    CC: throat tightness  HPI/Subjective: Notified by RN that patient has a funny feeling with her throat. Patient seen and assessed at bedside. Patient reports funny feeling started 30 minutes ago. Patient states that she was coughing and now has throat pain. She also reports feeling like there is something stuck in her throat.     ROS:  Denies fever, chills, diaphoresis, malaise, night sweats, generalized weakness, headache, changes in vision, dizziness, cough, sputum production, hemoptysis, chest pain, palpitations, shortness of breath, dyspnea on exertion or any other complaints.       Vital Signs Last 24 Hrs  T(C): 36.7 (23 @ 04:38), Max: 36.9 (23 @ 13:20)  T(F): 98 (23 @ :38), Max: 98.5 (23 @ 13:20)  HR: 68 (23 @ :38) (66 - 83)  BP: 165/93 (23 @ 04:38) (146/80 - 165/93)  RR: 17 (23 @ 04:38) (16 - 17)  SpO2: 97% (23 @ 04:38) (95% - 98%) on (O2)    PHYSICAL EXAM:  Constitutional: NAD, well-developed, well-nourished  Ears, nose, Mouth, and Throat: normal external ears and nose, normal hearing, moist oral mucosa  Neck: supple, no JVD  Respiratory: mild instipatory wheezing throughout  Cardiovascular: regular rate and rhythm, S1 and S2, no murmurs, rubs or gallops  Gastrointestinal: soft, nontender, nondistended, +bowel sounds,   Skin: warm, dry, no rash  Musculoskeletal: no clubbing, no cyanosis, no joint swelling  Psychiatric: A&Ox4, appropriate mood, affect    LABS:                        12.8   10.88 )-----------( 222      ( 2023 05:55 )             42.0         141  |  107  |  21  ----------------------------<  85  3.8   |  25  |  0.75    Ca    8.9      2023 05:55  Phos  3.6       Mg     2.20           Urinanalysis Basic (23 @ 05:37):  Color: Yellow / Appearance: Clear / S.032 / pH: --  Gluc: -- / Ketone: Negative / Bili: Negative / Urobili: 3 mg/dL  Blood: -- / Protein: Trace / Nitrite: Negative  Leuk Esterase: Negative / RBC: -- / WBC: --  Sq Epi: -- / Non Sq Epi: -- / Bacteria: --      Blood Gas Venous (23 @ 05:37):  pH: 7.46 | HCO3: 24 | pCO2: 33 | pO2: 56 | Lactate: 2.3      Blood Gas Arterial (23 @ 05:37):  pH: 7.43 | HCO3: 24 | pCO2: 36 | pO2: 167 | Lactate: --      I reviewed the above labs, radiology, medications, tests, telemetry, and EKG interpretation.    ASSESSMENT/PLAN: Patient is a 71 y/o female with a PMHx of asthma, aryepiglottic folds thickening, [place]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 Mcneal for wheezing. Pt was seen in office and was sent to the ED to r/o vocal cord dysfunction. After contacting Dr Mcneal for further collateral, he states that he had been following her for her asthma, but she presented to the office for 2 weeks of SOB. At the time, he heard glottic wheezing with good air flow and able to speak long sentences and no labored respirations and no concern for airway obstruction. 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 He sent her into ED for steroids and further imaging.     Patient evaluated in [place] ED and found to have stridor . Patient  with interval improvement of stridor after epi, magnesium and steroids. ICU saw pt in ED, no ENT available at [facility] so patient was transferred after hypoxic respiratory failure requiring intubation.       Plan   -Duoneb treatment  -CBC, CMP, VBG      - Clinical findings, labs, tests, telemetry, and ekg reviewed with attending. Will monitor patient closely.     Parris Mendoza PA-C  Medicine [id number]  [ ] Low complexity/risk (Time < 30min) [facility] MEDICINE NIGHT COVERAGE - Medicine Subsequent Hospital Care Note    CC: throat tightness  HPI/Subjective: Notified by RN that patient has a funny feeling with her throat. Patient seen and assessed at bedside. Patient reports funny feeling started 30 minutes ago. Patient states that she was coughing and now has throat pain. She also reports feeling like there is something stuck in her throat.     ROS:  Denies fever, chills, diaphoresis, malaise, night sweats, generalized weakness, headache, changes in vision, dizziness, cough, sputum production, hemoptysis, chest pain, palpitations, shortness of breath, dyspnea on exertion or any other complaints.       Vital Signs Last 24 Hrs  T(C): 36.7 (23 @ 04:38), Max: 36.9 (23 @ 13:20)  T(F): 98 (23 @ :38), Max: 98.5 (23 @ 13:20)  HR: 68 (23 @ :38) (66 - 83)  BP: 165/93 (23 @ 04:38) (146/80 - 165/93)  RR: 17 (23 @ 04:38) (16 - 17)  SpO2: 97% (23 @ 04:38) (95% - 98%) on (O2)    PHYSICAL EXAM:  Constitutional: NAD, well-developed, well-nourished  Ears, nose, Mouth, and Throat: normal external ears and nose, normal hearing, moist oral mucosa  Neck: supple, no JVD  Respiratory: mild instipatory wheezing throughout  Cardiovascular: regular rate and rhythm, S1 and S2, no murmurs, rubs or gallops  Gastrointestinal: soft, nontender, nondistended, +bowel sounds,   Skin: warm, dry, no rash  Musculoskeletal: no clubbing, no cyanosis, no joint swelling  Psychiatric: A&Ox4, appropriate mood, affect    LABS:                        12.8   10.88 )-----------( 222      ( 2023 05:55 )             42.0         141  |  107  |  21  ----------------------------<  85  3.8   |  25  |  0.75    Ca    8.9      2023 05:55  Phos  3.6       Mg     2.20           Urinanalysis Basic (23 @ 05:37):  Color: Yellow / Appearance: Clear / S.032 / pH: --  Gluc: -- / Ketone: Negative / Bili: Negative / Urobili: 3 mg/dL  Blood: -- / Protein: Trace / Nitrite: Negative  Leuk Esterase: Negative / RBC: -- / WBC: --  Sq Epi: -- / Non Sq Epi: -- / Bacteria: --      Blood Gas Venous (23 @ 05:37):  pH: 7.46 | HCO3: 24 | pCO2: 33 | pO2: 56 | Lactate: 2.3      Blood Gas Arterial (23 @ 05:37):  pH: 7.43 | HCO3: 24 | pCO2: 36 | pO2: 167 | Lactate: --      I reviewed the above labs, radiology, medications, tests, telemetry, and EKG interpretation.    ASSESSMENT/PLAN: Patient is a 73 y/o female with a PMHx of asthma, aryepiglottic folds thickening, [place]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 Mcneal for wheezing. Pt was seen in office and was sent to the ED to r/o vocal cord dysfunction. After contacting Dr Mcneal for further collateral, he states that he had been following her for her asthma, but she presented to the office for 2 weeks of SOB. At the time, he heard glottic wheezing with good air flow and able to speak long sentences and no labored respirations and no concern for airway obstruction. 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 He sent her into ED for steroids and further imaging.     Patient evaluated in [place] ED and found to have stridor . Patient  with interval improvement of stridor after epi, magnesium and steroids. ICU saw pt in ED, no ENT available at [facility] so patient was transferred after hypoxic respiratory failure requiring intubation.       Plan   -Duoneb treatment  -CBC, CMP, VBG      - Clinical findings, labs, tests, telemetry, and ekg reviewed with attending. Will monitor patient closely.     Parris Mendoza PA-C  Medicine [id number]  [ ] Low complexity/risk (Time < 30min) [facility] MEDICINE NIGHT COVERAGE - Medicine Subsequent Hospital Care Note    CC: throat tightness  HPI/Subjective: Notified by RN that patient has a funny feeling with her throat. Patient seen and assessed at bedside. Patient reports funny feeling started 30 minutes ago. Patient states that she was coughing and now has throat pain. She also reports feeling like there is something stuck in her throat.     ROS:  Denies fever, chills, diaphoresis, malaise, night sweats, generalized weakness, headache, changes in vision, dizziness, cough, sputum production, hemoptysis, chest pain, palpitations, shortness of breath, dyspnea on exertion or any other complaints.       Vital Signs Last 24 Hrs  T(C): 36.7 (23 @ 04:38), Max: 36.9 (23 @ 13:20)  T(F): 98 (23 @ :38), Max: 98.5 (23 @ 13:20)  HR: 68 (23 @ :38) (66 - 83)  BP: 165/93 (23 @ 04:38) (146/80 - 165/93)  RR: 17 (23 @ 04:38) (16 - 17)  SpO2: 97% (23 @ 04:38) (95% - 98%) on (O2)    PHYSICAL EXAM:  Constitutional: NAD, well-developed, well-nourished  Ears, nose, Mouth, and Throat: normal external ears and nose, normal hearing, moist oral mucosa  Neck: supple, no JVD  Respiratory: mild instipatory wheezing throughout  Cardiovascular: regular rate and rhythm, S1 and S2, no murmurs, rubs or gallops  Gastrointestinal: soft, nontender, nondistended, +bowel sounds,   Skin: warm, dry, no rash  Musculoskeletal: no clubbing, no cyanosis, no joint swelling  Psychiatric: A&Ox4, appropriate mood, affect    LABS:                        12.8   10.88 )-----------( 222      ( 2023 05:55 )             42.0         141  |  107  |  21  ----------------------------<  85  3.8   |  25  |  0.75    Ca    8.9      2023 05:55  Phos  3.6       Mg     2.20           Urinanalysis Basic (23 @ 05:37):  Color: Yellow / Appearance: Clear / S.032 / pH: --  Gluc: -- / Ketone: Negative / Bili: Negative / Urobili: 3 mg/dL  Blood: -- / Protein: Trace / Nitrite: Negative  Leuk Esterase: Negative / RBC: -- / WBC: --  Sq Epi: -- / Non Sq Epi: -- / Bacteria: --      Blood Gas Venous (23 @ 05:37):  pH: 7.46 | HCO3: 24 | pCO2: 33 | pO2: 56 | Lactate: 2.3      Blood Gas Arterial (23 @ 05:37):  pH: 7.43 | HCO3: 24 | pCO2: 36 | pO2: 167 | Lactate: --      I reviewed the above labs, radiology, medications, tests, telemetry, and EKG interpretation.    ASSESSMENT/PLAN: Patient is a 71 y/o female with a PMHx of asthma, aryepiglottic folds thickening, [place]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 Mcneal for wheezing. Pt was seen in office and was sent to the ED to r/o vocal cord dysfunction. After contacting Dr Mcneal for further collateral, he states that he had been following her for her asthma, but she presented to the office for 2 weeks of SOB. At the time, he heard glottic wheezing with good air flow and able to speak long sentences and no labored respirations and no concern for airway obstruction. 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 He sent her into ED for steroids and further imaging.     Patient evaluated in [place] ED and found to have stridor . Patient  with interval improvement of stridor after epi, magnesium and steroids. ICU saw pt in ED, no ENT available at [facility] so patient was transferred after hypoxic respiratory failure requiring intubation.       Plan   -Duoneb treatment  -CBC, CMP, VBG      - Clinical findings, labs, tests, telemetry, and ekg reviewed with attending. Will monitor patient closely.     Parris Mendoza PA-C  Medicine [id number]  [ ] Low complexity/risk (Time < 30min) [facility] MEDICINE NIGHT COVERAGE - Medicine Subsequent Hospital Care Note    CC: Throat Tightness now resolved  HPI/Subjective: Notified by RN that patient has a funny feeling with her throat. Patient seen and assessed at bedside. Patient reports funny feeling that started 30 minutes ago. Patient states that she was coughing and now has throat pain. She also reports feeling like there is something stuck in her throat. On assessment patient appears calm and in NAD. Patient able to converse but with dysphonia. Patient denies SOB and neck swelling. Denies starting new meds or eating different foods. On assessment, no signs of accessory muscles used. Patient satting 97% on RA.     Vital Signs Last 24 Hrs  T(C): 36.7 (23 @ 04:38), Max: 36.9 (23 @ 13:20)  T(F): 98 (23 @ 04:38), Max: 98.5 (23 @ 13:20)  HR: 68 (23 @ 04:38) (66 - 83)  BP: 165/93 (23 @ 04:38) (146/80 - 165/93)  RR: 17 (23 @ 04:38) (16 - 17)  SpO2: 97% (23 @ 04:38) (95% - 98%) on (O2)    PHYSICAL EXAM:  Constitutional: NAD, well-developed, well-nourished  Ears, nose, Mouth, and Throat: normal external ears and nose, normal hearing, moist oral mucosa  Neck: supple, no JVD  Respiratory: mild inspiratory wheezing throughout  Cardiovascular: regular rate and rhythm, S1 and S2, no murmurs, rubs or gallops  Gastrointestinal: soft, nontender, nondistended, +bowel sounds,   Skin: warm, dry, no rash  Musculoskeletal: no clubbing, no cyanosis, no joint swelling  Psychiatric: A&Ox4, appropriate mood, affect    LABS:                        12.8   10.88 )-----------( 222      ( 2023 05:55 )             42.0         141  |  107  |  21  ----------------------------<  85  3.8   |  25  |  0.75    Ca    8.9      2023 05:55  Phos  3.6       Mg     2.20           Urinanalysis Basic (23 @ 05:37):  Color: Yellow / Appearance: Clear / S.032 / pH: --  Gluc: -- / Ketone: Negative / Bili: Negative / Urobili: 3 mg/dL  Blood: -- / Protein: Trace / Nitrite: Negative  Leuk Esterase: Negative / RBC: -- / WBC: --  Sq Epi: -- / Non Sq Epi: -- / Bacteria: --      Blood Gas Venous (23 @ 05:37):  pH: 7.46 | HCO3: 24 | pCO2: 33 | pO2: 56 | Lactate: 2.3      Blood Gas Arterial (23 @ 05:37):  pH: 7.43 | HCO3: 24 | pCO2: 36 | pO2: 167 | Lactate: --      I reviewed the above labs, radiology, medications, tests, telemetry, and EKG interpretation.    ASSESSMENT/PLAN: Patient is a 72F with a PMHx of asthma, aryepiglottic folds thickening, [place] Palsy, essential HTN, hepatitis C, seizure who initially was BIBA to [place] ED, sent by Dr. Mcneal for wheezing. Pt was seen in office and was sent to the ED to r/o vocal cord dysfunction. Dr Mcneal stated that he had been following her for her asthma; she presented to the office for 2 weeks of SOB. Patient evaluated in [place] ED and found to have stridor. Patient with interval improvement of stridor after epi, magnesium and steroids. Patient intubated for hypoxic respiratory failure and stridor. ICU saw pt in ED, no ENT available at [facility] so patient was transferred to [facility] ICU. During MICU admission the patient was treated for asthma exacerbation with Albuterol and Solumedrol. ENT was consulted for evaluation who took patient to OR to extubate and evaluate. ENT determined she had normal airways and no aryepiglottic folds thickening. She was transferred back to MICU. She was on NC and was weaned off to room air. Overnight patient with complaints of throat tightness that has now resolved.        Plan   -Duoneb treatment  -CBC, CMP, VBG  -Tessalon Pearles ordered  -vitals q 4hrs  -pulse ox q4hrs  -ENT following    Will continue to monitor patient closely    Parris Mendoza PA-C  Medicine [id number] [facility] MEDICINE NIGHT COVERAGE - Medicine Subsequent Hospital Care Note    CC: Throat Tightness now resolved  HPI/Subjective: Notified by RN that patient has a funny feeling with her throat. Patient seen and assessed at bedside. Patient reports funny feeling that started 30 minutes ago. Patient states that she was coughing and now has throat pain. She also reports feeling like there is something stuck in her throat. On assessment patient appears calm and in NAD. Patient able to converse but with dysphonia. Patient denies SOB and neck swelling. Denies starting new meds or eating different foods. On assessment, no signs of accessory muscles used. Patient satting 97% on RA.     Vital Signs Last 24 Hrs  T(C): 36.7 (23 @ 04:38), Max: 36.9 (23 @ 13:20)  T(F): 98 (23 @ 04:38), Max: 98.5 (23 @ 13:20)  HR: 68 (23 @ 04:38) (66 - 83)  BP: 165/93 (23 @ 04:38) (146/80 - 165/93)  RR: 17 (23 @ 04:38) (16 - 17)  SpO2: 97% (23 @ 04:38) (95% - 98%) on (O2)    PHYSICAL EXAM:  Constitutional: NAD, well-developed, well-nourished  Ears, nose, Mouth, and Throat: normal external ears and nose, normal hearing, moist oral mucosa  Neck: supple, no JVD  Respiratory: mild inspiratory wheezing throughout  Cardiovascular: regular rate and rhythm, S1 and S2, no murmurs, rubs or gallops  Gastrointestinal: soft, nontender, nondistended, +bowel sounds,   Skin: warm, dry, no rash  Musculoskeletal: no clubbing, no cyanosis, no joint swelling  Psychiatric: A&Ox4, appropriate mood, affect    LABS:                        12.8   10.88 )-----------( 222      ( 2023 05:55 )             42.0         141  |  107  |  21  ----------------------------<  85  3.8   |  25  |  0.75    Ca    8.9      2023 05:55  Phos  3.6       Mg     2.20           Urinanalysis Basic (23 @ 05:37):  Color: Yellow / Appearance: Clear / S.032 / pH: --  Gluc: -- / Ketone: Negative / Bili: Negative / Urobili: 3 mg/dL  Blood: -- / Protein: Trace / Nitrite: Negative  Leuk Esterase: Negative / RBC: -- / WBC: --  Sq Epi: -- / Non Sq Epi: -- / Bacteria: --      Blood Gas Venous (23 @ 05:37):  pH: 7.46 | HCO3: 24 | pCO2: 33 | pO2: 56 | Lactate: 2.3      Blood Gas Arterial (23 @ 05:37):  pH: 7.43 | HCO3: 24 | pCO2: 36 | pO2: 167 | Lactate: --      I reviewed the above labs, radiology, medications, tests, telemetry, and EKG interpretation.    ASSESSMENT/PLAN: Patient is a 72F with a PMHx of asthma, aryepiglottic folds thickening, [place] Palsy, essential HTN, hepatitis C, seizure who initially was BIBA to [place] ED, sent by Dr. Mcneal for wheezing. Pt was seen in office and was sent to the ED to r/o vocal cord dysfunction. Dr Mcneal stated that he had been following her for her asthma; she presented to the office for 2 weeks of SOB. Patient evaluated in [place] ED and found to have stridor. Patient with interval improvement of stridor after epi, magnesium and steroids. Patient intubated for hypoxic respiratory failure and stridor. ICU saw pt in ED, no ENT available at [facility] so patient was transferred to [facility] ICU. During MICU admission the patient was treated for asthma exacerbation with Albuterol and Solumedrol. ENT was consulted for evaluation who took patient to OR to extubate and evaluate. ENT determined she had normal airways and no aryepiglottic folds thickening. She was transferred back to MICU. She was on NC and was weaned off to room air. Overnight patient with complaints of throat tightness that has now resolved.        Plan   -Duoneb treatment  -CBC, CMP, VBG  -Tessalon Pearles ordered  -vitals q 4hrs  -pulse ox q4hrs  -ENT following    Will continue to monitor patient closely    Parris Mendoza PA-C  Medicine [id number] [facility] MEDICINE NIGHT COVERAGE - Medicine Subsequent Hospital Care Note    CC: Throat Tightness now resolved  HPI/Subjective: Notified by RN that patient has a funny feeling with her throat. Patient seen and assessed at bedside. Patient reports funny feeling that started 30 minutes ago. Patient states that she was coughing and now has throat pain. She also reports feeling like there is something stuck in her throat. On assessment patient appears calm and in NAD. Patient able to converse but with dysphonia. Patient denies SOB and neck swelling. Denies starting new meds or eating different foods. On assessment, no signs of accessory muscles used. Patient satting 97% on RA.     Vital Signs Last 24 Hrs  T(C): 36.7 (23 @ 04:38), Max: 36.9 (23 @ 13:20)  T(F): 98 (23 @ 04:38), Max: 98.5 (23 @ 13:20)  HR: 68 (23 @ 04:38) (66 - 83)  BP: 165/93 (23 @ 04:38) (146/80 - 165/93)  RR: 17 (23 @ 04:38) (16 - 17)  SpO2: 97% (23 @ 04:38) (95% - 98%) on (O2)    PHYSICAL EXAM:  Constitutional: NAD, well-developed, well-nourished  Ears, nose, Mouth, and Throat: normal external ears and nose, normal hearing, moist oral mucosa  Neck: supple, no JVD  Respiratory: mild inspiratory wheezing throughout  Cardiovascular: regular rate and rhythm, S1 and S2, no murmurs, rubs or gallops  Gastrointestinal: soft, nontender, nondistended, +bowel sounds,   Skin: warm, dry, no rash  Musculoskeletal: no clubbing, no cyanosis, no joint swelling  Psychiatric: A&Ox4, appropriate mood, affect    LABS:                        12.8   10.88 )-----------( 222      ( 2023 05:55 )             42.0         141  |  107  |  21  ----------------------------<  85  3.8   |  25  |  0.75    Ca    8.9      2023 05:55  Phos  3.6       Mg     2.20           Urinanalysis Basic (23 @ 05:37):  Color: Yellow / Appearance: Clear / S.032 / pH: --  Gluc: -- / Ketone: Negative / Bili: Negative / Urobili: 3 mg/dL  Blood: -- / Protein: Trace / Nitrite: Negative  Leuk Esterase: Negative / RBC: -- / WBC: --  Sq Epi: -- / Non Sq Epi: -- / Bacteria: --      Blood Gas Venous (23 @ 05:37):  pH: 7.46 | HCO3: 24 | pCO2: 33 | pO2: 56 | Lactate: 2.3      Blood Gas Arterial (23 @ 05:37):  pH: 7.43 | HCO3: 24 | pCO2: 36 | pO2: 167 | Lactate: --      I reviewed the above labs, radiology, medications, tests, telemetry, and EKG interpretation.    ASSESSMENT/PLAN: Patient is a 72F with a PMHx of asthma, aryepiglottic folds thickening, [place] Palsy, essential HTN, hepatitis C, seizure who initially was BIBA to [place] ED, sent by Dr. Mcneal for wheezing. Pt was seen in office and was sent to the ED to r/o vocal cord dysfunction. Dr Mcneal stated that he had been following her for her asthma; she presented to the office for 2 weeks of SOB. Patient evaluated in [place] ED and found to have stridor. Patient with interval improvement of stridor after epi, magnesium and steroids. Patient intubated for hypoxic respiratory failure and stridor. ICU saw pt in ED, no ENT available at [facility] so patient was transferred to [facility] ICU. During MICU admission the patient was treated for asthma exacerbation with Albuterol and Solumedrol. ENT was consulted for evaluation who took patient to OR to extubate and evaluate. ENT determined she had normal airways and no aryepiglottic folds thickening. She was transferred back to MICU. She was on NC and was weaned off to room air. Overnight patient with complaints of throat tightness that has now resolved.        Plan   -Duoneb treatment  -CBC, CMP, VBG  -Tessalon Pearles ordered  -vitals q 4hrs  -pulse ox q4hrs  -ENT following    Will continue to monitor patient closely    Parris Mendoza PA-C  Medicine [id number] [facility] MEDICINE NIGHT COVERAGE - Medicine Subsequent Hospital Care Note    CC: Throat Tightness now resolved  HPI/Subjective: Notified by RN that patient has a "funny feeling in her throat" and feels nauseous. Patient seen and assessed at bedside. Patient reports funny feeling that started 30 minutes ago. Patient states that she was coughing and now has throat pain. She also reports feeling like there is something stuck in her throat. On assessment patient appears calm and in NAD. Patient able to converse but with dysphonia. Patient denies SOB and neck swelling. Denies starting new meds or eating different foods. On assessment, no signs of accessory muscles used. Patient satting 97% on RA.     Vital Signs Last 24 Hrs  T(C): 36.7 (23 @ 04:38), Max: 36.9 (23 @ 13:20)  T(F): 98 (23 @ 04:38), Max: 98.5 (23 @ 13:20)  HR: 68 (23 @ 04:38) (66 - 83)  BP: 165/93 (23 @ 04:38) (146/80 - 165/93)  RR: 17 (23 @ 04:38) (16 - 17)  SpO2: 97% (23 @ 04:38) (95% - 98%) on (O2)    PHYSICAL EXAM:  Constitutional: NAD, well-developed, well-nourished  Ears, nose, Mouth, and Throat: normal external ears and nose, normal hearing, moist oral mucosa  Neck: supple, no JVD  Respiratory: mild inspiratory wheezing throughout  Cardiovascular: regular rate and rhythm, S1 and S2, no murmurs, rubs or gallops  Gastrointestinal: soft, nontender, nondistended, +bowel sounds,   Skin: warm, dry, no rash  Musculoskeletal: no clubbing, no cyanosis, no joint swelling  Psychiatric: A&Ox4, appropriate mood, affect    LABS:                        12.8   10.88 )-----------( 222      ( 2023 05:55 )             42.0         141  |  107  |  21  ----------------------------<  85  3.8   |  25  |  0.75    Ca    8.9      2023 05:55  Phos  3.6       Mg     2.20           Urinanalysis Basic (23 @ 05:37):  Color: Yellow / Appearance: Clear / S.032 / pH: --  Gluc: -- / Ketone: Negative / Bili: Negative / Urobili: 3 mg/dL  Blood: -- / Protein: Trace / Nitrite: Negative  Leuk Esterase: Negative / RBC: -- / WBC: --  Sq Epi: -- / Non Sq Epi: -- / Bacteria: --      Blood Gas Venous (23 @ 05:37):  pH: 7.46 | HCO3: 24 | pCO2: 33 | pO2: 56 | Lactate: 2.3      Blood Gas Arterial (23 @ 05:37):  pH: 7.43 | HCO3: 24 | pCO2: 36 | pO2: 167 | Lactate: --      I reviewed the above labs, radiology, medications, tests, telemetry, and EKG interpretation.    ASSESSMENT/PLAN: Patient is a 72F with a PMHx of asthma, aryepiglottic folds thickening, [place] Palsy, essential HTN, hepatitis C, seizure who initially was BIBA to [place] ED, sent by Dr. Mcneal for wheezing. Pt was seen in office and was sent to the ED to r/o vocal cord dysfunction. Dr Mcneal stated that he had been following her for her asthma; she presented to the office for 2 weeks of SOB. Patient evaluated in [place] ED and found to have stridor. Patient with interval improvement of stridor after epi, magnesium and steroids. Patient intubated for hypoxic respiratory failure and stridor. ICU saw pt in ED, no ENT available at [facility] so patient was transferred to [facility] ICU. During MICU admission the patient was treated for asthma exacerbation with Albuterol and Solumedrol. ENT was consulted for evaluation who took patient to OR to extubate and evaluate. ENT determined she had normal airways and no aryepiglottic folds thickening. She was transferred back to MICU. She was on NC and was weaned off to room air. Overnight patient with complaints of throat tightness that has now resolved.        Plan   -Duoneb treatment  -CBC, CMP, VBG  -Tessalon Pearle ordered  -vitals q 4hrs  -pulse ox q4hrs  -4mg IVP Zofran given   -ENT following    Will continue to monitor patient closely    Parris Mendoza PA-C  Medicine [id number] [facility] MEDICINE NIGHT COVERAGE - Medicine Subsequent Hospital Care Note    CC: Throat Tightness now resolved  HPI/Subjective: Notified by RN that patient has a "funny feeling in her throat" and feels nauseous. Patient seen and assessed at bedside. Patient reports funny feeling that started 30 minutes ago. Patient states that she was coughing and now has throat pain. She also reports feeling like there is something stuck in her throat. On assessment patient appears calm and in NAD. Patient able to converse but with dysphonia. Patient denies SOB and neck swelling. Denies starting new meds or eating different foods. On assessment, no signs of accessory muscles used. Patient satting 97% on RA.     Vital Signs Last 24 Hrs  T(C): 36.7 (23 @ 04:38), Max: 36.9 (23 @ 13:20)  T(F): 98 (23 @ 04:38), Max: 98.5 (23 @ 13:20)  HR: 68 (23 @ 04:38) (66 - 83)  BP: 165/93 (23 @ 04:38) (146/80 - 165/93)  RR: 17 (23 @ 04:38) (16 - 17)  SpO2: 97% (23 @ 04:38) (95% - 98%) on (O2)    PHYSICAL EXAM:  Constitutional: NAD, well-developed, well-nourished  Ears, nose, Mouth, and Throat: normal external ears and nose, normal hearing, moist oral mucosa  Neck: supple, no JVD  Respiratory: mild inspiratory wheezing throughout  Cardiovascular: regular rate and rhythm, S1 and S2, no murmurs, rubs or gallops  Gastrointestinal: soft, nontender, nondistended, +bowel sounds,   Skin: warm, dry, no rash  Musculoskeletal: no clubbing, no cyanosis, no joint swelling  Psychiatric: A&Ox4, appropriate mood, affect    LABS:                        12.8   10.88 )-----------( 222      ( 2023 05:55 )             42.0         141  |  107  |  21  ----------------------------<  85  3.8   |  25  |  0.75    Ca    8.9      2023 05:55  Phos  3.6       Mg     2.20           Urinanalysis Basic (23 @ 05:37):  Color: Yellow / Appearance: Clear / S.032 / pH: --  Gluc: -- / Ketone: Negative / Bili: Negative / Urobili: 3 mg/dL  Blood: -- / Protein: Trace / Nitrite: Negative  Leuk Esterase: Negative / RBC: -- / WBC: --  Sq Epi: -- / Non Sq Epi: -- / Bacteria: --      Blood Gas Venous (23 @ 05:37):  pH: 7.46 | HCO3: 24 | pCO2: 33 | pO2: 56 | Lactate: 2.3      Blood Gas Arterial (23 @ 05:37):  pH: 7.43 | HCO3: 24 | pCO2: 36 | pO2: 167 | Lactate: --      I reviewed the above labs, radiology, medications, tests, telemetry, and EKG interpretation.    ASSESSMENT/PLAN: Patient is a 72F with a PMHx of asthma, aryepiglottic folds thickening, [place] Palsy, essential HTN, hepatitis C, seizure who initially was BIBA to [place] ED, sent by Dr. Mcneal for wheezing. Pt was seen in office and was sent to the ED to r/o vocal cord dysfunction. Dr Mcneal stated that he had been following her for her asthma; she presented to the office for 2 weeks of SOB. Patient evaluated in [place] ED and found to have stridor. Patient with interval improvement of stridor after epi, magnesium and steroids. Patient intubated for hypoxic respiratory failure and stridor. ICU saw pt in ED, no ENT available at [facility] so patient was transferred to [facility] ICU. During MICU admission the patient was treated for asthma exacerbation with Albuterol and Solumedrol. ENT was consulted for evaluation who took patient to OR to extubate and evaluate. ENT determined she had normal airways and no aryepiglottic folds thickening. She was transferred back to MICU. She was on NC and was weaned off to room air. Overnight patient with complaints of throat tightness that has now resolved.        Plan   -Duoneb treatment  -CBC, CMP, VBG  -Tessalon Pearle ordered  -vitals q 4hrs  -pulse ox q4hrs  -4mg IVP Zofran given   -ENT following    Will continue to monitor patient closely    Parris Mendoza PA-C  Medicine [id number] [facility] MEDICINE NIGHT COVERAGE - Medicine Subsequent Hospital Care Note    CC: Throat Tightness now resolved  HPI/Subjective: Notified by RN that patient has a "funny feeling in her throat" and feels nauseous. Patient seen and assessed at bedside. Patient reports funny feeling that started 30 minutes ago. Patient states that she was coughing and now has throat pain. She also reports feeling like there is something stuck in her throat. On assessment patient appears calm and in NAD. Patient able to converse but with dysphonia. Patient denies SOB and neck swelling. Denies starting new meds or eating different foods. On assessment, no signs of accessory muscles used. Patient satting 97% on RA.     Vital Signs Last 24 Hrs  T(C): 36.7 (23 @ 04:38), Max: 36.9 (23 @ 13:20)  T(F): 98 (23 @ 04:38), Max: 98.5 (23 @ 13:20)  HR: 68 (23 @ 04:38) (66 - 83)  BP: 165/93 (23 @ 04:38) (146/80 - 165/93)  RR: 17 (23 @ 04:38) (16 - 17)  SpO2: 97% (23 @ 04:38) (95% - 98%) on (O2)    PHYSICAL EXAM:  Constitutional: NAD, well-developed, well-nourished  Ears, nose, Mouth, and Throat: normal external ears and nose, normal hearing, moist oral mucosa  Neck: supple, no JVD  Respiratory: mild inspiratory wheezing throughout  Cardiovascular: regular rate and rhythm, S1 and S2, no murmurs, rubs or gallops  Gastrointestinal: soft, nontender, nondistended, +bowel sounds,   Skin: warm, dry, no rash  Musculoskeletal: no clubbing, no cyanosis, no joint swelling  Psychiatric: A&Ox4, appropriate mood, affect    LABS:                        12.8   10.88 )-----------( 222      ( 2023 05:55 )             42.0         141  |  107  |  21  ----------------------------<  85  3.8   |  25  |  0.75    Ca    8.9      2023 05:55  Phos  3.6       Mg     2.20           Urinanalysis Basic (23 @ 05:37):  Color: Yellow / Appearance: Clear / S.032 / pH: --  Gluc: -- / Ketone: Negative / Bili: Negative / Urobili: 3 mg/dL  Blood: -- / Protein: Trace / Nitrite: Negative  Leuk Esterase: Negative / RBC: -- / WBC: --  Sq Epi: -- / Non Sq Epi: -- / Bacteria: --      Blood Gas Venous (23 @ 05:37):  pH: 7.46 | HCO3: 24 | pCO2: 33 | pO2: 56 | Lactate: 2.3      Blood Gas Arterial (23 @ 05:37):  pH: 7.43 | HCO3: 24 | pCO2: 36 | pO2: 167 | Lactate: --      I reviewed the above labs, radiology, medications, tests, telemetry, and EKG interpretation.    ASSESSMENT/PLAN: Patient is a 72F with a PMHx of asthma, aryepiglottic folds thickening, [place] Palsy, essential HTN, hepatitis C, seizure who initially was BIBA to [place] ED, sent by Dr. Mcneal for wheezing. Pt was seen in office and was sent to the ED to r/o vocal cord dysfunction. Dr Mcneal stated that he had been following her for her asthma; she presented to the office for 2 weeks of SOB. Patient evaluated in [place] ED and found to have stridor. Patient with interval improvement of stridor after epi, magnesium and steroids. Patient intubated for hypoxic respiratory failure and stridor. ICU saw pt in ED, no ENT available at [facility] so patient was transferred to [facility] ICU. During MICU admission the patient was treated for asthma exacerbation with Albuterol and Solumedrol. ENT was consulted for evaluation who took patient to OR to extubate and evaluate. ENT determined she had normal airways and no aryepiglottic folds thickening. She was transferred back to MICU. She was on NC and was weaned off to room air. Overnight patient with complaints of throat tightness that has now resolved.        Plan   -Duoneb treatment  -CBC, CMP, VBG  -Tessalon Pearle ordered  -vitals q 4hrs  -pulse ox q4hrs  -4mg IVP Zofran given   -ENT following    Will continue to monitor patient closely    Parris Mendoza PA-C  Medicine [id number]

## 2023-02-27 NOTE — PROVIDER CONTACT NOTE (OTHER) - ACTION/TREATMENT ORDERED:
ACP aware and notified. Will order urinalysis
ACP aware and notified. Zofran ordered for nausea and tessalon pearle ordered for cough. Will come and assess Pt. at bedside.

## 2023-02-27 NOTE — PROGRESS NOTE ADULT - ASSESSMENT
72F with a PMHx of asthma, aryepiglottic folds thickening, [place] Palsy, essential HTN, hepatitis C, seizure who initially was BIBA to [place] ED, sent by Dr. Mcneal for wheezing. Pt was seen in office and was sent to the ED to r/o vocal cord dysfunction. Dr Mcneal stated that he had been following her for her asthma; she presented to the office for 2 weeks of SOB. Patient evaluated in [place] ED and found to have stridor. Patient with interval improvement of stridor after epi, magnesium and steroids. Patient intubated for hypoxic respiratory failure and stridor. ICU saw pt in ED, no ENT available at [facility] so patient was transferred to [facility] ICU. During MICU admission the patient was treated for asthma exacerbation with Albuterol and Solumedrol. ENT was consulted for evaluation who took patient to OR to extubate and evaluate. ENT determined she had normal airways and no aryepiglottic folds thickening. She was transferred back to MICU. She was on NC and was weaned off to room air. The patient reported feeling well, was hemodynamically stable and ready to be transferred to floors.    72F with a PMHx of asthma, aryepiglottic folds thickening, [place] Palsy, essential HTN, hepatitis C, seizure who initially was BIBA to [place] ED, sent by Dr. Mcneal for wheezing. Pt was seen in office and was sent to the ED to r/o vocal cord dysfunction. Dr Mcneal stated that he had been following her for her asthma; she presented to the office for 2 weeks of SOB. Patient evaluated in [place] ED and found to have stridor. Patient with interval improvement of stridor after epi, magnesium and steroids. Patient intubated for hypoxic respiratory failure and stridor. ICU saw pt in ED, no ENT available at [facility] so patient was transferred to [facility] ICU. During MICU admission the patient was treated for asthma exacerbation with Albuterol and Solumedrol. ENT was consulted for evaluation who took patient to OR to extubate and evaluate. ENT determined she had normal airways and no aryepiglottic folds thickening. She was transferred back to MICU. She was on NC and was weaned off to room air. The patient reported feeling well, was hemodynamically stable and ready to be transferred to floors.    72F with a PMHx of asthma, aryepiglottic folds thickening, [place] Palsy, essential HTN, hepatitis C, seizure who initially was BIBA to [place] ED, sent by Dr. Mcneal for wheezing. Pt was seen in office and was sent to the ED to r/o vocal cord dysfunction. Dr Mcneal stated that he had been following her for her asthma; she presented to the office for 2 weeks of SOB. Patient evaluated in [place] ED and found to have stridor. Patient with interval improvement of stridor after epi, magnesium and steroids. Patient intubated for hypoxic respiratory failure and stridor. ICU saw pt in ED, no ENT available at [facility] so patient was transferred to [facility] ICU. During MICU admission the patient was treated for asthma exacerbation with Albuterol and Solumedrol. ENT was consulted for evaluation who took patient to OR to extubate and evaluate. ENT determined she had normal airways and no aryepiglottic folds thickening. She was transferred back to MICU. She was on NC and was weaned off to room air. The patient reported feeling well, was hemodynamically stable and ready to be transferred to floors.

## 2023-02-28 LAB
ANION GAP SERPL CALC-SCNC: 11 MMOL/L — SIGNIFICANT CHANGE UP (ref 7–14)
BASOPHILS # BLD AUTO: 0.05 K/UL — SIGNIFICANT CHANGE UP (ref 0–0.2)
BASOPHILS NFR BLD AUTO: 0.3 % — SIGNIFICANT CHANGE UP (ref 0–2)
BUN SERPL-MCNC: 15 MG/DL — SIGNIFICANT CHANGE UP (ref 7–23)
CALCIUM SERPL-MCNC: 9.1 MG/DL — SIGNIFICANT CHANGE UP (ref 8.4–10.5)
CHLORIDE SERPL-SCNC: 102 MMOL/L — SIGNIFICANT CHANGE UP (ref 98–107)
CO2 SERPL-SCNC: 25 MMOL/L — SIGNIFICANT CHANGE UP (ref 22–31)
CREAT SERPL-MCNC: 0.71 MG/DL — SIGNIFICANT CHANGE UP (ref 0.5–1.3)
EGFR: 91 ML/MIN/1.73M2 — SIGNIFICANT CHANGE UP
EOSINOPHIL # BLD AUTO: 0.2 K/UL — SIGNIFICANT CHANGE UP (ref 0–0.5)
EOSINOPHIL NFR BLD AUTO: 1.4 % — SIGNIFICANT CHANGE UP (ref 0–6)
GLUCOSE SERPL-MCNC: 99 MG/DL — SIGNIFICANT CHANGE UP (ref 70–99)
HCT VFR BLD CALC: 42.3 % — SIGNIFICANT CHANGE UP (ref 34.5–45)
HGB BLD-MCNC: 13.2 G/DL — SIGNIFICANT CHANGE UP (ref 11.5–15.5)
IANC: 6.92 K/UL — SIGNIFICANT CHANGE UP (ref 1.8–7.4)
IMM GRANULOCYTES NFR BLD AUTO: 0.9 % — SIGNIFICANT CHANGE UP (ref 0–0.9)
LYMPHOCYTES # BLD AUTO: 43.7 % — SIGNIFICANT CHANGE UP (ref 13–44)
LYMPHOCYTES # BLD AUTO: 6.38 K/UL — HIGH (ref 1–3.3)
MAGNESIUM SERPL-MCNC: 2 MG/DL — SIGNIFICANT CHANGE UP (ref 1.6–2.6)
MCHC RBC-ENTMCNC: 26.2 PG — LOW (ref 27–34)
MCHC RBC-ENTMCNC: 31.2 GM/DL — LOW (ref 32–36)
MCV RBC AUTO: 84.1 FL — SIGNIFICANT CHANGE UP (ref 80–100)
MONOCYTES # BLD AUTO: 0.92 K/UL — HIGH (ref 0–0.9)
MONOCYTES NFR BLD AUTO: 6.3 % — SIGNIFICANT CHANGE UP (ref 2–14)
NEUTROPHILS # BLD AUTO: 6.92 K/UL — SIGNIFICANT CHANGE UP (ref 1.8–7.4)
NEUTROPHILS NFR BLD AUTO: 47.4 % — SIGNIFICANT CHANGE UP (ref 43–77)
NRBC # BLD: 0 /100 WBCS — SIGNIFICANT CHANGE UP (ref 0–0)
NRBC # FLD: 0 K/UL — SIGNIFICANT CHANGE UP (ref 0–0)
PHOSPHATE SERPL-MCNC: 3.7 MG/DL — SIGNIFICANT CHANGE UP (ref 2.5–4.5)
PLATELET # BLD AUTO: 239 K/UL — SIGNIFICANT CHANGE UP (ref 150–400)
POTASSIUM SERPL-MCNC: 3.7 MMOL/L — SIGNIFICANT CHANGE UP (ref 3.5–5.3)
POTASSIUM SERPL-SCNC: 3.7 MMOL/L — SIGNIFICANT CHANGE UP (ref 3.5–5.3)
RBC # BLD: 5.03 M/UL — SIGNIFICANT CHANGE UP (ref 3.8–5.2)
RBC # FLD: 12.9 % — SIGNIFICANT CHANGE UP (ref 10.3–14.5)
SODIUM SERPL-SCNC: 138 MMOL/L — SIGNIFICANT CHANGE UP (ref 135–145)
WBC # BLD: 14.6 K/UL — HIGH (ref 3.8–10.5)
WBC # FLD AUTO: 14.6 K/UL — HIGH (ref 3.8–10.5)

## 2023-02-28 PROCEDURE — 99232 SBSQ HOSP IP/OBS MODERATE 35: CPT

## 2023-02-28 RX ADMIN — HEPARIN SODIUM 5000 UNIT(S): 5000 INJECTION INTRAVENOUS; SUBCUTANEOUS at 22:44

## 2023-02-28 RX ADMIN — Medication 650 MILLIGRAM(S): at 12:24

## 2023-02-28 RX ADMIN — Medication 650 MILLIGRAM(S): at 13:24

## 2023-02-28 RX ADMIN — Medication 650 MILLIGRAM(S): at 07:17

## 2023-02-28 RX ADMIN — Medication 40 MILLIGRAM(S): at 06:17

## 2023-02-28 RX ADMIN — ALBUTEROL 2.5 MILLIGRAM(S): 90 AEROSOL, METERED ORAL at 03:38

## 2023-02-28 RX ADMIN — POLYETHYLENE GLYCOL 3350 17 GRAM(S): 17 POWDER, FOR SOLUTION ORAL at 11:43

## 2023-02-28 RX ADMIN — HEPARIN SODIUM 5000 UNIT(S): 5000 INJECTION INTRAVENOUS; SUBCUTANEOUS at 06:17

## 2023-02-28 RX ADMIN — CHLORHEXIDINE GLUCONATE 1 APPLICATION(S): 213 SOLUTION TOPICAL at 11:43

## 2023-02-28 RX ADMIN — Medication 650 MILLIGRAM(S): at 06:17

## 2023-02-28 RX ADMIN — ALBUTEROL 2.5 MILLIGRAM(S): 90 AEROSOL, METERED ORAL at 22:02

## 2023-02-28 RX ADMIN — ALBUTEROL 2.5 MILLIGRAM(S): 90 AEROSOL, METERED ORAL at 10:08

## 2023-02-28 RX ADMIN — HEPARIN SODIUM 5000 UNIT(S): 5000 INJECTION INTRAVENOUS; SUBCUTANEOUS at 14:59

## 2023-02-28 RX ADMIN — ALBUTEROL 2.5 MILLIGRAM(S): 90 AEROSOL, METERED ORAL at 16:19

## 2023-02-28 NOTE — SWALLOW BEDSIDE ASSESSMENT ADULT - SWALLOW EVAL: DIAGNOSIS
received clearance for PO trials of puree/solids from ACP: 1. Functional oral stage for pudding and thin liquids marked by adequate oral acceptance, collection and transfer. 2. mild oral dysphagia for regular solids marked by slow chewing likely given odynophagia and eventual transfer with patient utilizing a thin liquid wash. 3. Functional pharyngeal phase for pudding, regular solids and thin liquids marked by a present pharyngeal swallow trigger with hyolaryngeal elevation noted upon digital palpation without evidence of impaired airway protection.

## 2023-02-28 NOTE — SWALLOW BEDSIDE ASSESSMENT ADULT - ADDITIONAL RECOMMENDATIONS
1. this service to follow up as schedule permits for diet tolerance. 2. Medical team advised to reconsult this service if change in medical status and/or patient's tolerance to recommended PO diet.

## 2023-02-28 NOTE — PROGRESS NOTE ADULT - SUBJECTIVE AND OBJECTIVE BOX
Clay Mata MD  Academic Hospitalist  Pager [phone]  Email: [email or web address]  Available on [employer] Teams        PROGRESS NOTE:     Patient is a 72y old  Female who presents with a chief complaint of shortness of breath (27 Feb 2023 13:51)      SUBJECTIVE / OVERNIGHT EVENTS:  Patient seen and examined this morning. Still hypophonic, but no shortness of breath.  ADDITIONAL REVIEW OF SYSTEMS:  No f/c/n/v    MEDICATIONS  (STANDING):  albuterol    0.083% 2.5 milliGRAM(s) Nebulizer every 6 hours  albuterol    90 MICROgram(s) HFA Inhaler 1 Puff(s) Inhalation every 4 hours  chlorhexidine 2% Cloths 1 Application(s) Topical daily  heparin   Injectable 5000 Unit(s) SubCutaneous every 8 hours  methylPREDNISolone sodium succinate Injectable 40 milliGRAM(s) IV Push daily  polyethylene glycol 3350 17 Gram(s) Oral daily  senna Syrup 10 milliLiter(s) Oral at bedtime    MEDICATIONS  (PRN):  acetaminophen     Tablet .. 650 milliGRAM(s) Oral every 6 hours PRN Temp greater or equal to 38C (100.4F), Mild Pain (1 - 3), Moderate Pain (4 - 6)  bisacodyl Suppository 10 milliGRAM(s) Rectal daily PRN Constipation      CAPILLARY BLOOD GLUCOSE        I&O's Summary    27 Feb 2023 07:01  -  28 Feb 2023 07:00  --------------------------------------------------------  IN: 0 mL / OUT: 2000 mL / NET: -2000 mL        PHYSICAL EXAM:  Vital Signs Last 24 Hrs  T(C): 36.8 (28 Feb 2023 10:00), Max: 36.9 (27 Feb 2023 17:35)  T(F): 98.2 (28 Feb 2023 10:00), Max: 98.5 (27 Feb 2023 17:35)  HR: 77 (28 Feb 2023 10:08) (70 - 88)  BP: 133/65 (28 Feb 2023 10:00) (128/78 - 155/87)  BP(mean): --  RR: 18 (28 Feb 2023 10:00) (17 - 18)  SpO2: 98% (28 Feb 2023 10:08) (92% - 98%)    Parameters below as of 28 Feb 2023 10:08  Patient On (Oxygen Delivery Method): tx        CONSTITUTIONAL: NAD, well-developed, hoarseness about the same as yesterday or slightly improving  RESPIRATORY: Normal respiratory effort; lungs are clear to auscultation bilaterally  CARDIOVASCULAR: Regular rate and rhythm, normal S1 and S2, no murmur/rub/gallop; No lower extremity edema; Peripheral pulses are 2+ bilaterally  ABDOMEN: Nontender to palpation, normoactive bowel sounds, no rebound/guarding;   MUSCLOSKELETAL: no clubbing or cyanosis of digits; no joint swelling or tenderness to palpation  PSYCH: A+O to person, place, and time; affect appropriate      LABS:                        13.2   14.60 )-----------( 239      ( 28 Feb 2023 06:23 )             42.3     02-28    138  |  102  |  15  ----------------------------<  99  3.7   |  25  |  0.71    Ca    9.1      28 Feb 2023 06:23  Phos  3.7     02-28  Mg     2.00     02-28                  RADIOLOGY & ADDITIONAL TESTS:  Results Reviewed:   Imaging Personally Reviewed:  Electrocardiogram Personally Reviewed:    COORDINATION OF CARE:  Care Discussed with Consultants/Other Providers [Y/N]:  Prior or Outpatient Records Reviewed [Y/N]:   Clay Mata MD  Academic Hospitalist  Pager [phone]  Email: [email or web address]  Available on Microsoft Teams        PROGRESS NOTE:     Patient is a 72y old  Female who presents with a chief complaint of shortness of breath (27 Feb 2023 13:51)      SUBJECTIVE / OVERNIGHT EVENTS:  Patient seen and examined this morning. Still hypophonic, but no shortness of breath.  ADDITIONAL REVIEW OF SYSTEMS:  No f/c/n/v    MEDICATIONS  (STANDING):  albuterol    0.083% 2.5 milliGRAM(s) Nebulizer every 6 hours  albuterol    90 MICROgram(s) HFA Inhaler 1 Puff(s) Inhalation every 4 hours  chlorhexidine 2% Cloths 1 Application(s) Topical daily  heparin   Injectable 5000 Unit(s) SubCutaneous every 8 hours  methylPREDNISolone sodium succinate Injectable 40 milliGRAM(s) IV Push daily  polyethylene glycol 3350 17 Gram(s) Oral daily  senna Syrup 10 milliLiter(s) Oral at bedtime    MEDICATIONS  (PRN):  acetaminophen     Tablet .. 650 milliGRAM(s) Oral every 6 hours PRN Temp greater or equal to 38C (100.4F), Mild Pain (1 - 3), Moderate Pain (4 - 6)  bisacodyl Suppository 10 milliGRAM(s) Rectal daily PRN Constipation      CAPILLARY BLOOD GLUCOSE        I&O's Summary    27 Feb 2023 07:01  -  28 Feb 2023 07:00  --------------------------------------------------------  IN: 0 mL / OUT: 2000 mL / NET: -2000 mL        PHYSICAL EXAM:  Vital Signs Last 24 Hrs  T(C): 36.8 (28 Feb 2023 10:00), Max: 36.9 (27 Feb 2023 17:35)  T(F): 98.2 (28 Feb 2023 10:00), Max: 98.5 (27 Feb 2023 17:35)  HR: 77 (28 Feb 2023 10:08) (70 - 88)  BP: 133/65 (28 Feb 2023 10:00) (128/78 - 155/87)  BP(mean): --  RR: 18 (28 Feb 2023 10:00) (17 - 18)  SpO2: 98% (28 Feb 2023 10:08) (92% - 98%)    Parameters below as of 28 Feb 2023 10:08  Patient On (Oxygen Delivery Method): tx        CONSTITUTIONAL: NAD, well-developed, hoarseness about the same as yesterday or slightly improving  RESPIRATORY: Normal respiratory effort; lungs are clear to auscultation bilaterally  CARDIOVASCULAR: Regular rate and rhythm, normal S1 and S2, no murmur/rub/gallop; No lower extremity edema; Peripheral pulses are 2+ bilaterally  ABDOMEN: Nontender to palpation, normoactive bowel sounds, no rebound/guarding;   MUSCLOSKELETAL: no clubbing or cyanosis of digits; no joint swelling or tenderness to palpation  PSYCH: A+O to person, place, and time; affect appropriate      LABS:                        13.2   14.60 )-----------( 239      ( 28 Feb 2023 06:23 )             42.3     02-28    138  |  102  |  15  ----------------------------<  99  3.7   |  25  |  0.71    Ca    9.1      28 Feb 2023 06:23  Phos  3.7     02-28  Mg     2.00     02-28                  RADIOLOGY & ADDITIONAL TESTS:  Results Reviewed:   Imaging Personally Reviewed:  Electrocardiogram Personally Reviewed:    COORDINATION OF CARE:  Care Discussed with Consultants/Other Providers [Y/N]:  Prior or Outpatient Records Reviewed [Y/N]:   Clay Mata MD  Academic Hospitalist  Pager [phone]  Email: [email or web address]  Available on Microsoft Teams        PROGRESS NOTE:     Patient is a 72y old  Female who presents with a chief complaint of shortness of breath (27 Feb 2023 13:51)      SUBJECTIVE / OVERNIGHT EVENTS:  Patient seen and examined this morning. Still hypophonic, but no shortness of breath.  ADDITIONAL REVIEW OF SYSTEMS:  No f/c/n/v    MEDICATIONS  (STANDING):  albuterol    0.083% 2.5 milliGRAM(s) Nebulizer every 6 hours  albuterol    90 MICROgram(s) HFA Inhaler 1 Puff(s) Inhalation every 4 hours  chlorhexidine 2% Cloths 1 Application(s) Topical daily  heparin   Injectable 5000 Unit(s) SubCutaneous every 8 hours  methylPREDNISolone sodium succinate Injectable 40 milliGRAM(s) IV Push daily  polyethylene glycol 3350 17 Gram(s) Oral daily  senna Syrup 10 milliLiter(s) Oral at bedtime    MEDICATIONS  (PRN):  acetaminophen     Tablet .. 650 milliGRAM(s) Oral every 6 hours PRN Temp greater or equal to 38C (100.4F), Mild Pain (1 - 3), Moderate Pain (4 - 6)  bisacodyl Suppository 10 milliGRAM(s) Rectal daily PRN Constipation      CAPILLARY BLOOD GLUCOSE        I&O's Summary    27 Feb 2023 07:01  -  28 Feb 2023 07:00  --------------------------------------------------------  IN: 0 mL / OUT: 2000 mL / NET: -2000 mL        PHYSICAL EXAM:  Vital Signs Last 24 Hrs  T(C): 36.8 (28 Feb 2023 10:00), Max: 36.9 (27 Feb 2023 17:35)  T(F): 98.2 (28 Feb 2023 10:00), Max: 98.5 (27 Feb 2023 17:35)  HR: 77 (28 Feb 2023 10:08) (70 - 88)  BP: 133/65 (28 Feb 2023 10:00) (128/78 - 155/87)  BP(mean): --  RR: 18 (28 Feb 2023 10:00) (17 - 18)  SpO2: 98% (28 Feb 2023 10:08) (92% - 98%)    Parameters below as of 28 Feb 2023 10:08  Patient On (Oxygen Delivery Method): tx        CONSTITUTIONAL: NAD, well-developed, hoarseness about the same as yesterday or slightly improving  RESPIRATORY: Normal respiratory effort; lungs are clear to auscultation bilaterally  CARDIOVASCULAR: Regular rate and rhythm, normal S1 and S2, no murmur/rub/gallop; No lower extremity edema; Peripheral pulses are 2+ bilaterally  ABDOMEN: Nontender to palpation, normoactive bowel sounds, no rebound/guarding;   MUSCLOSKELETAL: no clubbing or cyanosis of digits; no joint swelling or tenderness to palpation  PSYCH: A+O to person, place, and time; affect appropriate      LABS:                        13.2   14.60 )-----------( 239      ( 28 Feb 2023 06:23 )             42.3     02-28    138  |  102  |  15  ----------------------------<  99  3.7   |  25  |  0.71    Ca    9.1      28 Feb 2023 06:23  Phos  3.7     02-28  Mg     2.00     02-28                  RADIOLOGY & ADDITIONAL TESTS:  Results Reviewed:   Imaging Personally Reviewed:  Electrocardiogram Personally Reviewed:    COORDINATION OF CARE:  Care Discussed with Consultants/Other Providers [Y/N]:  Prior or Outpatient Records Reviewed [Y/N]:   Clay Mata MD  Academic Hospitalist  Pager [phone]  Email: [email or web address]  Available on [employer] Teams        PROGRESS NOTE:     Patient is a 72y old  Female who presents with a chief complaint of shortness of breath (27 Feb 2023 13:51)      SUBJECTIVE / OVERNIGHT EVENTS:  Patient seen and examined this morning. Still hypophonic, but no shortness of breath.  ADDITIONAL REVIEW OF SYSTEMS:  No f/c/n/v    MEDICATIONS  (STANDING):  albuterol    0.083% 2.5 milliGRAM(s) Nebulizer every 6 hours  albuterol    90 MICROgram(s) HFA Inhaler 1 Puff(s) Inhalation every 4 hours  chlorhexidine 2% Cloths 1 Application(s) Topical daily  heparin   Injectable 5000 Unit(s) SubCutaneous every 8 hours  methylPREDNISolone sodium succinate Injectable 40 milliGRAM(s) IV Push daily  polyethylene glycol 3350 17 Gram(s) Oral daily  senna Syrup 10 milliLiter(s) Oral at bedtime    MEDICATIONS  (PRN):  acetaminophen     Tablet .. 650 milliGRAM(s) Oral every 6 hours PRN Temp greater or equal to 38C (100.4F), Mild Pain (1 - 3), Moderate Pain (4 - 6)  bisacodyl Suppository 10 milliGRAM(s) Rectal daily PRN Constipation      CAPILLARY BLOOD GLUCOSE        I&O's Summary    27 Feb 2023 07:01  -  28 Feb 2023 07:00  --------------------------------------------------------  IN: 0 mL / OUT: 2000 mL / NET: -2000 mL        PHYSICAL EXAM:  Vital Signs Last 24 Hrs  T(C): 36.8 (28 Feb 2023 10:00), Max: 36.9 (27 Feb 2023 17:35)  T(F): 98.2 (28 Feb 2023 10:00), Max: 98.5 (27 Feb 2023 17:35)  HR: 77 (28 Feb 2023 10:08) (70 - 88)  BP: 133/65 (28 Feb 2023 10:00) (128/78 - 155/87)  BP(mean): --  RR: 18 (28 Feb 2023 10:00) (17 - 18)  SpO2: 98% (28 Feb 2023 10:08) (92% - 98%)    Parameters below as of 28 Feb 2023 10:08  Patient On (Oxygen Delivery Method): tx        CONSTITUTIONAL: NAD, well-developed, hoarseness about the same as yesterday or slightly improving  RESPIRATORY: Normal respiratory effort; lungs are clear to auscultation bilaterally  CARDIOVASCULAR: Regular rate and rhythm, normal S1 and S2, no murmur/rub/gallop; No lower extremity edema; Peripheral pulses are 2+ bilaterally  ABDOMEN: Nontender to palpation, normoactive bowel sounds, no rebound/guarding;   MUSCLOSKELETAL: no clubbing or cyanosis of digits; no joint swelling or tenderness to palpation  Neuro: facial 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)  PSYCH: A+O to person, place, and time; affect appropriate      LABS:                        13.2   14.60 )-----------( 239      ( 28 Feb 2023 06:23 )             42.3     02-28    138  |  102  |  15  ----------------------------<  99  3.7   |  25  |  0.71    Ca    9.1      28 Feb 2023 06:23  Phos  3.7     02-28  Mg     2.00     02-28                  RADIOLOGY & ADDITIONAL TESTS:  Results Reviewed:   Imaging Personally Reviewed:  Electrocardiogram Personally Reviewed:    COORDINATION OF CARE:  Care Discussed with Consultants/Other Providers [Y/N]:  Prior or Outpatient Records Reviewed [Y/N]:   Clay Mata MD  Academic Hospitalist  Pager [phone]  Email: [email or web address]  Available on [employer] Teams        PROGRESS NOTE:     Patient is a 72y old  Female who presents with a chief complaint of shortness of breath (27 Feb 2023 13:51)      SUBJECTIVE / OVERNIGHT EVENTS:  Patient seen and examined this morning. Still hypophonic, but no shortness of breath.  ADDITIONAL REVIEW OF SYSTEMS:  No f/c/n/v    MEDICATIONS  (STANDING):  albuterol    0.083% 2.5 milliGRAM(s) Nebulizer every 6 hours  albuterol    90 MICROgram(s) HFA Inhaler 1 Puff(s) Inhalation every 4 hours  chlorhexidine 2% Cloths 1 Application(s) Topical daily  heparin   Injectable 5000 Unit(s) SubCutaneous every 8 hours  methylPREDNISolone sodium succinate Injectable 40 milliGRAM(s) IV Push daily  polyethylene glycol 3350 17 Gram(s) Oral daily  senna Syrup 10 milliLiter(s) Oral at bedtime    MEDICATIONS  (PRN):  acetaminophen     Tablet .. 650 milliGRAM(s) Oral every 6 hours PRN Temp greater or equal to 38C (100.4F), Mild Pain (1 - 3), Moderate Pain (4 - 6)  bisacodyl Suppository 10 milliGRAM(s) Rectal daily PRN Constipation      CAPILLARY BLOOD GLUCOSE        I&O's Summary    27 Feb 2023 07:01  -  28 Feb 2023 07:00  --------------------------------------------------------  IN: 0 mL / OUT: 2000 mL / NET: -2000 mL        PHYSICAL EXAM:  Vital Signs Last 24 Hrs  T(C): 36.8 (28 Feb 2023 10:00), Max: 36.9 (27 Feb 2023 17:35)  T(F): 98.2 (28 Feb 2023 10:00), Max: 98.5 (27 Feb 2023 17:35)  HR: 77 (28 Feb 2023 10:08) (70 - 88)  BP: 133/65 (28 Feb 2023 10:00) (128/78 - 155/87)  BP(mean): --  RR: 18 (28 Feb 2023 10:00) (17 - 18)  SpO2: 98% (28 Feb 2023 10:08) (92% - 98%)    Parameters below as of 28 Feb 2023 10:08  Patient On (Oxygen Delivery Method): tx        CONSTITUTIONAL: NAD, well-developed, hoarseness about the same as yesterday or slightly improving  RESPIRATORY: Normal respiratory effort; lungs are clear to auscultation bilaterally  CARDIOVASCULAR: Regular rate and rhythm, normal S1 and S2, no murmur/rub/gallop; No lower extremity edema; Peripheral pulses are 2+ bilaterally  ABDOMEN: Nontender to palpation, normoactive bowel sounds, no rebound/guarding;   MUSCLOSKELETAL: no clubbing or cyanosis of digits; no joint swelling or tenderness to palpation  Neuro: facial 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)  PSYCH: A+O to person, place, and time; affect appropriate      LABS:                        13.2   14.60 )-----------( 239      ( 28 Feb 2023 06:23 )             42.3     02-28    138  |  102  |  15  ----------------------------<  99  3.7   |  25  |  0.71    Ca    9.1      28 Feb 2023 06:23  Phos  3.7     02-28  Mg     2.00     02-28                  RADIOLOGY & ADDITIONAL TESTS:  Results Reviewed:   Imaging Personally Reviewed:  Electrocardiogram Personally Reviewed:    COORDINATION OF CARE:  Care Discussed with Consultants/Other Providers [Y/N]:  Prior or Outpatient Records Reviewed [Y/N]:   Clay Mata MD  Academic Hospitalist  Pager [phone]  Email: [email or web address]  Available on [employer] Teams        PROGRESS NOTE:     Patient is a 72y old  Female who presents with a chief complaint of shortness of breath (27 Feb 2023 13:51)      SUBJECTIVE / OVERNIGHT EVENTS:  Patient seen and examined this morning. Still hypophonic, but no shortness of breath.  ADDITIONAL REVIEW OF SYSTEMS:  No f/c/n/v    MEDICATIONS  (STANDING):  albuterol    0.083% 2.5 milliGRAM(s) Nebulizer every 6 hours  albuterol    90 MICROgram(s) HFA Inhaler 1 Puff(s) Inhalation every 4 hours  chlorhexidine 2% Cloths 1 Application(s) Topical daily  heparin   Injectable 5000 Unit(s) SubCutaneous every 8 hours  methylPREDNISolone sodium succinate Injectable 40 milliGRAM(s) IV Push daily  polyethylene glycol 3350 17 Gram(s) Oral daily  senna Syrup 10 milliLiter(s) Oral at bedtime    MEDICATIONS  (PRN):  acetaminophen     Tablet .. 650 milliGRAM(s) Oral every 6 hours PRN Temp greater or equal to 38C (100.4F), Mild Pain (1 - 3), Moderate Pain (4 - 6)  bisacodyl Suppository 10 milliGRAM(s) Rectal daily PRN Constipation      CAPILLARY BLOOD GLUCOSE        I&O's Summary    27 Feb 2023 07:01  -  28 Feb 2023 07:00  --------------------------------------------------------  IN: 0 mL / OUT: 2000 mL / NET: -2000 mL        PHYSICAL EXAM:  Vital Signs Last 24 Hrs  T(C): 36.8 (28 Feb 2023 10:00), Max: 36.9 (27 Feb 2023 17:35)  T(F): 98.2 (28 Feb 2023 10:00), Max: 98.5 (27 Feb 2023 17:35)  HR: 77 (28 Feb 2023 10:08) (70 - 88)  BP: 133/65 (28 Feb 2023 10:00) (128/78 - 155/87)  BP(mean): --  RR: 18 (28 Feb 2023 10:00) (17 - 18)  SpO2: 98% (28 Feb 2023 10:08) (92% - 98%)    Parameters below as of 28 Feb 2023 10:08  Patient On (Oxygen Delivery Method): tx        CONSTITUTIONAL: NAD, well-developed, hoarseness about the same as yesterday or slightly improving  RESPIRATORY: Normal respiratory effort; lungs are clear to auscultation bilaterally  CARDIOVASCULAR: Regular rate and rhythm, normal S1 and S2, no murmur/rub/gallop; No lower extremity edema; Peripheral pulses are 2+ bilaterally  ABDOMEN: Nontender to palpation, normoactive bowel sounds, no rebound/guarding;   MUSCLOSKELETAL: no clubbing or cyanosis of digits; no joint swelling or tenderness to palpation  Neuro: facial 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)  PSYCH: A+O to person, place, and time; affect appropriate      LABS:                        13.2   14.60 )-----------( 239      ( 28 Feb 2023 06:23 )             42.3     02-28    138  |  102  |  15  ----------------------------<  99  3.7   |  25  |  0.71    Ca    9.1      28 Feb 2023 06:23  Phos  3.7     02-28  Mg     2.00     02-28                  RADIOLOGY & ADDITIONAL TESTS:  Results Reviewed:   Imaging Personally Reviewed:  Electrocardiogram Personally Reviewed:    COORDINATION OF CARE:  Care Discussed with Consultants/Other Providers [Y/N]:  Prior or Outpatient Records Reviewed [Y/N]:

## 2023-02-28 NOTE — SWALLOW BEDSIDE ASSESSMENT ADULT - COMMENTS
Hospitalist note 2/27 '72F with a PMHx of asthma, aryepiglottic folds thickening, [place] Palsy, essential HTN, hepatitis C, seizure who initially was BIBA to [facility], sent by Dr. Mcneal for wheezing. Pt was seen in office and was sent to the ED to r/o vocal cord dysfunction. Dr Mcneal stated that he had been following her for her asthma; she presented to the office for 2 weeks of SOB. Patient evaluated in [facility] ED and found to have stridor. Patient with interval improvement of stridor after epi, magnesium and steroids. Patient intubated for hypoxic respiratory failure and stridor. ICU saw pt in ED, no ENT available at [facility] so patient was transferred to [facility] ICU. During MICU admission the patient was treated for asthma exacerbation with Albuterol and Solumedrol. ENT was consulted for evaluation who took patient to OR to extubate and evaluate. ENT determined she had normal airways and no aryepiglottic folds thickening. She was transferred back to MICU. She was on NC and was weaned off to room air. The patient reported feeling well, was hemodynamically stable and ready to be transferred to floors."    Patient seen at bedside this afternoon for an initial assessment of the swallow function, at which time patient was alert. Patient with hoarse vocal quality likely due to intubation. Patient reporting pain in throat and on right side of neck (patient pointed to right masseter) with a pain of 4 out of 10. Hospitalist note 2/27 '72F with a PMHx of asthma, aryepiglottic folds thickening, [place] Palsy, essential HTN, hepatitis C, seizure who initially was BIBA to [place] ED, sent by Dr. Mcneal for wheezing. Pt was seen in office and was sent to the ED to r/o vocal cord dysfunction. Dr Mcneal stated that he had been following her for her asthma; she presented to the office for 2 weeks of SOB. Patient evaluated in [place] ED and found to have stridor. Patient with interval improvement of stridor after epi, magnesium and steroids. Patient intubated for hypoxic respiratory failure and stridor. ICU saw pt in ED, no ENT available at [facility] so patient was transferred to [facility] ICU. During MICU admission the patient was treated for asthma exacerbation with Albuterol and Solumedrol. ENT was consulted for evaluation who took patient to OR to extubate and evaluate. ENT determined she had normal airways and no aryepiglottic folds thickening. She was transferred back to MICU. She was on NC and was weaned off to room air. The patient reported feeling well, was hemodynamically stable and ready to be transferred to floors."    Patient seen at bedside this afternoon for an initial assessment of the swallow function, at which time patient was alert. Patient with hoarse vocal quality likely due to intubation. Patient reporting pain in throat and on right side of neck (patient pointed to right masseter) with a pain of 4 out of 10. Hospitalist note 2/27 '72F with a PMHx of asthma, aryepiglottic folds thickening, [place] Palsy, essential HTN, hepatitis C, seizure who initially was BIBA to [place] ED, sent by Dr. Mcneal for wheezing. Pt was seen in office and was sent to the ED to r/o vocal cord dysfunction. Dr Mcneal stated that he had been following her for her asthma; she presented to the office for 2 weeks of SOB. Patient evaluated in [place] ED and found to have stridor. Patient with interval improvement of stridor after epi, magnesium and steroids. Patient intubated for hypoxic respiratory failure and stridor. ICU saw pt in ED, no ENT available at [facility] so patient was transferred to [facility] ICU. During MICU admission the patient was treated for asthma exacerbation with Albuterol and Solumedrol. ENT was consulted for evaluation who took patient to OR to extubate and evaluate. ENT determined she had normal airways and no aryepiglottic folds thickening. She was transferred back to MICU. She was on NC and was weaned off to room air. The patient reported feeling well, was hemodynamically stable and ready to be transferred to floors."    Patient seen at bedside this afternoon for an initial assessment of the swallow function, at which time patient was alert. Patient with hoarse vocal quality likely due to intubation. Patient reporting pain in throat and on right side of neck (patient pointed to right masseter) with a pain of 4 out of 10.

## 2023-02-28 NOTE — PROGRESS NOTE ADULT - ASSESSMENT
72F with a PMHx of asthma, aryepiglottic folds thickening, [place] Palsy, essential HTN, hepatitis C, seizure who initially was BIBA to [place] ED, sent by Dr. Mcneal for wheezing. Pt was seen in office and was sent to the ED to r/o vocal cord dysfunction. Dr Mcneal stated that he had been following her for her asthma; she presented to the office for 2 weeks of SOB. Patient evaluated in [place] ED and found to have stridor. Patient with interval improvement of stridor after epi, magnesium and steroids. Patient intubated for hypoxic respiratory failure and stridor. ICU saw pt in ED, no ENT available at [facility] so patient was transferred to [facility] ICU. During MICU admission the patient was treated for asthma exacerbation with Albuterol and Solumedrol. ENT was consulted for evaluation who took patient to OR to extubate and evaluate. ENT determined she had normal airways and no aryepiglottic folds thickening. She was transferred back to MICU. She was on NC and was weaned off to room air. The patient reported feeling well, was hemodynamically stable and ready to be transferred to floors.    72F with a PMHx of asthma, aryepiglottic folds thickening, [place] Palsy, essential HTN, hepatitis C, seizure who initially was BIBA to [place] ED, sent by Dr. Mcneal for wheezing. Pt was seen in office and was sent to the ED to r/o vocal cord dysfunction. Dr Mcneal stated that he had been following her for her asthma; she presented to the office for 2 weeks of SOB. Patient evaluated in [place] ED and found to have stridor. Patient with interval improvement of stridor after epi, magnesium and steroids. Patient intubated for hypoxic respiratory failure and stridor. ICU saw pt in ED, no ENT available at [facility] so patient was transferred to [facility] ICU. During MICU admission the patient was treated for asthma exacerbation with Albuterol and Solumedrol. ENT was consulted for evaluation who took patient to OR to extubate and evaluate. ENT determined she had normal airways and no aryepiglottic folds thickening. She was transferred back to MICU. She was on NC and was weaned off to room air. The patient reported feeling well, was hemodynamically stable and ready to be transferred to floors.    72F with a PMHx of asthma, aryepiglottic folds thickening, [place] Palsy, essential HTN, hepatitis C, seizure who initially was BIBA to [place] ED, sent by Dr. Mcneal for wheezing. Pt was seen in office and was sent to the ED to r/o vocal cord dysfunction. Dr Mcneal stated that he had been following her for her asthma; she presented to the office for 2 weeks of SOB. Patient evaluated in [place] ED and found to have stridor. Patient with interval improvement of stridor after epi, magnesium and steroids. Patient intubated for hypoxic respiratory failure and stridor. ICU saw pt in ED, no ENT available at [facility] so patient was transferred to [facility] ICU. During MICU admission the patient was treated for asthma exacerbation with Albuterol and Solumedrol. ENT was consulted for evaluation who took patient to OR to extubate and evaluate. ENT determined she had normal airways and no aryepiglottic folds thickening. She was transferred back to MICU. She was on NC and was weaned off to room air. The patient reported feeling well, was hemodynamically stable and ready to be transferred to floors.

## 2023-03-01 LAB
ANION GAP SERPL CALC-SCNC: 10 MMOL/L — SIGNIFICANT CHANGE UP (ref 7–14)
BASOPHILS # BLD AUTO: 0 K/UL — SIGNIFICANT CHANGE UP (ref 0–0.2)
BASOPHILS NFR BLD AUTO: 0 % — SIGNIFICANT CHANGE UP (ref 0–2)
BUN SERPL-MCNC: 18 MG/DL — SIGNIFICANT CHANGE UP (ref 7–23)
CALCIUM SERPL-MCNC: 9.2 MG/DL — SIGNIFICANT CHANGE UP (ref 8.4–10.5)
CHLORIDE SERPL-SCNC: 101 MMOL/L — SIGNIFICANT CHANGE UP (ref 98–107)
CO2 SERPL-SCNC: 26 MMOL/L — SIGNIFICANT CHANGE UP (ref 22–31)
CREAT SERPL-MCNC: 0.74 MG/DL — SIGNIFICANT CHANGE UP (ref 0.5–1.3)
CULTURE RESULTS: SIGNIFICANT CHANGE UP
EGFR: 87 ML/MIN/1.73M2 — SIGNIFICANT CHANGE UP
EOSINOPHIL # BLD AUTO: 0.35 K/UL — SIGNIFICANT CHANGE UP (ref 0–0.5)
EOSINOPHIL NFR BLD AUTO: 2.6 % — SIGNIFICANT CHANGE UP (ref 0–6)
GLUCOSE SERPL-MCNC: 100 MG/DL — HIGH (ref 70–99)
HCT VFR BLD CALC: 42.3 % — SIGNIFICANT CHANGE UP (ref 34.5–45)
HGB BLD-MCNC: 13.4 G/DL — SIGNIFICANT CHANGE UP (ref 11.5–15.5)
IANC: 6.46 K/UL — SIGNIFICANT CHANGE UP (ref 1.8–7.4)
LYMPHOCYTES # BLD AUTO: 28.1 % — SIGNIFICANT CHANGE UP (ref 13–44)
LYMPHOCYTES # BLD AUTO: 3.76 K/UL — HIGH (ref 1–3.3)
MAGNESIUM SERPL-MCNC: 2.1 MG/DL — SIGNIFICANT CHANGE UP (ref 1.6–2.6)
MCHC RBC-ENTMCNC: 26.9 PG — LOW (ref 27–34)
MCHC RBC-ENTMCNC: 31.7 GM/DL — LOW (ref 32–36)
MCV RBC AUTO: 84.9 FL — SIGNIFICANT CHANGE UP (ref 80–100)
MONOCYTES # BLD AUTO: 0.82 K/UL — SIGNIFICANT CHANGE UP (ref 0–0.9)
MONOCYTES NFR BLD AUTO: 6.1 % — SIGNIFICANT CHANGE UP (ref 2–14)
NEUTROPHILS # BLD AUTO: 6.57 K/UL — SIGNIFICANT CHANGE UP (ref 1.8–7.4)
NEUTROPHILS NFR BLD AUTO: 49.1 % — SIGNIFICANT CHANGE UP (ref 43–77)
PHOSPHATE SERPL-MCNC: 3.6 MG/DL — SIGNIFICANT CHANGE UP (ref 2.5–4.5)
PLATELET # BLD AUTO: 256 K/UL — SIGNIFICANT CHANGE UP (ref 150–400)
POTASSIUM SERPL-MCNC: 3.8 MMOL/L — SIGNIFICANT CHANGE UP (ref 3.5–5.3)
POTASSIUM SERPL-SCNC: 3.8 MMOL/L — SIGNIFICANT CHANGE UP (ref 3.5–5.3)
RBC # BLD: 4.98 M/UL — SIGNIFICANT CHANGE UP (ref 3.8–5.2)
RBC # FLD: 13.2 % — SIGNIFICANT CHANGE UP (ref 10.3–14.5)
SODIUM SERPL-SCNC: 137 MMOL/L — SIGNIFICANT CHANGE UP (ref 135–145)
SPECIMEN SOURCE: SIGNIFICANT CHANGE UP
WBC # BLD: 13.39 K/UL — HIGH (ref 3.8–10.5)
WBC # FLD AUTO: 13.39 K/UL — HIGH (ref 3.8–10.5)

## 2023-03-01 PROCEDURE — 99232 SBSQ HOSP IP/OBS MODERATE 35: CPT

## 2023-03-01 PROCEDURE — 70491 CT SOFT TISSUE NECK W/DYE: CPT | Mod: 26

## 2023-03-01 RX ADMIN — ALBUTEROL 2.5 MILLIGRAM(S): 90 AEROSOL, METERED ORAL at 04:48

## 2023-03-01 RX ADMIN — HEPARIN SODIUM 5000 UNIT(S): 5000 INJECTION INTRAVENOUS; SUBCUTANEOUS at 05:50

## 2023-03-01 RX ADMIN — HEPARIN SODIUM 5000 UNIT(S): 5000 INJECTION INTRAVENOUS; SUBCUTANEOUS at 22:06

## 2023-03-01 RX ADMIN — HEPARIN SODIUM 5000 UNIT(S): 5000 INJECTION INTRAVENOUS; SUBCUTANEOUS at 13:12

## 2023-03-01 RX ADMIN — Medication 650 MILLIGRAM(S): at 23:11

## 2023-03-01 RX ADMIN — SENNA PLUS 10 MILLILITER(S): 8.6 TABLET ORAL at 22:06

## 2023-03-01 RX ADMIN — Medication 650 MILLIGRAM(S): at 07:08

## 2023-03-01 RX ADMIN — ALBUTEROL 2.5 MILLIGRAM(S): 90 AEROSOL, METERED ORAL at 21:07

## 2023-03-01 RX ADMIN — Medication 650 MILLIGRAM(S): at 22:11

## 2023-03-01 RX ADMIN — Medication 650 MILLIGRAM(S): at 07:57

## 2023-03-01 RX ADMIN — ALBUTEROL 2.5 MILLIGRAM(S): 90 AEROSOL, METERED ORAL at 16:01

## 2023-03-01 RX ADMIN — Medication 40 MILLIGRAM(S): at 05:49

## 2023-03-01 RX ADMIN — ALBUTEROL 2.5 MILLIGRAM(S): 90 AEROSOL, METERED ORAL at 09:51

## 2023-03-01 RX ADMIN — CHLORHEXIDINE GLUCONATE 1 APPLICATION(S): 213 SOLUTION TOPICAL at 13:13

## 2023-03-01 NOTE — PROGRESS NOTE ADULT - ASSESSMENT
72F with a PMHx of asthma, aryepiglottic folds thickening, [place] Palsy, essential HTN, hepatitis C, seizure who initially was BIBA to [place] ED, sent by Dr. Mcneal for wheezing. Pt was seen in office and was sent to the ED to r/o vocal cord dysfunction. Dr Mcneal stated that he had been following her for her asthma; she presented to the office for 2 weeks of SOB. Patient evaluated in [place] ED and found to have stridor. Patient with interval improvement of stridor after epi, magnesium and steroids. Patient intubated for hypoxic respiratory failure and stridor. ICU saw pt in ED, no ENT available at [facility] so patient was transferred to [facility] ICU. During MICU admission the patient was treated for asthma exacerbation with Albuterol and Solumedrol. ENT was consulted for evaluation who took patient to OR to extubate and evaluate. ENT determined she had normal airways and no aryepiglottic folds thickening. She was transferred back to MICU. She was on NC and was weaned off to room air. The patient reported feeling well, was hemodynamically stable and ready to be transferred to floors.    72F with a PMHx of asthma, aryepiglottic folds thickening, [place] Palsy, essential HTN, hepatitis C, seizure who initially was BIBA to [place] ED, sent by Dr. Mcneal for wheezing. Pt was seen in office and was sent to the ED to r/o vocal cord dysfunction. Dr Mcneal stated that he had been following her for her asthma; she presented to the office for 2 weeks of SOB. Patient evaluated in [place] ED and found to have stridor. Patient with interval improvement of stridor after epi, magnesium and steroids. Patient intubated for hypoxic respiratory failure and stridor. ICU saw pt in ED, no ENT available at [facility] so patient was transferred to [facility] ICU. During MICU admission the patient was treated for asthma exacerbation with Albuterol and Solumedrol. ENT was consulted for evaluation who took patient to OR to extubate and evaluate. ENT determined she had normal airways and no aryepiglottic folds thickening. She was transferred back to MICU. She was on NC and was weaned off to room air. The patient reported feeling well, was hemodynamically stable and ready to be transferred to floors.    72F with a PMHx of asthma, aryepiglottic folds thickening, [place] Palsy, essential HTN, hepatitis C, seizure who initially was BIBA to [place] ED, sent by Dr. Mcneal for wheezing. Pt was seen in office and was sent to the ED to r/o vocal cord dysfunction. Dr Mcneal stated that he had been following her for her asthma; she presented to the office for 2 weeks of SOB. Patient evaluated in [place] ED and found to have stridor. Patient with interval improvement of stridor after epi, magnesium and steroids. Patient intubated for hypoxic respiratory failure and stridor. ICU saw pt in ED, no ENT available at [facility] so patient was transferred to [facility] ICU. During MICU admission the patient was treated for asthma exacerbation with Albuterol and Solumedrol. ENT was consulted for evaluation who took patient to OR to extubate and evaluate. ENT determined she had normal airways and no aryepiglottic folds thickening. She was transferred back to MICU. She was on NC and was weaned off to room air. The patient reported feeling well, was hemodynamically stable and ready to be transferred to floors.

## 2023-03-01 NOTE — PROGRESS NOTE ADULT - SUBJECTIVE AND OBJECTIVE BOX
Clay Mata MD  Academic Hospitalist  Pager [phone]  Email: [email or web address]  Available on [employer] Teams        PROGRESS NOTE:     Patient is a 72y old  Female who presents with a chief complaint of shortness of breath (28 Feb 2023 13:22)      SUBJECTIVE / OVERNIGHT EVENTS:  Patient seen and examined this morning. She thinks her voice is less hypophonic.     I discussed extensively with outpatient  pulmonologist, Feng Mcneal DO. He does not think that she is having is an asthma exacerbation. Given her continued change in voice, will get a CT neck w/contrast.  ADDITIONAL REVIEW OF SYSTEMS:  No f/c/n/v      MEDICATIONS  (STANDING):  albuterol    0.083% 2.5 milliGRAM(s) Nebulizer every 6 hours  albuterol    90 MICROgram(s) HFA Inhaler 1 Puff(s) Inhalation every 4 hours  chlorhexidine 2% Cloths 1 Application(s) Topical daily  heparin   Injectable 5000 Unit(s) SubCutaneous every 8 hours  polyethylene glycol 3350 17 Gram(s) Oral daily  senna Syrup 10 milliLiter(s) Oral at bedtime    MEDICATIONS  (PRN):  acetaminophen     Tablet .. 650 milliGRAM(s) Oral every 6 hours PRN Temp greater or equal to 38C (100.4F), Mild Pain (1 - 3), Moderate Pain (4 - 6)  bisacodyl Suppository 10 milliGRAM(s) Rectal daily PRN Constipation      CAPILLARY BLOOD GLUCOSE        I&O's Summary      PHYSICAL EXAM:  Vital Signs Last 24 Hrs  T(C): 36.6 (01 Mar 2023 06:47), Max: 36.7 (28 Feb 2023 18:58)  T(F): 97.9 (01 Mar 2023 06:47), Max: 98.1 (28 Feb 2023 18:58)  HR: 97 (01 Mar 2023 11:38) (75 - 97)  BP: 151/75 (01 Mar 2023 11:38) (136/82 - 162/88)  BP(mean): --  RR: 16 (01 Mar 2023 06:47) (16 - 18)  SpO2: 96% (01 Mar 2023 11:38) (96% - 99%)    Parameters below as of 01 Mar 2023 11:38  Patient On (Oxygen Delivery Method): room air        CONSTITUTIONAL: NAD, well-developed, hoarseness slightly improving  RESPIRATORY: Normal respiratory effort; lungs are clear to auscultation bilaterally  CARDIOVASCULAR: Regular rate and rhythm, normal S1 and S2, no murmur/rub/gallop; No lower extremity edema; Peripheral pulses are 2+ bilaterally  ABDOMEN: Nontender to palpation, normoactive bowel sounds, no rebound/guarding;   MUSCLOSKELETAL: no clubbing or cyanosis of digits; no joint swelling or tenderness to palpation  Neuro: facial 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), weakness bilaterally in upper and lower exts, about 4/5  PSYCH: A+O to person, place, and time; affect appropriate    LABS:                        13.4   13.39 )-----------( 256      ( 01 Mar 2023 07:30 )             42.3     03-01    137  |  101  |  18  ----------------------------<  100<H>  3.8   |  26  |  0.74    Ca    9.2      01 Mar 2023 07:30  Phos  3.6     03-01  Mg     2.10     03-01                  RADIOLOGY & ADDITIONAL TESTS:  Results Reviewed:   Imaging Personally Reviewed:  Electrocardiogram Personally Reviewed:    COORDINATION OF CARE:  Care Discussed with Consultants/Other Providers [Y/N]: Case discussed during interdisciplinary rounds with social work and case management  Prior or Outpatient Records Reviewed [Y/N]:   Clay Mata MD  Academic Hospitalist  Pager [phone]  Email: [email or web address]  Available on [employer] Teams        PROGRESS NOTE:     Patient is a 72y old  Female who presents with a chief complaint of shortness of breath (28 Feb 2023 13:22)      SUBJECTIVE / OVERNIGHT EVENTS:  Patient seen and examined this morning. She thinks her voice is less hypophonic.     I discussed extensively with outpatient  pulmonologist, Feng Mcneal DO. He does not think that she is having is an asthma exacerbation. Given her continued change in voice, will get a CT neck w/contrast.  ADDITIONAL REVIEW OF SYSTEMS:  No f/c/n/v      MEDICATIONS  (STANDING):  albuterol    0.083% 2.5 milliGRAM(s) Nebulizer every 6 hours  albuterol    90 MICROgram(s) HFA Inhaler 1 Puff(s) Inhalation every 4 hours  chlorhexidine 2% Cloths 1 Application(s) Topical daily  heparin   Injectable 5000 Unit(s) SubCutaneous every 8 hours  polyethylene glycol 3350 17 Gram(s) Oral daily  senna Syrup 10 milliLiter(s) Oral at bedtime    MEDICATIONS  (PRN):  acetaminophen     Tablet .. 650 milliGRAM(s) Oral every 6 hours PRN Temp greater or equal to 38C (100.4F), Mild Pain (1 - 3), Moderate Pain (4 - 6)  bisacodyl Suppository 10 milliGRAM(s) Rectal daily PRN Constipation      CAPILLARY BLOOD GLUCOSE        I&O's Summary      PHYSICAL EXAM:  Vital Signs Last 24 Hrs  T(C): 36.6 (01 Mar 2023 06:47), Max: 36.7 (28 Feb 2023 18:58)  T(F): 97.9 (01 Mar 2023 06:47), Max: 98.1 (28 Feb 2023 18:58)  HR: 97 (01 Mar 2023 11:38) (75 - 97)  BP: 151/75 (01 Mar 2023 11:38) (136/82 - 162/88)  BP(mean): --  RR: 16 (01 Mar 2023 06:47) (16 - 18)  SpO2: 96% (01 Mar 2023 11:38) (96% - 99%)    Parameters below as of 01 Mar 2023 11:38  Patient On (Oxygen Delivery Method): room air        CONSTITUTIONAL: NAD, well-developed, hoarseness slightly improving  RESPIRATORY: Normal respiratory effort; lungs are clear to auscultation bilaterally  CARDIOVASCULAR: Regular rate and rhythm, normal S1 and S2, no murmur/rub/gallop; No lower extremity edema; Peripheral pulses are 2+ bilaterally  ABDOMEN: Nontender to palpation, normoactive bowel sounds, no rebound/guarding;   MUSCLOSKELETAL: no clubbing or cyanosis of digits; no joint swelling or tenderness to palpation  Neuro: facial 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), weakness bilaterally in upper and lower exts, about 4/5  PSYCH: A+O to person, place, and time; affect appropriate    LABS:                        13.4   13.39 )-----------( 256      ( 01 Mar 2023 07:30 )             42.3     03-01    137  |  101  |  18  ----------------------------<  100<H>  3.8   |  26  |  0.74    Ca    9.2      01 Mar 2023 07:30  Phos  3.6     03-01  Mg     2.10     03-01                  RADIOLOGY & ADDITIONAL TESTS:  Results Reviewed:   Imaging Personally Reviewed:  Electrocardiogram Personally Reviewed:    COORDINATION OF CARE:  Care Discussed with Consultants/Other Providers [Y/N]: Case discussed during interdisciplinary rounds with social work and case management  Prior or Outpatient Records Reviewed [Y/N]:   Clay Mata MD  Academic Hospitalist  Pager [phone]  Email: [email or web address]  Available on [employer] Teams        PROGRESS NOTE:     Patient is a 72y old  Female who presents with a chief complaint of shortness of breath (28 Feb 2023 13:22)      SUBJECTIVE / OVERNIGHT EVENTS:  Patient seen and examined this morning. She thinks her voice is less hypophonic.     I discussed extensively with outpatient  pulmonologist, Feng Mcneal DO. He does not think that she is having is an asthma exacerbation. Given her continued change in voice, will get a CT neck w/contrast.  ADDITIONAL REVIEW OF SYSTEMS:  No f/c/n/v      MEDICATIONS  (STANDING):  albuterol    0.083% 2.5 milliGRAM(s) Nebulizer every 6 hours  albuterol    90 MICROgram(s) HFA Inhaler 1 Puff(s) Inhalation every 4 hours  chlorhexidine 2% Cloths 1 Application(s) Topical daily  heparin   Injectable 5000 Unit(s) SubCutaneous every 8 hours  polyethylene glycol 3350 17 Gram(s) Oral daily  senna Syrup 10 milliLiter(s) Oral at bedtime    MEDICATIONS  (PRN):  acetaminophen     Tablet .. 650 milliGRAM(s) Oral every 6 hours PRN Temp greater or equal to 38C (100.4F), Mild Pain (1 - 3), Moderate Pain (4 - 6)  bisacodyl Suppository 10 milliGRAM(s) Rectal daily PRN Constipation      CAPILLARY BLOOD GLUCOSE        I&O's Summary      PHYSICAL EXAM:  Vital Signs Last 24 Hrs  T(C): 36.6 (01 Mar 2023 06:47), Max: 36.7 (28 Feb 2023 18:58)  T(F): 97.9 (01 Mar 2023 06:47), Max: 98.1 (28 Feb 2023 18:58)  HR: 97 (01 Mar 2023 11:38) (75 - 97)  BP: 151/75 (01 Mar 2023 11:38) (136/82 - 162/88)  BP(mean): --  RR: 16 (01 Mar 2023 06:47) (16 - 18)  SpO2: 96% (01 Mar 2023 11:38) (96% - 99%)    Parameters below as of 01 Mar 2023 11:38  Patient On (Oxygen Delivery Method): room air        CONSTITUTIONAL: NAD, well-developed, hoarseness slightly improving  RESPIRATORY: Normal respiratory effort; lungs are clear to auscultation bilaterally  CARDIOVASCULAR: Regular rate and rhythm, normal S1 and S2, no murmur/rub/gallop; No lower extremity edema; Peripheral pulses are 2+ bilaterally  ABDOMEN: Nontender to palpation, normoactive bowel sounds, no rebound/guarding;   MUSCLOSKELETAL: no clubbing or cyanosis of digits; no joint swelling or tenderness to palpation  Neuro: facial 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), weakness bilaterally in upper and lower exts, about 4/5  PSYCH: A+O to person, place, and time; affect appropriate    LABS:                        13.4   13.39 )-----------( 256      ( 01 Mar 2023 07:30 )             42.3     03-01    137  |  101  |  18  ----------------------------<  100<H>  3.8   |  26  |  0.74    Ca    9.2      01 Mar 2023 07:30  Phos  3.6     03-01  Mg     2.10     03-01                  RADIOLOGY & ADDITIONAL TESTS:  Results Reviewed:   Imaging Personally Reviewed:  Electrocardiogram Personally Reviewed:    COORDINATION OF CARE:  Care Discussed with Consultants/Other Providers [Y/N]: Case discussed during interdisciplinary rounds with social work and case management  Prior or Outpatient Records Reviewed [Y/N]:

## 2023-03-02 LAB
ANION GAP SERPL CALC-SCNC: 10 MMOL/L — SIGNIFICANT CHANGE UP (ref 7–14)
BUN SERPL-MCNC: 17 MG/DL — SIGNIFICANT CHANGE UP (ref 7–23)
CALCIUM SERPL-MCNC: 9 MG/DL — SIGNIFICANT CHANGE UP (ref 8.4–10.5)
CHLORIDE SERPL-SCNC: 104 MMOL/L — SIGNIFICANT CHANGE UP (ref 98–107)
CO2 SERPL-SCNC: 24 MMOL/L — SIGNIFICANT CHANGE UP (ref 22–31)
CREAT SERPL-MCNC: 0.76 MG/DL — SIGNIFICANT CHANGE UP (ref 0.5–1.3)
EGFR: 84 ML/MIN/1.73M2 — SIGNIFICANT CHANGE UP
GLUCOSE SERPL-MCNC: 97 MG/DL — SIGNIFICANT CHANGE UP (ref 70–99)
HCT VFR BLD CALC: 42.2 % — SIGNIFICANT CHANGE UP (ref 34.5–45)
HGB BLD-MCNC: 12.8 G/DL — SIGNIFICANT CHANGE UP (ref 11.5–15.5)
MCHC RBC-ENTMCNC: 26.1 PG — LOW (ref 27–34)
MCHC RBC-ENTMCNC: 30.3 GM/DL — LOW (ref 32–36)
MCV RBC AUTO: 85.9 FL — SIGNIFICANT CHANGE UP (ref 80–100)
MRSA PCR RESULT.: SIGNIFICANT CHANGE UP
NRBC # BLD: 0 /100 WBCS — SIGNIFICANT CHANGE UP (ref 0–0)
NRBC # FLD: 0 K/UL — SIGNIFICANT CHANGE UP (ref 0–0)
PLATELET # BLD AUTO: 229 K/UL — SIGNIFICANT CHANGE UP (ref 150–400)
POTASSIUM SERPL-MCNC: 3.8 MMOL/L — SIGNIFICANT CHANGE UP (ref 3.5–5.3)
POTASSIUM SERPL-SCNC: 3.8 MMOL/L — SIGNIFICANT CHANGE UP (ref 3.5–5.3)
RBC # BLD: 4.91 M/UL — SIGNIFICANT CHANGE UP (ref 3.8–5.2)
RBC # FLD: 13.2 % — SIGNIFICANT CHANGE UP (ref 10.3–14.5)
S AUREUS DNA NOSE QL NAA+PROBE: SIGNIFICANT CHANGE UP
SARS-COV-2 RNA SPEC QL NAA+PROBE: SIGNIFICANT CHANGE UP
SODIUM SERPL-SCNC: 138 MMOL/L — SIGNIFICANT CHANGE UP (ref 135–145)
WBC # BLD: 14.8 K/UL — HIGH (ref 3.8–10.5)
WBC # FLD AUTO: 14.8 K/UL — HIGH (ref 3.8–10.5)

## 2023-03-02 PROCEDURE — 99232 SBSQ HOSP IP/OBS MODERATE 35: CPT

## 2023-03-02 PROCEDURE — 93010 ELECTROCARDIOGRAM REPORT: CPT

## 2023-03-02 RX ORDER — PANTOPRAZOLE SODIUM 20 MG/1
40 TABLET, DELAYED RELEASE ORAL
Refills: 0 | Status: DISCONTINUED | OUTPATIENT
Start: 2023-03-02 | End: 2023-03-03

## 2023-03-02 RX ADMIN — ALBUTEROL 2.5 MILLIGRAM(S): 90 AEROSOL, METERED ORAL at 20:25

## 2023-03-02 RX ADMIN — CHLORHEXIDINE GLUCONATE 1 APPLICATION(S): 213 SOLUTION TOPICAL at 13:19

## 2023-03-02 RX ADMIN — Medication 650 MILLIGRAM(S): at 13:22

## 2023-03-02 RX ADMIN — Medication 24 MILLIGRAM(S): at 13:18

## 2023-03-02 RX ADMIN — Medication 650 MILLIGRAM(S): at 13:55

## 2023-03-02 RX ADMIN — ALBUTEROL 2.5 MILLIGRAM(S): 90 AEROSOL, METERED ORAL at 16:17

## 2023-03-02 RX ADMIN — ALBUTEROL 2.5 MILLIGRAM(S): 90 AEROSOL, METERED ORAL at 03:38

## 2023-03-02 RX ADMIN — HEPARIN SODIUM 5000 UNIT(S): 5000 INJECTION INTRAVENOUS; SUBCUTANEOUS at 13:18

## 2023-03-02 RX ADMIN — HEPARIN SODIUM 5000 UNIT(S): 5000 INJECTION INTRAVENOUS; SUBCUTANEOUS at 21:42

## 2023-03-02 RX ADMIN — HEPARIN SODIUM 5000 UNIT(S): 5000 INJECTION INTRAVENOUS; SUBCUTANEOUS at 05:55

## 2023-03-02 NOTE — PROGRESS NOTE ADULT - PROBLEM SELECTOR PLAN 3
if SBP rises consistently above 150's can restart home antihypertensives

## 2023-03-02 NOTE — PROGRESS NOTE ADULT - PROBLEM SELECTOR PLAN 6
On heparin sub q for dvt prophylaxis
On heparin sub q for dvt prophylaxis  Protonix ordered, can help hoarseness
On heparin sub q for dvt prophylaxis
On heparin sub q for dvt prophylaxis

## 2023-03-02 NOTE — PROGRESS NOTE ADULT - PROBLEM SELECTOR PLAN 5
Likely from intubation, if any worsening noted, will reach out again to ENT
On heparin sub q for dvt prophylaxis
Likely from intubation, if any worsening noted, will reach out again to ENT  Given conversation with outpatient pulmonologist, will obtain CT neck w/contrast to exclude mass or other issues
Likely from intubation, if any worsening noted, will reach out again to ENT  Given conversation with outpatient pulmonologist, will obtain CT neck w/contrast to exclude mass or other issues
Likely from intubation, will reach out to ENT to further discuss whether any other intervention is recommended

## 2023-03-02 NOTE — PROGRESS NOTE ADULT - ASSESSMENT
72F with a PMHx of asthma, aryepiglottic folds thickening, [place] Palsy, essential HTN, hepatitis C, seizure who initially was BIBA to [place] ED, sent by Dr. Mcneal for wheezing. Pt was seen in office and was sent to the ED to r/o vocal cord dysfunction. Dr Mcneal stated that he had been following her for her asthma; she presented to the office for 2 weeks of SOB. Patient evaluated in [place] ED and found to have stridor. Patient with interval improvement of stridor after epi, magnesium and steroids. Patient intubated for hypoxic respiratory failure and stridor. ICU saw pt in ED, no ENT available at [facility] so patient was transferred to [facility] ICU. During MICU admission the patient was treated for asthma exacerbation with Albuterol and Solumedrol. ENT was consulted for evaluation who took patient to OR to extubate and evaluate. ENT determined she had normal airways and no aryepiglottic folds thickening. She was transferred back to MICU. She was on NC and was weaned off to room air. The patient reported feeling well, was hemodynamically stable and ready to be transferred to floors.    72F with a PMHx of asthma, aryepiglottic folds thickening, [place] Palsy, essential HTN, hepatitis C, seizure who initially was BIBA to [place] ED, sent by Dr. Mcneal for wheezing. Pt was seen in office and was sent to the ED to r/o vocal cord dysfunction. Dr Mcneal stated that he had been following her for her asthma; she presented to the office for 2 weeks of SOB. Patient evaluated in [place] ED and found to have stridor. Patient with interval improvement of stridor after epi, magnesium and steroids. Patient intubated for hypoxic respiratory failure and stridor. ICU saw pt in ED, no ENT available at [facility] so patient was transferred to [facility] ICU. During MICU admission the patient was treated for asthma exacerbation with Albuterol and Solumedrol. ENT was consulted for evaluation who took patient to OR to extubate and evaluate. ENT determined she had normal airways and no aryepiglottic folds thickening. She was transferred back to MICU. She was on NC and was weaned off to room air. The patient reported feeling well, was hemodynamically stable and ready to be transferred to floors.    72F with a PMHx of asthma, aryepiglottic folds thickening, [place] Palsy, essential HTN, hepatitis C, seizure who initially was BIBA to [place] ED, sent by Dr. Mcneal for wheezing. Pt was seen in office and was sent to the ED to r/o vocal cord dysfunction. Dr Mcneal stated that he had been following her for her asthma; she presented to the office for 2 weeks of SOB. Patient evaluated in [place] ED and found to have stridor. Patient with interval improvement of stridor after epi, magnesium and steroids. Patient intubated for hypoxic respiratory failure and stridor. ICU saw pt in ED, no ENT available at [facility] so patient was transferred to [facility] ICU. During MICU admission the patient was treated for asthma exacerbation with Albuterol and Solumedrol. ENT was consulted for evaluation who took patient to OR to extubate and evaluate. ENT determined she had normal airways and no aryepiglottic folds thickening. She was transferred back to MICU. She was on NC and was weaned off to room air. The patient reported feeling well, was hemodynamically stable and ready to be transferred to floors.

## 2023-03-02 NOTE — PROGRESS NOTE ADULT - PROBLEM SELECTOR PLAN 4
outpatient follow up

## 2023-03-02 NOTE — PROGRESS NOTE ADULT - PROBLEM SELECTOR PLAN 1
Patient transferred out of the MICU, no ENT findings as this was a major concern  Will need to continue treatment for asthma exacerbation  Continue albuterol and steroids  PT consult- recommending rehab
patient transferred out of the MICU, no ENT findings as this was a major concern  Will need to continue treatment for asthma exacerbation  Continue albuterol and steroids  PT consult

## 2023-03-02 NOTE — PROGRESS NOTE ADULT - SUBJECTIVE AND OBJECTIVE BOX
Clay Mata MD  Academic Hospitalist  Pager [phone]  Email: [email or web address]  Available on [employer] Teams        PROGRESS NOTE:     Patient is a 72y old  Female who presents with a chief complaint of shortness of breath (01 Mar 2023 13:49)      SUBJECTIVE / OVERNIGHT EVENTS:  Patient seen and examined this morning. Less hypophonic today.  ADDITIONAL REVIEW OF SYSTEMS:  No f/c/n/v    MEDICATIONS  (STANDING):  albuterol    0.083% 2.5 milliGRAM(s) Nebulizer every 6 hours  albuterol    90 MICROgram(s) HFA Inhaler 1 Puff(s) Inhalation every 4 hours  chlorhexidine 2% Cloths 1 Application(s) Topical daily  heparin   Injectable 5000 Unit(s) SubCutaneous every 8 hours  methylPREDNISolone   Oral   polyethylene glycol 3350 17 Gram(s) Oral daily  senna Syrup 10 milliLiter(s) Oral at bedtime    MEDICATIONS  (PRN):  acetaminophen     Tablet .. 650 milliGRAM(s) Oral every 6 hours PRN Temp greater or equal to 38C (100.4F), Mild Pain (1 - 3), Moderate Pain (4 - 6)  bisacodyl Suppository 10 milliGRAM(s) Rectal daily PRN Constipation      CAPILLARY BLOOD GLUCOSE        I&O's Summary    01 Mar 2023 07:01  -  02 Mar 2023 07:00  --------------------------------------------------------  IN: 0 mL / OUT: 1100 mL / NET: -1100 mL        PHYSICAL EXAM:  Vital Signs Last 24 Hrs  T(C): 36.7 (02 Mar 2023 13:38), Max: 37 (01 Mar 2023 18:00)  T(F): 98 (02 Mar 2023 13:38), Max: 98.6 (01 Mar 2023 18:00)  HR: 84 (02 Mar 2023 13:38) (74 - 95)  BP: 144/100 (02 Mar 2023 13:38) (135/66 - 154/89)  BP(mean): --  RR: 18 (02 Mar 2023 13:38) (16 - 18)  SpO2: 97% (02 Mar 2023 13:38) (95% - 99%)    Parameters below as of 02 Mar 2023 13:38  Patient On (Oxygen Delivery Method): room air        CONSTITUTIONAL: NAD, well-developed, hoarseness slightly improving  RESPIRATORY: Normal respiratory effort; lungs are clear to auscultation bilaterally  CARDIOVASCULAR: Regular rate and rhythm, normal S1 and S2, no murmur/rub/gallop; No lower extremity edema; Peripheral pulses are 2+ bilaterally  ABDOMEN: Nontender to palpation, normoactive bowel sounds, no rebound/guarding;   MUSCLOSKELETAL: no clubbing or cyanosis of digits; no joint swelling or tenderness to palpation  Neuro: facial 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), weakness bilaterally in upper and lower exts, about 4/5  PSYCH: A+O to person, place, and time; affect appropriate    LABS:                        12.8   14.80 )-----------( 229      ( 02 Mar 2023 06:27 )             42.2     03-02    138  |  104  |  17  ----------------------------<  97  3.8   |  24  |  0.76    Ca    9.0      02 Mar 2023 06:27  Phos  3.6     03-01  Mg     2.10     03-01                  RADIOLOGY & ADDITIONAL TESTS:  Results Reviewed:   Imaging Personally Reviewed:  Electrocardiogram Personally Reviewed:    COORDINATION OF CARE:  Care Discussed with Consultants/Other Providers [Y/N]: Case discussed during interdisciplinary rounds with social work and case management. Also d/w Dr. Guerrero (outpatient pulmonologist).   Prior or Outpatient Records Reviewed [Y/N]:   Clay Mata MD  Academic Hospitalist  Pager [phone]  Email: [email or web address]  Available on Microsoft Teams        PROGRESS NOTE:     Patient is a 72y old  Female who presents with a chief complaint of shortness of breath (01 Mar 2023 13:49)      SUBJECTIVE / OVERNIGHT EVENTS:  Patient seen and examined this morning. Less hypophonic today.  ADDITIONAL REVIEW OF SYSTEMS:  No f/c/n/v    MEDICATIONS  (STANDING):  albuterol    0.083% 2.5 milliGRAM(s) Nebulizer every 6 hours  albuterol    90 MICROgram(s) HFA Inhaler 1 Puff(s) Inhalation every 4 hours  chlorhexidine 2% Cloths 1 Application(s) Topical daily  heparin   Injectable 5000 Unit(s) SubCutaneous every 8 hours  methylPREDNISolone   Oral   polyethylene glycol 3350 17 Gram(s) Oral daily  senna Syrup 10 milliLiter(s) Oral at bedtime    MEDICATIONS  (PRN):  acetaminophen     Tablet .. 650 milliGRAM(s) Oral every 6 hours PRN Temp greater or equal to 38C (100.4F), Mild Pain (1 - 3), Moderate Pain (4 - 6)  bisacodyl Suppository 10 milliGRAM(s) Rectal daily PRN Constipation      CAPILLARY BLOOD GLUCOSE        I&O's Summary    01 Mar 2023 07:01  -  02 Mar 2023 07:00  --------------------------------------------------------  IN: 0 mL / OUT: 1100 mL / NET: -1100 mL        PHYSICAL EXAM:  Vital Signs Last 24 Hrs  T(C): 36.7 (02 Mar 2023 13:38), Max: 37 (01 Mar 2023 18:00)  T(F): 98 (02 Mar 2023 13:38), Max: 98.6 (01 Mar 2023 18:00)  HR: 84 (02 Mar 2023 13:38) (74 - 95)  BP: 144/100 (02 Mar 2023 13:38) (135/66 - 154/89)  BP(mean): --  RR: 18 (02 Mar 2023 13:38) (16 - 18)  SpO2: 97% (02 Mar 2023 13:38) (95% - 99%)    Parameters below as of 02 Mar 2023 13:38  Patient On (Oxygen Delivery Method): room air        CONSTITUTIONAL: NAD, well-developed, hoarseness slightly improving  RESPIRATORY: Normal respiratory effort; lungs are clear to auscultation bilaterally  CARDIOVASCULAR: Regular rate and rhythm, normal S1 and S2, no murmur/rub/gallop; No lower extremity edema; Peripheral pulses are 2+ bilaterally  ABDOMEN: Nontender to palpation, normoactive bowel sounds, no rebound/guarding;   MUSCLOSKELETAL: no clubbing or cyanosis of digits; no joint swelling or tenderness to palpation  Neuro: facial 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), weakness bilaterally in upper and lower exts, about 4/5  PSYCH: A+O to person, place, and time; affect appropriate    LABS:                        12.8   14.80 )-----------( 229      ( 02 Mar 2023 06:27 )             42.2     03-02    138  |  104  |  17  ----------------------------<  97  3.8   |  24  |  0.76    Ca    9.0      02 Mar 2023 06:27  Phos  3.6     03-01  Mg     2.10     03-01                  RADIOLOGY & ADDITIONAL TESTS:  Results Reviewed:   Imaging Personally Reviewed:  Electrocardiogram Personally Reviewed:    COORDINATION OF CARE:  Care Discussed with Consultants/Other Providers [Y/N]: Case discussed during interdisciplinary rounds with social work and case management. Also d/w Dr. Guerrero (outpatient pulmonologist).   Prior or Outpatient Records Reviewed [Y/N]:   Clay Mata MD  Academic Hospitalist  Pager [phone]  Email: [email or web address]  Available on [employer] Teams        PROGRESS NOTE:     Patient is a 72y old  Female who presents with a chief complaint of shortness of breath (01 Mar 2023 13:49)      SUBJECTIVE / OVERNIGHT EVENTS:  Patient seen and examined this morning. Less hypophonic today.  ADDITIONAL REVIEW OF SYSTEMS:  No f/c/n/v    MEDICATIONS  (STANDING):  albuterol    0.083% 2.5 milliGRAM(s) Nebulizer every 6 hours  albuterol    90 MICROgram(s) HFA Inhaler 1 Puff(s) Inhalation every 4 hours  chlorhexidine 2% Cloths 1 Application(s) Topical daily  heparin   Injectable 5000 Unit(s) SubCutaneous every 8 hours  methylPREDNISolone   Oral   polyethylene glycol 3350 17 Gram(s) Oral daily  senna Syrup 10 milliLiter(s) Oral at bedtime    MEDICATIONS  (PRN):  acetaminophen     Tablet .. 650 milliGRAM(s) Oral every 6 hours PRN Temp greater or equal to 38C (100.4F), Mild Pain (1 - 3), Moderate Pain (4 - 6)  bisacodyl Suppository 10 milliGRAM(s) Rectal daily PRN Constipation      CAPILLARY BLOOD GLUCOSE        I&O's Summary    01 Mar 2023 07:01  -  02 Mar 2023 07:00  --------------------------------------------------------  IN: 0 mL / OUT: 1100 mL / NET: -1100 mL        PHYSICAL EXAM:  Vital Signs Last 24 Hrs  T(C): 36.7 (02 Mar 2023 13:38), Max: 37 (01 Mar 2023 18:00)  T(F): 98 (02 Mar 2023 13:38), Max: 98.6 (01 Mar 2023 18:00)  HR: 84 (02 Mar 2023 13:38) (74 - 95)  BP: 144/100 (02 Mar 2023 13:38) (135/66 - 154/89)  BP(mean): --  RR: 18 (02 Mar 2023 13:38) (16 - 18)  SpO2: 97% (02 Mar 2023 13:38) (95% - 99%)    Parameters below as of 02 Mar 2023 13:38  Patient On (Oxygen Delivery Method): room air        CONSTITUTIONAL: NAD, well-developed, hoarseness slightly improving  RESPIRATORY: Normal respiratory effort; lungs are clear to auscultation bilaterally  CARDIOVASCULAR: Regular rate and rhythm, normal S1 and S2, no murmur/rub/gallop; No lower extremity edema; Peripheral pulses are 2+ bilaterally  ABDOMEN: Nontender to palpation, normoactive bowel sounds, no rebound/guarding;   MUSCLOSKELETAL: no clubbing or cyanosis of digits; no joint swelling or tenderness to palpation  Neuro: facial 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), weakness bilaterally in upper and lower exts, about 4/5  PSYCH: A+O to person, place, and time; affect appropriate    LABS:                        12.8   14.80 )-----------( 229      ( 02 Mar 2023 06:27 )             42.2     03-02    138  |  104  |  17  ----------------------------<  97  3.8   |  24  |  0.76    Ca    9.0      02 Mar 2023 06:27  Phos  3.6     03-01  Mg     2.10     03-01                  RADIOLOGY & ADDITIONAL TESTS:  Results Reviewed:   Imaging Personally Reviewed:  Electrocardiogram Personally Reviewed:    COORDINATION OF CARE:  Care Discussed with Consultants/Other Providers [Y/N]: Case discussed during interdisciplinary rounds with social work and case management. Also d/w Dr. Guerrero (outpatient pulmonologist).   Prior or Outpatient Records Reviewed [Y/N]:

## 2023-03-03 ENCOUNTER — TRANSCRIPTION ENCOUNTER (OUTPATIENT)
Age: 73
End: 2023-03-03

## 2023-03-03 VITALS
OXYGEN SATURATION: 92 % | HEART RATE: 87 BPM | TEMPERATURE: 98 F | RESPIRATION RATE: 18 BRPM | SYSTOLIC BLOOD PRESSURE: 140 MMHG | DIASTOLIC BLOOD PRESSURE: 68 MMHG

## 2023-03-03 LAB
ANION GAP SERPL CALC-SCNC: 11 MMOL/L — SIGNIFICANT CHANGE UP (ref 7–14)
BUN SERPL-MCNC: 20 MG/DL — SIGNIFICANT CHANGE UP (ref 7–23)
CALCIUM SERPL-MCNC: 9.3 MG/DL — SIGNIFICANT CHANGE UP (ref 8.4–10.5)
CHLORIDE SERPL-SCNC: 104 MMOL/L — SIGNIFICANT CHANGE UP (ref 98–107)
CO2 SERPL-SCNC: 24 MMOL/L — SIGNIFICANT CHANGE UP (ref 22–31)
CREAT SERPL-MCNC: 0.67 MG/DL — SIGNIFICANT CHANGE UP (ref 0.5–1.3)
EGFR: 94 ML/MIN/1.73M2 — SIGNIFICANT CHANGE UP
GLUCOSE SERPL-MCNC: 99 MG/DL — SIGNIFICANT CHANGE UP (ref 70–99)
HCT VFR BLD CALC: 41.9 % — SIGNIFICANT CHANGE UP (ref 34.5–45)
HGB BLD-MCNC: 12.6 G/DL — SIGNIFICANT CHANGE UP (ref 11.5–15.5)
MCHC RBC-ENTMCNC: 25.9 PG — LOW (ref 27–34)
MCHC RBC-ENTMCNC: 30.1 GM/DL — LOW (ref 32–36)
MCV RBC AUTO: 86.2 FL — SIGNIFICANT CHANGE UP (ref 80–100)
NRBC # BLD: 0 /100 WBCS — SIGNIFICANT CHANGE UP (ref 0–0)
NRBC # FLD: 0 K/UL — SIGNIFICANT CHANGE UP (ref 0–0)
PLATELET # BLD AUTO: 239 K/UL — SIGNIFICANT CHANGE UP (ref 150–400)
POTASSIUM SERPL-MCNC: 4 MMOL/L — SIGNIFICANT CHANGE UP (ref 3.5–5.3)
POTASSIUM SERPL-SCNC: 4 MMOL/L — SIGNIFICANT CHANGE UP (ref 3.5–5.3)
RBC # BLD: 4.86 M/UL — SIGNIFICANT CHANGE UP (ref 3.8–5.2)
RBC # FLD: 13.2 % — SIGNIFICANT CHANGE UP (ref 10.3–14.5)
SODIUM SERPL-SCNC: 139 MMOL/L — SIGNIFICANT CHANGE UP (ref 135–145)
WBC # BLD: 14.55 K/UL — HIGH (ref 3.8–10.5)
WBC # FLD AUTO: 14.55 K/UL — HIGH (ref 3.8–10.5)

## 2023-03-03 PROCEDURE — 99239 HOSP IP/OBS DSCHRG MGMT >30: CPT

## 2023-03-03 RX ORDER — ALBUTEROL 90 UG/1
1 AEROSOL, METERED ORAL
Qty: 0 | Refills: 0 | DISCHARGE
Start: 2023-03-03

## 2023-03-03 RX ORDER — ONDANSETRON 8 MG/1
4 TABLET, FILM COATED ORAL ONCE
Refills: 0 | Status: COMPLETED | OUTPATIENT
Start: 2023-03-03 | End: 2023-03-03

## 2023-03-03 RX ORDER — PANTOPRAZOLE SODIUM 20 MG/1
1 TABLET, DELAYED RELEASE ORAL
Qty: 0 | Refills: 0 | DISCHARGE
Start: 2023-03-03

## 2023-03-03 RX ORDER — SENNA PLUS 8.6 MG/1
10 TABLET ORAL
Qty: 0 | Refills: 0 | DISCHARGE
Start: 2023-03-03

## 2023-03-03 RX ORDER — POLYETHYLENE GLYCOL 3350 17 G/17G
17 POWDER, FOR SOLUTION ORAL
Qty: 0 | Refills: 0 | DISCHARGE
Start: 2023-03-03

## 2023-03-03 RX ADMIN — HEPARIN SODIUM 5000 UNIT(S): 5000 INJECTION INTRAVENOUS; SUBCUTANEOUS at 14:06

## 2023-03-03 RX ADMIN — ALBUTEROL 2.5 MILLIGRAM(S): 90 AEROSOL, METERED ORAL at 04:14

## 2023-03-03 RX ADMIN — PANTOPRAZOLE SODIUM 40 MILLIGRAM(S): 20 TABLET, DELAYED RELEASE ORAL at 06:25

## 2023-03-03 RX ADMIN — HEPARIN SODIUM 5000 UNIT(S): 5000 INJECTION INTRAVENOUS; SUBCUTANEOUS at 06:25

## 2023-03-03 RX ADMIN — Medication 4 MILLIGRAM(S): at 08:00

## 2023-03-03 RX ADMIN — Medication 4 MILLIGRAM(S): at 14:06

## 2023-03-03 RX ADMIN — POLYETHYLENE GLYCOL 3350 17 GRAM(S): 17 POWDER, FOR SOLUTION ORAL at 12:02

## 2023-03-03 RX ADMIN — CHLORHEXIDINE GLUCONATE 1 APPLICATION(S): 213 SOLUTION TOPICAL at 12:01

## 2023-03-03 RX ADMIN — Medication 650 MILLIGRAM(S): at 06:25

## 2023-03-03 RX ADMIN — ONDANSETRON 4 MILLIGRAM(S): 8 TABLET, FILM COATED ORAL at 12:02

## 2023-03-03 RX ADMIN — Medication 650 MILLIGRAM(S): at 07:25

## 2023-03-03 NOTE — DIETITIAN INITIAL EVALUATION ADULT - REASON FOR ADMISSION
Sepsis  72F with a PMHx of asthma, aryepiglottic folds thickening, [place] Palsy, essential HTN, hepatitis C, seizure who initially was BIBA to [place] ED, sent by Dr. Mcneal for wheezing. Pt was seen in office and was sent to the ED to r/o vocal cord dysfunction per chart review.    Sepsis  72F with a PMHx of asthma, aryepiglottic folds thickening, [place] Palsy, essential HTN, hepatitis C, seizure who initially was BIBA to [place] ED, sent by Dr. Mcneal for wheezing. Pt was seen in office and was sent to the ED to r/o vocal cord dysfunction per chart review.    Sepsis  72F with a PMHx of asthma, aryepiglottic folds thickening, [place] Palsy, essential HTN, hepatitis C, seizure who initially was BIBA to [place] ED, sent by Dr. Mcneal for wheezing. Pt was seen in office and was sent to the ED to r/o vocal cord dysfunction per chart review.

## 2023-03-03 NOTE — DISCHARGE NOTE PROVIDER - NSDCMRMEDTOKEN_GEN_ALL_CORE_FT
2 seconds or less
albuterol 90 mcg/inh inhalation aerosol: 1 puff(s) inhaled every 4 hours, As Needed  amLODIPine 5 mg oral tablet: 1 tab(s) orally once a day  methylPREDNISolone: Taper  4mg before breakfast x 4 doses on 3/4, 3/5, 3/6, 3/7.  4mg after Lunch x 2 doses on 3/4, 3/5  4mg after dinner x 2 doses on 3/3, 3/4  8mg at bedtime x 1 dose on 3/3  4mg at bedtime x 3 doses on 3/4, 3/5, 3/6.  pantoprazole 40 mg oral delayed release tablet: 1 tab(s) orally once a day (before a meal)  polyethylene glycol 3350 oral powder for reconstitution: 17 gram(s) orally once a day  senna (sennosides) 8.8 mg/5 mL oral syrup: 10 milliliter(s) orally once a day (at bedtime)

## 2023-03-03 NOTE — DIETITIAN INITIAL EVALUATION ADULT - PERTINENT MEDS FT
MEDICATIONS  (STANDING):  albuterol    0.083% 2.5 milliGRAM(s) Nebulizer every 6 hours  albuterol    90 MICROgram(s) HFA Inhaler 1 Puff(s) Inhalation every 4 hours  chlorhexidine 2% Cloths 1 Application(s) Topical daily  heparin   Injectable 5000 Unit(s) SubCutaneous every 8 hours  methylPREDNISolone 8 milliGRAM(s) Oral at bedtime  methylPREDNISolone   Oral   methylPREDNISolone 4 milliGRAM(s) Oral before breakfast  methylPREDNISolone 4 milliGRAM(s) Oral after lunch  methylPREDNISolone 4 milliGRAM(s) Oral after dinner  pantoprazole    Tablet 40 milliGRAM(s) Oral before breakfast  polyethylene glycol 3350 17 Gram(s) Oral daily  senna Syrup 10 milliLiter(s) Oral at bedtime    MEDICATIONS  (PRN):  acetaminophen     Tablet .. 650 milliGRAM(s) Oral every 6 hours PRN Temp greater or equal to 38C (100.4F), Mild Pain (1 - 3), Moderate Pain (4 - 6)  bisacodyl Suppository 10 milliGRAM(s) Rectal daily PRN Constipation

## 2023-03-03 NOTE — DISCHARGE NOTE NURSING/CASE MANAGEMENT/SOCIAL WORK - NSDCPEFALRISK_GEN_ALL_CORE
For information on Fall & Injury Prevention, visit: [email or web address] OR  [email or web address] OR  https://www.cdc.gov/steadi/patient.html For information on Fall & Injury Prevention, visit: [email or web address] OR  [email or web address] OR  https://www.cdc.gov/steadi/patient.html For information on Fall & Injury Prevention, visit: [email or web address] OR  [email or web address] OR  https://www.cdc.gov/steadi/patient.html

## 2023-03-03 NOTE — DIETITIAN INITIAL EVALUATION ADULT - OTHER INFO
S/p swallow bedside, regular thin liquids recommended. Patient reports good appetite and PO intake, consuming more than half of meals. Per nursing flowsheet, % po intake per nursing flowsheet. Denies any nausea/vomiting/diarrhea/constipation or difficulty chewing and swallowing. Continue good po intake of nutrient and protein dense foods. Strategies to increase kcal and protein intake discussed.

## 2023-03-03 NOTE — DISCHARGE NOTE PROVIDER - ATTENDING DISCHARGE PHYSICAL EXAMINATION:
CONSTITUTIONAL: NAD, well-developed, hoarseness slightly improving  RESPIRATORY: Normal respiratory effort; lungs are clear to auscultation bilaterally  CARDIOVASCULAR: Regular rate and rhythm, normal S1 and S2, no murmur/rub/gallop; No lower extremity edema; Peripheral pulses are 2+ bilaterally  ABDOMEN: Nontender to palpation, normoactive bowel sounds, no rebound/guarding;   MUSCLOSKELETAL: no clubbing or cyanosis of digits; no joint swelling or tenderness to palpation  Neuro: facial 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), weakness bilaterally in upper and lower exts, about 4/5  PSYCH: A+O to person, place, and time; affect appropriate

## 2023-03-03 NOTE — DISCHARGE NOTE PROVIDER - NSDCCAREPROVSEEN_GEN_ALL_CORE_FT
Clay Mata  [facility] ACP Clay Mata  [facility] ACP Clay Mata  [facility] ACP

## 2023-03-03 NOTE — DISCHARGE NOTE PROVIDER - NSDCCPCAREPLAN_GEN_ALL_CORE_FT
PRINCIPAL DISCHARGE DIAGNOSIS  Diagnosis: Acute respiratory failure with hypoxia  Assessment and Plan of Treatment: Please continue your current prescribed meds. Follow up with Dr. Urbina [phone] who is aware of your hospitalization. He will be performing several tests as an outpatient to further optimize your current medications.      SECONDARY DISCHARGE DIAGNOSES  Diagnosis: Acute asthma exacerbation  Assessment and Plan of Treatment: Please see instruction as above     PRINCIPAL DISCHARGE DIAGNOSIS  Diagnosis: Acute respiratory failure with hypoxia  Assessment and Plan of Treatment: Please continue your current prescribed meds. Follow up with Dr. Urbina [phone] who is aware of your hospitalization. He will be performing several tests as an outpatient to further optimize your current medications.      SECONDARY DISCHARGE DIAGNOSES  Diagnosis: Acute asthma exacerbation  Assessment and Plan of Treatment: Please see instruction as above     PRINCIPAL DISCHARGE DIAGNOSIS  Diagnosis: Acute respiratory failure with hypoxia  Assessment and Plan of Treatment: Please continue your current prescribed meds. Follow up with Dr. Urbina [phone] who is aware of your hospitalization. He will be performing several tests as an outpatient to further optimize your current medications.      SECONDARY DISCHARGE DIAGNOSES  Diagnosis: Acute asthma exacerbation  Assessment and Plan of Treatment: Please see instruction as above

## 2023-03-03 NOTE — DIETITIAN INITIAL EVALUATION ADULT - NSICDXPASTMEDICALHX_GEN_ALL_CORE_FT
PAST MEDICAL HISTORY:  Asthma     H/O Bell's palsy     Hepatitis C     HTN (hypertension)     Seizure

## 2023-03-03 NOTE — DIETITIAN INITIAL EVALUATION ADULT - PERTINENT LABORATORY DATA
03-03    139  |  104  |  20  ----------------------------<  99  4.0   |  24  |  0.67    Ca    9.3      03 Mar 2023 06:12

## 2023-03-03 NOTE — DISCHARGE NOTE NURSING/CASE MANAGEMENT/SOCIAL WORK - NSFLUVACAGEDISCH_IMM_ALL_CORE
NICKI Labor Progress Note Patient: Jami Hidden MRN: [id number]  SSN: [id number] YOB: 1981  Age: 41 y.o. Sex: female Subjective:  
Patient coping well with contractions. In tub. Pitocin turned off.  present and supportive Objective:  
 
Patient Vitals for the past 4 hrs: 
 Temp Pulse Resp BP  
19 1031 98.1 °F (36.7 °C) 90 16 122/74 Fetal heart [id number] , moderate variability, positive accelerations, no decelerations. Uterine contractions q 3 minutes, moderate to palpation, resting tone soft,  
Sterile Vaginal Exam: 5 cm dilated/ 80 % effaced/ 0 station, cervix position midline , fetal presentation vertex, membranes ruptured, leaking clear fluid Assessment:  
Intrauterine pregnancy at term Category 1 fetal heart rate tracing Active Labor Plan:  
Continue current orders/management NICKI management Anticipate  [place] [facility] Box Adult

## 2023-03-03 NOTE — DISCHARGE NOTE NURSING/CASE MANAGEMENT/SOCIAL WORK - PATIENT PORTAL LINK FT
You can access the FollowMyHealth Patient Portal offered by [facility] by registering at the following website: [email or web address]. By joining [employer]’s FollowMyHealth portal, you will also be able to view your health information using other applications (apps) compatible with our system. You can access the FollowMyHealth Patient Portal offered by [facility] by registering at the following website: [email or web address]. By joining [employer]’s FollowMyHealth portal, you will also be able to view your health information using other applications (apps) compatible with our system. You can access the FollowMyHealth Patient Portal offered by [facility] by registering at the following website: [email or web address]. By joining [employer]’s FollowMyHealth portal, you will also be able to view your health information using other applications (apps) compatible with our system.

## 2023-03-03 NOTE — DISCHARGE NOTE PROVIDER - HOSPITAL COURSE
72 year old lady with a PMHx of asthma, aryepiglottic folds thickening, [place] Palsy, essential HTN, hepatitis C, seizure who initially was BIBA to [place] ED, sent by Dr. Mcneal for wheezing. Pt was seen in office and was sent to the ED to r/o vocal cord dysfunction. Dr Mcneal stated that he had been following her for her asthma; she presented to the office for 2 weeks of SOB. Patient evaluated in [place] ED and found to have stridor. Patient with interval improvement of stridor after epi, magnesium and steroids. Patient intubated for hypoxic respiratory failure and stridor. ICU saw pt in ED, no ENT available at [facility] so patient was transferred to [facility] ICU. During MICU admission the patient was treated for asthma exacerbation with Albuterol and Solumedrol. ENT was consulted for evaluation who took patient to OR to extubate and evaluate. ENT determined she had normal airways and no aryepiglottic folds thickening. She was transferred back to MICU. She was on NC and was weaned off to room air. The patient reported feeling well, was hemodynamically stable and ready to be transferred to floors.          A/w  #Acute respiratory failure with hypoxia.   Patient transferred out of the MICU, no ENT findings as this was a major concern  Will need to continue treatment for asthma exacerbation  Continue albuterol and steroids, now weaning steroids with medrol pack  PT consult- recommending rehab.    #Acute asthma exacerbation.   As above    #HTN  If continues to be in the 150's at rehab, would recommend restarting antihypertensive     #Hepatitis C.   outpatient follow up.    #Hoarseness.   Likely from intubation  Given conversation with outpatient pulmonologist, CT neck with contrast obtained, no evidence of masses noted, some inflammation related to intubation noted  ENT did scope while patient was in the ICU, no acute pathology was noted    Discussed with Dr. Guerrero (patient pulmonary doctor) multiple times during the hospitalization, patient will follow up as outpatient upon discharge from rehab.  Care of plan after discharge from hospital discussed with patient extensively.     72 year old lady with a PMHx of asthma, aryepiglottic folds thickening, [place] Palsy, essential HTN, hepatitis C, seizure who initially was BIBA to [place] ED, sent by Dr. Mcneal for wheezing. Pt was seen in office and was sent to the ED to r/o vocal cord dysfunction. Dr Mcneal stated that he had been following her for her asthma; she presented to the office for 2 weeks of SOB. Patient evaluated in [place] ED and found to have stridor. Patient with interval improvement of stridor after epi, magnesium and steroids. Patient intubated for hypoxic respiratory failure and stridor. ICU saw pt in ED, no ENT available at [facility] so patient was transferred to [facility] ICU. During MICU admission the patient was treated for asthma exacerbation with Albuterol and Solumedrol. ENT was consulted for evaluation who took patient to OR to extubate and evaluate. ENT determined she had normal airways and no aryepiglottic folds thickening. She was transferred back to MICU. She was on NC and was weaned off to room air. The patient reported feeling well, was hemodynamically stable and ready to be transferred to floors.          A/w  #Acute respiratory failure with hypoxia.   Patient transferred out of the MICU, no ENT findings as this was a major concern  Will need to continue treatment for asthma exacerbation  Continue albuterol and steroids, now weaning steroids with medrol pack  PT consult- recommending rehab.    #Acute asthma exacerbation.   As above    #HTN  If continues to be in the 150's at rehab, would recommend restarting antihypertensive     #Hepatitis C.   outpatient follow up.    #Hoarseness.   Likely from intubation  Given conversation with outpatient pulmonologist, CT neck with contrast obtained, no evidence of masses noted, some inflammation related to intubation noted  ENT did scope while patient was in the ICU, no acute pathology was noted    Discussed with Dr. Guerrero (patient pulmonary doctor) multiple times during the hospitalization, patient will follow up as outpatient upon discharge from rehab.  Care of plan after discharge from hospital discussed with patient extensively.     72 year old lady with a PMHx of asthma, aryepiglottic folds thickening, [place] Palsy, essential HTN, hepatitis C, seizure who initially was BIBA to [place] ED, sent by Dr. Mcneal for wheezing. Pt was seen in office and was sent to the ED to r/o vocal cord dysfunction. Dr Mcneal stated that he had been following her for her asthma; she presented to the office for 2 weeks of SOB. Patient evaluated in [place] ED and found to have stridor. Patient with interval improvement of stridor after epi, magnesium and steroids. Patient intubated for hypoxic respiratory failure and stridor. ICU saw pt in ED, no ENT available at [facility] so patient was transferred to [facility] ICU. During MICU admission the patient was treated for asthma exacerbation with Albuterol and Solumedrol. ENT was consulted for evaluation who took patient to OR to extubate and evaluate. ENT determined she had normal airways and no aryepiglottic folds thickening. She was transferred back to MICU. She was on NC and was weaned off to room air. The patient reported feeling well, was hemodynamically stable and ready to be transferred to floors.          A/w  #Acute respiratory failure with hypoxia.   Patient transferred out of the MICU, no ENT findings as this was a major concern  Will need to continue treatment for asthma exacerbation  Continue albuterol and steroids, now weaning steroids with medrol pack  PT consult- recommending rehab.    #Acute asthma exacerbation.   As above    #HTN  If continues to be in the 150's at rehab, would recommend restarting antihypertensive     #Hepatitis C.   outpatient follow up.    #Hoarseness.   Likely from intubation  Given conversation with outpatient pulmonologist, CT neck with contrast obtained, no evidence of masses noted, some inflammation related to intubation noted  ENT did scope while patient was in the ICU, no acute pathology was noted    Discussed with Dr. Guerrero (patient pulmonary doctor) multiple times during the hospitalization, patient will follow up as outpatient upon discharge from rehab.  Care of plan after discharge from hospital discussed with patient extensively.

## 2023-03-13 ENCOUNTER — EMERGENCY (EMERGENCY)
Facility: HOSPITAL | Age: 73
LOS: 0 days | Discharge: HOME | End: 2023-03-13
Attending: FAMILY MEDICINE
Payer: COMMERCIAL

## 2023-03-13 VITALS
TEMPERATURE: 98 F | RESPIRATION RATE: 18 BRPM | HEIGHT: 69 IN | OXYGEN SATURATION: 95 % | WEIGHT: 210.1 LBS | SYSTOLIC BLOOD PRESSURE: 132 MMHG | DIASTOLIC BLOOD PRESSURE: 70 MMHG | HEART RATE: 83 BPM

## 2023-03-13 VITALS
SYSTOLIC BLOOD PRESSURE: 128 MMHG | OXYGEN SATURATION: 97 % | HEART RATE: 79 BPM | RESPIRATION RATE: 18 BRPM | DIASTOLIC BLOOD PRESSURE: 76 MMHG | TEMPERATURE: 99 F

## 2023-03-13 DIAGNOSIS — H57.811 BROW PTOSIS, RIGHT: ICD-10-CM

## 2023-03-13 DIAGNOSIS — R51.9 HEADACHE, UNSPECIFIED: ICD-10-CM

## 2023-03-13 DIAGNOSIS — M54.2 CERVICALGIA: ICD-10-CM

## 2023-03-13 DIAGNOSIS — G40.909 EPILEPSY, UNSPECIFIED, NOT INTRACTABLE, WITHOUT STATUS EPILEPTICUS: ICD-10-CM

## 2023-03-13 DIAGNOSIS — Z20.822 CONTACT WITH AND (SUSPECTED) EXPOSURE TO COVID-19: ICD-10-CM

## 2023-03-13 DIAGNOSIS — E66.9 OBESITY, UNSPECIFIED: ICD-10-CM

## 2023-03-13 DIAGNOSIS — Z88.5 ALLERGY STATUS TO NARCOTIC AGENT: ICD-10-CM

## 2023-03-13 DIAGNOSIS — I10 ESSENTIAL (PRIMARY) HYPERTENSION: ICD-10-CM

## 2023-03-13 DIAGNOSIS — J45.909 UNSPECIFIED ASTHMA, UNCOMPLICATED: ICD-10-CM

## 2023-03-13 LAB
ALBUMIN SERPL ELPH-MCNC: 2.9 G/DL — LOW (ref 3.3–5)
ALP SERPL-CCNC: 69 U/L — SIGNIFICANT CHANGE UP (ref 40–120)
ALT FLD-CCNC: 33 U/L — SIGNIFICANT CHANGE UP (ref 12–78)
ANION GAP SERPL CALC-SCNC: 5 MMOL/L — SIGNIFICANT CHANGE UP (ref 5–17)
APTT BLD: 28.4 SEC — SIGNIFICANT CHANGE UP (ref 27.5–35.5)
AST SERPL-CCNC: 36 U/L — SIGNIFICANT CHANGE UP (ref 15–37)
BASOPHILS # BLD AUTO: 0.04 K/UL — SIGNIFICANT CHANGE UP (ref 0–0.2)
BASOPHILS NFR BLD AUTO: 0.6 % — SIGNIFICANT CHANGE UP (ref 0–2)
BILIRUB SERPL-MCNC: 0.4 MG/DL — SIGNIFICANT CHANGE UP (ref 0.2–1.2)
BLD GP AB SCN SERPL QL: SIGNIFICANT CHANGE UP
BUN SERPL-MCNC: 11 MG/DL — SIGNIFICANT CHANGE UP (ref 7–23)
CALCIUM SERPL-MCNC: 8.7 MG/DL — SIGNIFICANT CHANGE UP (ref 8.5–10.1)
CHLORIDE SERPL-SCNC: 110 MMOL/L — HIGH (ref 96–108)
CO2 SERPL-SCNC: 27 MMOL/L — SIGNIFICANT CHANGE UP (ref 22–31)
CREAT SERPL-MCNC: 0.73 MG/DL — SIGNIFICANT CHANGE UP (ref 0.5–1.3)
EGFR: 88 ML/MIN/1.73M2 — SIGNIFICANT CHANGE UP
EOSINOPHIL # BLD AUTO: 0.2 K/UL — SIGNIFICANT CHANGE UP (ref 0–0.5)
EOSINOPHIL NFR BLD AUTO: 2.9 % — SIGNIFICANT CHANGE UP (ref 0–6)
ERYTHROCYTE [SEDIMENTATION RATE] IN BLOOD: 20 MM/HR — SIGNIFICANT CHANGE UP (ref 0–20)
GLUCOSE SERPL-MCNC: 97 MG/DL — SIGNIFICANT CHANGE UP (ref 70–99)
HCT VFR BLD CALC: 38.7 % — SIGNIFICANT CHANGE UP (ref 34.5–45)
HGB BLD-MCNC: 12.2 G/DL — SIGNIFICANT CHANGE UP (ref 11.5–15.5)
IMM GRANULOCYTES NFR BLD AUTO: 0.3 % — SIGNIFICANT CHANGE UP (ref 0–0.9)
INR BLD: 1.09 RATIO — SIGNIFICANT CHANGE UP (ref 0.88–1.16)
LYMPHOCYTES # BLD AUTO: 2.13 K/UL — SIGNIFICANT CHANGE UP (ref 1–3.3)
LYMPHOCYTES # BLD AUTO: 30.6 % — SIGNIFICANT CHANGE UP (ref 13–44)
MCHC RBC-ENTMCNC: 27.4 PG — SIGNIFICANT CHANGE UP (ref 27–34)
MCHC RBC-ENTMCNC: 31.5 GM/DL — LOW (ref 32–36)
MCV RBC AUTO: 87 FL — SIGNIFICANT CHANGE UP (ref 80–100)
MONOCYTES # BLD AUTO: 0.54 K/UL — SIGNIFICANT CHANGE UP (ref 0–0.9)
MONOCYTES NFR BLD AUTO: 7.7 % — SIGNIFICANT CHANGE UP (ref 2–14)
NEUTROPHILS # BLD AUTO: 4.04 K/UL — SIGNIFICANT CHANGE UP (ref 1.8–7.4)
NEUTROPHILS NFR BLD AUTO: 57.9 % — SIGNIFICANT CHANGE UP (ref 43–77)
PLATELET # BLD AUTO: 223 K/UL — SIGNIFICANT CHANGE UP (ref 150–400)
POTASSIUM SERPL-MCNC: 4.6 MMOL/L — SIGNIFICANT CHANGE UP (ref 3.5–5.3)
POTASSIUM SERPL-SCNC: 4.6 MMOL/L — SIGNIFICANT CHANGE UP (ref 3.5–5.3)
PROT SERPL-MCNC: 7 GM/DL — SIGNIFICANT CHANGE UP (ref 6–8.3)
PROTHROM AB SERPL-ACNC: 12.6 SEC — SIGNIFICANT CHANGE UP (ref 10.5–13.4)
RAPID RVP RESULT: SIGNIFICANT CHANGE UP
RBC # BLD: 4.45 M/UL — SIGNIFICANT CHANGE UP (ref 3.8–5.2)
RBC # FLD: 13.6 % — SIGNIFICANT CHANGE UP (ref 10.3–14.5)
SARS-COV-2 RNA SPEC QL NAA+PROBE: SIGNIFICANT CHANGE UP
SODIUM SERPL-SCNC: 142 MMOL/L — SIGNIFICANT CHANGE UP (ref 135–145)
WBC # BLD: 6.97 K/UL — SIGNIFICANT CHANGE UP (ref 3.8–10.5)
WBC # FLD AUTO: 6.97 K/UL — SIGNIFICANT CHANGE UP (ref 3.8–10.5)

## 2023-03-13 PROCEDURE — 96374 THER/PROPH/DIAG INJ IV PUSH: CPT

## 2023-03-13 PROCEDURE — 85730 THROMBOPLASTIN TIME PARTIAL: CPT

## 2023-03-13 PROCEDURE — 99284 EMERGENCY DEPT VISIT MOD MDM: CPT | Mod: 25

## 2023-03-13 PROCEDURE — 85610 PROTHROMBIN TIME: CPT

## 2023-03-13 PROCEDURE — 70498 CT ANGIOGRAPHY NECK: CPT | Mod: MA

## 2023-03-13 PROCEDURE — 86900 BLOOD TYPING SEROLOGIC ABO: CPT

## 2023-03-13 PROCEDURE — 86901 BLOOD TYPING SEROLOGIC RH(D): CPT

## 2023-03-13 PROCEDURE — 86850 RBC ANTIBODY SCREEN: CPT

## 2023-03-13 PROCEDURE — 36415 COLL VENOUS BLD VENIPUNCTURE: CPT

## 2023-03-13 PROCEDURE — 96375 TX/PRO/DX INJ NEW DRUG ADDON: CPT

## 2023-03-13 PROCEDURE — 99285 EMERGENCY DEPT VISIT HI MDM: CPT

## 2023-03-13 PROCEDURE — 86140 C-REACTIVE PROTEIN: CPT

## 2023-03-13 PROCEDURE — 70496 CT ANGIOGRAPHY HEAD: CPT | Mod: 26,MA

## 2023-03-13 PROCEDURE — 0225U NFCT DS DNA&RNA 21 SARSCOV2: CPT

## 2023-03-13 PROCEDURE — 70496 CT ANGIOGRAPHY HEAD: CPT | Mod: MA

## 2023-03-13 PROCEDURE — 80053 COMPREHEN METABOLIC PANEL: CPT

## 2023-03-13 PROCEDURE — 85025 COMPLETE CBC W/AUTO DIFF WBC: CPT

## 2023-03-13 PROCEDURE — 70498 CT ANGIOGRAPHY NECK: CPT | Mod: 26,MA

## 2023-03-13 PROCEDURE — 85652 RBC SED RATE AUTOMATED: CPT

## 2023-03-13 RX ORDER — SODIUM CHLORIDE 9 MG/ML
3 INJECTION INTRAMUSCULAR; INTRAVENOUS; SUBCUTANEOUS ONCE
Refills: 0 | Status: COMPLETED | OUTPATIENT
Start: 2023-03-13 | End: 2023-03-13

## 2023-03-13 RX ORDER — SODIUM CHLORIDE 0.65 %
2 AEROSOL, SPRAY (ML) NASAL
Qty: 0 | Refills: 0 | DISCHARGE

## 2023-03-13 RX ORDER — AMLODIPINE BESYLATE 2.5 MG/1
1 TABLET ORAL
Qty: 0 | Refills: 0 | DISCHARGE

## 2023-03-13 RX ORDER — ACETAMINOPHEN 500 MG
1000 TABLET ORAL ONCE
Refills: 0 | Status: COMPLETED | OUTPATIENT
Start: 2023-03-13 | End: 2023-03-13

## 2023-03-13 RX ORDER — POLYETHYLENE GLYCOL 3350 17 G/17G
17 POWDER, FOR SOLUTION ORAL
Qty: 0 | Refills: 0 | DISCHARGE

## 2023-03-13 RX ORDER — IBUPROFEN 200 MG
2 TABLET ORAL
Qty: 0 | Refills: 0 | DISCHARGE

## 2023-03-13 RX ORDER — MAGNESIUM SULFATE 500 MG/ML
2 VIAL (ML) INJECTION ONCE
Refills: 0 | Status: COMPLETED | OUTPATIENT
Start: 2023-03-13 | End: 2023-03-13

## 2023-03-13 RX ORDER — SENNA PLUS 8.6 MG/1
10 TABLET ORAL
Qty: 0 | Refills: 0 | DISCHARGE

## 2023-03-13 RX ORDER — PANTOPRAZOLE SODIUM 20 MG/1
1 TABLET, DELAYED RELEASE ORAL
Qty: 0 | Refills: 0 | DISCHARGE

## 2023-03-13 RX ORDER — ALBUTEROL 90 UG/1
2 AEROSOL, METERED ORAL
Qty: 0 | Refills: 0 | DISCHARGE

## 2023-03-13 RX ADMIN — SODIUM CHLORIDE 3 MILLILITER(S): 9 INJECTION INTRAMUSCULAR; INTRAVENOUS; SUBCUTANEOUS at 16:56

## 2023-03-13 RX ADMIN — Medication 100 GRAM(S): at 17:49

## 2023-03-13 RX ADMIN — Medication 400 MILLIGRAM(S): at 17:24

## 2023-03-13 NOTE — ED PROVIDER NOTE - CLINICAL SUMMARY MEDICAL DECISION MAKING FREE TEXT BOX
Pt with history of acute respiratory distress requiring intubation secondary to stridor here complaining of left headache and neck pain. States she is here for CT scan. Will do CTA head and neck to rule out dissection vs bleed, check labs, give pain meds, and reassess.

## 2023-03-13 NOTE — ED PROVIDER NOTE - ENMT, MLM
Airway patent. Left neck TTP. Face with right brow droop and left face flattening of nasolabial fold.

## 2023-03-13 NOTE — ED PROVIDER NOTE - OBJECTIVE STATEMENT
72 year old female with PMHx of asthma, aryepiglottic folds thickening, essential HTN, hepatitis C, seizure disorder, and [place] Palsy s/p surgery presents to the ED [facility] from [facility] complaining of left sided headache radiating to her neck x 2 weeks. Pt described pain as to be localized behind her eye. Pt has been using Tylenol and Advil without relief. Denies recent falls, trauma to area, nausea, vomiting, fevers, chills, vision changes, dizziness, or any other complaints. Denies recent surgeries. Pt was seen in [facility] on 2/23 secondary to acute respiratory failure, was transferred to [facility] and discharged on 3/3.  Pulmonologist Dr. Mcneal.

## 2023-03-13 NOTE — ED ADULT TRIAGE NOTE - CHIEF COMPLAINT QUOTE
Pt BIBEMS from [facility] c/o L sided headache, atraumatic neck pain, and L eye pain since 3/1. As per EMS, recently diagnosed with [place] palsy. Unrelieved by Tylenol and Advil. Denies recent falls/trauma, vision changes, and dizziness.

## 2023-03-13 NOTE — ED ADULT NURSE NOTE - CHIEF COMPLAINT QUOTE
Pt BIBEMS from [facility] c/o L sided headache, atraumatic neck pain, and L eye pain since 3/1. As per EMS, recently diagnosed with [place] palsy. Unrelieved by Tylenol and Advil. Denies recent falls/trauma, vision changes, and dizziness.

## 2023-03-13 NOTE — ED PROVIDER NOTE - PATIENT PORTAL LINK FT
You can access the FollowMyHealth Patient Portal offered by [facility] by registering at the following website: [email or web address]. By joining [facility]’s FollowMyHealth portal, you will also be able to view your health information using other applications (apps) compatible with our system.

## 2023-03-13 NOTE — ED ADULT NURSE NOTE - OBJECTIVE STATEMENT
Pt presents to ED c/o headache, left eye pain and atraumatic neck pain for 2 weeks. PT states she was taking tylenol and motrin without relief. PT diagnosed with bells palsy 1 month ago. PT denies nvd, afebrile at home. Denies changes in cognition, speech or diet. PT skin color appropriate for race, respirations even and unlabored. ED workup in progress, will continue to monitor. Flaco Robles RN

## 2023-03-13 NOTE — ED PROVIDER NOTE - DIFFERENTIAL DIAGNOSIS
- - - carotid dissection, migraine headache, tension headache, musculoskeletal strain. Differential Diagnosis

## 2023-03-13 NOTE — ED PROVIDER NOTE - NEUROLOGICAL, MLM
Alert and oriented, no focal deficits, no motor or sensory deficits. Alert and oriented, no focal deficits, no motor or sensory deficits. facial droop as noted (baseline)

## 2023-03-13 NOTE — PHARMACOTHERAPY INTERVENTION NOTE - COMMENTS
Medication reconciliation completed.  Reviewed Medication list and confirmed med allergies with list from Care Facility "[facility]"; confirmed with Dr. First MedHx.

## 2023-03-14 LAB — CRP SERPL-MCNC: 8 MG/L — HIGH

## 2023-04-14 NOTE — CHART NOTE - NSCHARTNOTEFT_GEN_A_CORE
Attempted to get med reconciliation for Mrs. Wesley. Sister not aware of medications that she takes.   She provided patient's preferred pharmacy: [address] [address] [phone]  - Pharmacy staff reported: Amlodipine 5mg QD as only medications filled there.     Dominique Gonzalez MD  PGY-2 | Internal Medicine Attempted to get med reconciliation for Mrs. Wesley. Sister not aware of medications that she takes.   She provided patient's preferred pharmacy: [address] [address] [phone]  - Pharmacy staff reported: Amlodipine 5mg QD as only medications filled there.     Dominique Gonzalez MD  PGY-2 | Internal Medicine Attempted to get med reconciliation for Mrs. Wesley. Sister not aware of medications that she takes.   She provided patient's preferred pharmacy: [address] [address] [phone]  - Pharmacy staff reported: Amlodipine 5mg QD as only medications filled there.     Dominique Gonzalez MD  PGY-2 | Internal Medicine Patient Specific Counseling (Will Not Stick From Patient To Patient): Patient instructed to take continue taking spironolactone but lower to 1/2 tablet daily.  Patient instructed to continue minoxidil Detail Level: Detailed

## 2023-04-19 ENCOUNTER — EMERGENCY (EMERGENCY)
Facility: HOSPITAL | Age: 73
LOS: 0 days | Discharge: ANOTHER HOSPITAL | End: 2023-04-19
Attending: FAMILY MEDICINE
Payer: COMMERCIAL

## 2023-04-19 ENCOUNTER — INPATIENT (INPATIENT)
Facility: HOSPITAL | Age: 73
LOS: 5 days | Discharge: HOME | End: 2023-04-25
Attending: STUDENT IN AN ORGANIZED HEALTH CARE EDUCATION/TRAINING PROGRAM | Admitting: STUDENT IN AN ORGANIZED HEALTH CARE EDUCATION/TRAINING PROGRAM
Payer: COMMERCIAL

## 2023-04-19 VITALS
TEMPERATURE: 99 F | SYSTOLIC BLOOD PRESSURE: 140 MMHG | DIASTOLIC BLOOD PRESSURE: 84 MMHG | HEIGHT: 63.78 IN | HEART RATE: 108 BPM | RESPIRATION RATE: 22 BRPM | OXYGEN SATURATION: 98 %

## 2023-04-19 VITALS
OXYGEN SATURATION: 100 % | SYSTOLIC BLOOD PRESSURE: 163 MMHG | RESPIRATION RATE: 18 BRPM | DIASTOLIC BLOOD PRESSURE: 101 MMHG | HEIGHT: 69 IN | WEIGHT: 210.1 LBS | HEART RATE: 110 BPM

## 2023-04-19 DIAGNOSIS — G51.0 BELL'S PALSY: ICD-10-CM

## 2023-04-19 DIAGNOSIS — R06.1 STRIDOR: ICD-10-CM

## 2023-04-19 DIAGNOSIS — I10 ESSENTIAL (PRIMARY) HYPERTENSION: ICD-10-CM

## 2023-04-19 DIAGNOSIS — R06.2 WHEEZING: ICD-10-CM

## 2023-04-19 DIAGNOSIS — E66.9 OBESITY, UNSPECIFIED: ICD-10-CM

## 2023-04-19 DIAGNOSIS — J45.909 UNSPECIFIED ASTHMA, UNCOMPLICATED: ICD-10-CM

## 2023-04-19 DIAGNOSIS — J44.0 CHRONIC OBSTRUCTIVE PULMONARY DISEASE WITH (ACUTE) LOWER RESPIRATORY INFECTION: ICD-10-CM

## 2023-04-19 DIAGNOSIS — R06.02 SHORTNESS OF BREATH: ICD-10-CM

## 2023-04-19 DIAGNOSIS — Z86.69 PERSONAL HISTORY OF OTHER DISEASES OF THE NERVOUS SYSTEM AND SENSE ORGANS: ICD-10-CM

## 2023-04-19 DIAGNOSIS — R00.0 TACHYCARDIA, UNSPECIFIED: ICD-10-CM

## 2023-04-19 DIAGNOSIS — Z88.6 ALLERGY STATUS TO ANALGESIC AGENT: ICD-10-CM

## 2023-04-19 LAB
ALBUMIN SERPL ELPH-MCNC: 3.6 G/DL — SIGNIFICANT CHANGE UP (ref 3.3–5)
ALP SERPL-CCNC: 94 U/L — SIGNIFICANT CHANGE UP (ref 40–120)
ALT FLD-CCNC: 21 U/L — SIGNIFICANT CHANGE UP (ref 12–78)
ANION GAP SERPL CALC-SCNC: 2 MMOL/L — LOW (ref 5–17)
APTT BLD: 33 SEC — SIGNIFICANT CHANGE UP (ref 27.5–35.5)
AST SERPL-CCNC: 30 U/L — SIGNIFICANT CHANGE UP (ref 15–37)
BASE EXCESS BLDA CALC-SCNC: -0.2 MMOL/L — SIGNIFICANT CHANGE UP (ref -2–3)
BASE EXCESS BLDV CALC-SCNC: 1.5 MMOL/L — SIGNIFICANT CHANGE UP (ref -2–3)
BASOPHILS # BLD AUTO: 0.08 K/UL — SIGNIFICANT CHANGE UP (ref 0–0.2)
BASOPHILS NFR BLD AUTO: 1 % — SIGNIFICANT CHANGE UP (ref 0–2)
BILIRUB SERPL-MCNC: 0.4 MG/DL — SIGNIFICANT CHANGE UP (ref 0.2–1.2)
BLOOD GAS COMMENTS ARTERIAL: SIGNIFICANT CHANGE UP
BLOOD GAS VENOUS COMPREHENSIVE RESULT: SIGNIFICANT CHANGE UP
BUN SERPL-MCNC: 17 MG/DL — SIGNIFICANT CHANGE UP (ref 7–23)
CALCIUM SERPL-MCNC: 9.4 MG/DL — SIGNIFICANT CHANGE UP (ref 8.5–10.1)
CHLORIDE BLDV-SCNC: 105 MMOL/L — SIGNIFICANT CHANGE UP (ref 96–108)
CHLORIDE SERPL-SCNC: 109 MMOL/L — HIGH (ref 96–108)
CO2 BLDA-SCNC: 25 MMOL/L — HIGH (ref 19–24)
CO2 BLDV-SCNC: 27.9 MMOL/L — HIGH (ref 22–26)
CO2 SERPL-SCNC: 26 MMOL/L — SIGNIFICANT CHANGE UP (ref 22–31)
CREAT SERPL-MCNC: 0.89 MG/DL — SIGNIFICANT CHANGE UP (ref 0.5–1.3)
EGFR: 70 ML/MIN/1.73M2 — SIGNIFICANT CHANGE UP
EOSINOPHIL # BLD AUTO: 0.27 K/UL — SIGNIFICANT CHANGE UP (ref 0–0.5)
EOSINOPHIL NFR BLD AUTO: 3.4 % — SIGNIFICANT CHANGE UP (ref 0–6)
GAS PNL BLDA: SIGNIFICANT CHANGE UP
GAS PNL BLDV: 135 MMOL/L — LOW (ref 136–145)
GLUCOSE BLDV-MCNC: 154 MG/DL — HIGH (ref 70–99)
GLUCOSE SERPL-MCNC: 102 MG/DL — HIGH (ref 70–99)
HCO3 BLDA-SCNC: 24 MMOL/L — SIGNIFICANT CHANGE UP (ref 21–28)
HCO3 BLDV-SCNC: 27 MMOL/L — SIGNIFICANT CHANGE UP (ref 22–29)
HCT VFR BLD CALC: 43.7 % — SIGNIFICANT CHANGE UP (ref 34.5–45)
HCT VFR BLDA CALC: 40 % — SIGNIFICANT CHANGE UP (ref 34.5–46.5)
HGB BLD CALC-MCNC: 13.3 G/DL — SIGNIFICANT CHANGE UP (ref 11.7–16.1)
HGB BLD-MCNC: 13.5 G/DL — SIGNIFICANT CHANGE UP (ref 11.5–15.5)
IMM GRANULOCYTES NFR BLD AUTO: 0.3 % — SIGNIFICANT CHANGE UP (ref 0–0.9)
INR BLD: 1.11 RATIO — SIGNIFICANT CHANGE UP (ref 0.88–1.16)
LACTATE BLDV-MCNC: 2.1 MMOL/L — HIGH (ref 0.5–2)
LACTATE SERPL-SCNC: 1.7 MMOL/L — SIGNIFICANT CHANGE UP (ref 0.7–2)
LYMPHOCYTES # BLD AUTO: 3.21 K/UL — SIGNIFICANT CHANGE UP (ref 1–3.3)
LYMPHOCYTES # BLD AUTO: 40.7 % — SIGNIFICANT CHANGE UP (ref 13–44)
MCHC RBC-ENTMCNC: 26.5 PG — LOW (ref 27–34)
MCHC RBC-ENTMCNC: 30.9 GM/DL — LOW (ref 32–36)
MCV RBC AUTO: 85.9 FL — SIGNIFICANT CHANGE UP (ref 80–100)
MONOCYTES # BLD AUTO: 0.59 K/UL — SIGNIFICANT CHANGE UP (ref 0–0.9)
MONOCYTES NFR BLD AUTO: 7.5 % — SIGNIFICANT CHANGE UP (ref 2–14)
NEUTROPHILS # BLD AUTO: 3.71 K/UL — SIGNIFICANT CHANGE UP (ref 1.8–7.4)
NEUTROPHILS NFR BLD AUTO: 47.1 % — SIGNIFICANT CHANGE UP (ref 43–77)
NT-PROBNP SERPL-SCNC: 30 PG/ML — SIGNIFICANT CHANGE UP (ref 0–125)
PCO2 BLDA: 37 MMHG — SIGNIFICANT CHANGE UP (ref 32–45)
PCO2 BLDV: 43 MMHG — SIGNIFICANT CHANGE UP (ref 39–52)
PH BLDA: 7.42 — SIGNIFICANT CHANGE UP (ref 7.35–7.45)
PH BLDV: 7.4 — SIGNIFICANT CHANGE UP (ref 7.32–7.43)
PLATELET # BLD AUTO: 245 K/UL — SIGNIFICANT CHANGE UP (ref 150–400)
PO2 BLDA: 133 MMHG — HIGH (ref 83–108)
PO2 BLDV: 69 MMHG — HIGH (ref 25–45)
POTASSIUM BLDV-SCNC: 4.2 MMOL/L — SIGNIFICANT CHANGE UP (ref 3.5–5.1)
POTASSIUM SERPL-MCNC: 4.3 MMOL/L — SIGNIFICANT CHANGE UP (ref 3.5–5.3)
POTASSIUM SERPL-SCNC: 4.3 MMOL/L — SIGNIFICANT CHANGE UP (ref 3.5–5.3)
PROT SERPL-MCNC: 8.4 GM/DL — HIGH (ref 6–8.3)
PROTHROM AB SERPL-ACNC: 12.9 SEC — SIGNIFICANT CHANGE UP (ref 10.5–13.4)
RBC # BLD: 5.09 M/UL — SIGNIFICANT CHANGE UP (ref 3.8–5.2)
RBC # FLD: 13.2 % — SIGNIFICANT CHANGE UP (ref 10.3–14.5)
SAO2 % BLDA: 100 % — HIGH (ref 94–98)
SAO2 % BLDV: 95.3 % — HIGH (ref 67–88)
SODIUM SERPL-SCNC: 137 MMOL/L — SIGNIFICANT CHANGE UP (ref 135–145)
TROPONIN I, HIGH SENSITIVITY RESULT: <3 NG/L — SIGNIFICANT CHANGE UP
TROPONIN T, HIGH SENSITIVITY RESULT: <6 NG/L — SIGNIFICANT CHANGE UP
WBC # BLD: 7.88 K/UL — SIGNIFICANT CHANGE UP (ref 3.8–10.5)
WBC # FLD AUTO: 7.88 K/UL — SIGNIFICANT CHANGE UP (ref 3.8–10.5)

## 2023-04-19 PROCEDURE — 99291 CRITICAL CARE FIRST HOUR: CPT

## 2023-04-19 PROCEDURE — 80053 COMPREHEN METABOLIC PANEL: CPT

## 2023-04-19 PROCEDURE — 87040 BLOOD CULTURE FOR BACTERIA: CPT

## 2023-04-19 PROCEDURE — 36415 COLL VENOUS BLD VENIPUNCTURE: CPT

## 2023-04-19 PROCEDURE — 83605 ASSAY OF LACTIC ACID: CPT

## 2023-04-19 PROCEDURE — 82803 BLOOD GASES ANY COMBINATION: CPT

## 2023-04-19 PROCEDURE — 85730 THROMBOPLASTIN TIME PARTIAL: CPT

## 2023-04-19 PROCEDURE — 96374 THER/PROPH/DIAG INJ IV PUSH: CPT

## 2023-04-19 PROCEDURE — 84484 ASSAY OF TROPONIN QUANT: CPT

## 2023-04-19 PROCEDURE — 36600 WITHDRAWAL OF ARTERIAL BLOOD: CPT

## 2023-04-19 PROCEDURE — 83880 ASSAY OF NATRIURETIC PEPTIDE: CPT

## 2023-04-19 PROCEDURE — 99291 CRITICAL CARE FIRST HOUR: CPT | Mod: 25

## 2023-04-19 PROCEDURE — 85610 PROTHROMBIN TIME: CPT

## 2023-04-19 PROCEDURE — 93005 ELECTROCARDIOGRAM TRACING: CPT

## 2023-04-19 PROCEDURE — 94640 AIRWAY INHALATION TREATMENT: CPT

## 2023-04-19 PROCEDURE — 71045 X-RAY EXAM CHEST 1 VIEW: CPT | Mod: 26

## 2023-04-19 PROCEDURE — 93010 ELECTROCARDIOGRAM REPORT: CPT

## 2023-04-19 PROCEDURE — 96375 TX/PRO/DX INJ NEW DRUG ADDON: CPT

## 2023-04-19 PROCEDURE — 85025 COMPLETE CBC W/AUTO DIFF WBC: CPT

## 2023-04-19 PROCEDURE — 71045 X-RAY EXAM CHEST 1 VIEW: CPT

## 2023-04-19 PROCEDURE — 99285 EMERGENCY DEPT VISIT HI MDM: CPT

## 2023-04-19 RX ORDER — IPRATROPIUM/ALBUTEROL SULFATE 18-103MCG
3 AEROSOL WITH ADAPTER (GRAM) INHALATION ONCE
Refills: 0 | Status: COMPLETED | OUTPATIENT
Start: 2023-04-19 | End: 2023-04-19

## 2023-04-19 RX ORDER — SODIUM CHLORIDE 9 MG/ML
500 INJECTION INTRAMUSCULAR; INTRAVENOUS; SUBCUTANEOUS ONCE
Refills: 0 | Status: COMPLETED | OUTPATIENT
Start: 2023-04-19 | End: 2023-04-19

## 2023-04-19 RX ORDER — EPINEPHRINE 11.25MG/ML
0.5 SOLUTION, NON-ORAL INHALATION ONCE
Refills: 0 | Status: COMPLETED | OUTPATIENT
Start: 2023-04-19 | End: 2023-04-19

## 2023-04-19 RX ORDER — MAGNESIUM SULFATE 500 MG/ML
2 VIAL (ML) INJECTION ONCE
Refills: 0 | Status: COMPLETED | OUTPATIENT
Start: 2023-04-19 | End: 2023-04-19

## 2023-04-19 RX ORDER — SODIUM CHLORIDE 9 MG/ML
1000 INJECTION INTRAMUSCULAR; INTRAVENOUS; SUBCUTANEOUS ONCE
Refills: 0 | Status: COMPLETED | OUTPATIENT
Start: 2023-04-19 | End: 2023-04-19

## 2023-04-19 RX ADMIN — SODIUM CHLORIDE 1000 MILLILITER(S): 9 INJECTION INTRAMUSCULAR; INTRAVENOUS; SUBCUTANEOUS at 15:34

## 2023-04-19 RX ADMIN — Medication 3 MILLILITER(S): at 21:05

## 2023-04-19 RX ADMIN — Medication 125 MILLIGRAM(S): at 15:33

## 2023-04-19 RX ADMIN — SODIUM CHLORIDE 500 MILLILITER(S): 9 INJECTION INTRAMUSCULAR; INTRAVENOUS; SUBCUTANEOUS at 23:17

## 2023-04-19 RX ADMIN — Medication 150 GRAM(S): at 15:00

## 2023-04-19 RX ADMIN — Medication 0.5 MILLILITER(S): at 15:51

## 2023-04-19 NOTE — ED ADULT NURSE NOTE - OBJECTIVE STATEMENT
pt arrived to ED with respiratory wheezing and stridor. pt states she went to urgent care and was sent to . pt states she has been intubated during her previous admission. denies PMH of asthma and COPD. pt having difficulty breathing on assessment. audible wheezing and stridor noted. coloring appropriate. pt o2 reading WDL on monitor

## 2023-04-19 NOTE — ED PROVIDER NOTE - NORMAL, MLM
-schedule appointment with eye doctor  Brain Cohen MD or any other partner with [facility] [address]  Office: [phone]    -Please go to your pharmacy for Shingles shot. Shingles is not always covered so ask the pharmacy, if the Shingles shot if free or copay is cheap then please get it. If the Shingles copay is expensive then review the Shingles handout and see if you feel the Shingles shot is needed.  -Keep record of when you got the shots and tell us at next visit or via phone call.   
nikunj all pertinent systems normal

## 2023-04-19 NOTE — ED PROVIDER NOTE - PROGRESS NOTE DETAILS
Timo England for attending Dr. Nova: Spoke with Dr. Jain of anesthesia. He has no on available right now to consult. States he will find someone if pt needs to be emergently intubated. Timo Nova:  Anaesthesia evaluated pt. Pt is improved, no need for intubation at this time. Timo Nova:  Spoke to ICU PA who will be down to evaluate the pt for a bed. I Lisa Manley attest that this documentation has been prepared under the direction and in the presence of Dr. Nova. Spoke to ICU PA who recommends transfer to [facility]. Spoke to transfer center and Dr. Tariq who will accept pt to [facility]. Spoke to [facility] ED attending Dr. De Paz who accepts pt.

## 2023-04-19 NOTE — ED ADULT TRIAGE NOTE - CHIEF COMPLAINT QUOTE
sent by urgent care for SOB. hx COPD, large epiglottic folds, seen 2/2023 intubated. + wheezing & stridor in Triage. pt c/o diff swallowing.

## 2023-04-19 NOTE — ED ADULT NURSE NOTE - NSIMPLEMENTINTERV_GEN_ALL_ED
18 Implemented All Universal Safety Interventions:  [place] to call system. Call bell, personal items and telephone within reach. Instruct patient to call for assistance. Room bathroom lighting operational. Non-slip footwear when patient is off stretcher. Physically safe environment: no spills, clutter or unnecessary equipment. Stretcher in lowest position, wheels locked, appropriate side rails in place.

## 2023-04-19 NOTE — ED ADULT TRIAGE NOTE - CHIEF COMPLAINT QUOTE
pt transfer from [facility]  arrives with 50% venti mask has hx of large epiglottic folds  c/o some left side rib pain pt A&Ox4 pt transfer from [facility]  arrives with 50% venti mask has hx of large epiglottic folds  c/o some left side rib pain pt A&Ox4 pt transfer from [facility]  arrives with 50% venti mask has hx of large epiglottic folds  c/o some left side rib pain pt A&Ox4

## 2023-04-19 NOTE — CONSULT NOTE ADULT - SUBJECTIVE AND OBJECTIVE BOX
CHIEF COMPLAINT:    HPI:    PAST MEDICAL & SURGICAL HISTORY:  Asthma      H/O Bell's palsy      HTN (hypertension)      Seizure      Hepatitis C      No significant past surgical history          FAMILY HISTORY:  No pertinent family history in first degree relatives        SOCIAL HISTORY:  Smoking: __ packs x ___ years  EtOH Use:  Marital Status:  Occupation:  Recent Travel:  Country of Birth:  Advance Directives:    Allergies    No Known Allergies    Intolerances        HOME MEDICATIONS:    REVIEW OF SYSTEMS:  Constitutional:   Eyes:  ENT:  CV:  Resp:  GI:  :  MSK:  Integumentary:  Neurological:  Psychiatric:  Endocrine:  Hematologic/Lymphatic:  Allergic/Immunologic:  [ ] All other systems negative  [ ] Unable to assess ROS because ________    OBJECTIVE:  ICU Vital Signs Last 24 Hrs  T(C): 36.5 (19 Apr 2023 21:10), Max: 37.1 (19 Apr 2023 19:20)  T(F): 97.7 (19 Apr 2023 21:10), Max: 98.7 (19 Apr 2023 19:20)  HR: 93 (19 Apr 2023 21:10) (93 - 108)  BP: 151/72 (19 Apr 2023 21:10) (140/84 - 151/72)  BP(mean): --  ABP: --  ABP(mean): --  RR: 18 (19 Apr 2023 21:10) (18 - 22)  SpO2: 100% (19 Apr 2023 21:10) (98% - 100%)    O2 Parameters below as of 19 Apr 2023 21:10  Patient On (Oxygen Delivery Method): room air              CAPILLARY BLOOD GLUCOSE          PHYSICAL EXAM:  General:   HEENT:   Lymph Nodes:  Neck:   Respiratory:   Cardiovascular:   Abdomen:   Extremities:   Skin:   Neurological:  Psychiatry:    HOSPITAL MEDICATIONS:  MEDICATIONS  (STANDING):    MEDICATIONS  (PRN):      LABS:                Venous Blood Gas:  04-19 @ 21:03  7.40/43/69/27/95.3  VBG Lactate: 2.1      MICROBIOLOGY:     RADIOLOGY:  [ ] Reviewed and interpreted by me    EKG: CHIEF COMPLAINT:    HPI:    72f h/o asthma, Bell's Palsy, essential HTN, hepatitis C, seizures presents to the ED BIBEMS c/o SOB and wheezing x3-5 days, progressively worsened, similar to prior episode approx 2m ago AT [place] during which she was intubated with concern for airway etiology. At that time, patient was transferred to [facility] and extubated in OR with ENT for which there were no issues or anatomic abnormalities with airway.    She was transferred to [facility] today from [place] for ent evaluation given stridor and sob. She received steroids, neb epi and mag while there and had cc consult and anesthesia consult though deemed stable and not requiring intubation at that time. On arrival here reports her symptoms are about the same as when she departed [place] though improved from this am. Denies CP, fevers, abd pain, nausea, vomiting, diarrhea, dysuria.     Evaluated by ENT: patient not in active distress. Normal breathing at baseline, but has mild noisy breathing (wheezing vs. stridor) on deep expiration which usually leads to a congested cough. Denies fevers, dysphagia or other symptoms. Has had raspy voice for about the same time and actively currently. Scope exam with widely patent airway, no swelling or supraglottic masses, can be intubated from above. Vocal fold mobile, but small posterior gap. 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 No obvious subglottic lesions but difficult to assess fully.     At bedside, patient is comfortable without wheeze, spo2 98% on 3L NC (no oxygen use at baseline), tachy to 110. Received duonebs x3. Received steroids at [place] earlier in afternoon 4/19.         PAST MEDICAL & SURGICAL HISTORY:  Asthma      H/O Bell's palsy      HTN (hypertension)      Seizure      Hepatitis C      No significant past surgical history          FAMILY HISTORY:  No pertinent family history in first degree relatives        Allergies    No Known Allergies    Intolerances        HOME MEDICATIONS:    OBJECTIVE:  ICU Vital Signs Last 24 Hrs  T(C): 36.7 (19 Apr 2023 23:58), Max: 37.1 (19 Apr 2023 19:20)  T(F): 98 (19 Apr 2023 23:58), Max: 98.7 (19 Apr 2023 19:20)  HR: 120 (19 Apr 2023 23:58) (93 - 120)  BP: 127/65 (19 Apr 2023 23:58) (127/65 - 151/72)  BP(mean): --  ABP: --  ABP(mean): --  RR: 20 (19 Apr 2023 23:58) (18 - 22)  SpO2: 99% (19 Apr 2023 23:58) (98% - 100%)    O2 Parameters below as of 19 Apr 2023 23:58  Patient On (Oxygen Delivery Method): room air              CAPILLARY BLOOD GLUCOSE          PHYSICAL EXAM:  General: appears comfortable on nasal cannula, no increased work of breathing   Respiratory: tachypneic to 20s  Cardiovascular: regular rate and rhythm, tachycardic to 110  Extremities: no edema      HOSPITAL MEDICATIONS:  MEDICATIONS  (STANDING):  ipratropium  for Nebulization. 500 MICROGram(s) Nebulizer once  methylPREDNISolone sodium succinate Injectable 125 milliGRAM(s) IV Push Once    MEDICATIONS  (PRN):      LABS:                Venous Blood Gas:  04-19 @ 21:03  7.40/43/69/27/95.3  VBG Lactate: 2.1      MICROBIOLOGY:     RADIOLOGY:  [ ] Reviewed and interpreted by me    EKG: CHIEF COMPLAINT:    HPI:    72f h/o asthma, Bell's Palsy, essential HTN, hepatitis C, seizures presents to the ED BIBEMS c/o SOB and wheezing x3-5 days, progressively worsened, similar to prior episode approx 2m ago AT [place] during which she was intubated with concern for airway etiology. At that time, patient was transferred to [facility] and extubated in OR with ENT for which there were no issues or anatomic abnormalities with airway.    She was transferred to [facility] today from [place] for ent evaluation given stridor and sob. She received steroids, neb epi and mag while there and had cc consult and anesthesia consult though deemed stable and not requiring intubation at that time. On arrival here reports her symptoms are about the same as when she departed [place] though improved from this am. Denies CP, fevers, abd pain, nausea, vomiting, diarrhea, dysuria.     Evaluated by ENT: patient not in active distress. Normal breathing at baseline, but has mild noisy breathing (wheezing vs. stridor) on deep expiration which usually leads to a congested cough. Denies fevers, dysphagia or other symptoms. Has had raspy voice for about the same time and actively currently. Scope exam with widely patent airway, no swelling or supraglottic masses, can be intubated from above. Vocal fold mobile, but small posterior gap. 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 No obvious subglottic lesions but difficult to assess fully.     At bedside, patient is comfortable without wheeze, spo2 98% on 3L NC (no oxygen use at baseline), tachy to 110. Received duonebs x3. Received steroids at [place] earlier in afternoon 4/19.         PAST MEDICAL & SURGICAL HISTORY:  Asthma      H/O Bell's palsy      HTN (hypertension)      Seizure      Hepatitis C      No significant past surgical history          FAMILY HISTORY:  No pertinent family history in first degree relatives        Allergies    No Known Allergies    Intolerances        HOME MEDICATIONS:    OBJECTIVE:  ICU Vital Signs Last 24 Hrs  T(C): 36.7 (19 Apr 2023 23:58), Max: 37.1 (19 Apr 2023 19:20)  T(F): 98 (19 Apr 2023 23:58), Max: 98.7 (19 Apr 2023 19:20)  HR: 120 (19 Apr 2023 23:58) (93 - 120)  BP: 127/65 (19 Apr 2023 23:58) (127/65 - 151/72)  BP(mean): --  ABP: --  ABP(mean): --  RR: 20 (19 Apr 2023 23:58) (18 - 22)  SpO2: 99% (19 Apr 2023 23:58) (98% - 100%)    O2 Parameters below as of 19 Apr 2023 23:58  Patient On (Oxygen Delivery Method): room air              CAPILLARY BLOOD GLUCOSE          PHYSICAL EXAM:  General: appears comfortable on nasal cannula, no increased work of breathing   Respiratory: tachypneic to 20s  Cardiovascular: regular rate and rhythm, tachycardic to 110  Extremities: no edema      HOSPITAL MEDICATIONS:  MEDICATIONS  (STANDING):  ipratropium  for Nebulization. 500 MICROGram(s) Nebulizer once  methylPREDNISolone sodium succinate Injectable 125 milliGRAM(s) IV Push Once    MEDICATIONS  (PRN):      LABS:                Venous Blood Gas:  04-19 @ 21:03  7.40/43/69/27/95.3  VBG Lactate: 2.1      MICROBIOLOGY:     RADIOLOGY:  [ ] Reviewed and interpreted by me    EKG: CHIEF COMPLAINT:    HPI:    72f h/o asthma, Bell's Palsy, essential HTN, hepatitis C, seizures presents to the ED BIBEMS c/o SOB and wheezing x3-5 days, progressively worsened, similar to prior episode approx 2m ago AT [place] during which she was intubated with concern for airway etiology. At that time, patient was transferred to [facility] and extubated in OR with ENT for which there were no issues or anatomic abnormalities with airway.    She was transferred to [facility] today from [place] for ent evaluation given stridor and sob. She received steroids, neb epi and mag while there and had cc consult and anesthesia consult though deemed stable and not requiring intubation at that time. On arrival here reports her symptoms are about the same as when she departed [place] though improved from this am. Denies CP, fevers, abd pain, nausea, vomiting, diarrhea, dysuria.     Evaluated by ENT: patient not in active distress. Normal breathing at baseline, but has mild noisy breathing (wheezing vs. stridor) on deep expiration which usually leads to a congested cough. Denies fevers, dysphagia or other symptoms. Has had raspy voice for about the same time and actively currently. Scope exam with widely patent airway, no swelling or supraglottic masses, can be intubated from above. Vocal fold mobile, but small posterior gap. 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 No obvious subglottic lesions but difficult to assess fully.     At bedside, patient is comfortable without wheeze, spo2 98% on 3L NC (no oxygen use at baseline), tachy to 110. Received duonebs x3. Received steroids at [place] earlier in afternoon 4/19.         PAST MEDICAL & SURGICAL HISTORY:  Asthma      H/O Bell's palsy      HTN (hypertension)      Seizure      Hepatitis C      No significant past surgical history          FAMILY HISTORY:  No pertinent family history in first degree relatives        Allergies    No Known Allergies    Intolerances        HOME MEDICATIONS:    OBJECTIVE:  ICU Vital Signs Last 24 Hrs  T(C): 36.7 (19 Apr 2023 23:58), Max: 37.1 (19 Apr 2023 19:20)  T(F): 98 (19 Apr 2023 23:58), Max: 98.7 (19 Apr 2023 19:20)  HR: 120 (19 Apr 2023 23:58) (93 - 120)  BP: 127/65 (19 Apr 2023 23:58) (127/65 - 151/72)  BP(mean): --  ABP: --  ABP(mean): --  RR: 20 (19 Apr 2023 23:58) (18 - 22)  SpO2: 99% (19 Apr 2023 23:58) (98% - 100%)    O2 Parameters below as of 19 Apr 2023 23:58  Patient On (Oxygen Delivery Method): room air              CAPILLARY BLOOD GLUCOSE          PHYSICAL EXAM:  General: appears comfortable on nasal cannula, no increased work of breathing   Respiratory: tachypneic to 20s  Cardiovascular: regular rate and rhythm, tachycardic to 110  Extremities: no edema      HOSPITAL MEDICATIONS:  MEDICATIONS  (STANDING):  ipratropium  for Nebulization. 500 MICROGram(s) Nebulizer once  methylPREDNISolone sodium succinate Injectable 125 milliGRAM(s) IV Push Once    MEDICATIONS  (PRN):      LABS:                Venous Blood Gas:  04-19 @ 21:03  7.40/43/69/27/95.3  VBG Lactate: 2.1      MICROBIOLOGY:     RADIOLOGY:  [ ] Reviewed and interpreted by me    EKG:

## 2023-04-19 NOTE — ED PROVIDER NOTE - OBJECTIVE STATEMENT
71 y/o female with a PMHx of asthma, Bell's Palsy, essential HTN, hepatitis C, large epiglottic folds, seizures presents to the ED BIBEMS c/o SOB and wheezing x5 days. Denies CP. No other complaints at this time.

## 2023-04-19 NOTE — ED PROVIDER NOTE - CRITICAL CARE ATTENDING CONTRIBUTION TO CARE
Critical care time spent evaluating and reevaluating patient, ordering andinterpreting diagnostics, ordering therapeutics,, speaking to admitting and consulting MDs and staff and documenting. Benito HERRERA

## 2023-04-19 NOTE — CONSULT NOTE ADULT - ASSESSMENT
A/P: 72f h/o asthma, Bell's Palsy, essential HTN, hepatitis C, seizures presents to the ED BIBEMS c/o SOB and wheezing x3-5 days, progressively worsened, similar to prior episode approx 2m ago AT [place] during which she was intubated with concern for airway etiology. At that time, patient was transferred to [facility] and extubated in OR with ENT for which there were no issues or anatomic abnormalities with airway.    On ENT evaluation, patient is not in active distress. Normal breathing at baseline, but has mild noisy breathing (wheezing vs. stridor) on deep expiration which usually leads to a congested cough. Denies fevers, dysphagia or other symptoms. Has had raspy voice for about the same time and actively currently.     - Scope exam with widely patent airway, no swelling or supraglottic masses, can be intubated from above. Vocal fold mobile, but small posterior gap  - Some evidence of paradoxical vocal fold motion +/- muscle tension dysphonia  - No obvious subglottic lesions but difficult to assess fully  - CXR  - CT neck w/ IV contrast to r/o subglottic / tracheal issues  - D/w attending A/P: 72f h/o asthma, Bell's Palsy, essential HTN, hepatitis C, seizures presents to the ED BIBEMS c/o SOB and wheezing x3-5 days, progressively worsened, similar to prior episode approx 2m ago AT [place] during which she was intubated with concern for airway etiology. At that time, patient was transferred to [facility] and extubated in OR with ENT for which there were no issues or anatomic abnormalities with airway.    On ENT evaluation, patient is not in active distress. Normal breathing at baseline, but has mild noisy breathing (wheezing vs. stridor) on deep expiration which usually leads to a congested cough. Denies fevers, dysphagia or other symptoms. Has had raspy voice for about the same time and actively currently.     - Scope exam with widely patent airway, no swelling or supraglottic masses, can be intubated from above. Vocal fold mobile, but small posterior gap  - Some evidence of paradoxical vocal fold motion +/- muscle tension dysphonia  - No obvious subglottic lesions but difficult to assess fully  - CXR  - CT neck w/ IV contrast to r/o subglottic / tracheal issues  - D/w attending A/P: 72f h/o asthma, Bell's Palsy, essential HTN, hepatitis C, seizures presents to the ED BIBEMS c/o SOB and wheezing x3-5 days, progressively worsened, similar to prior episode approx 2m ago AT [place] during which she was intubated with concern for airway etiology. At that time, patient was transferred to [facility] and extubated in OR with ENT for which there were no issues or anatomic abnormalities with airway.    On ENT evaluation, patient is not in active distress. Normal breathing at baseline, but has mild noisy breathing (wheezing vs. stridor) on deep expiration which usually leads to a congested cough. Denies fevers, dysphagia or other symptoms. Has had raspy voice for about the same time and actively currently.     - Scope exam with widely patent airway, no swelling or supraglottic masses, can be intubated from above. Vocal fold mobile, but small posterior gap  - Some evidence of paradoxical vocal fold motion +/- muscle tension dysphonia  - No obvious subglottic lesions but difficult to assess fully  - CXR  - CT neck w/ IV contrast to r/o subglottic / tracheal issues  - D/w attending

## 2023-04-19 NOTE — ED ADULT TRIAGE NOTE - NS ED NURSE AMBULANCES
[facility]

## 2023-04-19 NOTE — ED ADULT NURSE NOTE - CHIEF COMPLAINT QUOTE
pt transfer from [facility]  arrives with 50% venti mask has hx of large epiglottic folds  c/o some left side rib pain pt A&Ox4 pt transfer from [facility]  arrives with 50% venti mask has hx of large epiglottic folds  c/o some left side rib pain pt A&Ox4 pt transfer from [facility]  arrives with 50% venti mask has hx of large epiglottic folds  c/o some left side rib pain pt A&Ox4

## 2023-04-19 NOTE — ED PROVIDER NOTE - CLINICAL SUMMARY MEDICAL DECISION MAKING FREE TEXT BOX
Pt with hx COPD, large epiglottic folds, seen in February and intubated here with stridor and wheezing.

## 2023-04-19 NOTE — ED PROVIDER NOTE - PHYSICAL EXAMINATION
GEN - awake, alert, o x3, speaking in full sentences, uncomfortable appearing  HEAD - NC/AT   EYES- PERRL, EOMI  ENT: Airway patent, minimally audible stridor present, mmm, Oral cavity and pharynx normal. No inflammation, swelling, exudate, or lesions. uvula midline    NECK: Neck supple  PULMONARY - CTA b/l, symmetric breath sounds.   CARDIAC -s1s2, RRR, no M,G,R  ABDOMEN - +BS, ND, NT, soft, no guarding, no rebound, no masses   BACK - no CVA tenderness, Normal  spine   EXTREMITIES - FROM, symmetric pulses, capillary refill < 2 seconds, no edema   SKIN - no rash or bruising   NEUROLOGIC - alert, speech clear, no focal deficits  PSYCH -nl mood/affect, nl insight.

## 2023-04-19 NOTE — ED ADULT NURSE NOTE - NSIMPLEMENTINTERV_GEN_ALL_ED
Implemented All Universal Safety Interventions:  [place] to call system. Call bell, personal items and telephone within reach. Instruct patient to call for assistance. Room bathroom lighting operational. Non-slip footwear when patient is off stretcher. Physically safe environment: no spills, clutter or unnecessary equipment. Stretcher in lowest position, wheels locked, appropriate side rails in place. Implemented All Universal Safety Interventions:  [place] to call system. Call bell, personal items and telephone within reach. Instruct patient to call for assistance. Room bathroom lighting operational. Non-slip footwear when patient is off stretcher. Physically safe environment: no spills, clutter or unnecessary equipment. Stretcher in lowest position, wheels locked, appropriate side rails in place. Implemented All Universal Safety Interventions:  [place] to call system. Call bell, personal items and telephone within reach. Instruct patient to call for assistance. Room bathroom lighting operational. Non-slip footwear when patient is off stretcher. Physically safe environment: no spills, clutter or unnecessary equipment. Stretcher in lowest position, wheels locked, appropriate side rails in place.

## 2023-04-19 NOTE — CONSULT NOTE ADULT - ATTENDING COMMENTS
72f h/o asthma, Bell's Palsy, essential HTN, hepatitis C, seizures, hx intubations for stridor/sob presents to the ED as transfer from [place] for ENT evaluation for 5 days of stridor and shortness of breath. She was tx for asthma exacerbation at [place] with steroids and nebs. Scoped/evaluated by ENT here without airway edema.    pt appears comfortable  steroids, nebs  RVP  CT neck   reconsult as needed  would admit to continuous pulse oxim 72f h/o asthma, Bell's Palsy, essential HTN, hepatitis C, seizures, hx intubations for stridor/sob presents to the ED as transfer from [place] for ENT evaluation for 5 days of stridor and shortness of breath. She was tx for asthma exacerbation at [place] with steroids and nebs. Scoped/evaluated by ENT here without airway edema.    pt appears comfortable  steroids, nebs  RVP  CT neck   reconsult as needed  would admit to continuous pulse oxim 72f h/o asthma, Bell's Palsy, essential HTN, hepatitis C, seizures, hx intubations for stridor/sob presents to the ED as transfer from [place] for ENT evaluation for 5 days of stridor and shortness of breath. She was tx for asthma exacerbation at [place] with steroids and nebs. Scoped/evaluated by ENT here without airway edema.    pt appears comfortable  steroids, nebs  RVP  CT neck   reconsult as needed  would admit to continuous pulse oxim

## 2023-04-19 NOTE — ED PROVIDER NOTE - OBJECTIVE STATEMENT
72f h/o asthma, Bell's Palsy, essential HTN, hepatitis C, large epiglottic folds, seizures presents to the ED BIBEMS c/o SOB and wheezing x3-5 days, progressively worsened, similar to prior episode approx 2m ago AT [place] during which she was intubated with concern for airway etiology, though after scope in OR, diagnosis not clear, extubated. She was transferred to [facility] today from [place] for ent evaluation given stridor and sob. She received steroids, neb epi and mag while there and had cc consult and anesthesia consult though deemed stable and not requiring intubation at that time. On arrival here reports her symptoms are about the same as when she departed [place] though improved from this am. Denies CP, fevers, abd pain, nausea, vomiting, diarrhea, dysuria. Does note upper thigh/groin pain, no trauma or injury.    Patient remained with stridor and sob and  had concern for vocal cord dysfunction so transferred to [facility] for ent eval. 72f h/o asthma, Bell's Palsy, essential HTN, hepatitis C, large epiglottic folds, seizures presents to the ED BIBEMS c/o SOB and wheezing x3-5 days, progressively worsened, similar to prior episode approx 2m ago AT [place] during which she was intubated with concern for airway etiology, though after scope in OR, diagnosis not clear, extubated. She was transferred to [facility] today from [place] for ent evaluation given stridor and sob. She received steroids, neb epi and mag while there and had cc consult and anesthesia consult though deemed stable and not requiring intubation at that time. On arrival here reports her symptoms are about the same as when she departed [place] though improved from this am. Denies CP, fevers, abd pain, nausea, vomiting, diarrhea, dysuria. Does note upper thigh/groin pain, no trauma or injury.    Patient remained with stridor and sob and  had concern for vocal cord dysfunction so transferred to [facility] for ent eval. 72f h/o asthma, Bell's Palsy, essential HTN, hepatitis C, large epiglottic folds, seizures presents to the ED BIBEMS c/o SOB and wheezing x3-5 days, progressively worsened, similar to prior episode approx 2m ago AT [place] during which she was intubated with concern for airway etiology, though after scope in OR, diagnosis not clear, extubated. She was transferred to [facility] today from [place] for ent evaluation given stridor and sob. She received steroids, neb epi and mag while there and had cc consult and anesthesia consult though deemed stable and not requiring intubation at that time. On arrival here reports her symptoms are about the same as when she departed [place] though improved from this am. Denies CP, fevers, abd pain, nausea, vomiting, diarrhea, dysuria. Does note upper thigh/groin pain, no trauma or injury.    Patient remained with stridor and sob and  had concern for vocal cord dysfunction so transferred to [facility] for ent eval. 72f h/o asthma, Bell's Palsy, essential HTN, hepatitis C, large epiglottic folds, seizures presents to the ED BIBEMS c/o SOB and wheezing x3-5 days, progressively worsened, similar to prior episode approx 2m ago AT [place] during which she was intubated with concern for airway etiology, though after scope in OR, diagnosis not clear, extubated. She was transferred to [facility] today from [place] for ent evaluation given stridor and sob. She received steroids, neb epi and mag while there and had cc consult and anesthesia consult though deemed stable and not requiring intubation at that time. On arrival here reports her symptoms are about the same as when she departed [place] though improved from this am. Denies CP, fevers, abd pain, nausea, vomiting, diarrhea, dysuria. 72f h/o asthma, Bell's Palsy, essential HTN, hepatitis C, large epiglottic folds, seizures presents to the ED BIBEMS c/o SOB and wheezing x3-5 days, progressively worsened, similar to prior episode approx 2m ago AT [place] during which she was intubated with concern for airway etiology, though after scope in OR, diagnosis not clear, extubated. She was transferred to [facility] today from [place] for ent evaluation given stridor and sob. She received steroids, neb epi and mag while there and had cc consult and anesthesia consult though deemed stable and not requiring intubation at that time. On arrival here reports her symptoms are about the same as when she departed [place] though improved from this am. Denies CP, fevers, abd pain, nausea, vomiting, diarrhea, dysuria. 72f h/o asthma, Bell's Palsy, essential HTN, hepatitis C, large epiglottic folds, seizures presents to the ED BIBEMS c/o SOB and wheezing x3-5 days, progressively worsened, similar to prior episode approx 2m ago AT [place] during which she was intubated with concern for airway etiology, though after scope in OR, diagnosis not clear, extubated. She was transferred to [facility] today from [place] for ent evaluation given stridor and sob. She received steroids, neb epi and mag while there and had cc consult and anesthesia consult though deemed stable and not requiring intubation at that time. On arrival here reports her symptoms are about the same as when she departed [place] though improved from this am. Denies CP, fevers, abd pain, nausea, vomiting, diarrhea, dysuria.

## 2023-04-19 NOTE — ED ADULT TRIAGE NOTE - CCCP TRG CHIEF CMPLNT
pt had stridor was given .5 racemic epi, solumedrol 125 mg.,2 gm magnesium  iv arrives with 2 iv #20 right and left arm/shortness of breath

## 2023-04-19 NOTE — CONSULT NOTE ADULT - SUBJECTIVE AND OBJECTIVE BOX
ICU Consult Note    HPI:    S:    Pt seen and examined  HD # 1  FULL CODE  PMHx PMHx of asthma, Bell's Palsy, essential HTN, hepatitis C, large epiglottic folds, seizures presents to the ED BIBEMS c/o SOB and wheezing x5 days. Denies CP. No other complaints at this time    4/19 AM: Pt w/ stridor c/o of SOB despite nebs, steroids, MgS+, racemic epi INH. Notes reviewed, discussed extensively with Dr Jhaveri. Pt speech limited 2/2 Pt condition    ROS: + SOB  Remainder of systems reviewed, negative    Allergies    morphine (Unknown)    Intolerances    MEDICATIONS  (STANDING):    MEDICATIONS  (PRN):    Drug Dosing Weight    Height (cm): 175.3 (19 Apr 2023 14:48)  Weight (kg): 95.3 (19 Apr 2023 14:48)  BMI (kg/m2): 31 (19 Apr 2023 14:48)  BSA (m2): 2.11 (19 Apr 2023 14:48)    PAST MEDICAL & SURGICAL HISTORY:    Bell's palsy  Hepatitis C  Asthma, unspecified asthma severity, unspecified whether complicated, unspecified whether persistent  Essential hypertension  Seizure    No significant past surgical history    FAMILY HISTORY:  Family history of breast cancer (Sibling)    ROS: See HPI; otherwise, all systems reviewed and negative.    O:    ICU Vital Signs Last 24 Hrs  T(C): --  T(F): --  HR: 110 (19 Apr 2023 14:48) (110 - 110)  BP: 163/101 (19 Apr 2023 14:48) (163/101 - 163/101)  BP(mean): --  ABP: --  ABP(mean): --  RR: 18 (19 Apr 2023 14:48) (18 - 18)  SpO2: 100% (19 Apr 2023 14:48) (100% - 100%)    O2 Parameters below as of 19 Apr 2023 14:48  Patient On (Oxygen Delivery Method): nasal cannula  O2 Flow (L/min): 6    ABG - ( 19 Apr 2023 15:54 )  pH, Arterial: 7.42  pH, Blood: x     /  pCO2: 37    /  pO2: 133   / HCO3: 24    / Base Excess: -0.2  /  SaO2: 100       I&O's Detail    PE:    Elderly F sitting in bed  No JVD trachea midline  + audible stridor  S1S2+   + expiratory wheezing throughout  Abd soft NTND  No leg swelling/edema noted  Skin pink, well perfused  Awake and alert    LABS:    CBC Full  -  ( 19 Apr 2023 15:17 )  WBC Count : 7.88 K/uL  RBC Count : 5.09 M/uL  Hemoglobin : 13.5 g/dL  Hematocrit : 43.7 %  Platelet Count - Automated : 245 K/uL  Mean Cell Volume : 85.9 fl  Mean Cell Hemoglobin : 26.5 pg  Mean Cell Hemoglobin Concentration : 30.9 gm/dL  Auto Neutrophil # : 3.71 K/uL  Auto Lymphocyte # : 3.21 K/uL  Auto Monocyte # : 0.59 K/uL  Auto Eosinophil # : 0.27 K/uL  Auto Basophil # : 0.08 K/uL  Auto Neutrophil % : 47.1 %  Auto Lymphocyte % : 40.7 %  Auto Monocyte % : 7.5 %  Auto Eosinophil % : 3.4 %  Auto Basophil % : 1.0 %    04-19    137  |  109<H>  |  17  ----------------------------<  102<H>  4.3   |  26  |  0.89    Ca    9.4      19 Apr 2023 15:17    TPro  8.4<H>  /  Alb  3.6  /  TBili  0.4  /  DBili  x   /  AST  30  /  ALT  21  /  AlkPhos  94  04-19    PT/INR - ( 19 Apr 2023 15:17 )   PT: 12.9 sec;   INR: 1.11 ratio         PTT - ( 19 Apr 2023 15:17 )  PTT:33.0 sec    CAPILLARY BLOOD GLUCOSE            LIVER FUNCTIONS - ( 19 Apr 2023 15:17 )  Alb: 3.6 g/dL / Pro: 8.4 gm/dL / ALK PHOS: 94 U/L / ALT: 21 U/L / AST: 30 U/L / GGT: x

## 2023-04-19 NOTE — CONSULT NOTE ADULT - ASSESSMENT
A:    72F  HD # 1  FULL CODE    Here for:    1. Resp distress  2. Stridor  3. SOB    P/recommendations:    Pt carries a Dx of arytenoid edema/enlargment  Was intubated here in ER, sent to [facility] ~ 2 months ago for similar incident  Brought to OR, scoped by ENT while intubated; no anatomic abnl, extubated    Has similar incident now    Long discussion with Dr Erazo, likely dx vocal cord dysfxn, paradoxical vocal cord motion    The problem is this hospital does NOT have ENT services; we can watch patient and intubate if necessary, but cannot treat what is considered her underlying condition/working Dx    Recommended transfer as best course of action to center with ENT services to Dr Oliver    - Tx with bronchdilators  - Steroids    Discussed with Dr Schuster, agrees with this plan  Relayed this to Dr Oliver, who will call [facility]

## 2023-04-19 NOTE — ED PROVIDER NOTE - PROGRESS NOTE DETAILS
ENT scoped, airways open, concern for possible vocal cord dysfunction/muscle problem, recommend CT given previous intubation of the neck to ensure no anatomic problem, will consult MICU for airway monitoring.  We will trial additional medication given also has history of asthma. Oralia Young MD (PGY3) -  Pt's breathing has improved. No stridor. States nebs helped, although they did make her jittery. Also c/o chest pain s/p nebs - trop/ecg non actionable. ENT's exam reassuring from airway standpoint. MICU consulted - reject admission. Pending CT imaging. Pt endorsed to hospitalist at this time.

## 2023-04-19 NOTE — ED ADULT NURSE REASSESSMENT NOTE - NS ED NURSE REASSESS COMMENT FT1
attempted report to transfer center. RN not picking up phone at [facility]. Transfer center aware transport was at  already. will attempt to call [facility] RN.

## 2023-04-19 NOTE — CONSULT NOTE ADULT - SUBJECTIVE AND OBJECTIVE BOX
ENT CONSULT NOTE    HPI: 72f h/o asthma, Bell's Palsy, essential HTN, hepatitis C, seizures presents to the ED BIBEMS c/o SOB and wheezing x3-5 days, progressively worsened, similar to prior episode approx 2m ago AT [place] during which she was intubated with concern for airway etiology. At that time, patient was transferred to [facility] and extubated in OR with ENT for which there were no issues or anatomic abnormalities with airway.    She was transferred to [facility] today from [place] for ent evaluation given stridor and sob. She received steroids, neb epi and mag while there and had cc consult and anesthesia consult though deemed stable and not requiring intubation at that time. On arrival here reports her symptoms are about the same as when she departed [place] though improved from this am. Denies CP, fevers, abd pain, nausea, vomiting, diarrhea, dysuria.     On ENT evaluation, patient is not in active distress. Normal breathing at baseline, but has mild noisy breathing (wheezing vs. stridor) on deep expiration which usually leads to a congested cough. Denies fevers, dysphagia or other symptoms. Has had raspy voice for about the same time and actively currently. No recent travel or issues since the last time patient was here.      PAST MEDICAL & SURGICAL HISTORY:  Asthma      H/O Bell's palsy      HTN (hypertension)      Seizure      Hepatitis C      No significant past surgical history        No Known Allergies    MEDICATIONS  (STANDING):    MEDICATIONS  (PRN):      Objective    ICU Vital Signs Last 24 Hrs  T(C): 36.5 (19 Apr 2023 21:10), Max: 37.1 (19 Apr 2023 19:20)  T(F): 97.7 (19 Apr 2023 21:10), Max: 98.7 (19 Apr 2023 19:20)  HR: 93 (19 Apr 2023 21:10) (93 - 108)  BP: 151/72 (19 Apr 2023 21:10) (140/84 - 151/72)  BP(mean): --  ABP: --  ABP(mean): --  RR: 18 (19 Apr 2023 21:10) (18 - 22)  SpO2: 100% (19 Apr 2023 21:10) (98% - 100%)    O2 Parameters below as of 19 Apr 2023 21:10  Patient On (Oxygen Delivery Method): room air          PHYSICAL EXAM:    EARS: The right/left pinna was normal.  NOSE: Normal external nose. Anterior nasal cavity patent with no obstruction. Inferior turbinates normally sized.  ORAL CAVITY/OROPHARYNX: normal mucosa. No erythema, lesions or bleeding.  NECK: No cervical lymphadenopathy  RESPIRATORY: Respirations unlabored, no increased work of breathing with use of accessory muscles and retractions. Mild expiratory wheezing vs. stridor.  CARDIAC: Warm extremities, no cyanosis.       Procedure: Flexible laryngoscopy    Pre-procedure diagnosis/Indication for procedure: To evaluate larynx    Nasopharynx wnl  BOT/vallecula normal  Epiglottis sharp  AE folds nonedematous  Arytenoids mobile  Vocal folds mobile bilaterally, though small posterior gap.  Some paradoxical vocal fold motion  No masses or lesions visualized in post cricoid space or pyriform sinuses bilaterally  No active bleeding or significant obstruction noted in supraglottic area - intubatable from above   ENT CONSULT NOTE    HPI: 72f h/o asthma, Bell's Palsy, essential HTN, hepatitis C, seizures presents to the ED BIBEMS c/o SOB and wheezing x3-5 days, progressively worsened, similar to prior episode approx 2m ago AT [place] during which she was intubated with concern for airway etiology. At that time, patient was transferred to [facility] and extubated in OR with ENT for which there were no issues or anatomic abnormalities with airway.    She was transferred to [facility] today from [place] for ent evaluation given stridor and sob. She received steroids, neb epi and mag while there and had cc consult and anesthesia consult though deemed stable and not requiring intubation at that time. On arrival here reports her symptoms are about the same as when she departed [place] though improved from this am. Denies CP, fevers, abd pain, nausea, vomiting, diarrhea, dysuria.     On ENT evaluation, patient is not in active distress. Normal breathing at baseline, but has mild noisy breathing (wheezing vs. stridor) on deep expiration which usually leads to a congested cough. Denies fevers, dysphagia or other symptoms. Has had raspy voice for about the same time and actively currently. No recent travel or issues since the last time patient was here.      PAST MEDICAL & SURGICAL HISTORY:  Asthma      H/O Bell's palsy      HTN (hypertension)      Seizure      Hepatitis C      No significant past surgical history        No Known Allergies    MEDICATIONS  (STANDING):    MEDICATIONS  (PRN):      Objective    ICU Vital Signs Last 24 Hrs  T(C): 36.5 (19 Apr 2023 21:10), Max: 37.1 (19 Apr 2023 19:20)  T(F): 97.7 (19 Apr 2023 21:10), Max: 98.7 (19 Apr 2023 19:20)  HR: 93 (19 Apr 2023 21:10) (93 - 108)  BP: 151/72 (19 Apr 2023 21:10) (140/84 - 151/72)  BP(mean): --  ABP: --  ABP(mean): --  RR: 18 (19 Apr 2023 21:10) (18 - 22)  SpO2: 100% (19 Apr 2023 21:10) (98% - 100%)    O2 Parameters below as of 19 Apr 2023 21:10  Patient On (Oxygen Delivery Method): room air          PHYSICAL EXAM:    EARS: The right/left pinna was normal.  NOSE: Normal external nose. Anterior nasal cavity patent with no obstruction. Inferior turbinates normally sized.  ORAL CAVITY/OROPHARYNX: normal mucosa. No erythema, lesions or bleeding.  NECK: No cervical lymphadenopathy  RESPIRATORY: Respirations unlabored, no increased work of breathing with use of accessory muscles and retractions. Mild expiratory wheezing vs. stridor.  CARDIAC: Warm extremities, no cyanosis.       Procedure: Flexible laryngoscopy    Pre-procedure diagnosis/Indication for procedure: To evaluate larynx    Nasopharynx wnl  BOT/vallecula normal  Epiglottis sharp  AE folds nonedematous  Arytenoids mobile  Vocal folds mobile bilaterally, though small posterior gap.  Some paradoxical vocal fold motion  No masses or lesions visualized in post cricoid space or pyriform sinuses bilaterally  No active bleeding or significant obstruction noted in supraglottic area - intubatable from above   ENT CONSULT NOTE    HPI: 72f h/o asthma, Bell's Palsy, essential HTN, hepatitis C, seizures presents to the ED BIBEMS c/o SOB and wheezing x3-5 days, progressively worsened, similar to prior episode approx 2m ago AT [place] during which she was intubated with concern for airway etiology. At that time, patient was transferred to [facility] and extubated in OR with ENT for which there were no issues or anatomic abnormalities with airway.    She was transferred to [facility] today from [place] for ent evaluation given stridor and sob. She received steroids, neb epi and mag while there and had cc consult and anesthesia consult though deemed stable and not requiring intubation at that time. On arrival here reports her symptoms are about the same as when she departed [place] though improved from this am. Denies CP, fevers, abd pain, nausea, vomiting, diarrhea, dysuria.     On ENT evaluation, patient is not in active distress. Normal breathing at baseline, but has mild noisy breathing (wheezing vs. stridor) on deep expiration which usually leads to a congested cough. Denies fevers, dysphagia or other symptoms. Has had raspy voice for about the same time and actively currently. No recent travel or issues since the last time patient was here.      PAST MEDICAL & SURGICAL HISTORY:  Asthma      H/O Bell's palsy      HTN (hypertension)      Seizure      Hepatitis C      No significant past surgical history        No Known Allergies    MEDICATIONS  (STANDING):    MEDICATIONS  (PRN):      Objective    ICU Vital Signs Last 24 Hrs  T(C): 36.5 (19 Apr 2023 21:10), Max: 37.1 (19 Apr 2023 19:20)  T(F): 97.7 (19 Apr 2023 21:10), Max: 98.7 (19 Apr 2023 19:20)  HR: 93 (19 Apr 2023 21:10) (93 - 108)  BP: 151/72 (19 Apr 2023 21:10) (140/84 - 151/72)  BP(mean): --  ABP: --  ABP(mean): --  RR: 18 (19 Apr 2023 21:10) (18 - 22)  SpO2: 100% (19 Apr 2023 21:10) (98% - 100%)    O2 Parameters below as of 19 Apr 2023 21:10  Patient On (Oxygen Delivery Method): room air          PHYSICAL EXAM:    EARS: The right/left pinna was normal.  NOSE: Normal external nose. Anterior nasal cavity patent with no obstruction. Inferior turbinates normally sized.  ORAL CAVITY/OROPHARYNX: normal mucosa. No erythema, lesions or bleeding.  NECK: No cervical lymphadenopathy  RESPIRATORY: Respirations unlabored, no increased work of breathing with use of accessory muscles and retractions. Mild expiratory wheezing vs. stridor.  CARDIAC: Warm extremities, no cyanosis.       Procedure: Flexible laryngoscopy    Pre-procedure diagnosis/Indication for procedure: To evaluate larynx    Nasopharynx wnl  BOT/vallecula normal  Epiglottis sharp  AE folds nonedematous  Arytenoids mobile  Vocal folds mobile bilaterally, though small posterior gap.  Some paradoxical vocal fold motion  No masses or lesions visualized in post cricoid space or pyriform sinuses bilaterally  No active bleeding or significant obstruction noted in supraglottic area - intubatable from above

## 2023-04-19 NOTE — ED PROVIDER NOTE - CLINICAL SUMMARY MEDICAL DECISION MAKING FREE TEXT BOX
72 female presenting to the [facility] as transfer from [facility] for ENT evaluation in the setting of concern for possible vocal cord dysfunction and airway threat.  Patient arrived stable, uncomfortable though does not appear significantly labored at this time, maintaining saturation with a Ventimask, awake and talking, able to speak in full sentences.  Plan to symptomatically treat as needed, stat ENT consult, MICU consult for airway monitoring.  Plan discussed with patient who is agreeable. 72 female presenting to the [facility] as transfer from [facility] for ENT evaluation in the setting of concern for possible vocal cord dysfunction and airway threat.  Patient arrived stable, uncomfortable though does not appear significantly labored at this time, maintaining saturation with a Ventimask, awake and talking, able to speak in full sentences.  Plan to symptomatically treat as needed, stat ENT consult, MICU consult for airway monitoring.  Plan discussed with patient who is agreeable. 72 female presenting to the [facility] as transfer from [facility] for ENT evaluation in the setting of concern for possible vocal cord dysfunction and airway threat.  Patient arrived stable, uncomfortable though does not appear significantly labored at this time, maintaining saturation with a Ventimask, awake and talking, able to speak in full sentences.  Plan to symptomatically treat as needed, stat ENT consult, MICU consult for airway monitoring.  Plan discussed with patient who is agreeable.

## 2023-04-19 NOTE — ED ADULT NURSE NOTE - OBJECTIVE STATEMENT
73 y/o F presents to ED room 2 A&Ox4 c/p SOB. "pt transfer from [facility]  arrives with 50% venti mask has hx of large epiglottic folds" pt previously intubated. "pt had stridor was given .5 racemic epi, solumedrol 125 mg.,2 gm magnesium  iv arrives with 2 iv #20 right and left arm." upon assessment, no stridor, drooling, tachypnea, labored breathing noted. 100% on RA at this time. pt c/o back pain, L leg pain, R groin pain, and SOB x 5 days. PMHx [place] Palsy (L facial droop noted.), asthma, Hepatitis C. no acute distress noted at this time. respirations even and unlabored. presents with 20G to RAC and 20G to R forearm. labs drawn and sent. medictaed as per MD orders. awaiting reassessment 73 y/o F presents to ED room 2 A&Ox4 c/p SOB. "pt transfer from [facility]  arrives with 50% venti mask has hx of large epiglottic folds" pt previously intubated. "pt had stridor was given .5 racemic epi, solumedrol 125 mg.,2 gm magnesium  iv arrives with 2 iv #20 right and left arm." upon assessment, no stridor, drooling, tachypnea, labored breathing noted. 100% on RA at this time. pt c/o back pain, L leg pain, R groin pain, and SOB x 5 days. PMHx [place] Palsy (L facial droop noted.), asthma, Hepatitis C. no acute distress noted at this time. respirations even and unlabored. presents with 20G to RAC and 20G to R forearm. labs drawn and sent. medictaed as per MD orders. awaiting reassessment 73 y/o F presents to ED room 2 A&Ox4 c/p SOB. "pt transfer from [facility]  arrives with 50% venti mask has hx of large epiglottic folds" pt previously intubated. "pt had stridor was given .5 racemic epi, solumedrol 125 mg.,2 gm magnesium  iv arrives with 2 iv #20 right and left arm." upon assessment, no stridor, drooling, tachypnea, labored breathing noted. 100% on RA at this time. pt c/o back pain, L leg pain, R groin pain, and SOB x 5 days. PMHx [place] Palsy (L facial droop noted.), asthma, Hepatitis C. no acute distress noted at this time. respirations even and unlabored. presents with 20G to RAC and 20G to R forearm. labs drawn and sent. medictaed as per MD orders. awaiting reassessment 73 y/o F presents to ED room 2 A&Ox4 c/p SOB. "pt transfer from [facility]  arrives with 50% venti mask has hx of large epiglottic folds" pt previously intubated. "pt had stridor was given .5 racemic epi, solumedrol 125 mg.,2 gm magnesium  iv arrives with 2 iv #20 right and left arm." upon assessment, no stridor, drooling, tachypnea, labored breathing noted. 100% on RA at this time. pt c/o back pain, L leg pain, R groin pain, and SOB x 5 days. PMHx [place] Palsy (L facial droop noted.), asthma, Hepatitis C. no acute distress noted at this time. denies recent exposure to flu/covid. respirations even and unlabored. presents with 20G to RAC and 20G to R forearm. labs drawn and sent. medictaed as per MD orders. awaiting reassessment 73 y/o F presents to ED room 2 A&Ox4 c/p SOB. "pt transfer from [facility]  arrives with 50% venti mask has hx of large epiglottic folds" pt previously intubated. "pt had stridor was given .5 racemic epi, solumedrol 125 mg.,2 gm magnesium  iv arrives with 2 iv #20 right and left arm." upon assessment, no stridor, drooling, tachypnea, labored breathing noted. 100% on RA at this time. pt c/o back pain, L leg pain, R groin pain, and SOB x 5 days. PMHx [place] Palsy (L facial droop noted.), asthma, Hepatitis C. no acute distress noted at this time. denies recent exposure to flu/covid. respirations even and unlabored. presents with 20G to RAC and 20G to R forearm. labs drawn and sent. medictaed as per MD orders. awaiting reassessment 73 y/o F presents to ED room 2 A&Ox4 c/p SOB. "pt transfer from [facility]  arrives with 50% venti mask has hx of large epiglottic folds" pt previously intubated. "pt had stridor was given .5 racemic epi, solumedrol 125 mg.,2 gm magnesium  iv arrives with 2 iv #20 right and left arm." upon assessment, no stridor, drooling, tachypnea, labored breathing noted. 100% on RA at this time. pt c/o back pain, L leg pain, R groin pain, and SOB x 5 days. PMHx [place] Palsy (L facial droop noted.), asthma, Hepatitis C. no acute distress noted at this time. denies recent exposure to flu/covid. respirations even and unlabored. presents with 20G to RAC and 20G to R forearm. labs drawn and sent. medictaed as per MD orders. awaiting reassessment

## 2023-04-19 NOTE — CONSULT NOTE ADULT - ASSESSMENT
- pending CT neck - f/u  - pending CT neck - f/u   - recommend treating asthma exacerbation with steroids and duonebs  72f h/o asthma, Bell's Palsy, essential HTN, hepatitis C, seizures, hx intubations for stridor/sob presents to the ED as transfer from [place] for ENT evaluation for 5 days of stridor and shorntess of breath. She was tx for asthma exacerbation at [place] with steroids and nebs. Scoped/evaluated by ENT here without airway edema.      Recommendations:   - currently not a MICU candidate as she is stable, no stridor, spo2 >95 on nasal cannula  - pending CT neck - f/u   - recommend continue treating asthma exacerbation with steroids and duonebs  72f h/o asthma, Bell's Palsy, essential HTN, hepatitis C, seizures, hx intubations for stridor/sob presents to the ED as transfer from [place] for ENT evaluation for 5 days of stridor and shorntess of breath. She was tx for asthma exacerbation at [place] with steroids and nebs. Scoped/evaluated by ENT here without airway edema.      Recommendations:   - currently not a MICU candidate as she is stable, no stridor, spo2 >95 on nasal cannula  - pending CT neck - f/u   - recommend continue treating asthma exacerbation with steroids and duonebs  72f h/o asthma, Bell's Palsy, essential HTN, hepatitis C, seizures, hx intubations for stridor/sob presents to the ED as transfer from [place] for ENT evaluation for 5 days of stridor and shorntess of breath. She was tx for asthma exacerbation at [place] with steroids and nebs. Scoped/evaluated by ENT here without airway edema.      Recommendations:   - currently not a MICU candidate as she is stable, no stridor, spo2 >95 on nasal cannula  - pending CT neck - f/u   - recommend continue treating asthma exacerbation with steroids and duonebs

## 2023-04-19 NOTE — ED PROVIDER NOTE - CONSTITUTIONAL, MLM
Obese, well appearing, awake, alert, oriented to person, place, time/situation and in moderate to severe respiratory distress normal...

## 2023-04-20 DIAGNOSIS — Z29.9 ENCOUNTER FOR PROPHYLACTIC MEASURES, UNSPECIFIED: ICD-10-CM

## 2023-04-20 DIAGNOSIS — D22.9 MELANOCYTIC NEVI, UNSPECIFIED: ICD-10-CM

## 2023-04-20 DIAGNOSIS — R07.89 OTHER CHEST PAIN: ICD-10-CM

## 2023-04-20 DIAGNOSIS — R06.02 SHORTNESS OF BREATH: ICD-10-CM

## 2023-04-20 DIAGNOSIS — M25.552 PAIN IN LEFT HIP: ICD-10-CM

## 2023-04-20 DIAGNOSIS — R35.0 FREQUENCY OF MICTURITION: ICD-10-CM

## 2023-04-20 DIAGNOSIS — R06.1 STRIDOR: ICD-10-CM

## 2023-04-20 DIAGNOSIS — I10 ESSENTIAL (PRIMARY) HYPERTENSION: ICD-10-CM

## 2023-04-20 DIAGNOSIS — J45.909 UNSPECIFIED ASTHMA, UNCOMPLICATED: ICD-10-CM

## 2023-04-20 LAB
ANION GAP SERPL CALC-SCNC: 18 MMOL/L — HIGH (ref 7–14)
APPEARANCE UR: CLEAR — SIGNIFICANT CHANGE UP
B PERT DNA SPEC QL NAA+PROBE: SIGNIFICANT CHANGE UP
B PERT+PARAPERT DNA PNL SPEC NAA+PROBE: SIGNIFICANT CHANGE UP
BILIRUB UR-MCNC: NEGATIVE — SIGNIFICANT CHANGE UP
BORDETELLA PARAPERTUSSIS (RAPRVP): SIGNIFICANT CHANGE UP
BUN SERPL-MCNC: 16 MG/DL — SIGNIFICANT CHANGE UP (ref 7–23)
C PNEUM DNA SPEC QL NAA+PROBE: SIGNIFICANT CHANGE UP
CALCIUM SERPL-MCNC: 9.2 MG/DL — SIGNIFICANT CHANGE UP (ref 8.4–10.5)
CHLORIDE SERPL-SCNC: 102 MMOL/L — SIGNIFICANT CHANGE UP (ref 98–107)
CO2 SERPL-SCNC: 18 MMOL/L — LOW (ref 22–31)
COLOR SPEC: YELLOW — SIGNIFICANT CHANGE UP
CREAT SERPL-MCNC: 0.74 MG/DL — SIGNIFICANT CHANGE UP (ref 0.5–1.3)
DIFF PNL FLD: ABNORMAL
EGFR: 87 ML/MIN/1.73M2 — SIGNIFICANT CHANGE UP
FLUAV SUBTYP SPEC NAA+PROBE: SIGNIFICANT CHANGE UP
FLUBV RNA SPEC QL NAA+PROBE: SIGNIFICANT CHANGE UP
GLUCOSE SERPL-MCNC: 173 MG/DL — HIGH (ref 70–99)
GLUCOSE UR QL: NEGATIVE — SIGNIFICANT CHANGE UP
HADV DNA SPEC QL NAA+PROBE: SIGNIFICANT CHANGE UP
HCOV 229E RNA SPEC QL NAA+PROBE: SIGNIFICANT CHANGE UP
HCOV HKU1 RNA SPEC QL NAA+PROBE: SIGNIFICANT CHANGE UP
HCOV NL63 RNA SPEC QL NAA+PROBE: SIGNIFICANT CHANGE UP
HCOV OC43 RNA SPEC QL NAA+PROBE: SIGNIFICANT CHANGE UP
HCT VFR BLD CALC: 40.2 % — SIGNIFICANT CHANGE UP (ref 34.5–45)
HGB BLD-MCNC: 12.4 G/DL — SIGNIFICANT CHANGE UP (ref 11.5–15.5)
HMPV RNA SPEC QL NAA+PROBE: SIGNIFICANT CHANGE UP
HPIV1 RNA SPEC QL NAA+PROBE: SIGNIFICANT CHANGE UP
HPIV2 RNA SPEC QL NAA+PROBE: SIGNIFICANT CHANGE UP
HPIV3 RNA SPEC QL NAA+PROBE: SIGNIFICANT CHANGE UP
HPIV4 RNA SPEC QL NAA+PROBE: SIGNIFICANT CHANGE UP
KETONES UR-MCNC: NEGATIVE — SIGNIFICANT CHANGE UP
LEUKOCYTE ESTERASE UR-ACNC: ABNORMAL
M PNEUMO DNA SPEC QL NAA+PROBE: SIGNIFICANT CHANGE UP
MAGNESIUM SERPL-MCNC: 2.3 MG/DL — SIGNIFICANT CHANGE UP (ref 1.6–2.6)
MCHC RBC-ENTMCNC: 26.2 PG — LOW (ref 27–34)
MCHC RBC-ENTMCNC: 30.8 GM/DL — LOW (ref 32–36)
MCV RBC AUTO: 84.8 FL — SIGNIFICANT CHANGE UP (ref 80–100)
NITRITE UR-MCNC: NEGATIVE — SIGNIFICANT CHANGE UP
NRBC # BLD: 0 /100 WBCS — SIGNIFICANT CHANGE UP (ref 0–0)
NRBC # FLD: 0 K/UL — SIGNIFICANT CHANGE UP (ref 0–0)
PH UR: 6 — SIGNIFICANT CHANGE UP (ref 5–8)
PHOSPHATE SERPL-MCNC: 3.7 MG/DL — SIGNIFICANT CHANGE UP (ref 2.5–4.5)
PLATELET # BLD AUTO: 243 K/UL — SIGNIFICANT CHANGE UP (ref 150–400)
POTASSIUM SERPL-MCNC: 4.6 MMOL/L — SIGNIFICANT CHANGE UP (ref 3.5–5.3)
POTASSIUM SERPL-SCNC: 4.6 MMOL/L — SIGNIFICANT CHANGE UP (ref 3.5–5.3)
PROT UR-MCNC: ABNORMAL
RAPID RVP RESULT: SIGNIFICANT CHANGE UP
RBC # BLD: 4.74 M/UL — SIGNIFICANT CHANGE UP (ref 3.8–5.2)
RBC # FLD: 13.3 % — SIGNIFICANT CHANGE UP (ref 10.3–14.5)
RSV RNA SPEC QL NAA+PROBE: SIGNIFICANT CHANGE UP
RV+EV RNA SPEC QL NAA+PROBE: SIGNIFICANT CHANGE UP
SARS-COV-2 RNA SPEC QL NAA+PROBE: SIGNIFICANT CHANGE UP
SODIUM SERPL-SCNC: 138 MMOL/L — SIGNIFICANT CHANGE UP (ref 135–145)
SP GR SPEC: 1.04 — SIGNIFICANT CHANGE UP (ref 1.01–1.05)
TROPONIN T, HIGH SENSITIVITY RESULT: <6 NG/L — SIGNIFICANT CHANGE UP
UROBILINOGEN FLD QL: SIGNIFICANT CHANGE UP
WBC # BLD: 9.39 K/UL — SIGNIFICANT CHANGE UP (ref 3.8–10.5)
WBC # FLD AUTO: 9.39 K/UL — SIGNIFICANT CHANGE UP (ref 3.8–10.5)

## 2023-04-20 PROCEDURE — 71045 X-RAY EXAM CHEST 1 VIEW: CPT | Mod: 26

## 2023-04-20 PROCEDURE — 70491 CT SOFT TISSUE NECK W/DYE: CPT | Mod: 26,MA

## 2023-04-20 PROCEDURE — 12345: CPT | Mod: NC

## 2023-04-20 PROCEDURE — 99223 1ST HOSP IP/OBS HIGH 75: CPT | Mod: GC

## 2023-04-20 PROCEDURE — 73502 X-RAY EXAM HIP UNI 2-3 VIEWS: CPT | Mod: 26,LT

## 2023-04-20 PROCEDURE — 99291 CRITICAL CARE FIRST HOUR: CPT | Mod: GC

## 2023-04-20 RX ORDER — IPRATROPIUM BROMIDE 0.2 MG/ML
500 SOLUTION, NON-ORAL INHALATION ONCE
Refills: 0 | Status: COMPLETED | OUTPATIENT
Start: 2023-04-20 | End: 2023-04-20

## 2023-04-20 RX ORDER — ALBUTEROL 90 UG/1
1 AEROSOL, METERED ORAL EVERY 4 HOURS
Refills: 0 | Status: DISCONTINUED | OUTPATIENT
Start: 2023-04-20 | End: 2023-04-25

## 2023-04-20 RX ORDER — ACETAMINOPHEN 500 MG
650 TABLET ORAL EVERY 6 HOURS
Refills: 0 | Status: DISCONTINUED | OUTPATIENT
Start: 2023-04-20 | End: 2023-04-25

## 2023-04-20 RX ORDER — IPRATROPIUM/ALBUTEROL SULFATE 18-103MCG
3 AEROSOL WITH ADAPTER (GRAM) INHALATION ONCE
Refills: 0 | Status: DISCONTINUED | OUTPATIENT
Start: 2023-04-20 | End: 2023-04-20

## 2023-04-20 RX ORDER — AMLODIPINE BESYLATE 2.5 MG/1
5 TABLET ORAL DAILY
Refills: 0 | Status: DISCONTINUED | OUTPATIENT
Start: 2023-04-20 | End: 2023-04-20

## 2023-04-20 RX ORDER — IPRATROPIUM/ALBUTEROL SULFATE 18-103MCG
3 AEROSOL WITH ADAPTER (GRAM) INHALATION EVERY 6 HOURS
Refills: 0 | Status: DISCONTINUED | OUTPATIENT
Start: 2023-04-20 | End: 2023-04-25

## 2023-04-20 RX ORDER — ENOXAPARIN SODIUM 100 MG/ML
40 INJECTION SUBCUTANEOUS EVERY 24 HOURS
Refills: 0 | Status: DISCONTINUED | OUTPATIENT
Start: 2023-04-20 | End: 2023-04-25

## 2023-04-20 RX ORDER — AMLODIPINE BESYLATE 2.5 MG/1
5 TABLET ORAL DAILY
Refills: 0 | Status: DISCONTINUED | OUTPATIENT
Start: 2023-04-20 | End: 2023-04-25

## 2023-04-20 RX ORDER — PANTOPRAZOLE SODIUM 20 MG/1
40 TABLET, DELAYED RELEASE ORAL
Refills: 0 | Status: DISCONTINUED | OUTPATIENT
Start: 2023-04-20 | End: 2023-04-25

## 2023-04-20 RX ORDER — PANTOPRAZOLE SODIUM 20 MG/1
40 TABLET, DELAYED RELEASE ORAL
Refills: 0 | Status: DISCONTINUED | OUTPATIENT
Start: 2023-04-20 | End: 2023-04-20

## 2023-04-20 RX ADMIN — Medication 40 MILLIGRAM(S): at 05:57

## 2023-04-20 RX ADMIN — ENOXAPARIN SODIUM 40 MILLIGRAM(S): 100 INJECTION SUBCUTANEOUS at 05:58

## 2023-04-20 RX ADMIN — Medication 650 MILLIGRAM(S): at 17:00

## 2023-04-20 RX ADMIN — Medication 3 MILLILITER(S): at 10:46

## 2023-04-20 RX ADMIN — PANTOPRAZOLE SODIUM 40 MILLIGRAM(S): 20 TABLET, DELAYED RELEASE ORAL at 05:57

## 2023-04-20 RX ADMIN — Medication 3 MILLILITER(S): at 15:55

## 2023-04-20 RX ADMIN — Medication 3 MILLILITER(S): at 21:37

## 2023-04-20 RX ADMIN — Medication 125 MILLIGRAM(S): at 01:42

## 2023-04-20 RX ADMIN — Medication 500 MICROGRAM(S): at 01:42

## 2023-04-20 RX ADMIN — AMLODIPINE BESYLATE 5 MILLIGRAM(S): 2.5 TABLET ORAL at 15:55

## 2023-04-20 RX ADMIN — Medication 650 MILLIGRAM(S): at 15:55

## 2023-04-20 NOTE — H&P ADULT - HISTORY OF PRESENT ILLNESS
72f h/o asthma, Bell's Palsy, essential HTN, hepatitis C, seizures presents to the ED BIBEMS c/o SOB and wheezing x3-5 days. She presented to [facility] yesterday with worsening SOB, and received steroids, nebulized epinephrine and magnesium.  She was transferred to [facility] for ENT eval.     Patient experienced a similar episode two months ago, where she presented to [place] and was intubated for airway protection. She was transferred to [facility] and extubated at that time.     In the ED, she was tachycardic to 120, afebrile, BP WNL, satting 99% on room air. Slightly tachypneic to 22. She received 3 duoneb and an atrovent treatment. She  was evaluated by ENT and underwent laryngoscopy, showing widely patent airway, no swelling or supraglottic mass. Vocal fold mobile but small posterior gap. 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 Recommended for CT neck w / contrast. She was also evaluated by the MICU and deemed not to require the ICU at this time.    72f h/o asthma, Bell's Palsy, essential HTN, hepatitis C, seizures presents to the ED BIBEMS c/o SOB and wheezing x3-5 days. She presented to [facility] yesterday with worsening SOB, and received steroids, nebulized epinephrine and magnesium.  She was transferred to [facility] for ENT eval.     Patient experienced a similar episode two months ago, where she presented to [place] and was intubated for airway protection. She was transferred to [facility] and extubated at that time.     In the ED, she was tachycardic to 120, afebrile, BP WNL, satting 99% on room air. Slightly tachypneic to 22. She received 3 duoneb and an atrovent treatment. She  was evaluated by ENT and underwent laryngoscopy, showing widely patent airway, no swelling or supraglottic mass. Vocal fold mobile but small posterior gap. 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 Recommended for CT neck w / contrast. She was also evaluated by the MICU and deemed not to require the ICU at this time.    72f h/o asthma, Bell's Palsy, essential HTN, hepatitis C, seizures presents to the ED BIBEMS c/o SOB and wheezing x3-5 days. She presented to [facility] yesterday with worsening SOB, and received steroids, nebulized epinephrine and magnesium.  She was transferred to [facility] for ENT eval.     Patient experienced a similar episode two months ago, where she presented to [place] and was intubated for airway protection. She was transferred to [facility] and extubated at that time.     In the ED, she was tachycardic to 120, afebrile, BP WNL, satting 99% on room air. Slightly tachypneic to 22. She received 3 duoneb and an atrovent treatment. She  was evaluated by ENT and underwent laryngoscopy, showing widely patent airway, no swelling or supraglottic mass. Vocal fold mobile but small posterior gap. 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 Recommended for CT neck w / contrast. She was also evaluated by the MICU and deemed not to require the ICU at this time.    72f h/o asthma, Bell's Palsy, essential HTN, hepatitis C, seizures presents to the ED BIBEMS c/o SOB and wheezing x3-5 days. She presented to [facility] yesterday with worsening SOB, and received steroids, nebulized epinephrine and magnesium.  She was transferred to [facility] for ENT eval.     Patient experienced a similar episode two months ago, where she presented to [place] and was intubated for airway protection. She was transferred to [facility] and extubated at that time. She was discharged to rehab and then home.     In the ED, she was tachycardic to 120, afebrile, BP WNL, satting 99% on room air. Slightly tachypneic to 22. She received 3 duoneb and an atrovent treatment. She  was evaluated by ENT and underwent laryngoscopy, showing widely patent airway, no swelling or supraglottic mass. Vocal fold mobile but small posterior gap. 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 Recommended for CT neck w / contrast. She was also evaluated by the MICU and deemed not to require the ICU at this time.    72f h/o asthma, Bell's Palsy, essential HTN, hepatitis C, seizures presents to the ED BIBEMS c/o SOB and wheezing x3-5 days. She presented to [facility] yesterday with worsening SOB, and received steroids, nebulized epinephrine and magnesium.  She was transferred to [facility] for ENT eval.     Patient experienced a similar episode two months ago, where she presented to [place] and was intubated for airway protection. She was transferred to [facility] and extubated at that time. She was discharged to rehab and then home.     In the ED, she was tachycardic to 120, afebrile, BP WNL, satting 99% on room air. Slightly tachypneic to 22. She received 3 duoneb and an atrovent treatment. She  was evaluated by ENT and underwent laryngoscopy, showing widely patent airway, no swelling or supraglottic mass. Vocal fold mobile but small posterior gap. 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 Recommended for CT neck w / contrast. She was also evaluated by the MICU and deemed not to require the ICU at this time.    72f h/o asthma, Bell's Palsy, essential HTN, hepatitis C, seizures presents to the ED BIBEMS c/o SOB and wheezing x3-5 days. She presented to [facility] yesterday with worsening SOB, and received steroids, nebulized epinephrine and magnesium.  She was transferred to [facility] for ENT eval.     Patient experienced a similar episode two months ago, where she presented to [place] and was intubated for airway protection. She was transferred to [facility] and extubated at that time. She was discharged to rehab and then home.     In the ED, she was tachycardic to 120, afebrile, BP WNL, satting 99% on room air. Slightly tachypneic to 22. She received 3 duoneb and an atrovent treatment. She  was evaluated by ENT and underwent laryngoscopy, showing widely patent airway, no swelling or supraglottic mass. Vocal fold mobile but small posterior gap. 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 Recommended for CT neck w / contrast. She was also evaluated by the MICU and deemed not to require the ICU at this time.    72f h/o asthma, Bell's Palsy (R side of her face), Essential HTN, Hepatitis C (reports treatment for ~6months within the past 10 years but unsure if she cleared her infection or not), Seizures (not on AEDs currently, states her last seizure was a long time ago), and ?Asthma (reports no official diagnosis of asthma but was told she had issues with her vocal cords) who presents to the ED BIBEMS c/o SOB and wheezing x5-6 days. States that she has been having significant wheezing and SOB for the past 5-6 days with an associated non-productive cough. Went to her pulmonologist for evaluation but was sent to [facility] yesterday with worsening SOB, and received solumedrol 125mg IVP x1, nebulized epinephrine, and magnesium 2g IVPB x1.  She was evaluated by Critical Care there and was transferred to [facility] for ENT eval.     Patient experienced a similar episode two months ago, where she presented to [place] and was intubated for airway protection. She was transferred to [facility] MICU, evaluated by ENT who reported no abnormal airway fidnings and was extubated at that time. She was discharged to rehab and then home.     In the ED, she was tachycardic to 120, afebrile, BP WNL, satting 99% on room air. Slightly tachypneic to 22. She received 3 duoneb and an atrovent treatment. She  was evaluated by ENT and underwent laryngoscopy, showing widely patent airway, no swelling or supraglottic mass. Vocal fold mobile but small posterior gap. 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 Recommended for CT neck w / contrast. She was also evaluated by the MICU and deemed not to require the ICU at this time.     Patient currently states her SOB is improved but is still having a raspy voice (states this is a chronic issue for her and it comes and goes, recently has been present while she has been having her SOB/wheezing). Reports a food stuck sensation in her throat but denies any dysphagia. Also reports worsening pain in her L hip with ambulation (no falls/trauma), states this is a long standing issue but seems to have worsened over the past 1-2 weeks. Reporting new urinary frequency, unclear on timing but denies dysuria or hematuria. Also reports having chest pain when getting the nebulizer treatment, denies any current chest pain, no chest pain with exertion, no palpitations, no syncope. Also worried about a mole on her inner R thigh, no pain/discharge but states that she had not noticed it before. No other acute complaints.  72f h/o asthma, Bell's Palsy (R side of her face), Essential HTN, Hepatitis C (reports treatment for ~6months within the past 10 years but unsure if she cleared her infection or not), Seizures (not on AEDs currently, states her last seizure was a long time ago), and ?Asthma (reports no official diagnosis of asthma but was told she had issues with her vocal cords) who presents to the ED BIBEMS c/o SOB and wheezing x5-6 days. States that she has been having significant wheezing and SOB for the past 5-6 days with an associated non-productive cough. Went to her pulmonologist for evaluation but was sent to [facility] yesterday with worsening SOB, and received solumedrol 125mg IVP x1, nebulized epinephrine, and magnesium 2g IVPB x1.  She was evaluated by Critical Care there and was transferred to [facility] for ENT eval.     Patient experienced a similar episode two months ago, where she presented to [place] and was intubated for airway protection. She was transferred to [facility] MICU, evaluated by ENT who reported no abnormal airway fidnings and was extubated at that time. She was discharged to rehab and then home.     In the ED, she was tachycardic to 120, afebrile, BP WNL, satting 99% on room air. Slightly tachypneic to 22. She received 3 duoneb and an atrovent treatment. She  was evaluated by ENT and underwent laryngoscopy, showing widely patent airway, no swelling or supraglottic mass. Vocal fold mobile but small posterior gap. 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 Recommended for CT neck w / contrast. She was also evaluated by the MICU and deemed not to require the ICU at this time.     Patient currently states her SOB is improved but is still having a raspy voice (states this is a chronic issue for her and it comes and goes, recently has been present while she has been having her SOB/wheezing). Reports a food stuck sensation in her throat but denies any dysphagia. Also reports worsening pain in her L hip with ambulation (no falls/trauma), states this is a long standing issue but seems to have worsened over the past 1-2 weeks. Reporting new urinary frequency, unclear on timing but denies dysuria or hematuria. Also reports having chest pain when getting the nebulizer treatment, denies any current chest pain, no chest pain with exertion, no palpitations, no syncope. Also worried about a mole on her inner R thigh, no pain/discharge but states that she had not noticed it before. No other acute complaints.  72f h/o asthma, Bell's Palsy (R side of her face), Essential HTN, Hepatitis C (reports treatment for ~6months within the past 10 years but unsure if she cleared her infection or not), Seizures (not on AEDs currently, states her last seizure was a long time ago), and ?Asthma (reports no official diagnosis of asthma but was told she had issues with her vocal cords) who presents to the ED BIBEMS c/o SOB and wheezing x5-6 days. States that she has been having significant wheezing and SOB for the past 5-6 days with an associated non-productive cough. Went to her pulmonologist for evaluation but was sent to  yesterday with worsening SOB, and received solumedrol 125mg IVP x1, nebulized epinephrine, and magnesium 2g IVPB x1.  She was evaluated by Critical Care there and was transferred to [facility] for ENT eval.     Patient experienced a similar episode two months ago, where she presented to [place] and was intubated for airway protection. She was transferred to [facility] MICU, evaluated by ENT who reported no abnormal airway fidnings and was extubated at that time. She was discharged to rehab and then home.     In the ED, she was tachycardic to 120, afebrile, BP WNL, satting 99% on room air. Slightly tachypneic to 22. She received 3 duoneb and an atrovent treatment. She  was evaluated by ENT and underwent laryngoscopy, showing widely patent airway, no swelling or supraglottic mass. Vocal fold mobile but small posterior gap. 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 Recommended for CT neck w / contrast. She was also evaluated by the MICU and deemed not to require the ICU at this time.     Patient currently states her SOB is improved but is still having a raspy voice (states this is a chronic issue for her and it comes and goes, recently has been present while she has been having her SOB/wheezing). Reports a food stuck sensation in her throat but denies any dysphagia. Also reports worsening pain in her L hip with ambulation (no falls/trauma), states this is a long standing issue but seems to have worsened over the past 1-2 weeks. Reporting new urinary frequency, unclear on timing but denies dysuria or hematuria. Also reports having chest pain when getting the nebulizer treatment, denies any current chest pain, no chest pain with exertion, no palpitations, no syncope. Also worried about a mole on her inner R thigh, no pain/discharge but states that she had not noticed it before. No other acute complaints.

## 2023-04-20 NOTE — SWALLOW BEDSIDE ASSESSMENT ADULT - ASR SWALLOW RECOMMEND DIAG
Cinesophagram given patient reporting at stuck sensation with solids. Cinesophagram may be completed as an outpatient if pending discharge.

## 2023-04-20 NOTE — SWALLOW BEDSIDE ASSESSMENT ADULT - ADDITIONAL RECOMMENDATIONS
1. This service to follow up for diet tolerance as schedule permits. 2. Medical team advised to reconsult if change in medical status and/or patient's tolerance to recommended PO diet.

## 2023-04-20 NOTE — PATIENT PROFILE ADULT - FALL HARM RISK - HARM RISK INTERVENTIONS
Assistance OOB with selected safe patient handling equipment/Communicate Risk of Fall with Harm to all staff/Discuss with provider need for PT consult/Monitor gait and stability/Provide patient with walking aids - walker, cane, crutches/Reinforce activity limits and safety measures with patient and family/Review medications for side effects contributing to fall risk/Sit up slowly, dangle for a short time, stand at bedside before walking/Tailored Fall Risk Interventions/Use of alarms - bed, chair and/or voice tab/Visual Cue: Yellow wristband and red socks/Bed in lowest position, wheels locked, appropriate side rails in place/Call bell, personal items and telephone in reach/Instruct patient to call for assistance before getting out of bed or chair/Non-slip footwear when patient is out of bed/[place] to call system/Physically safe environment - no spills, clutter or unnecessary equipment/Purposeful Proactive Rounding/Room/bathroom lighting operational, light cord in reach Assistance OOB with selected safe patient handling equipment/Communicate Risk of Fall with Harm to all staff/Discuss with provider need for PT consult/Monitor gait and stability/Provide patient with walking aids - walker, cane, crutches/Reinforce activity limits and safety measures with patient and family/Review medications for side effects contributing to fall risk/Sit up slowly, dangle for a short time, stand at bedside before walking/Tailored Fall Risk Interventions/Use of alarms - bed, chair and/or voice tab/Visual Cue: Yellow wristband and red socks/Bed in lowest position, wheels locked, appropriate side rails in place/Call bell, personal items and telephone in reach/Instruct patient to call for assistance before getting out of bed or chair/Non-slip footwear when patient is out of bed/[place] to call system/Physically safe environment - no spills, clutter or unnecessary equipment/Purposeful Proactive Rounding/Room/bathroom lighting operational, light cord in reach Assistance OOB with selected safe patient handling equipment/Communicate Risk of Fall with Harm to all staff/Discuss with provider need for PT consult/Monitor gait and stability/Provide patient with walking aids - walker, cane, crutches/Reinforce activity limits and safety measures with patient and family/Review medications for side effects contributing to fall risk/Sit up slowly, dangle for a short time, stand at bedside before walking/Tailored Fall Risk Interventions/Use of alarms - bed, chair and/or voice tab/Visual Cue: Yellow wristband and red socks/Bed in lowest position, wheels locked, appropriate side rails in place/Call bell, personal items and telephone in reach/Instruct patient to call for assistance before getting out of bed or chair/Non-slip footwear when patient is out of bed/[place] to call system/Physically safe environment - no spills, clutter or unnecessary equipment/Purposeful Proactive Rounding/Room/bathroom lighting operational, light cord in reach

## 2023-04-20 NOTE — H&P ADULT - PROBLEM SELECTOR PLAN 1
-5 days of shortness of breath, unclear if asthma vs upper airway  -ENT consulted, appreciate recs- mobile vocal folds (w/ small posterior gap), some evidence of paradoxical vocal fold motion +/- muscle tension dysphonia   -CT neck negative for acute pathology   -improved with steroids and nebs at [place], will continue steroids for asthma exacerbation, PRN duonebs Q6H  -continuous pulse ox   -FU RVP -5 days of shortness of breath, unclear if asthma vs upper airway  -ENT consulted, appreciate recs- mobile vocal folds (w/ small posterior gap), some evidence of paradoxical vocal fold motion +/- muscle tension dysphonia   -CT neck negative for acute pathology   -improved with steroids and nebs at [place], will continue steroids for asthma exacerbation, PRN duonebs Q6H  -continuous pulse ox   -FU RVP -5 days of shortness of breath, unclear if asthma vs upper airway  -ENT consulted, appreciate recs- mobile vocal folds (w/ small posterior gap), some evidence of paradoxical vocal fold motion +/- muscle tension dysphonia   -CT neck negative for acute pathology   -improved with steroids and nebs at [place], will continue steroids for asthma exacerbation, PRN duonebs Q6H  -continuous pulse ox   -FU RVP -5-6 days of shortness of breath, unclear if asthma vs upper airway  -ENT consulted, appreciate recs- mobile vocal folds (w/ small posterior gap), some evidence of paradoxical vocal fold motion +/- muscle tension dysphonia   -CT neck negative for acute pathology   -improved with steroids and nebs at [place], here still with mild upper airway stridor and some scattered lower airway wheezing -> will continue steroids for asthma exacerbation, PRN duonebs Q6H  -continuous pulse ox   -Raspy voice likely 2/2 vocal cord issues  -FU RVP, check CXR as per ENT  -Lactate mildly increased to 2.1, likely 2/2 duoneb use, can consider repeating as needed  -Likely outpatient follow up for her vocal cord issues -> f/u ENT to see if they want any additional work up while inpatient -5-6 days of shortness of breath, unclear if asthma vs upper airway  -ENT consulted, appreciate recs- mobile vocal folds (w/ small posterior gap), some evidence of paradoxical vocal fold motion +/- muscle tension dysphonia   -CT neck negative for acute pathology   -improved with steroids and nebs at [place], here still with mild upper airway stridor and some scattered lower airway wheezing -> will continue steroids for asthma exacerbation, PRN duonebs Q6H  -continuous pulse ox   -Raspy voice likely 2/2 vocal cord issues  -FU RVP, check CXR as per ENT  -Lactate mildly increased to 2.1, likely 2/2 duoneb use, can consider repeating as needed  -Likely outpatient follow up for her vocal cord issues -> f/u ENT to see if they want any additional work up while inpatient -5-6 days of shortness of breath, unclear if asthma vs upper airway  -ENT consulted, appreciate recs- mobile vocal folds (w/ small posterior gap), some evidence of paradoxical vocal fold motion +/- muscle tension dysphonia   -CT neck negative for acute pathology   -improved with steroids and nebs at [place], here still with mild upper airway stridor and some scattered lower airway wheezing -> will continue steroids for asthma exacerbation, PRN duonebs Q6H  -continuous pulse ox   -Raspy voice likely 2/2 vocal cord issues  -FU RVP, check CXR as per ENT  -Lactate mildly increased to 2.1, likely 2/2 duoneb use, can consider repeating as needed  -Likely outpatient follow up for her vocal cord issues -> f/u ENT to see if they want any additional work up while inpatient

## 2023-04-20 NOTE — H&P ADULT - HIV OFFER
Nephrology Progress Note    Patient sleeping.  Looks comfortable.    ROS:  Resp:negative  CV:negative  GI:negative  Gu:negative    MEDICATIONS:  • polyethylene glycol  17 g Oral Daily   • docusate sodium-sennosides  1 tablet Oral Nightly   • iron sucrose (VENOFER) IVPB  200 mg Intravenous Daily   • lidocaine  2 patch Transdermal Daily   • allopurinol  300 mg Oral Q Evening   • atenolol  50 mg Oral Daily   • CARBOXYMethylcellulose PF  1 drop Both Eyes Daily   • ezetimibe  10 mg Oral Q Evening   • gabapentin  300 mg Oral TID   • hyoscyamine  0.375 mg Oral Daily   • pantoprazole  40 mg Oral BID AC   • potassium CHLORIDE  20 mEq Oral Q Evening   • potassium CHLORIDE  30 mEq Oral Daily   • predniSONE  10 mg Oral Daily   • pregabalin  75 mg Oral QHS   • rivaroxaban  15 mg Oral Daily with dinner   • selexipag  400 mcg Oral Daily   • selexipag  600 mcg Oral Q Evening          Physical Examination:  Vital Signs:    Visit Vitals  /63 (BP Location: RFA - Right forearm)   Pulse 76   Temp 97.5 °F (36.4 °C) (Oral)   Resp 19   Ht 5' 1\" (1.549 m)   Wt 64.3 kg (141 lb 12.1 oz)   SpO2 94%   BMI 26.78 kg/m²     General:  No acute distress.  Head:  Normocephalic-atraumatic.   Neck:  Trachea is midline.  Eyes:  Normal conjunctivae.  ENT:   Mucous membranes are moist.    Cardiovascular:  S1, S2 auscultated.  Regular rate and rhythm.  Bilateral peripheral pulses are intact.  Respiratory:   Bilateral breath sounds heard.  Lungs clear to auscultation.  Symmetric chest wall expansion.  Respirations are non-labored.    Gastrointestinal:  Soft and nontender.  Non-distended.  Positive bowel sounds.    Genitourinary: No CVA tenderness.    Musculoskeletal:  No joint swelling.  Normal range of motion.  No deformity.      Skin: Warm and dry.  No rash noted.  Neurologic:   Not assessed    I/O's    Intake/Output Summary (Last 24 hours) at 4/17/2022 1211  Last data filed at 4/17/2022 0640  Gross per 24 hour   Intake 3831.44 ml   Output 1200 ml    Net 2631.44 ml       Labs:    Recent Labs   Lab 04/17/22  0435 04/16/22  0416 04/16/22  0025   SODIUM 140 138 138   POTASSIUM 3.5 3.8 3.9   CHLORIDE 108* 103 101   CO2 27 31 33*   BUN 47* 61* 65*   CREATININE 1.43* 1.89* 2.02*   GLUCOSE 130* 136* 174*   CALCIUM 8.3* 8.6 8.5      Recent Labs   Lab 04/17/22  0435 04/16/22 0416 04/16/22  0025   SODIUM 140 138 138   CHLORIDE 108* 103 101   CO2 27 31 33*   BUN 47* 61* 65*   CREATININE 1.43* 1.89* 2.02*   CALCIUM 8.3* 8.6 8.5   ALBUMIN 3.0*  --   --    BILIRUBIN 0.5  --   --    ALKPT 69  --   --    GPT 31  --   --    AST 17  --   --    GLUCOSE 130* 136* 174*      Recent Labs   Lab 04/17/22 0435   WBC 11.9*   RBC 3.42*   HGB 7.2*   HCT 26.6*           Imaging    XR CHEST PA OR AP 1 VIEW   Final Result   Nonspecific increased opacity at right lateral costophrenic   angle region, possibly costochondral cartilage calcification.      Electronically Signed by: RAFAEL RODRÍGUEZ M.D.    Signed on: 4/15/2022 9:52 PM          CT HEAD WO CONTRAST   Final Result   1.    No acute intracranial finding without edema, mass or hemorrhage.      Electronically Signed by: TARUN HENSLEY M.D.    Signed on: 4/14/2022 3:15 PM               Assessment and Plan:    1.  Acute kidney injury.  Renal function improving.  Good urine output.  We will stop IV fluid.  Monitor serum chemistries.    2.  Metabolic alkalosis.  Improving.    3.  Hypokalemia/hyponatremia.  Improving.  Encourage oral intake.   Opt out

## 2023-04-20 NOTE — H&P ADULT - NSHPPOADEEPVENOUSTHROMB_GEN_A_CORE
[facility]  HOSPITAL MEDICINE PROGRESS NOTE    Patient: Umair Vera Jr Today's Date: 2/18/2022   YOB: 1962 Admission Date: 1/26/2022 12:02 PM   MRN: [id number] Inpatient LOS: 23 day(s)   Room:  [address] Hospital Day:  Hospital Day: 24       History and Subjective complaints     Chief Complaint: -weakness, AMS, seizure-like activity    Interval history and Overnight events:    Vaguely states he isn't feeling well, but is making jokes by the end of the interview.  Denies CP, SOB, abd pain.  He is tolerating PO.    Hospital Course  Patients interval history reviewed/EHR notes reviewed.   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 He was recently admitted 1/6/22 - 1/14/22 for decompensated chronic CHF after not taking his medications for 2 weeks. He was admitted through the ED on this occasion on 1/26/22 after he presented with generalized weakness, myalgias, AMS, uncontrolled tremors/seizure-like activity in his RLE/RUE. He was treated with Keppra and Ativan. He was noted to be hypoxemic and a CXR showed new and worsening pulmonary opacities suggestive of CHF and/or atypical infection. Covid-19 pcr was positive (negative on 1/6) with a Ct of 27.2.  He failed BiPAP and was intubated in the ED. CToH showed a moderate-sized area of chronic encephalomalacia within the posterior left cerebral hemisphere, similar compared to prior examinations, and no acute intracranial abnormality. He has been treated with tocilizumab, dexamethasone and remdesivir. He has been seen by the ID,  [facility], Neurology/Epilepsy, Pulmonary, and Palliative Care teams. He has been diuresed. He was extubated 1/29 initially to bipap and subsequently to NC O2. His AEDs have been re-started and adjusted by the Epilepsy team. On 2/1/22 RN had noted some episodes of brief arterial desaturations. No clear cut seizure activity, he would follow commands consistently with all 4 extremities but his speech was unintelligible and he had disconjugate gaze at times. Later, he was noted to have overt seizures as noted by the neurology team and his vEEG has captured intermittent stereotypical right arm events. The daytime RN has noted focal seizure-like activity of his RUE with movement of the limb.  His AED regimen has been intensified by the neurology/epilepsy team. Dexamethasone was stopped 1/31 and his usual low-dose prednisone was resumed.     Reviewed Pertinent Histories: Medical History, Surgical History, Social History, Family History,     ROS: 12-point review of systems negative except as above.    Medications: Reviewed     Scheduled Medications:    [START ON 2/19/2022] valGANciclovir, 450 mg, Daily with breakfast  furosemide, 60 mg, Once  TACROlimus, 2.5 mg, Daily Tx  TACROlimus, 2 mg, Nightly Tx  [Held by provider] furosemide, 40 mg, BID  insulin glargine, 20 Units, Nightly  insulin lispro, , TID WC  valproic acid, 500 mg, 3 times per day  predniSONE, 10 mg, Daily with breakfast  OXcarbazepine, 300 mg, 2 times per day  polyethylene glycol, 17 g, Daily  levETIRAcetam, 2,000 mg, 2 times per day  gabapentin, 600 mg, QHS  gabapentin, 300 mg, QAM  famotidine, 20 mg, 2 times per day  clopidogrel, 75 mg, Daily  mycophenolate, 250 mg, BIDTX  metoPROLOL succinate, 25 mg, Daily  insulin lispro, , TID WC  sulfamethoxazole-trimethoprim, 1 tablet, Once per day on Mon Wed Fri  enoxaparin, 40 mg, Q24H  sodium chloride (PF), 10 mL, 2 times per day  docusate sodium-sennosides, 1 tablet, Nightly  sodium chloride (PF), 2 mL, 2 times per  day  Potassium Standard Replacement Protocol, , See Admin Instructions  Magnesium Standard Replacement Protocol, , See Admin Instructions      Continuous Infusions:    • sodium chloride 0.9% infusion Stopped (02/05/22 0650)   • sodium chloride 0.9% infusion Stopped (01/30/22 1502)     PRN Medications:  dextrose, dextrose, dextrose, sodium chloride (PF), sodium chloride (PF), glucagon, sodium chloride, sodium chloride, sodium chloride, ondansetron **OR** ondansetron, sodium chloride      Physical Examination       Vital 24 Hour Range Most Recent Value   Temperature Temp  Min: 98 °F (36.7 °C)  Max: 98.1 °F (36.7 °C) 98 °F (36.7 °C)   Pulse Pulse  Min: 57  Max: 125 (!) 57   Respiratory Resp  Min: 18  Max: 18 18   Blood Pressure BP  Min: 91/63  Max: 136/70 98/69   Pulse Oximetry SpO2  Min: 94 %  Max: 99 % 95 %   Arterial BP No data recorded     O2 No data recorded       Intake and Output:      Intake/Output Summary (Last 24 hours) at 2/18/2022 1536  Last data filed at 2/18/2022 1147  Gross per 24 hour   Intake 660 ml   Output 200 ml   Net 460 ml       Last Stool Occurrence:  1 (02/14/22 0115)    Vital Most Recent Value First Value   Weight 91 kg (200 lb 9.9 oz) Weight: 92.4 kg (203 lb 11.3 oz)   Height 5' 10\" (177.8 cm) Height: 5' 10\" (177.8 cm)   BMI 28.79 N/A     General: Looks well, well developed, well nourished  CV: regular rate and rhythm  Resp: clear to auscultation bilaterally  Abd: soft, nontender and nondistended  Ext: edema absent   Skin: no rashes, lesions, or ulcers noted  Neuro: no focal deficits noted      Test Results     Labs: The Laboratory values listed below have been reviewed and pertinent findings discussed in the Assessment and Plan.    Laboratory values:   Recent Labs   Lab 02/17/22  0738 02/15/22  0716 02/13/22  0721   WBC 3.8* 4.7 4.2   HGB 11.9* 11.7* 11.9*   HCT 37.2* 35.9* 36.3*    153 123*       Recent Labs   Lab 02/18/22  0452 02/17/22  0738 02/16/22  0527   SODIUM 140 140 142    POTASSIUM 4.6 4.4 3.8   CHLORIDE 109* 109* 109*   CO2 25 25 26   CALCIUM 8.7 8.5 8.3*   GLUCOSE 107* 126* 104*   BUN 27* 22* 21*   CREATININE 1.72* 1.72* 1.38*        No results found    Invalid input(s): CAPTH, ALKPHOS, NH#  No results found    No results found    Lab Results   Component Value Date    VB12 533 11/09/2017    CECILIO 12.4 09/30/2015    VITD25 28.5 (L) 11/08/2021    TSH 2.123 11/08/2021    HGBA1C 8.3 (H) 11/08/2021        Lab Results   Component Value Date    CHOLESTEROL 146 11/08/2021    HDL 66 11/08/2021    CALCLDL 60 11/08/2021        Lab Results   Component Value Date    UOSM 470 01/10/2022    ALFREDO <5 01/10/2022    USPG 1.015 01/26/2022    UPROT Negative 01/26/2022    UWBC Negative 01/26/2022    URBC Trace (A) 01/26/2022    UPH 5.5 01/26/2022    UBACTRA None Seen 01/26/2022             Radiology: Imaging studies have been reviewed and pertinent findings discussed in the Assessment and Plan.    No results found for any visits on 01/26/22 (from the past 48 hour(s)).    Tubes, Devices, Monitoring     Telemetry: On      Limon: No    Assessment and Plan     1. S/p orthotopic heart transplant, allograft vasculopathy, and severe cardiomyopathy (LVEF 15%)  2. Chronic immunosuppressive treatment   3. Covid-19 pneumonia with extensive infiltrates. Unvaccinated. S/p tocilizumab, remdesivir, dexamethasone.   4. Acute exacerbation of chronic biventricular systolic heart failure  5. Acute hypoxemic respiratory failure, likely due largely to CHF rather than severe Covid-19 pna. Intubated in ED and extubated 1/29  6. Remote left parieto-temporal stroke with residual right sided weakness and dysphasia  7. Seizure disorder  8. DM II  9. Essential HTN  10. Dyslipidemia  11. Past pulmonary blastomycosis treated for apparent cure.  12. History of polysubstance abuse.  13. History of medical non-compliance.         Clear to go home by Physical therapy  Family unable to take him back at least while he remains on COVID  isolation, repeat COVID 2/14 was positive with CT of 29.3, continue isolation  Repeat COVID test on 02/21, appreciate ID input  Off oxygen, respiratory symptoms improved  Continue immunosuppressants  Prophylactic Bactrim and valganciclovir  Lasix p.o. b.i.d., metoprolol, Plavix  Repeat echocardiogram unchanged.  Lasix and tacro increased per heart failure team.  Discussed tachycardia with transplant team no concerns in transplanted patient  Continue Keppra, Trileptal  No reported seizure activity  Monitor Accu-Cheks t.i.d. a.c.  Blood glucose levels better controlled, continue Lantus and lispro with correction dose.  PT/OT  No facility able to accommodate pt.  Family is working on support at home so that pt may return home.  Covid isolation remains an issue, as a family member is immune compromised.  Will consider competency evaluation Monday, as pt currently with activated POA from ICU.    Consults:    IP CONSULT TO CARDIOLOGY  IP CONSULT TO INFECTIOUS DISEASES  IP CONSULT TO NUTRITIONAL SERVICES - TUBE FEEDING  IP CONSULT TO PALLIATIVE CARE  IP CONSULT TO NEUROLOGY  IP CONSULT TO PULMONOLOGY  IP CONSULT TO SOCIAL WORK  IP CONSULT TO SOCIAL WORK    Diet:  One Time Diet Cardiac; One Time Tray For Speech Therapy To Feed At 1200 Today. Please Send Orange Soda If You Have It.  Consistent Carb Moderate (45-75 Gm/meal) Diet  Therapy Orders:    PT and OT Orders Placed this Encounter   Procedures   • Occupational Therapy Sedation Consult   • Physical Therapy Sedation Consult       Smoking status- {  Tobacco Use: High Risk   • Smoking Tobacco Use: Current Every Day Smoker   • Smokeless Tobacco Use: Never Used     Nutrition status- appropriate  Body mass index is 28.79 kg/m². - Overweight BMI 25-29  DVT Prophylaxis - Lovenox therapeutic dosing   Limited English proficiency with :  No         Advanced Directives     Code Status: Selective Treatment/DNR      Discharge Plan     The patients treatment plans were  discussed with patient and RN.     Recommendations for Discharge   SW     PT Home   OT Home   SLP SNF     Anticipated discharge destination: Home with home health; unsuccessful search for facility  Expected Discharge Date:  2/21/2022  Barriers to Discharge: Social barriers including: home support, covid isolation        Jodie COLLAZO Hospitalist    Please contact the above hospitalist from 7am until 7pm via Epic Secure Chat.    From 7pm to 7am please contact the hospitalist on call at [phone].     no

## 2023-04-20 NOTE — H&P ADULT - PROBLEM SELECTOR PLAN 4
- Worsening L hip pain when ambulating, on examination able to range L hip but has some pain on palpation of the L hip  - Denies any falls or injuries  - Check xrays of the pelvis/L hip  - PT eval (pt ambulates with walker)  - Pain control as needed

## 2023-04-20 NOTE — H&P ADULT - ATTENDING COMMENTS
Patient seen and examined on 4/20/23, case discussed with Dr. Tereza Lundberg. This is a 72F with history as above who presents to the hospital with complaints of worsening SOB and wheezing for the past 5-6 days with an associated non-productive cough. Initially presented to [facility] where there was a concern for vocal cord involvement to her symptoms and she was sent to [facility] for ENT evaluation. Had received solumedrol, racemic epi, and mag sulfate at [facility] prior to transfer. At [facility], she was evaluated by ENT and MICU, noted to have some paradoxical vocal fold motion  +/- muscle tension dysphonia. MICU evaluated patient and determined patient did not require ICU level of care at this time. Patient notes improvement in her respiratory status currently though still with upper airway mild stridor and some mild lower airway scattered wheezing.   - Will c/w steroids, dounebs prn  - Reports chest pain with neb inh, suspect likely tachycardia related as patient otherwise without chest pain, no exertional component to pain, trops neg x2, EKG without ischemic findings -> monitor tachycardia on telemetry  - Reports food stuck sensation at times but no dysphagia, obtain dysphagia screen, if passes then likely pureed diet, S&S eval ordered  - Check L hip and pelvis xrays for her L hip pain, PT eval  - Encouraged outpatient follow up with dermatology for her R thigh nevus  - Other management as above. Patient seen and examined on 4/20/23, case discussed with Dr. Tereza Lundberg. This is a 72F with history as above who presents to the hospital with complaints of worsening SOB and wheezing for the past 5-6 days with an associated non-productive cough. Initially presented to [facility] where there was a concern for vocal cord involvement to her symptoms and she was sent to [facility] for ENT evaluation. Had received solumedrol, racemic epi, and mag sulfate at [facility] prior to transfer. At [facility], she was evaluated by ENT and MICU, noted to have some paradoxical vocal fold motion  +/- muscle tension dysphonia. MICU evaluated patient and determined patient did not require ICU level of care at this time. Patient notes improvement in her respiratory status currently though still with upper airway mild stridor and some mild lower airway scattered wheezing.   - Will c/w steroids, dounebs prn  - Reports chest pain with neb inh, suspect likely tachycardia related as patient otherwise without chest pain, no exertional component to pain, trops neg x2, EKG without ischemic findings -> monitor tachycardia on telemetry  - Reports food stuck sensation at times but no dysphagia, obtain dysphagia screen, if passes then likely pureed diet, S&S eval ordered  - Check L hip and pelvis xrays for her L hip pain, PT eval  - Encouraged outpatient follow up with dermatology for her R thigh nevus  - Other management as above. Patient seen and examined on 4/20/23, case discussed with Dr. Tereza Lundberg. This is a 72F with history as above who presents to the hospital with complaints of worsening SOB and wheezing for the past 5-6 days with an associated non-productive cough. Initially presented to [facility] where there was a concern for vocal cord involvement to her symptoms and she was sent to [facility] for ENT evaluation. Had received solumedrol, racemic epi, and mag sulfate at [facility] prior to transfer. At [facility], she was evaluated by ENT and MICU, noted to have some paradoxical vocal fold motion  +/- muscle tension dysphonia. MICU evaluated patient and determined patient did not require ICU level of care at this time. Patient notes improvement in her respiratory status currently though still with upper airway mild stridor and some mild lower airway scattered wheezing.   - Will c/w steroids, dounebs prn  - Reports chest pain with neb inh, suspect likely tachycardia related as patient otherwise without chest pain, no exertional component to pain, trops neg x2, EKG without ischemic findings -> monitor tachycardia on telemetry  - Reports food stuck sensation at times but no dysphagia, obtain dysphagia screen, if passes then likely pureed diet, S&S eval ordered  - Check L hip and pelvis xrays for her L hip pain, PT eval  - Encouraged outpatient follow up with dermatology for her R thigh nevus  - Other management as above.

## 2023-04-20 NOTE — PATIENT PROFILE ADULT - FUNCTIONAL SCREEN CURRENT LEVEL: SWALLOWING (IF SCORE 2 OR MORE FOR ANY ITEM, CONSULT REHAB SERVICES), MLM)
0 = swallows foods/liquids without difficulty  had problem swallowing liquids in ER, due to shortness of breath/2 = difficulty swallowing liquids

## 2023-04-20 NOTE — CHART NOTE - NSCHARTNOTEFT_GEN_A_CORE
Patient seen and examined this morning. She remains with hoarse voice, though she does have some improvement with her breathing. On physical exam extensive wheezing is noted bilaterally. D/W nurse, nebulizer treatment was initiated. Continue current management as stated in the H&P.

## 2023-04-20 NOTE — H&P ADULT - NSHPLABSRESULTS_GEN_ALL_CORE
< from: CT Neck Soft Tissue w/ IV Cont (04.20.23 @ 02:24) >      IMPRESSION:    No acute abnormality of the soft tissues of the neck.    --- End of Report ---    < end of copied text > LABS and ADDITIONAL STUDIES:                        12.4   9.39  )-----------( 243      ( 20 Apr 2023 04:36 )             40.2     04-20    138  |  102  |  16  ----------------------------<  173<H>  4.6   |  18<L>  |  0.74    Ca    9.2      20 Apr 2023 04:36  Phos  3.7     04-20  Mg     2.30     04-20    Troponin T, High Sensitivity (04.19.23 @ 22:10)   Troponin T, High Sensitivity Result: <6 ng/L    Blood Gas Profile - Venous (04.19.23 @ 21:03)   pH, Venous: 7.40: Due to specimen delivery delays, please interpret with caution  pCO2, Venous: 43 mmHg  pO2, Venous: 69 mmHg  HCO3, Venous: 27 mmol/L  Base Excess, Venous: 1.5 mmol/L  Oxygen Saturation, Venous: 95.3 %  Total CO2, Venous: 27.9 mmol/L  Blood Gas Venous - Lactate (04.19.23 @ 21:03)   Blood Gas Venous - Lactate: 2.1 mmol/L    < from: CT Neck Soft Tissue w/ IV Cont (04.20.23 @ 02:24) >    FINDINGS:    There is no parapharyngeal or retropharyngeal air or fluid/edema. The   epiglottis and aryepiglottic folds are normal.    The glottic region/larynx appear normal.    The visualized trachea and upper esophagus appear normal.    The parotid glands are submandibular glands are normal.    The thyroid gland is normal.    There is no pathologically enlarged cervical lymph nodes.    Incidental Findings:    The visualized portions of the brain and orbits are unremarkable.  The visualized paranasal sinuses and tympanic/mastoid cavities are clear.  The lung apices are clear.  Degenerative changes of the cervical spine are appreciated most prominent   at C5-C7. There is associated prominent anterior osteophyte formation at   this level. Patient is edentulous along the maxilla. Multiple teeth of   the mandible are absent. Atherosclerotic calcifications of the arterial   structures are appreciated.    IMPRESSION:    No acute abnormality of the soft tissues of the neck.    --- End of Report ---    < end of copied text >    EKG - Sinus tach at 123, QTc 472, no significant ST-T wave changes

## 2023-04-20 NOTE — H&P ADULT - ASSESSMENT
72f h/o asthma, Bell's Palsy, essential HTN, hepatitis C, seizures transferred to [facility] for ENT eval for SOB and wheezing.  72f h/o asthma, Bell's Palsy, essential HTN, hepatitis C, seizures transferred to [facility] for ENT eval for SOB and wheezing.  72f h/o asthma, Bell's Palsy, essential HTN, hepatitis C, seizures transferred to [facility] for ENT eval for SOB and wheezing.

## 2023-04-20 NOTE — H&P ADULT - NSHPPHYSICALEXAM_GEN_ALL_CORE
T(C): 36.7 (04-19-23 @ 23:58), Max: 37.1 (04-19-23 @ 19:20)  HR: 120 (04-19-23 @ 23:58) (93 - 120)  BP: 127/65 (04-19-23 @ 23:58) (127/65 - 151/72)  RR: 20 (04-19-23 @ 23:58) (18 - 22)  SpO2: 99% (04-19-23 @ 23:58) (98% - 100%)    GENERAL: patient appears well, no acute distress, appropriate, pleasant  EYES: sclera clear, no exudates  ENMT: oropharynx clear without erythema, no exudates, moist mucous membranes  NECK: mild upper airway stridor appreciated   LUNGS: coughing with deep breath. No wheezing appreciated, mild stridor. on 2L NC  HEART: soft S1/S2, regular rate and rhythm, no murmurs noted, no lower extremity edema  GASTROINTESTINAL: abdomen is soft, nontender, nondistended, normoactive bowel sounds, no palpable masses  INTEGUMENT: good skin turgor, no lesions noted  MUSCULOSKELETAL: no clubbing or cyanosis, no obvious deformity  NEUROLOGIC: awake, alert, oriented x3, good muscle tone in 4 extremities, no obvious sensory deficits  PSYCHIATRIC: mood is good, affect is congruent, linear and logical thought process  HEME/LYMPH: no palpable supraclavicular nodules, no obvious ecchymosis or petechiae T(C): 36.7 (04-19-23 @ 23:58), Max: 37.1 (04-19-23 @ 19:20)  HR: 120 (04-19-23 @ 23:58) (93 - 120)  BP: 127/65 (04-19-23 @ 23:58) (127/65 - 151/72)  RR: 20 (04-19-23 @ 23:58) (18 - 22)  SpO2: 99% (04-19-23 @ 23:58) (98% - 100%)    GENERAL: patient appears well, no acute distress, appropriate, pleasant  EYES: sclera clear, no exudates  ENMT: oropharynx clear without erythema, no exudates, moist mucous membranes  NECK: mild upper airway stridor appreciated   LUNGS: coughing with deep breath. +Mild scattered wheezing appreciated, mild stridor. on 2L NC  HEART: soft S1/S2, regular rate and rhythm, no murmurs noted, no lower extremity edema  GASTROINTESTINAL: abdomen is soft, nontender, nondistended, normoactive bowel sounds, no palpable masses  INTEGUMENT: good skin turgor, no lesions noted  MUSCULOSKELETAL: no clubbing or cyanosis, no obvious deformity  NEUROLOGIC: awake, alert, oriented x3, good muscle tone in 4 extremities, no obvious sensory deficits  PSYCHIATRIC: mood is good, affect is congruent, linear and logical thought process  HEME/LYMPH: no palpable supraclavicular nodules, no obvious ecchymosis or petechiae T(C): 36.7 (04-19-23 @ 23:58), Max: 37.1 (04-19-23 @ 19:20)  HR: 120 (04-19-23 @ 23:58) (93 - 120)  BP: 127/65 (04-19-23 @ 23:58) (127/65 - 151/72)  RR: 20 (04-19-23 @ 23:58) (18 - 22)  SpO2: 99% (04-19-23 @ 23:58) (98% - 100%)    GENERAL: patient appears well, no acute distress, appropriate, pleasant, raspy voice which patient states is intermittent  EYES: sclera clear, no exudates  ENMT: oropharynx clear without erythema, no exudates, moist mucous membranes  NECK: mild upper airway stridor appreciated   LUNGS: coughing with deep breath. +Mild scattered wheezing appreciated, mild stridor. on 2L NC  HEART: soft S1/S2, regular rate and rhythm, no murmurs noted, no lower extremity edema  GASTROINTESTINAL: abdomen is soft, nontender, nondistended, normoactive bowel sounds, no palpable masses  INTEGUMENT: good skin turgor, no lesions noted  MUSCULOSKELETAL: no clubbing or cyanosis, no obvious deformity  NEUROLOGIC: awake, alert, oriented x3, good muscle tone in 4 extremities, no obvious sensory deficits  PSYCHIATRIC: mood is good, affect is congruent, linear and logical thought process  HEME/LYMPH: no palpable supraclavicular nodules, no obvious ecchymosis or petechiae

## 2023-04-20 NOTE — SWALLOW BEDSIDE ASSESSMENT ADULT - COMMENTS
ENT 4/19: "72f h/o asthma, Bell's Palsy, essential HTN, hepatitis C, seizures presents to the ED BIBEMS c/o SOB and wheezing x3-5 days, progressively worsened, similar to prior episode approx 2m ago AT [place] during which she was intubated with concern for airway etiology. At that time, patient was transferred to [facility] and extubated in OR with ENT for which there were no issues or anatomic abnormalities with airway.On ENT evaluation, patient is not in active distress. Normal breathing at baseline, but has mild noisy breathing (wheezing vs. stridor) on deep expiration which usually leads to a congested cough. Denies fevers, dysphagia or other symptoms. Has had raspy voice for about the same time and actively currently. - Scope exam with widely patent airway, no swelling or supraglottic masses, can be intubated from above. Vocal fold mobile, but small posterior gap- Some evidence of paradoxical vocal fold motion +/- muscle tension dysphonia- No obvious subglottic lesions but difficult to assess fully"    Of Note: Patient is known to this service as patient was seen for a clinical swallow assessment during a previous admission on 2/28/23 with recommendations of regular with thin liquids (please see note).    Patient seen at bedside in the [facility]/ESSU[facility] this AM for an initial assessment of the swallow function, at which time patient was alert. Patient receiving supplemental O2 via nasal cannula. RN reporting patient passed dysphagia screen and was reporting stuck sensation with food. Patient states she felt food was "getting stuck" (pointing to throat) yesterday, however, she states her symptoms have now resolved. ENT 4/19: "72f h/o asthma, Bell's Palsy, essential HTN, hepatitis C, seizures presents to the ED BIBEMS c/o SOB and wheezing x3-5 days, progressively worsened, similar to prior episode approx 2m ago AT [place] during which she was intubated with concern for airway etiology. At that time, patient was transferred to [facility] and extubated in OR with ENT for which there were no issues or anatomic abnormalities with airway.On ENT evaluation, patient is not in active distress. Normal breathing at baseline, but has mild noisy breathing (wheezing vs. stridor) on deep expiration which usually leads to a congested cough. Denies fevers, dysphagia or other symptoms. Has had raspy voice for about the same time and actively currently. - Scope exam with widely patent airway, no swelling or supraglottic masses, can be intubated from above. Vocal fold mobile, but small posterior gap- Some evidence of paradoxical vocal fold motion +/- muscle tension dysphonia- No obvious subglottic lesions but difficult to assess fully"    Of Note: Patient is known to this service as patient was seen for a clinical swallow assessment during a previous admission on 2/28/23 with recommendations of regular with thin liquids (please see note).    Patient seen at bedside in the [facility]/ESSU1 this AM for an initial assessment of the swallow function, at which time patient was alert. Patient receiving supplemental O2 via nasal cannula. RN reporting patient passed dysphagia screen and was reporting stuck sensation with food. Patient states she felt food was "getting stuck" (pointing to throat) yesterday, however, she states her symptoms have now resolved. ENT 4/19: "72f h/o asthma, Bell's Palsy, essential HTN, hepatitis C, seizures presents to the ED BIBEMS c/o SOB and wheezing x3-5 days, progressively worsened, similar to prior episode approx 2m ago AT [place] during which she was intubated with concern for airway etiology. At that time, patient was transferred to [facility] and extubated in OR with ENT for which there were no issues or anatomic abnormalities with airway.On ENT evaluation, patient is not in active distress. Normal breathing at baseline, but has mild noisy breathing (wheezing vs. stridor) on deep expiration which usually leads to a congested cough. Denies fevers, dysphagia or other symptoms. Has had raspy voice for about the same time and actively currently. - Scope exam with widely patent airway, no swelling or supraglottic masses, can be intubated from above. Vocal fold mobile, but small posterior gap- Some evidence of paradoxical vocal fold motion +/- muscle tension dysphonia- No obvious subglottic lesions but difficult to assess fully"    Of Note: Patient is known to this service as patient was seen for a clinical swallow assessment during a previous admission on 2/28/23 with recommendations of regular with thin liquids (please see note).    Patient seen at bedside in the [facility]/ESSU1 this AM for an initial assessment of the swallow function, at which time patient was alert. Patient receiving supplemental O2 via nasal cannula. RN reporting patient passed dysphagia screen and was reporting stuck sensation with food. Patient states she felt food was "getting stuck" (pointing to throat) yesterday, however, she states her symptoms have now resolved.

## 2023-04-20 NOTE — H&P ADULT - PROBLEM SELECTOR PLAN 6
- Noted to have a flat nevus of ~0.5 cm x 0.5 cm on her inner R thigh, no TTP, no discharge, patient denies any known increases in size  - Encouraged patient to have outpatient follow up with dermatology for her nevus evaluation

## 2023-04-20 NOTE — ED ADULT NURSE REASSESSMENT NOTE - NS ED NURSE REASSESS COMMENT FT1
PT endorsing chest pain and throat discomfort at this time. MD Young made aware. MD at bedside reassessing pt. sinus tachycardia noted on monitor. respirations even and unlabored. repeat labs drawn and sent. placed on 2 L NC for comfort. no stridor/ drooling noted. repeat EKG performed. awaiting reassessment. side rails up call bell within reach
pt reports some relief s/p duoneb treatments. respirations even and unlabored. remains on continuos monitor. sinus tachycardia noted. MD aware. awaiting CT
py A&Ox4 offering no complaints at this time. respirations even and unlabored. remains on 2 L NC for comfort. remains on continuos monitor, sinus tachycardia noted. pt refusing duo neb at this time.  report given to E[facility] 1 RN. awaiting bed assignment

## 2023-04-20 NOTE — PATIENT PROFILE ADULT - STATED REASON FOR ADMISSION
[place] cuidar a un bebé recién nacido  (Well  - )  ASPECTO NORMAL DEL RECIÉN NACIDO  · La joseph del bebé puede parecer más matti comparada con el yamileth de fairchild cuerpo.  · La joseph del bebé recién nacido tendrá 2 puntos planos blandos (fontanelas). [place] fontanela se encuentra en la parte superior y la otra en la parte posterior de la joseph. Cuando el bebé llora o vomita, las fontanelas se abultan. Deben volver a la normalidad cuando se calma. La fontanela de la parte posterior de la joseph se cerrará a los 4 meses después del parto. La fontanela en la parte superior de la joseph se cerrará después después del 1 año de abdiel.  · La piel del recién nacido puede tener [place] cubierta protectora de aspecto cremoso y de color caceres (vernix caseosa). La vernix caseosa, llamada simplemente vérnix, puede cubrir toda la superficie de la piel o puede encontrarse sólo en los pliegues cutáneos. Felice sustancia puede limpiarse parcialmente poco después del nacimiento del bebé. El vérnix restante se retira al bañarlo.  · La piel del recién nacido puede parecer seca, escamosa o descamada. Algunas pequeñas manchas bravo en la sonido y en el pecho son normales.  · El recién nacido puede presentar bultos blancos (milia) en la parte superior las mejillas, la nariz o la barbilla. La milia desaparecerá en los próximos meses sin ningún tratamiento.  · Muchos recién nacidos desarrollan [place] coloración amarillenta en la piel y en la parte jose de los ojos (ictericia) en la primera semana de abdiel. La mayoría de las veces, la ictericia no requiere ningún tratamiento. Es importante cumplir con las visitas de control con el médico para controlar la ictericia.  · El bebé puede tener un pelo suave (lanugo) que cubra fairchild cuerpo. El lanugo es reemplazado nam los primeros 3-4 meses por un pelo más daksha.  · A veces podrá tener las josi y los pies fríos, de color púrpura y con manchas. [place] es habitual nam las primeras semanas  después del nacimiento. [place] no significa que el bebé tenga frío.  · Puede desarrollar [place] erupción si está muy acalorado.  · Es normal que las niñas recién nacidas tengan [place] secreción jose o con algo de cyrus por la vagina.  COMPORTAMIENTO DEL RECIÉN NACIDO NORMAL  · El bebé recién nacido debe  ambos brazos y piernas por igual.  · [place] no podrá sostener la joseph. [place] se debe a que los músculos del alisha son débiles. Hasta que los músculos se mary más rajeev, es muy importante que le sostenga la joseph y el alisha al levantarlo.  · El bebé recién nacido dormirá la mayor parte del tiempo y se despertará para alimentarse o para los cambios de pañales.  · Indicará venita necesidades a través del llanto. En las primeras semanas puede llorar sin tener lágrimas.  · El bebé puede asustarse con los ruidos rajeev o los movimientos repentinos.  · Puede estornudar y tener hipo con frecuencia. El estornudo no significa que tiene un resfriado.  · El recién nacido normal respira a través de la nariz. Utiliza los músculos del estómago para ayudar a la respiración.  · El recién nacido tiene varios reflejos normales. Algunos reflejos son:  ¨ Succión.  ¨ Deglución.  ¨ Náusea.  ¨ Tos.  ¨ Reflejo de búsqueda. Es cuando el bebé recién nacido gira la joseph y abre la boca al acariciarle la boca o la mejilla.  ¨ Reflejo de prensión. Es cuando el bebé farzana los dedos al acariciarle la pacheco de la mano.  VACUNAS  El recién nacido debe recibir la primera dosis de la vacuna contra la hepatitis B antes de ser dado de elier del hospital.  ESTUDIOS Y CUIDADOS PREVENTIVOS  · El recién nacido será evaluado por medio de la puntuación de Apgar. La puntuación de Apgar es un número dado al recién nacido, entre 1 y 5 minutos después del nacimiento. La puntuación al 1er. minuto indica cómo el bebé ha tolerado el parto. La puntuación a los 5 minutos evalúa tha el recién nacido se adapta a vivir fuera del útero. La puntuación ser realiza  en base a 5 observaciones que incluyen el maribeth muscular, la frecuencia cardíaca, las respuestas reflejas, el color, y la respiración. [place] puntuación total entre 7 y 10 es normal.  · Mientras está en el hospital le harán [place] prueba de audición. Si el bebé no pasa la primera prueba de audición, se programará [place] prueba de audición de control.  · A todos los recién nacidos se les extrae cyrus para un estudio de cribado metabólico antes de salir del hospital. [place] examen es requerido por la marbella estatal y se realiza para el control para muchas enfermedades hereditarias y médicas graves. Según la edad del recién nacido en el momento del elier y el estado en el que usted vive, se hará [place] segunda prueba metabólica.  · Podrán indicarle gotas o un ungüento para los ojos después del nacimiento para prevenir infecciones en el javad.  · El recién nacido debe recibir [place] inyección de vitamina K para el tratamiento de posibles niveles bajos de esta vitamina. El recién nacido con un nivel bajo de vitamina K tiene riesgo de sangrado.  · De [place] bebé debe ser estudiado para detectar defectos congénitos cardíacos críticos. Un defecto cardíaco crítico es [place] alteración najma y grave que está presente desde el nacimiento. El defecto puede impedir que el corazón bombee cyrus normalmente o puede disminuir la cantidad de oxígeno de la cyrus. El estudio de detección debe realizarse a las 24-48 horas, o lo más tarde que se pueda si se le da el elier antes de las 24 horas de abdiel. Requiere la colocación de un sensor sobre la piel del bebé sólo nam unos minutos. El sensor detecta los latidos cardíacos y el nivel de oxígeno en cyrus del bebé (oximetría de pulso). Los niveles bajos de oxígeno en cyrus pueden ser un signo de defectos cardíacos congénitos críticos.  ALIMENTACIÓN  La leche materna y la leche maternizada para bebés, o la combinación de ambas, aporta todos los nutrientes que el bebé necesita nam muchos de los primeros meses de  abdiel. El amamantamiento exclusivo, si es posible en fairchild liss, es lo mejor para el bebé. Hable con el médico o con la asesora en lactancia sobre las necesidades nutricionales del bebé.  Los signos de que el bebé podría tener hambre son:  · Aumenta fairchild estado de alerta o vigilancia.  · Se estira.  · Mueve la joseph de un lado a otro.  · Reflejo de búsqueda.  · Aumenta los sonidos de succión, se relame los labios, emite arrullos, suspiros, o chirridos.  · Mueve la mano hacia la boca.  · Se chupa con ganas los dedos o las josi.  · Está agitado.  · Llora de manera intermitente.  Los signos de hambre extrema requerirán que lo calme y lo consuele antes de tratar de alimentarlo. Los signos de hambre extrema son:  · Agitación.  · Llanto benji e intenso.  · Gritos.  Las señales de que el recién nacido está lleno y satisfecho son:  · Disminución gradual en el número de succiones o cese completo de la succión.  · Se queda dormido.  · Extiende o relaja fairchild cuerpo.  · Retiene [place] pequeña cantidad de leche en la boca.  · Se desprende del pecho por sí mismo.  Es común que el recién nacido regurgite [place] pequeña cantidad después de comer.  Lactancia materna   · La lactancia materna no implica costos. Siempre está disponible y a la temperatura correcta. Proporciona la mejor nutrición para el bebé.  · La primera leche (calostro) debe estar presente en el momento del parto. La leche “bajará” a los 2 ó 3 días después del parto.  · El bebé nannette, nacido a término, puede alimentarse con tanta frecuencia tha cada hora o con intervalos de 3 horas. La frecuencia de lactancia variará entre laurel y otro recién nacido. La alimentación frecuente le ayudará a producir más leche, así tha ayudará a prevenir problemas en los senos, tha dolor en los pezones o pechos muy llenos (congestión).  · Aliméntelo cuando el bebé muestre signos de hambre o cuando sienta la necesidad de reducir la congestión de los senos.  · Los recién nacidos deben ser  alimentados por lo menos cada 2-3 horas nam el día y cada 4-5 horas nam la noche. Usted debe amamantarlo por un mínimo de 8 dmitri en un período de 24 horas.  · Despierte al bebé para amamantarlo si bruce pasado 3-4 horas desde la última comida.  · El recién nacido suelen tragar aire nam la alimentación. [place] puede hacer que se sienta molesto. Hacerlo eructar entre un pecho y otro puede ayudarlo.  · Se recomiendan suplementos de vitamina D para los bebés que reciben sólo leche materna.  · Evite el uso de un chupete nam las primeras 4 a 6 semanas de abdiel.  Alimentación con preparado para lactantes   · Se recomienda la leche para bebés fortificada con reese.  · Puede comprarla en forma de polvo, concentrado líquido o líquida y lista para consumir. La fórmula en polvo es la forma más económica para comprar. Concentrado en polvo y líquido debe mantenerse refrigerado después de mezclarlo. [place] vez que el bebé tome el biberón y termine de comer, deseche la fórmula restante.  · La fórmula refrigerada se puede calentar colocando el biberón en un recipiente con [place]. Nunca caliente el biberón en el microondas. Al calentarlo en el microondas puede quemar la boca del bebé recién nacido.  · Para preparar la fórmula concentrada o en polvo concentrado puede usar agua limpia del grifo o agua embotellada. Utilice siempre agua fría del grifo para preparar la fórmula del recién nacido. [place] reduce la cantidad de plomo que podría proceder de las tuberías de agua si se utiliza [place].  · El agua de raman debe ser hervida y enfriada antes de mezclarla con la fórmula.  · Los biberones y las tetinas deben lavarse con [place] y jabón o lavarlos en el lavavajillas.  · El biberón y la fórmula no necesitan esterilización si el suministro de agua es seguro.  · Los recién nacidos deben ser alimentados por lo menos cada 2-3 horas nam el día y cada 4-5 horas nam la noche. Debe linda un mínimo de 8 dmitri  en un período de 24 horas.  · Despierte al bebé para alimentarlo si bruce pasado 3-4 horas desde la última comida.  · El recién nacido suele tragar aire nam la alimentación. [place] puede hacer que se sienta molesto. Hágalo eructar después de cada onza (30 ml) de fórmula.  · Se recomiendan suplementos de vitamina D para los bebés que beben menos de 17 onzas (500 ml) de fórmula por día.  · No debe añadir agua, jugo o alimentos sólidos a la dieta del bebé recién nacido hasta que se lo indique el pediatra.  VÍNCULO AFECTIVO  El vínculo afectivo consiste en el desarrollo de un intenso apego entre usted y el recién nacido. Enseña al bebé a confiar en usted y lo hace sentir seguro, protegido y nico. Algunos comportamientos que favorecen el desarrollo del vínculo afectivo son:  · Sostener y abrazar al bebé recién nacido. Puede ser un contacto de piel a piel.  · Mírelo directamente a los ojos al hablarle. El bebé puede uri mejor los objetos cuando están a 8-12 pulgadas (20-31 cm) de distancia de fairchild sonido.  · Háblele o cántele con frecuencia.  · Tóquelo o acarícielo con frecuencia. Puede acariciar fairchild yaneli.  · Acúnelo.  HÁBITOS DE SUEÑO  El bebé puede dormir hasta 16 a 17 horas por día. Todos los recién nacidos desarrollan diferentes patrones de sueño y estos patrones cambian con el tiempo. Aprenda a sacar ventaja del ciclo de sueño de fairchild bebé recién nacido para que usted pueda descansar lo necesario.  · La forma más babb para que el bebé duerma es de espalda en la cuna o ghassan.  · Siempre acuéstelo para dormir en [place] superficie firme.  · Los asientos de seguridad y otros tipos de asiento no se recomiendan para el sueño de rutina.  · Es más seguro cuando duerme en fairchild propio espacio. El ghassan o la cuna al lado de la cama de los padres permite acceder más fácilmente al recién nacido nam la noche.  · Mantenga fuera de la cuna o del ghassan los objetos blandos o la ropa de cama suelta, tha almohadas, protectores para  cuna, mantas, o animales de clarice. Los objetos que están en la cuna o el ghassan pueden impedir la respiración.  · [place] al recién nacido tha se vestiría usted misma para estar en el interior o al aire lance. Puede añadirle [place] prenda delgada, tha [place] camiseta o enterito.  · Nunca permita que fairchild bebé recién nacido comparta la cama con adultos o niños mayores.  · Nunca use asha de agua, sofás o bolsas rellenas de frijoles para hacer dormir al bebé recién nacido. En estos muebles se pueden obstruir las vías respiratorias y causar sofocación.  · Cuando el recién nacido esté despierto, puede colocarlo sobre fairchild abdomen, siempre que haya un adulto presente. Si coloca al bebé algún tiempo sobre fairchild abdomen, evitará que se aplane fairchild joseph.  CUIDADO DEL CORDÓN UMBILICAL  · El cordón umbilical del bebé se pinza y se corta poco después de nacer. La pinza del cordón umbilical puede quitarse cuando el cordón se haya secada.  · El cordón restante debe caerse y sanar el plazo de 1-3 semanas.  · El cordón umbilical y el área alrededor de fairchild parte inferior no necesitan cuidados específicos maximilian deben mantenerse limpios y secos.  · Si el área en la parte inferior del cordón umbilical se ensucia, se puede limpiar con agua y secarse al aire.  · Doble la parte delantera del pañal lejos del cordón umbilical para que pueda secarse y caerse con mayor rapidez.  · Podrá notar un olor fétido antes que el cordón umbilical se caiga. Llame a fairchild médico si el cordón umbilical no se ha caído a los 2 meses de abdiel o si observa:  ¨ Enrojecimiento o hinchazón alrededor de la feliciano umbilical.  ¨ El drenaje de la feliciano umbilical.  ¨ Siente dolor al tocar fairchild abdomen.  EVACUACIÓN  · Las primeras evacuaciones del recién nacido (heces) serán pegajosas, de color pamela verdoso y similar al alquitrán (meconio). [place] es normal.  · Si amamanta al bebé, debe esperar que tenga entre 3 y 5 deposiciones cada día, nam los primeros 5 a 7 días. La materia fecal  debe ser grumosa, suave o blanda y de color marrón amarillento. El bebé tendrá varias deposiciones por día nam la lactancia.  · Si lo alimenta con fórmula, las heces serán más firmes y de color amarillo grisáceo. Es normal que el recién nacido tenga 1 o más evacuaciones al día o que no tenga evacuaciones por [place]urel o dos días.  · Las heces del bebé cambiarán a medida que empiece a comer.  · Muchas veces un recién nacido gruñe, se contrae, o fairchild sonido se vuelve freid al pasar las heces, maximilian si la consistencia es blanda, no está constipado.  · Es normal que el recién nacido elimine los gases de manera explosiva y con frecuencia nam el primer mes.  · Nam los primeros 5 días, el recién nacido debe mojar por lo menos 3-5 pañales en 24 horas. La orina debe ser kareem y de color amarillo pálido.  · Después de la primera semana, es normal que el recién nacido moje 6 o más pañales en 24 horas.  ¿CUÁNDO VOLVER?  Fairchild próxima visita al médico será cuando el edouard tenga 3 días de abdiel.  Esta información no tiene tha fin reemplazar el consejo del médico. Asegúrese de hacerle al médico cualquier pregunta que tenga.  Document Released: 01/06/2009 Document Revised: 05/03/2016 Document Reviewed: 08/09/2013  Elsevier Interactive Patient Education © 2017 [employer]     shortness of breath, wheezing

## 2023-04-20 NOTE — H&P ADULT - PROBLEM SELECTOR PLAN 7
Diet: Reports food stuck sensation but no actual dysphagia -> check dysphagia screen, if passes then likely pureed diet with thin liquids, S&S eval placed  DVT: lovenox subQ  Activity: OOB to chair/with assistance, increase as tolerated, PT eval    Fall and aspiration precautions

## 2023-04-20 NOTE — PATIENT PROFILE ADULT - FUNCTIONAL ASSESSMENT - BASIC MOBILITY 6.
3-calculated by average/Not able to assess (calculate score using [facility] averaging method)  3-calculated by average/Not able to assess (calculate score using [facility] averaging method)  3-calculated by average/Not able to assess (calculate score using [facility] averaging method)

## 2023-04-20 NOTE — H&P ADULT - NSHPSOCIALHISTORY_GEN_ALL_CORE
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  Quit smoking and drinking >20 years ago.   No drug use. 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  Quit smoking and drinking >20 years ago.   No current drug use.

## 2023-04-20 NOTE — H&P ADULT - PROBLEM SELECTOR PLAN 2
-c/w home amlodipine - Reports L sided chest pain with nebulizer inhalations, otherwise no chest pain, no exertional chest pain, no palpitations, no syncope  - Suspect pain 2/2 tachycardia worsened by the duoneb use, currently still mildly tachycardic  - Initial trop in ED here neg, trop in ED at [facility] also negative  - Add on repeat trop to AM labs, holding off on TTE as low suspicion for ACS  - Monitor tachycardia, likely has slight tachycardia in setting of respiratory issues and duoneb use, if tachycardia persists consider further work up of her tachycardia  - Monitor on telemetry for now - Reports L sided chest pain with nebulizer inhalations, otherwise no chest pain, no exertional chest pain, no palpitations, no syncope  - Suspect pain 2/2 tachycardia worsened by the duoneb use, currently still mildly tachycardic  - Initial trop in ED here neg, trop in ED at [facility] also negative  - Add on repeat trop to AM labs, holding off on TTE as low suspicion for ACS  - Monitor tachycardia, likely has slight tachycardia in setting of respiratory issues and duoneb use, if tachycardia persists consider further work up of her tachycardia  - Monitor on telemetry for now - Reports L sided chest pain with nebulizer inhalations, otherwise no chest pain, no exertional chest pain, no palpitations, no syncope  - Suspect pain 2/2 tachycardia worsened by the duoneb use, currently still mildly tachycardic  - Initial trop in ED here neg, trop in ED at [facility] also negative  - Add on repeat trop to AM labs, holding off on TTE as low suspicion for ACS  - Monitor tachycardia, likely has slight tachycardia in setting of respiratory issues and duoneb use, if tachycardia persists consider further work up of her tachycardia  - Monitor on telemetry for now

## 2023-04-20 NOTE — H&P ADULT - PROBLEM SELECTOR PLAN 3
Diet: regular   DVT: lovenox subQ - Reports urinary frequency without dysuria or hematuria  - Check UA

## 2023-04-20 NOTE — H&P ADULT - NSHPREVIEWOFSYSTEMS_GEN_ALL_CORE
REVIEW OF SYSTEMS  --------------------------------------------------------------------------------  Gen: No weight changes, fatigue, fevers/chills, weakness  Skin: No rashes  Head/Eyes/Ears/Mouth: No headache; Normal hearing; Normal vision w/o blurriness; No sinus pain/discomfort, sore throat  Respiratory: +wheezing + stridor   CV: No chest pain, PND, orthopnea  GI: No abdominal pain, diarrhea, constipation, nausea, vomiting, melena, hematochezia  : No increased frequency, dysuria, hematuria, nocturia  MSK: +L groin/hip pain   Neuro: No dizziness/lightheadedness, weakness, seizures, numbness, tingling  Heme: No easy bruising or bleeding  Endo: No heat/cold intolerance  Psych: No significant nervousness, anxiety, stress, depression REVIEW OF SYSTEMS  --------------------------------------------------------------------------------  Gen: No weight changes, fatigue, fevers/chills, weakness  Skin: +Mole on R inner thigh, no wounds, no rashes  Head/Eyes/Ears/Mouth: No headache; Normal hearing; Normal vision w/o blurriness; No sinus pain/discomfort, sore throat, +food stuck sensation but no dysphagia  Respiratory: +wheezing + stridor , +non-productive cough, no hemoptysis  CV: +Chest pain with duoneb use, No current chest pain, PND, orthopnea, no palpitations/syncope  GI: No abdominal pain, diarrhea, constipation, nausea, vomiting, melena, hematochezia  : +increased frequency but no dysuria, hematuria  MSK: +L groin/hip pain, +chronic lower back pain (denies saddle anesthesia, urinary/fecal incontinence/retention, no injuries/falls)  Neuro: No dizziness/lightheadedness, weakness, seizures, numbness, tingling  Heme: No easy bruising or bleeding  Endo: No heat/cold intolerance

## 2023-04-20 NOTE — SWALLOW BEDSIDE ASSESSMENT ADULT - SWALLOW EVAL: DIAGNOSIS
1. Functional oral stage for regular solids with thin liquids marked by adequate oral acceptance, chewing, and transfer. 2. Functional pharyngeal phase for regular solids and thin liquids marked by a present pharyngeal swallow trigger with hyolaryngeal elevation noted upon digital palpation without evidence of impaired airway protection.

## 2023-04-21 DIAGNOSIS — J96.01 ACUTE RESPIRATORY FAILURE WITH HYPOXIA: ICD-10-CM

## 2023-04-21 LAB
ANION GAP SERPL CALC-SCNC: 14 MMOL/L — SIGNIFICANT CHANGE UP (ref 7–14)
BUN SERPL-MCNC: 24 MG/DL — HIGH (ref 7–23)
CALCIUM SERPL-MCNC: 9.3 MG/DL — SIGNIFICANT CHANGE UP (ref 8.4–10.5)
CHLORIDE SERPL-SCNC: 102 MMOL/L — SIGNIFICANT CHANGE UP (ref 98–107)
CO2 SERPL-SCNC: 21 MMOL/L — LOW (ref 22–31)
CREAT SERPL-MCNC: 0.83 MG/DL — SIGNIFICANT CHANGE UP (ref 0.5–1.3)
EGFR: 76 ML/MIN/1.73M2 — SIGNIFICANT CHANGE UP
GLUCOSE BLDC GLUCOMTR-MCNC: 153 MG/DL — HIGH (ref 70–99)
GLUCOSE SERPL-MCNC: 110 MG/DL — HIGH (ref 70–99)
HCT VFR BLD CALC: 40.4 % — SIGNIFICANT CHANGE UP (ref 34.5–45)
HGB BLD-MCNC: 12.5 G/DL — SIGNIFICANT CHANGE UP (ref 11.5–15.5)
MCHC RBC-ENTMCNC: 26.4 PG — LOW (ref 27–34)
MCHC RBC-ENTMCNC: 30.9 GM/DL — LOW (ref 32–36)
MCV RBC AUTO: 85.2 FL — SIGNIFICANT CHANGE UP (ref 80–100)
NRBC # BLD: 0 /100 WBCS — SIGNIFICANT CHANGE UP (ref 0–0)
NRBC # FLD: 0 K/UL — SIGNIFICANT CHANGE UP (ref 0–0)
PLATELET # BLD AUTO: 249 K/UL — SIGNIFICANT CHANGE UP (ref 150–400)
POTASSIUM SERPL-MCNC: 3.9 MMOL/L — SIGNIFICANT CHANGE UP (ref 3.5–5.3)
POTASSIUM SERPL-SCNC: 3.9 MMOL/L — SIGNIFICANT CHANGE UP (ref 3.5–5.3)
RBC # BLD: 4.74 M/UL — SIGNIFICANT CHANGE UP (ref 3.8–5.2)
RBC # FLD: 13.7 % — SIGNIFICANT CHANGE UP (ref 10.3–14.5)
SODIUM SERPL-SCNC: 137 MMOL/L — SIGNIFICANT CHANGE UP (ref 135–145)
WBC # BLD: 18.24 K/UL — HIGH (ref 3.8–10.5)
WBC # FLD AUTO: 18.24 K/UL — HIGH (ref 3.8–10.5)

## 2023-04-21 PROCEDURE — 99233 SBSQ HOSP IP/OBS HIGH 50: CPT

## 2023-04-21 PROCEDURE — 74230 X-RAY XM SWLNG FUNCJ C+: CPT | Mod: 26

## 2023-04-21 RX ADMIN — ALBUTEROL 1 PUFF(S): 90 AEROSOL, METERED ORAL at 10:57

## 2023-04-21 RX ADMIN — Medication 3 MILLILITER(S): at 12:02

## 2023-04-21 RX ADMIN — ENOXAPARIN SODIUM 40 MILLIGRAM(S): 100 INJECTION SUBCUTANEOUS at 06:36

## 2023-04-21 RX ADMIN — Medication 650 MILLIGRAM(S): at 17:47

## 2023-04-21 RX ADMIN — PANTOPRAZOLE SODIUM 40 MILLIGRAM(S): 20 TABLET, DELAYED RELEASE ORAL at 06:33

## 2023-04-21 RX ADMIN — Medication 3 MILLILITER(S): at 03:24

## 2023-04-21 RX ADMIN — Medication 650 MILLIGRAM(S): at 10:56

## 2023-04-21 RX ADMIN — Medication 3 MILLILITER(S): at 16:22

## 2023-04-21 RX ADMIN — Medication 40 MILLIGRAM(S): at 06:33

## 2023-04-21 RX ADMIN — Medication 3 MILLILITER(S): at 22:29

## 2023-04-21 RX ADMIN — AMLODIPINE BESYLATE 5 MILLIGRAM(S): 2.5 TABLET ORAL at 06:33

## 2023-04-21 RX ADMIN — Medication 650 MILLIGRAM(S): at 18:19

## 2023-04-21 RX ADMIN — Medication 650 MILLIGRAM(S): at 11:56

## 2023-04-21 NOTE — SWALLOW VFSS/MBS ASSESSMENT ADULT - COMMENTS
ENT 4/19 - 72f h/o asthma, Bell's Palsy, essential HTN, hepatitis C, seizures presents to the ED BIBEMS c/o SOB and wheezing x3-5 days, progressively worsened, similar to prior episode approx 2m ago AT [place] during which she was intubated with concern for airway etiology. At that time, patient was transferred to [facility] and extubated in OR with ENT for which there were no issues or anatomic abnormalities with airway. On ENT evaluation, patient is not in active distress. Normal breathing at baseline, but has mild noisy breathing (wheezing vs. stridor) on deep expiration which usually leads to a congested cough. Denies fevers, dysphagia or other symptoms. Has had raspy voice for about the same time and actively currently.     Of Note: Patient was seen for a Clinical Swallow Eval on 4/20/23 (See Consult).     Patient arrived to Radiology for Cinesophagram. Patient transferred to a specialized seating unit with lateral view projection. Patient reports increase coughing and voice hoarseness again.  Patient reports no shortness of breath. ENT 4/19 - 72f h/o asthma, Bell's Palsy, essential HTN, hepatitis C, seizures presents to the ED BIBEMS c/o SOB and wheezing x3-5 days, progressively worsened, similar to prior episode approx 2m ago AT [place] during which she was intubated with concern for airway etiology. At that time, patient was transferred to [facility] and extubated in OR with ENT for which there were no issues or anatomic abnormalities with airway. On ENT evaluation, patient is not in active distress. Normal breathing at baseline, but has mild noisy breathing (wheezing vs. stridor) on deep expiration which usually leads to a congested cough. Denies fevers, dysphagia or other symptoms. Has had raspy voice for about the same time and actively currently.     Of Note: Patient was seen for a Clinical Swallow Eval on 4/20/23 (See Consult).     Patient arrived to Radiology for Cinesophagram. Patient transferred to a specialized seating unit with lateral view projection. Patient reports increase coughing and voice hoarseness again.  Patient reports no shortness of breath. ENT 4/19 - 72f h/o asthma, Bell's Palsy, essential HTN, hepatitis C, seizures presents to the ED BIBEMS c/o SOB and wheezing x3-5 days, progressively worsened, similar to prior episode approx 2m ago AT [place] during which she was intubated with concern for airway etiology. At that time, patient was transferred to [facility] and extubated in OR with ENT for which there were no issues or anatomic abnormalities with airway. On ENT evaluation, patient is not in active distress. Normal breathing at baseline, but has mild noisy breathing (wheezing vs. stridor) on deep expiration which usually leads to a congested cough. Denies fevers, dysphagia or other symptoms. Has had raspy voice for about the same time and actively currently.     Of Note: Patient was seen for a Clinical Swallow Eval on 4/20/23 (See Consult).     Patient arrived to Radiology for Cinesophagram. Patient transferred to a specialized seating unit with lateral view projection. Patient reports increase coughing and voice hoarseness again.  Patient reports no shortness of breath.

## 2023-04-21 NOTE — PROGRESS NOTE ADULT - PROBLEM SELECTOR PLAN 1
2/2 asthma exacerbation, expiratory wheezing noted  On admission required supplemental O2  Now on room air, however still with significant wheezing  Continue steroids, nebulizers   ENT w/u negative

## 2023-04-21 NOTE — DIETITIAN INITIAL EVALUATION ADULT - NAME AND PHONE
Akilah Hernandez RDN, CDN #[id number]  Also available on [employer] Teams  Akilah Hernandez RDN, CDN #[id number]  Also available on [employer] Teams  Akilah Hernandez RDN, CDN #[id number]  Also available on [employer] Teams

## 2023-04-21 NOTE — DIETITIAN INITIAL EVALUATION ADULT - OTHER CALCULATIONS
Dosing weight (4/21) 218.9 lbs / 99.3 kg.  Recent chart weight (2/23/23) 216 lbs.  Ideal Body Weight: 125 lbs /56.8 kg +/-10%

## 2023-04-21 NOTE — DIETITIAN INITIAL EVALUATION ADULT - OTHER INFO
Per chart, pt is 72 year old female PMH asthma, Bell's Palsy, essential HTN, hepatitis C, seizures presenting with SOB, wheezing x5 days. ENT was consulted.     Pt confirms NKFA, lives at home and independent with ADLs. Pt denies chewing difficulties but endorses intermittent sensation of food getting stuck when swallowing. S/p bedside swallow assessment (4/20) and cineesophagram (4/21), both of which recommended regular solids/thin liquids. Pt with good PO intake, tolerating diet. Pt does not vocalize any food preferences or nutritional concerns at this time. No noted GI distress, last BM 4/20 per flowsheets; no bowel regimen ordered at this time.

## 2023-04-21 NOTE — PROGRESS NOTE ADULT - ASSESSMENT
72f h/o asthma, Bell's Palsy, essential HTN, hepatitis C, seizures transferred to [facility] for ENT eval for SOB and wheezing.  72f h/o asthma, Bell's Palsy, essential HTN, hepatitis C, seizures transferred to [facility] for ENT eval for SOB and wheezing.  72f h/o asthma, Bell's Palsy, essential HTN, hepatitis C, seizures transferred to [facility] for ENT eval for SOB and wheezing.

## 2023-04-21 NOTE — SWALLOW VFSS/MBS ASSESSMENT ADULT - DIAGNOSTIC IMPRESSIONS
Patient presents with Functional Oral and Pharyngeal Stage swallowing mechanism. The Oral Stage is characterized by adequate oral containment, adequate chewing for solid, adequate bolus manipulation, adequate tongue motion with adequate anterior to posterior transfer of the bolus; piecemeal transfer/deglutition for puree/solids; with adequate oral clearance. The Pharyngeal Stage is characterized by adequate initiation of the pharyngeal swallow, adequate laryngeal elevation, adequate tongue base retraction and adequate pharyngeal constriction. There is adequate pharyngeal clearance post swallow for puree/solids.  There was intermittent Trace Laryngeal Penetration during the swallow for Thin Liquids with retrieval and airway protection maintained. There was No Aspiration observed before, during or after the swallow for puree, solids, thin liquids.

## 2023-04-21 NOTE — PROGRESS NOTE ADULT - TIME BILLING
Time reflective of charting, documentation and face to face evaluation    Discussed with outpatient provider, patient still wheezing.

## 2023-04-21 NOTE — PHYSICAL THERAPY INITIAL EVALUATION ADULT - PERTINENT HX OF CURRENT PROBLEM, REHAB EVAL
72f h/o asthma, Bell's Palsy (R side of her face), Essential HTN, Hepatitis C (reports treatment for ~6months within the past 10 years but unsure if she cleared her infection or not), Seizures (not on AEDs currently, states her last seizure was a long time ago), and ?Asthma (reports no official diagnosis of asthma but was told she had issues with her vocal cords) who presents to the ED BIBEMS c/o SOB and wheezing x5-6 days

## 2023-04-21 NOTE — PROGRESS NOTE ADULT - SUBJECTIVE AND OBJECTIVE BOX
Clay Mata MD  Academic Hospitalist  Pager [phone]  Email: [email or web address]  Available on [employer] Teams        PROGRESS NOTE:     Patient is a 72y old  Female who presents with a chief complaint of Stridor     (2023 12:26)      SUBJECTIVE / OVERNIGHT EVENTS:  Patient seen and examined this morning. Continues to have moments of coughing/wheezing and hoarseness.   ADDITIONAL REVIEW OF SYSTEMS:  No f/c/n/v      MEDICATIONS  (STANDING):  albuterol/ipratropium for Nebulization 3 milliLiter(s) Nebulizer every 6 hours  amLODIPine   Tablet 5 milliGRAM(s) Oral daily  enoxaparin Injectable 40 milliGRAM(s) SubCutaneous every 24 hours  methylPREDNISolone sodium succinate Injectable 40 milliGRAM(s) IV Push daily  pantoprazole    Tablet 40 milliGRAM(s) Oral before breakfast    MEDICATIONS  (PRN):  acetaminophen     Tablet .. 650 milliGRAM(s) Oral every 6 hours PRN Temp greater or equal to 38C (100.4F), Mild Pain (1 - 3)  albuterol    90 MICROgram(s) HFA Inhaler 1 Puff(s) Inhalation every 4 hours PRN Shortness of Breath and/or Wheezing      CAPILLARY BLOOD GLUCOSE      POCT Blood Glucose.: 153 mg/dL (2023 11:19)    I&O's Summary    2023 07:01  -  2023 07:00  --------------------------------------------------------  IN: 200 mL / OUT: 0 mL / NET: 200 mL        PHYSICAL EXAM:  Vital Signs Last 24 Hrs  T(C): 36.9 (2023 12:10), Max: 37 (2023 15:34)  T(F): 98.4 (2023 12:10), Max: 98.6 (2023 15:34)  HR: 93 (2023 12:10) (86 - 100)  BP: 128/67 (2023 12:10) (128/67 - 152/93)  BP(mean): --  RR: 18 (2023 12:10) (15 - 19)  SpO2: 95% (2023 12:10) (95% - 98%)    Parameters below as of 2023 12:10  Patient On (Oxygen Delivery Method): room air        CONSTITUTIONAL: NAD, well-developed, pleasant  RESPIRATORY: Significant expiratory wheezes bilaterally.  CARDIOVASCULAR: Regular rate and rhythm, normal S1 and S2, no murmur/rub/gallop; No lower extremity edema; Peripheral pulses are 2+ bilaterally  ABDOMEN: Nontender to palpation, normoactive bowel sounds, no rebound/guarding; No hepatosplenomegaly  MUSCLOSKELETAL: no clubbing or cyanosis of digits; no joint swelling or tenderness to palpation  PSYCH: A+O to person, place, and time; affect appropriate    LABS:                        12.5   18.24 )-----------( 249      ( 2023 06:44 )             40.4     04-21    137  |  102  |  24<H>  ----------------------------<  110<H>  3.9   |  21<L>  |  0.83    Ca    9.3      2023 06:44  Phos  3.7     04-20  Mg     2.30     04-20            Urinalysis Basic - ( 2023 11:40 )    Color: Yellow / Appearance: Clear / S.044 / pH: x  Gluc: x / Ketone: Negative  / Bili: Negative / Urobili: <2 mg/dL   Blood: x / Protein: 30 mg/dL / Nitrite: Negative   Leuk Esterase: Large / RBC: 2 /HPF / WBC 26 /HPF   Sq Epi: x / Non Sq Epi: x / Bacteria: Moderate          RADIOLOGY & ADDITIONAL TESTS:  Results Reviewed:   Imaging Personally Reviewed:  Electrocardiogram Personally Reviewed:    COORDINATION OF CARE:  Care Discussed with Consultants/Other Providers [Y/N]: Called Dr. Guerrero, awaiting call back.   Prior or Outpatient Records Reviewed [Y/N]:   Clay Mata MD  Academic Hospitalist  Pager [phone]  Email: [email or web address]  Available on [employer] Teams        PROGRESS NOTE:     Patient is a 72y old  Female who presents with a chief complaint of Stridor     (2023 12:26)      SUBJECTIVE / OVERNIGHT EVENTS:  Patient seen and examined this morning. Continues to have moments of coughing/wheezing and hoarseness.   ADDITIONAL REVIEW OF SYSTEMS:  No f/c/n/v      MEDICATIONS  (STANDING):  albuterol/ipratropium for Nebulization 3 milliLiter(s) Nebulizer every 6 hours  amLODIPine   Tablet 5 milliGRAM(s) Oral daily  enoxaparin Injectable 40 milliGRAM(s) SubCutaneous every 24 hours  methylPREDNISolone sodium succinate Injectable 40 milliGRAM(s) IV Push daily  pantoprazole    Tablet 40 milliGRAM(s) Oral before breakfast    MEDICATIONS  (PRN):  acetaminophen     Tablet .. 650 milliGRAM(s) Oral every 6 hours PRN Temp greater or equal to 38C (100.4F), Mild Pain (1 - 3)  albuterol    90 MICROgram(s) HFA Inhaler 1 Puff(s) Inhalation every 4 hours PRN Shortness of Breath and/or Wheezing      CAPILLARY BLOOD GLUCOSE      POCT Blood Glucose.: 153 mg/dL (2023 11:19)    I&O's Summary    2023 07:01  -  2023 07:00  --------------------------------------------------------  IN: 200 mL / OUT: 0 mL / NET: 200 mL        PHYSICAL EXAM:  Vital Signs Last 24 Hrs  T(C): 36.9 (2023 12:10), Max: 37 (2023 15:34)  T(F): 98.4 (2023 12:10), Max: 98.6 (2023 15:34)  HR: 93 (2023 12:10) (86 - 100)  BP: 128/67 (2023 12:10) (128/67 - 152/93)  BP(mean): --  RR: 18 (2023 12:10) (15 - 19)  SpO2: 95% (2023 12:10) (95% - 98%)    Parameters below as of 2023 12:10  Patient On (Oxygen Delivery Method): room air        CONSTITUTIONAL: NAD, well-developed, pleasant  RESPIRATORY: Significant expiratory wheezes bilaterally.  CARDIOVASCULAR: Regular rate and rhythm, normal S1 and S2, no murmur/rub/gallop; No lower extremity edema; Peripheral pulses are 2+ bilaterally  ABDOMEN: Nontender to palpation, normoactive bowel sounds, no rebound/guarding; No hepatosplenomegaly  MUSCLOSKELETAL: no clubbing or cyanosis of digits; no joint swelling or tenderness to palpation  PSYCH: A+O to person, place, and time; affect appropriate    LABS:                        12.5   18.24 )-----------( 249      ( 2023 06:44 )             40.4     04-21    137  |  102  |  24<H>  ----------------------------<  110<H>  3.9   |  21<L>  |  0.83    Ca    9.3      2023 06:44  Phos  3.7     04-20  Mg     2.30     04-20            Urinalysis Basic - ( 2023 11:40 )    Color: Yellow / Appearance: Clear / S.044 / pH: x  Gluc: x / Ketone: Negative  / Bili: Negative / Urobili: <2 mg/dL   Blood: x / Protein: 30 mg/dL / Nitrite: Negative   Leuk Esterase: Large / RBC: 2 /HPF / WBC 26 /HPF   Sq Epi: x / Non Sq Epi: x / Bacteria: Moderate          RADIOLOGY & ADDITIONAL TESTS:  Results Reviewed:   Imaging Personally Reviewed:  Electrocardiogram Personally Reviewed:    COORDINATION OF CARE:  Care Discussed with Consultants/Other Providers [Y/N]: Called Dr. Guerrero, awaiting call back.   Prior or Outpatient Records Reviewed [Y/N]:   Clay Mata MD  Academic Hospitalist  Pager [phone]  Email: [email or web address]  Available on [employer] Teams        PROGRESS NOTE:     Patient is a 72y old  Female who presents with a chief complaint of Stridor     (2023 12:26)      SUBJECTIVE / OVERNIGHT EVENTS:  Patient seen and examined this morning. Continues to have moments of coughing/wheezing and hoarseness.   ADDITIONAL REVIEW OF SYSTEMS:  No f/c/n/v      MEDICATIONS  (STANDING):  albuterol/ipratropium for Nebulization 3 milliLiter(s) Nebulizer every 6 hours  amLODIPine   Tablet 5 milliGRAM(s) Oral daily  enoxaparin Injectable 40 milliGRAM(s) SubCutaneous every 24 hours  methylPREDNISolone sodium succinate Injectable 40 milliGRAM(s) IV Push daily  pantoprazole    Tablet 40 milliGRAM(s) Oral before breakfast    MEDICATIONS  (PRN):  acetaminophen     Tablet .. 650 milliGRAM(s) Oral every 6 hours PRN Temp greater or equal to 38C (100.4F), Mild Pain (1 - 3)  albuterol    90 MICROgram(s) HFA Inhaler 1 Puff(s) Inhalation every 4 hours PRN Shortness of Breath and/or Wheezing      CAPILLARY BLOOD GLUCOSE      POCT Blood Glucose.: 153 mg/dL (2023 11:19)    I&O's Summary    2023 07:01  -  2023 07:00  --------------------------------------------------------  IN: 200 mL / OUT: 0 mL / NET: 200 mL        PHYSICAL EXAM:  Vital Signs Last 24 Hrs  T(C): 36.9 (2023 12:10), Max: 37 (2023 15:34)  T(F): 98.4 (2023 12:10), Max: 98.6 (2023 15:34)  HR: 93 (2023 12:10) (86 - 100)  BP: 128/67 (2023 12:10) (128/67 - 152/93)  BP(mean): --  RR: 18 (2023 12:10) (15 - 19)  SpO2: 95% (2023 12:10) (95% - 98%)    Parameters below as of 2023 12:10  Patient On (Oxygen Delivery Method): room air        CONSTITUTIONAL: NAD, well-developed, pleasant  RESPIRATORY: Significant expiratory wheezes bilaterally.  CARDIOVASCULAR: Regular rate and rhythm, normal S1 and S2, no murmur/rub/gallop; No lower extremity edema; Peripheral pulses are 2+ bilaterally  ABDOMEN: Nontender to palpation, normoactive bowel sounds, no rebound/guarding; No hepatosplenomegaly  MUSCLOSKELETAL: no clubbing or cyanosis of digits; no joint swelling or tenderness to palpation  PSYCH: A+O to person, place, and time; affect appropriate    LABS:                        12.5   18.24 )-----------( 249      ( 2023 06:44 )             40.4     04-21    137  |  102  |  24<H>  ----------------------------<  110<H>  3.9   |  21<L>  |  0.83    Ca    9.3      2023 06:44  Phos  3.7     04-20  Mg     2.30     04-20            Urinalysis Basic - ( 2023 11:40 )    Color: Yellow / Appearance: Clear / S.044 / pH: x  Gluc: x / Ketone: Negative  / Bili: Negative / Urobili: <2 mg/dL   Blood: x / Protein: 30 mg/dL / Nitrite: Negative   Leuk Esterase: Large / RBC: 2 /HPF / WBC 26 /HPF   Sq Epi: x / Non Sq Epi: x / Bacteria: Moderate          RADIOLOGY & ADDITIONAL TESTS:  Results Reviewed:   Imaging Personally Reviewed:  Electrocardiogram Personally Reviewed:    COORDINATION OF CARE:  Care Discussed with Consultants/Other Providers [Y/N]: Called Dr. Guerrero, awaiting call back.   Prior or Outpatient Records Reviewed [Y/N]:   Clay Mata MD  Academic Hospitalist  Pager [phone]  Email: [email or web address]  Available on [employer] Teams        PROGRESS NOTE:     Patient is a 72y old  Female who presents with a chief complaint of Stridor     (2023 12:26)      SUBJECTIVE / OVERNIGHT EVENTS:  Patient seen and examined this morning. Continues to have moments of coughing/wheezing and hoarseness.   ADDITIONAL REVIEW OF SYSTEMS:  No f/c/n/v      MEDICATIONS  (STANDING):  albuterol/ipratropium for Nebulization 3 milliLiter(s) Nebulizer every 6 hours  amLODIPine   Tablet 5 milliGRAM(s) Oral daily  enoxaparin Injectable 40 milliGRAM(s) SubCutaneous every 24 hours  methylPREDNISolone sodium succinate Injectable 40 milliGRAM(s) IV Push daily  pantoprazole    Tablet 40 milliGRAM(s) Oral before breakfast    MEDICATIONS  (PRN):  acetaminophen     Tablet .. 650 milliGRAM(s) Oral every 6 hours PRN Temp greater or equal to 38C (100.4F), Mild Pain (1 - 3)  albuterol    90 MICROgram(s) HFA Inhaler 1 Puff(s) Inhalation every 4 hours PRN Shortness of Breath and/or Wheezing      CAPILLARY BLOOD GLUCOSE      POCT Blood Glucose.: 153 mg/dL (2023 11:19)    I&O's Summary    2023 07:01  -  2023 07:00  --------------------------------------------------------  IN: 200 mL / OUT: 0 mL / NET: 200 mL        PHYSICAL EXAM:  Vital Signs Last 24 Hrs  T(C): 36.9 (2023 12:10), Max: 37 (2023 15:34)  T(F): 98.4 (2023 12:10), Max: 98.6 (2023 15:34)  HR: 93 (2023 12:10) (86 - 100)  BP: 128/67 (2023 12:10) (128/67 - 152/93)  BP(mean): --  RR: 18 (2023 12:10) (15 - 19)  SpO2: 95% (2023 12:10) (95% - 98%)    Parameters below as of 2023 12:10  Patient On (Oxygen Delivery Method): room air        CONSTITUTIONAL: NAD, well-developed, pleasant  RESPIRATORY: Significant expiratory wheezes bilaterally.  CARDIOVASCULAR: Regular rate and rhythm, normal S1 and S2, no murmur/rub/gallop; No lower extremity edema; Peripheral pulses are 2+ bilaterally  ABDOMEN: Nontender to palpation, normoactive bowel sounds, no rebound/guarding; No hepatosplenomegaly  MUSCLOSKELETAL: no clubbing or cyanosis of digits; no joint swelling or tenderness to palpation  PSYCH: A+O to person, place, and time; affect appropriate    LABS:                        12.5   18.24 )-----------( 249      ( 2023 06:44 )             40.4     04-21    137  |  102  |  24<H>  ----------------------------<  110<H>  3.9   |  21<L>  |  0.83    Ca    9.3      2023 06:44  Phos  3.7     04-20  Mg     2.30     04-20            Urinalysis Basic - ( 2023 11:40 )    Color: Yellow / Appearance: Clear / S.044 / pH: x  Gluc: x / Ketone: Negative  / Bili: Negative / Urobili: <2 mg/dL   Blood: x / Protein: 30 mg/dL / Nitrite: Negative   Leuk Esterase: Large / RBC: 2 /HPF / WBC 26 /HPF   Sq Epi: x / Non Sq Epi: x / Bacteria: Moderate          RADIOLOGY & ADDITIONAL TESTS:  Results Reviewed:   Imaging Personally Reviewed:  Electrocardiogram Personally Reviewed:    COORDINATION OF CARE:  Care Discussed with Consultants/Other Providers [Y/N]: Called Dr. Guerrero, he thinks that her symptoms may be related to vocal cord/upper airway. He recommended flow volume loops, however this would be better done as outpatient.   Prior or Outpatient Records Reviewed [Y/N]:   Clay Mata MD  Academic Hospitalist  Pager [phone]  Email: [email or web address]  Available on [employer] Teams        PROGRESS NOTE:     Patient is a 72y old  Female who presents with a chief complaint of Stridor     (2023 12:26)      SUBJECTIVE / OVERNIGHT EVENTS:  Patient seen and examined this morning. Continues to have moments of coughing/wheezing and hoarseness.   ADDITIONAL REVIEW OF SYSTEMS:  No f/c/n/v      MEDICATIONS  (STANDING):  albuterol/ipratropium for Nebulization 3 milliLiter(s) Nebulizer every 6 hours  amLODIPine   Tablet 5 milliGRAM(s) Oral daily  enoxaparin Injectable 40 milliGRAM(s) SubCutaneous every 24 hours  methylPREDNISolone sodium succinate Injectable 40 milliGRAM(s) IV Push daily  pantoprazole    Tablet 40 milliGRAM(s) Oral before breakfast    MEDICATIONS  (PRN):  acetaminophen     Tablet .. 650 milliGRAM(s) Oral every 6 hours PRN Temp greater or equal to 38C (100.4F), Mild Pain (1 - 3)  albuterol    90 MICROgram(s) HFA Inhaler 1 Puff(s) Inhalation every 4 hours PRN Shortness of Breath and/or Wheezing      CAPILLARY BLOOD GLUCOSE      POCT Blood Glucose.: 153 mg/dL (2023 11:19)    I&O's Summary    2023 07:01  -  2023 07:00  --------------------------------------------------------  IN: 200 mL / OUT: 0 mL / NET: 200 mL        PHYSICAL EXAM:  Vital Signs Last 24 Hrs  T(C): 36.9 (2023 12:10), Max: 37 (2023 15:34)  T(F): 98.4 (2023 12:10), Max: 98.6 (2023 15:34)  HR: 93 (2023 12:10) (86 - 100)  BP: 128/67 (2023 12:10) (128/67 - 152/93)  BP(mean): --  RR: 18 (2023 12:10) (15 - 19)  SpO2: 95% (2023 12:10) (95% - 98%)    Parameters below as of 2023 12:10  Patient On (Oxygen Delivery Method): room air        CONSTITUTIONAL: NAD, well-developed, pleasant  RESPIRATORY: Significant expiratory wheezes bilaterally.  CARDIOVASCULAR: Regular rate and rhythm, normal S1 and S2, no murmur/rub/gallop; No lower extremity edema; Peripheral pulses are 2+ bilaterally  ABDOMEN: Nontender to palpation, normoactive bowel sounds, no rebound/guarding; No hepatosplenomegaly  MUSCLOSKELETAL: no clubbing or cyanosis of digits; no joint swelling or tenderness to palpation  PSYCH: A+O to person, place, and time; affect appropriate    LABS:                        12.5   18.24 )-----------( 249      ( 2023 06:44 )             40.4     04-21    137  |  102  |  24<H>  ----------------------------<  110<H>  3.9   |  21<L>  |  0.83    Ca    9.3      2023 06:44  Phos  3.7     04-20  Mg     2.30     04-20            Urinalysis Basic - ( 2023 11:40 )    Color: Yellow / Appearance: Clear / S.044 / pH: x  Gluc: x / Ketone: Negative  / Bili: Negative / Urobili: <2 mg/dL   Blood: x / Protein: 30 mg/dL / Nitrite: Negative   Leuk Esterase: Large / RBC: 2 /HPF / WBC 26 /HPF   Sq Epi: x / Non Sq Epi: x / Bacteria: Moderate          RADIOLOGY & ADDITIONAL TESTS:  Results Reviewed:   Imaging Personally Reviewed:  Electrocardiogram Personally Reviewed:    COORDINATION OF CARE:  Care Discussed with Consultants/Other Providers [Y/N]: Called Dr. Guerrero, he thinks that her symptoms may be related to vocal cord/upper airway. He recommended flow volume loops, however this would be better done as outpatient.   Prior or Outpatient Records Reviewed [Y/N]:   Clay Mata MD  Academic Hospitalist  Pager [phone]  Email: [email or web address]  Available on [employer] Teams        PROGRESS NOTE:     Patient is a 72y old  Female who presents with a chief complaint of Stridor     (2023 12:26)      SUBJECTIVE / OVERNIGHT EVENTS:  Patient seen and examined this morning. Continues to have moments of coughing/wheezing and hoarseness.   ADDITIONAL REVIEW OF SYSTEMS:  No f/c/n/v      MEDICATIONS  (STANDING):  albuterol/ipratropium for Nebulization 3 milliLiter(s) Nebulizer every 6 hours  amLODIPine   Tablet 5 milliGRAM(s) Oral daily  enoxaparin Injectable 40 milliGRAM(s) SubCutaneous every 24 hours  methylPREDNISolone sodium succinate Injectable 40 milliGRAM(s) IV Push daily  pantoprazole    Tablet 40 milliGRAM(s) Oral before breakfast    MEDICATIONS  (PRN):  acetaminophen     Tablet .. 650 milliGRAM(s) Oral every 6 hours PRN Temp greater or equal to 38C (100.4F), Mild Pain (1 - 3)  albuterol    90 MICROgram(s) HFA Inhaler 1 Puff(s) Inhalation every 4 hours PRN Shortness of Breath and/or Wheezing      CAPILLARY BLOOD GLUCOSE      POCT Blood Glucose.: 153 mg/dL (2023 11:19)    I&O's Summary    2023 07:01  -  2023 07:00  --------------------------------------------------------  IN: 200 mL / OUT: 0 mL / NET: 200 mL        PHYSICAL EXAM:  Vital Signs Last 24 Hrs  T(C): 36.9 (2023 12:10), Max: 37 (2023 15:34)  T(F): 98.4 (2023 12:10), Max: 98.6 (2023 15:34)  HR: 93 (2023 12:10) (86 - 100)  BP: 128/67 (2023 12:10) (128/67 - 152/93)  BP(mean): --  RR: 18 (2023 12:10) (15 - 19)  SpO2: 95% (2023 12:10) (95% - 98%)    Parameters below as of 2023 12:10  Patient On (Oxygen Delivery Method): room air        CONSTITUTIONAL: NAD, well-developed, pleasant  RESPIRATORY: Significant expiratory wheezes bilaterally.  CARDIOVASCULAR: Regular rate and rhythm, normal S1 and S2, no murmur/rub/gallop; No lower extremity edema; Peripheral pulses are 2+ bilaterally  ABDOMEN: Nontender to palpation, normoactive bowel sounds, no rebound/guarding; No hepatosplenomegaly  MUSCLOSKELETAL: no clubbing or cyanosis of digits; no joint swelling or tenderness to palpation  PSYCH: A+O to person, place, and time; affect appropriate    LABS:                        12.5   18.24 )-----------( 249      ( 2023 06:44 )             40.4     04-21    137  |  102  |  24<H>  ----------------------------<  110<H>  3.9   |  21<L>  |  0.83    Ca    9.3      2023 06:44  Phos  3.7     04-20  Mg     2.30     04-20            Urinalysis Basic - ( 2023 11:40 )    Color: Yellow / Appearance: Clear / S.044 / pH: x  Gluc: x / Ketone: Negative  / Bili: Negative / Urobili: <2 mg/dL   Blood: x / Protein: 30 mg/dL / Nitrite: Negative   Leuk Esterase: Large / RBC: 2 /HPF / WBC 26 /HPF   Sq Epi: x / Non Sq Epi: x / Bacteria: Moderate          RADIOLOGY & ADDITIONAL TESTS:  Results Reviewed:   Imaging Personally Reviewed:  Electrocardiogram Personally Reviewed:    COORDINATION OF CARE:  Care Discussed with Consultants/Other Providers [Y/N]: Called Dr. Guerrero, he thinks that her symptoms may be related to vocal cord/upper airway. He recommended flow volume loops, however this would be better done as outpatient.   Prior or Outpatient Records Reviewed [Y/N]:

## 2023-04-21 NOTE — PHYSICAL THERAPY INITIAL EVALUATION ADULT - ADDITIONAL COMMENTS
Pt reprots she lives in an apartment 20 steps to negotiate, resides with her sister, no recent falls, has DME which she uses, and completes ADLs independently.

## 2023-04-21 NOTE — DIETITIAN INITIAL EVALUATION ADULT - PROBLEM/PLAN-7
DISPLAY PLAN FREE TEXT [email or web address]

## 2023-04-21 NOTE — DIETITIAN INITIAL EVALUATION ADULT - ADD RECOMMEND
Recommend continue current diet, which remains appropriate at this time. Discussed menu ordering system, obtained food preferences and will honor as able. Monitor fingersticks.

## 2023-04-21 NOTE — SWALLOW VFSS/MBS ASSESSMENT ADULT - RECOMMENDED CONSISTENCY
1.) Regular with Thin Liquids  2.) Aspiration Precautions  3.) Reflux Precautions  4.) Maintain Good Oral Hygiene Care

## 2023-04-21 NOTE — SWALLOW VFSS/MBS ASSESSMENT ADULT - ADDITIONAL RECOMMENDATIONS
This service to follow as schedule permits for diet tolerance. Medical Team advised to reconsult if there is a change in medical status.

## 2023-04-22 LAB
ANION GAP SERPL CALC-SCNC: 10 MMOL/L — SIGNIFICANT CHANGE UP (ref 7–14)
BUN SERPL-MCNC: 20 MG/DL — SIGNIFICANT CHANGE UP (ref 7–23)
CALCIUM SERPL-MCNC: 9.2 MG/DL — SIGNIFICANT CHANGE UP (ref 8.4–10.5)
CHLORIDE SERPL-SCNC: 103 MMOL/L — SIGNIFICANT CHANGE UP (ref 98–107)
CO2 SERPL-SCNC: 24 MMOL/L — SIGNIFICANT CHANGE UP (ref 22–31)
CREAT SERPL-MCNC: 0.8 MG/DL — SIGNIFICANT CHANGE UP (ref 0.5–1.3)
EGFR: 79 ML/MIN/1.73M2 — SIGNIFICANT CHANGE UP
GLUCOSE SERPL-MCNC: 102 MG/DL — HIGH (ref 70–99)
HCT VFR BLD CALC: 39.4 % — SIGNIFICANT CHANGE UP (ref 34.5–45)
HGB BLD-MCNC: 12.3 G/DL — SIGNIFICANT CHANGE UP (ref 11.5–15.5)
MCHC RBC-ENTMCNC: 26.6 PG — LOW (ref 27–34)
MCHC RBC-ENTMCNC: 31.2 GM/DL — LOW (ref 32–36)
MCV RBC AUTO: 85.3 FL — SIGNIFICANT CHANGE UP (ref 80–100)
NRBC # BLD: 0 /100 WBCS — SIGNIFICANT CHANGE UP (ref 0–0)
NRBC # FLD: 0 K/UL — SIGNIFICANT CHANGE UP (ref 0–0)
PLATELET # BLD AUTO: 252 K/UL — SIGNIFICANT CHANGE UP (ref 150–400)
POTASSIUM SERPL-MCNC: 4 MMOL/L — SIGNIFICANT CHANGE UP (ref 3.5–5.3)
POTASSIUM SERPL-SCNC: 4 MMOL/L — SIGNIFICANT CHANGE UP (ref 3.5–5.3)
RBC # BLD: 4.62 M/UL — SIGNIFICANT CHANGE UP (ref 3.8–5.2)
RBC # FLD: 13.5 % — SIGNIFICANT CHANGE UP (ref 10.3–14.5)
SODIUM SERPL-SCNC: 137 MMOL/L — SIGNIFICANT CHANGE UP (ref 135–145)
WBC # BLD: 11.6 K/UL — HIGH (ref 3.8–10.5)
WBC # FLD AUTO: 11.6 K/UL — HIGH (ref 3.8–10.5)

## 2023-04-22 PROCEDURE — 99233 SBSQ HOSP IP/OBS HIGH 50: CPT

## 2023-04-22 RX ADMIN — AMLODIPINE BESYLATE 5 MILLIGRAM(S): 2.5 TABLET ORAL at 05:41

## 2023-04-22 RX ADMIN — PANTOPRAZOLE SODIUM 40 MILLIGRAM(S): 20 TABLET, DELAYED RELEASE ORAL at 05:42

## 2023-04-22 RX ADMIN — Medication 40 MILLIGRAM(S): at 05:42

## 2023-04-22 RX ADMIN — ALBUTEROL 1 PUFF(S): 90 AEROSOL, METERED ORAL at 21:54

## 2023-04-22 RX ADMIN — Medication 3 MILLILITER(S): at 21:15

## 2023-04-22 RX ADMIN — Medication 3 MILLILITER(S): at 10:04

## 2023-04-22 RX ADMIN — ENOXAPARIN SODIUM 40 MILLIGRAM(S): 100 INJECTION SUBCUTANEOUS at 05:42

## 2023-04-22 RX ADMIN — Medication 3 MILLILITER(S): at 04:41

## 2023-04-22 RX ADMIN — Medication 3 MILLILITER(S): at 15:48

## 2023-04-22 NOTE — PROGRESS NOTE ADULT - SUBJECTIVE AND OBJECTIVE BOX
[facility]  Division of Hospital Medicine  Amanda Garduno MD  Pager: [phone]      Patient is a 72y old  Female who presents with a chief complaint of SOB/Wheezing (21 Apr 2023 12:58)      SUBJECTIVE / OVERNIGHT EVENTS: Patient reports breathing is stable. Still with wheezing. Plan for one more day IV steroids and potential transition to po tomorrow   ADDITIONAL REVIEW OF SYSTEMS:    MEDICATIONS  (STANDING):  albuterol/ipratropium for Nebulization 3 milliLiter(s) Nebulizer every 6 hours  amLODIPine   Tablet 5 milliGRAM(s) Oral daily  enoxaparin Injectable 40 milliGRAM(s) SubCutaneous every 24 hours  methylPREDNISolone sodium succinate Injectable 40 milliGRAM(s) IV Push daily  pantoprazole    Tablet 40 milliGRAM(s) Oral before breakfast    MEDICATIONS  (PRN):  acetaminophen     Tablet .. 650 milliGRAM(s) Oral every 6 hours PRN Temp greater or equal to 38C (100.4F), Mild Pain (1 - 3)  albuterol    90 MICROgram(s) HFA Inhaler 1 Puff(s) Inhalation every 4 hours PRN Shortness of Breath and/or Wheezing      CAPILLARY BLOOD GLUCOSE        I&O's Summary    21 Apr 2023 07:01  -  22 Apr 2023 07:00  --------------------------------------------------------  IN: 420 mL / OUT: 0 mL / NET: 420 mL        PHYSICAL EXAM:  Vital Signs Last 24 Hrs  T(C): 36.7 (22 Apr 2023 11:34), Max: 36.7 (22 Apr 2023 05:35)  T(F): 98 (22 Apr 2023 11:34), Max: 98.1 (22 Apr 2023 05:35)  HR: 87 (22 Apr 2023 11:34) (77 - 110)  BP: 137/74 (22 Apr 2023 11:34) (137/74 - 150/88)  BP(mean): --  RR: 18 (22 Apr 2023 11:34) (17 - 18)  SpO2: 95% (22 Apr 2023 11:34) (95% - 99%)    Parameters below as of 22 Apr 2023 05:35  Patient On (Oxygen Delivery Method): room air      CONSTITUTIONAL: NAD, well-developed, pleasant  RESPIRATORY: Significant expiratory wheezes bilaterally.  CARDIOVASCULAR: Regular rate and rhythm, normal S1 and S2, no murmur/rub/gallop; No lower extremity edema; Peripheral pulses are 2+ bilaterally  ABDOMEN: Nontender to palpation, normoactive bowel sounds, no rebound/guarding; No hepatosplenomegaly  MUSCLOSKELETAL: no clubbing or cyanosis of digits; no joint swelling or tenderness to palpation  PSYCH: A+O to person, place, and time; affect appropriate  LABS:                        12.3   11.60 )-----------( 252      ( 22 Apr 2023 08:48 )             39.4     04-22    137  |  103  |  20  ----------------------------<  102<H>  4.0   |  24  |  0.80    Ca    9.2      22 Apr 2023 08:48                  RADIOLOGY & ADDITIONAL TESTS:  Results Reviewed:   Imaging Personally Reviewed:  Electrocardiogram Personally Reviewed:    COORDINATION OF CARE:  Care Discussed with Consultants/Other Providers [Y/N]:  Prior or Outpatient Records Reviewed [Y/N]:   [facility]  Division of Hospital Medicine  Amanda Garduno MD  Pager: [phone]      Patient is a 72y old  Female who presents with a chief complaint of SOB/Wheezing (21 Apr 2023 12:58)      SUBJECTIVE / OVERNIGHT EVENTS: Patient reports breathing is stable. Still with wheezing. Plan for one more day IV steroids and potential transition to po tomorrow   ADDITIONAL REVIEW OF SYSTEMS:    MEDICATIONS  (STANDING):  albuterol/ipratropium for Nebulization 3 milliLiter(s) Nebulizer every 6 hours  amLODIPine   Tablet 5 milliGRAM(s) Oral daily  enoxaparin Injectable 40 milliGRAM(s) SubCutaneous every 24 hours  methylPREDNISolone sodium succinate Injectable 40 milliGRAM(s) IV Push daily  pantoprazole    Tablet 40 milliGRAM(s) Oral before breakfast    MEDICATIONS  (PRN):  acetaminophen     Tablet .. 650 milliGRAM(s) Oral every 6 hours PRN Temp greater or equal to 38C (100.4F), Mild Pain (1 - 3)  albuterol    90 MICROgram(s) HFA Inhaler 1 Puff(s) Inhalation every 4 hours PRN Shortness of Breath and/or Wheezing      CAPILLARY BLOOD GLUCOSE        I&O's Summary    21 Apr 2023 07:01  -  22 Apr 2023 07:00  --------------------------------------------------------  IN: 420 mL / OUT: 0 mL / NET: 420 mL        PHYSICAL EXAM:  Vital Signs Last 24 Hrs  T(C): 36.7 (22 Apr 2023 11:34), Max: 36.7 (22 Apr 2023 05:35)  T(F): 98 (22 Apr 2023 11:34), Max: 98.1 (22 Apr 2023 05:35)  HR: 87 (22 Apr 2023 11:34) (77 - 110)  BP: 137/74 (22 Apr 2023 11:34) (137/74 - 150/88)  BP(mean): --  RR: 18 (22 Apr 2023 11:34) (17 - 18)  SpO2: 95% (22 Apr 2023 11:34) (95% - 99%)    Parameters below as of 22 Apr 2023 05:35  Patient On (Oxygen Delivery Method): room air      CONSTITUTIONAL: NAD, well-developed, pleasant  RESPIRATORY: Significant expiratory wheezes bilaterally.  CARDIOVASCULAR: Regular rate and rhythm, normal S1 and S2, no murmur/rub/gallop; No lower extremity edema; Peripheral pulses are 2+ bilaterally  ABDOMEN: Nontender to palpation, normoactive bowel sounds, no rebound/guarding; No hepatosplenomegaly  MUSCLOSKELETAL: no clubbing or cyanosis of digits; no joint swelling or tenderness to palpation  PSYCH: A+O to person, place, and time; affect appropriate  LABS:                        12.3   11.60 )-----------( 252      ( 22 Apr 2023 08:48 )             39.4     04-22    137  |  103  |  20  ----------------------------<  102<H>  4.0   |  24  |  0.80    Ca    9.2      22 Apr 2023 08:48                  RADIOLOGY & ADDITIONAL TESTS:  Results Reviewed:   Imaging Personally Reviewed:  Electrocardiogram Personally Reviewed:    COORDINATION OF CARE:  Care Discussed with Consultants/Other Providers [Y/N]:  Prior or Outpatient Records Reviewed [Y/N]:   [facility]  Division of Hospital Medicine  Amanda Garduno MD  Pager: [phone]      Patient is a 72y old  Female who presents with a chief complaint of SOB/Wheezing (21 Apr 2023 12:58)      SUBJECTIVE / OVERNIGHT EVENTS: Patient reports breathing is stable. Still with wheezing. Plan for one more day IV steroids and potential transition to po tomorrow   ADDITIONAL REVIEW OF SYSTEMS:    MEDICATIONS  (STANDING):  albuterol/ipratropium for Nebulization 3 milliLiter(s) Nebulizer every 6 hours  amLODIPine   Tablet 5 milliGRAM(s) Oral daily  enoxaparin Injectable 40 milliGRAM(s) SubCutaneous every 24 hours  methylPREDNISolone sodium succinate Injectable 40 milliGRAM(s) IV Push daily  pantoprazole    Tablet 40 milliGRAM(s) Oral before breakfast    MEDICATIONS  (PRN):  acetaminophen     Tablet .. 650 milliGRAM(s) Oral every 6 hours PRN Temp greater or equal to 38C (100.4F), Mild Pain (1 - 3)  albuterol    90 MICROgram(s) HFA Inhaler 1 Puff(s) Inhalation every 4 hours PRN Shortness of Breath and/or Wheezing      CAPILLARY BLOOD GLUCOSE        I&O's Summary    21 Apr 2023 07:01  -  22 Apr 2023 07:00  --------------------------------------------------------  IN: 420 mL / OUT: 0 mL / NET: 420 mL        PHYSICAL EXAM:  Vital Signs Last 24 Hrs  T(C): 36.7 (22 Apr 2023 11:34), Max: 36.7 (22 Apr 2023 05:35)  T(F): 98 (22 Apr 2023 11:34), Max: 98.1 (22 Apr 2023 05:35)  HR: 87 (22 Apr 2023 11:34) (77 - 110)  BP: 137/74 (22 Apr 2023 11:34) (137/74 - 150/88)  BP(mean): --  RR: 18 (22 Apr 2023 11:34) (17 - 18)  SpO2: 95% (22 Apr 2023 11:34) (95% - 99%)    Parameters below as of 22 Apr 2023 05:35  Patient On (Oxygen Delivery Method): room air      CONSTITUTIONAL: NAD, well-developed, pleasant  RESPIRATORY: Significant expiratory wheezes bilaterally.  CARDIOVASCULAR: Regular rate and rhythm, normal S1 and S2, no murmur/rub/gallop; No lower extremity edema; Peripheral pulses are 2+ bilaterally  ABDOMEN: Nontender to palpation, normoactive bowel sounds, no rebound/guarding; No hepatosplenomegaly  MUSCLOSKELETAL: no clubbing or cyanosis of digits; no joint swelling or tenderness to palpation  PSYCH: A+O to person, place, and time; affect appropriate  LABS:                        12.3   11.60 )-----------( 252      ( 22 Apr 2023 08:48 )             39.4     04-22    137  |  103  |  20  ----------------------------<  102<H>  4.0   |  24  |  0.80    Ca    9.2      22 Apr 2023 08:48                  RADIOLOGY & ADDITIONAL TESTS:  Results Reviewed:   Imaging Personally Reviewed:  Electrocardiogram Personally Reviewed:    COORDINATION OF CARE:  Care Discussed with Consultants/Other Providers [Y/N]:  Prior or Outpatient Records Reviewed [Y/N]:

## 2023-04-22 NOTE — PROGRESS NOTE ADULT - ASSESSMENT
72f h/o asthma, Bell's Palsy, essential HTN, hepatitis C, seizures transferred to [facility] for ENT eval for SOB and wheezing.  72f h/o asthma, Bell's Palsy, essential HTN, hepatitis C, seizures transferred to [facility] for ENT eval for SOB and wheezing.

## 2023-04-23 ENCOUNTER — TRANSCRIPTION ENCOUNTER (OUTPATIENT)
Age: 73
End: 2023-04-23

## 2023-04-23 LAB
ANION GAP SERPL CALC-SCNC: 12 MMOL/L — SIGNIFICANT CHANGE UP (ref 7–14)
BUN SERPL-MCNC: 20 MG/DL — SIGNIFICANT CHANGE UP (ref 7–23)
CALCIUM SERPL-MCNC: 8.9 MG/DL — SIGNIFICANT CHANGE UP (ref 8.4–10.5)
CHLORIDE SERPL-SCNC: 102 MMOL/L — SIGNIFICANT CHANGE UP (ref 98–107)
CO2 SERPL-SCNC: 23 MMOL/L — SIGNIFICANT CHANGE UP (ref 22–31)
CREAT SERPL-MCNC: 0.84 MG/DL — SIGNIFICANT CHANGE UP (ref 0.5–1.3)
EGFR: 75 ML/MIN/1.73M2 — SIGNIFICANT CHANGE UP
GLUCOSE SERPL-MCNC: 99 MG/DL — SIGNIFICANT CHANGE UP (ref 70–99)
HCT VFR BLD CALC: 40.9 % — SIGNIFICANT CHANGE UP (ref 34.5–45)
HGB BLD-MCNC: 12.6 G/DL — SIGNIFICANT CHANGE UP (ref 11.5–15.5)
MCHC RBC-ENTMCNC: 26.1 PG — LOW (ref 27–34)
MCHC RBC-ENTMCNC: 30.8 GM/DL — LOW (ref 32–36)
MCV RBC AUTO: 84.7 FL — SIGNIFICANT CHANGE UP (ref 80–100)
NRBC # BLD: 0 /100 WBCS — SIGNIFICANT CHANGE UP (ref 0–0)
NRBC # FLD: 0 K/UL — SIGNIFICANT CHANGE UP (ref 0–0)
PLATELET # BLD AUTO: 249 K/UL — SIGNIFICANT CHANGE UP (ref 150–400)
POTASSIUM SERPL-MCNC: 3.8 MMOL/L — SIGNIFICANT CHANGE UP (ref 3.5–5.3)
POTASSIUM SERPL-SCNC: 3.8 MMOL/L — SIGNIFICANT CHANGE UP (ref 3.5–5.3)
RBC # BLD: 4.83 M/UL — SIGNIFICANT CHANGE UP (ref 3.8–5.2)
RBC # FLD: 13.3 % — SIGNIFICANT CHANGE UP (ref 10.3–14.5)
SODIUM SERPL-SCNC: 137 MMOL/L — SIGNIFICANT CHANGE UP (ref 135–145)
WBC # BLD: 10.16 K/UL — SIGNIFICANT CHANGE UP (ref 3.8–10.5)
WBC # FLD AUTO: 10.16 K/UL — SIGNIFICANT CHANGE UP (ref 3.8–10.5)

## 2023-04-23 PROCEDURE — 99232 SBSQ HOSP IP/OBS MODERATE 35: CPT

## 2023-04-23 RX ADMIN — AMLODIPINE BESYLATE 5 MILLIGRAM(S): 2.5 TABLET ORAL at 04:58

## 2023-04-23 RX ADMIN — ENOXAPARIN SODIUM 40 MILLIGRAM(S): 100 INJECTION SUBCUTANEOUS at 05:01

## 2023-04-23 RX ADMIN — Medication 650 MILLIGRAM(S): at 10:15

## 2023-04-23 RX ADMIN — Medication 3 MILLILITER(S): at 04:55

## 2023-04-23 RX ADMIN — PANTOPRAZOLE SODIUM 40 MILLIGRAM(S): 20 TABLET, DELAYED RELEASE ORAL at 04:59

## 2023-04-23 RX ADMIN — Medication 40 MILLIGRAM(S): at 13:10

## 2023-04-23 RX ADMIN — Medication 3 MILLILITER(S): at 21:08

## 2023-04-23 RX ADMIN — Medication 100 MILLIGRAM(S): at 21:08

## 2023-04-23 RX ADMIN — Medication 40 MILLIGRAM(S): at 04:58

## 2023-04-23 RX ADMIN — Medication 100 MILLIGRAM(S): at 14:30

## 2023-04-23 RX ADMIN — Medication 3 MILLILITER(S): at 16:01

## 2023-04-23 RX ADMIN — Medication 650 MILLIGRAM(S): at 09:19

## 2023-04-23 NOTE — DISCHARGE NOTE PROVIDER - NSDCCPCAREPLAN_GEN_ALL_CORE_FT
PRINCIPAL DISCHARGE DIAGNOSIS  Diagnosis: Stridor  Assessment and Plan of Treatment: Please continue your current prescribed meds. ENT evaluated you in the hospital.  Follow up with Dr. Urbina [phone] who is aware of your hospitalization. He will be performing several tests as an outpatient to further optimize your current medications.     PRINCIPAL DISCHARGE DIAGNOSIS  Diagnosis: Stridor  Assessment and Plan of Treatment: Please continue your current prescribed meds. ENT evaluated you in the hospital.  Follow up with Dr. Urbina [phone] who is aware of your hospitalization. He will be performing several tests as an outpatient to further optimize your current medications.     PRINCIPAL DISCHARGE DIAGNOSIS  Diagnosis: Stridor  Assessment and Plan of Treatment: Please continue your current prescribed meds. ENT evaluated you in the hospital.  Follow up with Dr. Urbina [phone] who is aware of your hospitalization. He will be performing several tests as an outpatient to further optimize your current medications.     PRINCIPAL DISCHARGE DIAGNOSIS  Diagnosis: Stridor  Assessment and Plan of Treatment: Please continue your current prescribed meds. ENT evaluated you in the hospital.  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 The pulmonary doctors taught you techniques to help manage the episodes of wheezing. You can tripod and breathe through pursed lips in order to help alleviate symptoms. Please follow up with Dr. Urbina [phone] who is aware of your hospitalization. He will be performing several tests as an outpatient to further optimize your current medications.      SECONDARY DISCHARGE DIAGNOSES  Diagnosis: HTN (hypertension)  Assessment and Plan of Treatment: Continue with your home medications.     PRINCIPAL DISCHARGE DIAGNOSIS  Diagnosis: Stridor  Assessment and Plan of Treatment: Please continue your current prescribed meds. ENT evaluated you in the hospital.  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 The pulmonary doctors taught you techniques to help manage the episodes of wheezing. You can tripod and breathe through pursed lips in order to help alleviate symptoms. Please follow up with Dr. Urbina [phone] who is aware of your hospitalization. He will be performing several tests as an outpatient to further optimize your current medications.      SECONDARY DISCHARGE DIAGNOSES  Diagnosis: HTN (hypertension)  Assessment and Plan of Treatment: Continue with your home medications.     PRINCIPAL DISCHARGE DIAGNOSIS  Diagnosis: Stridor  Assessment and Plan of Treatment: Please continue your current prescribed meds. ENT evaluated you in the hospital.  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 The pulmonary doctors taught you techniques to help manage the episodes of wheezing. You can tripod and breathe through pursed lips in order to help alleviate symptoms. Please follow up with Dr. Urbina [phone] who is aware of your hospitalization. He will be performing several tests as an outpatient to further optimize your current medications.      SECONDARY DISCHARGE DIAGNOSES  Diagnosis: HTN (hypertension)  Assessment and Plan of Treatment: Continue with your home medications.     PRINCIPAL DISCHARGE DIAGNOSIS  Diagnosis: Stridor  Assessment and Plan of Treatment: Please continue your current prescribed meds. ENT evaluated you in the hospital.  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 The pulmonary doctors taught you techniques to help manage the episodes of wheezing. You can tripod and breathe through pursed lips in order to help alleviate symptoms. Please follow up with Dr. Urbina [phone] who is aware of your hospitalization. He will be performing several tests as an outpatient to further optimize your current medications.  Also follow up with ENT/Speech therapy in [address] [place] [address]. Please call [phone] to set up an appointment. Voice evaluation script was given to you.      SECONDARY DISCHARGE DIAGNOSES  Diagnosis: HTN (hypertension)  Assessment and Plan of Treatment: Continue with your home medications.     PRINCIPAL DISCHARGE DIAGNOSIS  Diagnosis: Stridor  Assessment and Plan of Treatment: Please continue your current prescribed meds. ENT evaluated you in the hospital.  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 The pulmonary doctors taught you techniques to help manage the episodes of wheezing. You can tripod and breathe through pursed lips in order to help alleviate symptoms. Please follow up with Dr. Urbina [phone] who is aware of your hospitalization. He will be performing several tests as an outpatient to further optimize your current medications.  Also follow up with ENT/Speech therapy in [address] [place] [address]. Please call [phone] to set up an appointment. Voice evaluation script was given to you.      SECONDARY DISCHARGE DIAGNOSES  Diagnosis: HTN (hypertension)  Assessment and Plan of Treatment: Continue with your home medications.     PRINCIPAL DISCHARGE DIAGNOSIS  Diagnosis: Stridor  Assessment and Plan of Treatment: Please continue your current prescribed meds. ENT evaluated you in the hospital.  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 The pulmonary doctors taught you techniques to help manage the episodes of wheezing. You can tripod and breathe through pursed lips in order to help alleviate symptoms. Please follow up with Dr. Urbina [phone] who is aware of your hospitalization. He will be performing several tests as an outpatient to further optimize your current medications.  Also follow up with ENT/Speech therapy in [address] [place] [address]. Please call [phone] to set up an appointment. Voice evaluation script was given to you.      SECONDARY DISCHARGE DIAGNOSES  Diagnosis: HTN (hypertension)  Assessment and Plan of Treatment: Continue with your home medications.     PRINCIPAL DISCHARGE DIAGNOSIS  Diagnosis: Stridor  Assessment and Plan of Treatment: Please continue your current prescribed meds. ENT evaluated you in the hospital.  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 The pulmonary doctors taught you techniques to help manage the episodes of wheezing. You can tripod and breathe through pursed lips in order to help alleviate symptoms. Please follow up with Dr. Urbina [phone] who is aware of your hospitalization. He will be performing several tests as an outpatient to further optimize your current medications.  Also follow up with ENT/Speech therapy in [address] [place] [address]. Please call [phone] to set up an appointment. Voice evaluation script was given to you.      SECONDARY DISCHARGE DIAGNOSES  Diagnosis: HTN (hypertension)  Assessment and Plan of Treatment: Continue with your home medications.    Diagnosis: Urinary tract infection  Assessment and Plan of Treatment: You were reporting urinary frequency.  Your urine test was positive for infection. Will continue to treat with cefpodoxime for 5 days total. The last day of treatment is 4/28 (Friday).     PRINCIPAL DISCHARGE DIAGNOSIS  Diagnosis: Stridor  Assessment and Plan of Treatment: Please continue your current prescribed meds. ENT evaluated you in the hospital.  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 The pulmonary doctors taught you techniques to help manage the episodes of wheezing. You can tripod and breathe through pursed lips in order to help alleviate symptoms. Please follow up with Dr. Urbina [phone] who is aware of your hospitalization. He will be performing several tests as an outpatient to further optimize your current medications.  Also follow up with ENT/Speech therapy in [address] [place] [address]. Please call [phone] to set up an appointment. Voice evaluation script was given to you.      SECONDARY DISCHARGE DIAGNOSES  Diagnosis: HTN (hypertension)  Assessment and Plan of Treatment: Continue with your home medications.    Diagnosis: Urinary tract infection  Assessment and Plan of Treatment: You were reporting urinary frequency.  Your urine test was positive for infection. Will continue to treat with cefpodoxime for 5 days total. The last day of treatment is 4/28 (Friday).     PRINCIPAL DISCHARGE DIAGNOSIS  Diagnosis: Stridor  Assessment and Plan of Treatment: Please continue your current prescribed meds. ENT evaluated you in the hospital.  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 The pulmonary doctors taught you techniques to help manage the episodes of wheezing. You can tripod and breathe through pursed lips in order to help alleviate symptoms. Please follow up with Dr. Urbina [phone] who is aware of your hospitalization. He will be performing several tests as an outpatient to further optimize your current medications.  Also follow up with ENT/Speech therapy in [address] [place] [address]. Please call [phone] to set up an appointment. Voice evaluation script was given to you.      SECONDARY DISCHARGE DIAGNOSES  Diagnosis: HTN (hypertension)  Assessment and Plan of Treatment: Continue with your home medications.    Diagnosis: Urinary tract infection  Assessment and Plan of Treatment: You were reporting urinary frequency.  Your urine test was positive for infection. Will continue to treat with cefpodoxime for 5 days total. The last day of treatment is 4/28 (Friday).

## 2023-04-23 NOTE — PROGRESS NOTE ADULT - ASSESSMENT
72f h/o asthma, Bell's Palsy, essential HTN, hepatitis C, seizures transferred to [facility] for ENT eval for SOB and wheezing.  72f h/o asthma, Bell's Palsy, essential HTN, hepatitis C, seizures transferred to [facility] for ENT eval for SOB and wheezing.  72f h/o asthma, Bell's Palsy, essential HTN, hepatitis C, seizures transferred to [facility] for ENT eval for SOB and wheezing.

## 2023-04-23 NOTE — DISCHARGE NOTE PROVIDER - HOSPITAL COURSE
72f h/o asthma, Bell's Palsy, essential HTN, hepatitis C, seizures transferred to [facility] for ENT eval for SOB and wheezing.    72f h/o asthma, Bell's Palsy, essential HTN, hepatitis C, seizures transferred to [facility] for ENT eval for SOB and wheezing.    72f h/o asthma, Bell's Palsy, essential HTN, hepatitis C, seizures transferred to [facility] for ENT eval for SOB and wheezing.    72f h/o asthma, Bell's Palsy, essential HTN, hepatitis C, seizures transferred to [facility] for ENT eval for SOB and wheezing. From [facility] here w/ acute respiratory failure w/ wheezing. ENT scoped, has vocal cord not closing. 3 L NC. Given epi/steroids/nebs w/ improvement. MICU saw. Still with wheezing on 4/20, though feels less short of breath, O2 sats are stable. I spoke with her outpatient pulmonologist (Dr. Mcneal), he still thinks it’s a vocal cord issue, even w/neg ENT workup. However patient is still wheezing, though stable on room air.   72f h/o asthma, Bell's Palsy, essential HTN, hepatitis C, seizures transferred to [facility] for ENT eval for SOB and wheezing. From [facility] here w/ acute respiratory failure w/ wheezing. ENT scoped, has vocal cord not closing. 3 L NC. Given epi/steroids/nebs w/ improvement. MICU saw. Still with wheezing on 4/20, though feels less short of breath, O2 sats are stable. I spoke with her outpatient pulmonologist (Dr. Mcneal), he still thinks it’s a vocal cord issue, even w/neg ENT workup. However patient is still wheezing, though stable on room air.   72f h/o asthma, Bell's Palsy, essential HTN, hepatitis C, seizures transferred to [facility] for ENT eval for SOB and wheezing. From [facility] here w/ acute respiratory failure w/ wheezing. ENT scoped, has vocal cord not closing. 3 L NC. Given epi/steroids/nebs w/ improvement. MICU saw. Still with wheezing on 4/20, though feels less short of breath, O2 sats are stable. I spoke with her outpatient pulmonologist (Dr. Mcneal), he still thinks it’s a vocal cord issue, even w/neg ENT workup. However patient is still wheezing, though stable on room air.   72f h/o asthma, Bell's Palsy, essential HTN, hepatitis C, seizures transferred to [facility] for ENT eval for SOB and wheezing. From [facility] here w/ acute respiratory failure w/ wheezing. ENT scoped, has vocal cord not closing. 3 L NC. Given epi/steroids/nebs w/ improvement. MICU saw. ENT evaluated - scope showing a widely patent airway without swelling or supraglottic masses. Vocal fold mobile with a small posterior gap. 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 A CT neck with IV contrast was obtained and ruled out subglottic/tracheal issues. A swallow evaluation was obtained and cineesophagogram was wnl. Pt. reporting feeling better but still wheezing. CXR on 4/20 - clear. RVP negative. Recommend outpatient follow up with pulmonary. To be discharged home on a 5 day course of prednisone. 72f h/o asthma, Bell's Palsy, essential HTN, hepatitis C, seizures transferred to [facility] for ENT eval for SOB and wheezing. From [facility] here w/ acute respiratory failure w/ wheezing. ENT scoped, has vocal cord not closing. 3 L NC. Given epi/steroids/nebs w/ improvement. MICU saw. ENT evaluated - scope showing a widely patent airway without swelling or supraglottic masses. Vocal fold mobile with a small posterior gap. 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 A CT neck with IV contrast was obtained and ruled out subglottic/tracheal issues. A swallow evaluation was obtained and cineesophagogram was wnl. Pt. reporting feeling better but still wheezing. CXR on 4/20 - clear. RVP negative. Recommend outpatient follow up with pulmonary. To be discharged home on a 5 day course of prednisone. 72f h/o asthma, Bell's Palsy, essential HTN, hepatitis C, seizures transferred to [facility] for ENT eval for SOB and wheezing. From [facility] here w/ acute respiratory failure w/ wheezing. ENT scoped, has vocal cord not closing. 3 L NC. Given epi/steroids/nebs w/ improvement. MICU saw. ENT evaluated - scope showing a widely patent airway without swelling or supraglottic masses. Vocal fold mobile with a small posterior gap. 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 A CT neck with IV contrast was obtained and ruled out subglottic/tracheal issues. A swallow evaluation was obtained and cineesophagogram was wnl. Pt. reporting feeling better but still wheezing. CXR on 4/20 - clear. RVP negative. Recommend outpatient follow up with pulmonary. To be discharged home on a 5 day course of prednisone. 72f h/o asthma, Bell's Palsy, essential HTN, hepatitis C, seizures transferred to [facility] for ENT eval for SOB and wheezing. From [facility] here w/ acute respiratory failure w/ wheezing. ENT scoped, has vocal cord not closing. 3 L NC. Given epi/steroids/nebs w/ improvement. MICU saw. ENT evaluated - scope showing a widely patent airway without swelling or supraglottic masses. Vocal fold mobile with a small posterior gap. 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 A CT neck with IV contrast was obtained and ruled out subglottic/tracheal issues. A swallow evaluation was obtained and cineesophagogram was wnl. Pt. reporting feeling better but still wheezing. CXR on 4/20 - clear. RVP negative. Given ongoing wheezing - pulmonary consulted in the hospital. Patients symptoms are likely due to vocal cord dysfunction. Pulmonary evaluated at bedside and asked the patient to use pursed lip technique to recover from the wheezing episode with success. Will finish the prednisone course at home. Plan for outpatient pulmonary follow up.     Discharge diagnosis:   Paradoxical VC  Dysfunction     72f h/o asthma, Bell's Palsy, essential HTN, hepatitis C, seizures transferred to [facility] for ENT eval for SOB and wheezing. From [facility] here w/ acute respiratory failure w/ wheezing. ENT scoped, has vocal cord not closing. 3 L NC. Given epi/steroids/nebs w/ improvement. MICU saw. ENT evaluated - scope showing a widely patent airway without swelling or supraglottic masses. Vocal fold mobile with a small posterior gap. 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 A CT neck with IV contrast was obtained and ruled out subglottic/tracheal issues. A swallow evaluation was obtained and cineesophagogram was wnl. Pt. reporting feeling better but still wheezing. CXR on 4/20 - clear. RVP negative. Given ongoing wheezing - pulmonary consulted in the hospital. Patients symptoms are likely due to vocal cord dysfunction. Pulmonary evaluated at bedside and asked the patient to use pursed lip technique to recover from the wheezing episode with success. Will finish the prednisone course at home. Plan for outpatient pulmonary follow up.     Discharge diagnosis:   Paradoxical VC  Dysfunction     72f h/o asthma, Bell's Palsy, essential HTN, hepatitis C, seizures transferred to [facility] for ENT eval for SOB and wheezing. From [facility] here w/ acute respiratory failure w/ wheezing. ENT scoped, has vocal cord not closing. 3 L NC. Given epi/steroids/nebs w/ improvement. MICU saw. ENT evaluated - scope showing a widely patent airway without swelling or supraglottic masses. Vocal fold mobile with a small posterior gap. 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 A CT neck with IV contrast was obtained and ruled out subglottic/tracheal issues. A swallow evaluation was obtained and cineesophagogram was wnl. Pt. reporting feeling better but still wheezing. CXR on 4/20 - clear. RVP negative. Given ongoing wheezing - pulmonary consulted in the hospital. Patients symptoms are likely due to vocal cord dysfunction. Pulmonary evaluated at bedside and asked the patient to use pursed lip technique to recover from the wheezing episode with success. Will finish the prednisone course at home. Plan for outpatient pulmonary follow up.     Discharge diagnosis:   Paradoxical VC  Dysfunction

## 2023-04-23 NOTE — PROGRESS NOTE ADULT - PROBLEM SELECTOR PLAN 1
2/2 asthma exacerbation, expiratory wheezing noted  On admission required supplemental O2  Now on room air, improvement in wheezing, will switch IV prednisone to prednisone 40mg for 5 days   Continue steroids, nebulizers   ENT w/u negative

## 2023-04-23 NOTE — DISCHARGE NOTE PROVIDER - NSDCMRMEDTOKEN_GEN_ALL_CORE_FT
albuterol 90 mcg/inh inhalation aerosol: 1 puff(s) inhaled every 4 hours, As Needed  amLODIPine 5 mg oral tablet: 1 tab(s) orally once a day  pantoprazole 40 mg oral delayed release tablet: 1 tab(s) orally once a day (before a meal)   albuterol 90 mcg/inh inhalation aerosol: 1 puff(s) inhaled every 4 hours, As Needed  amLODIPine 5 mg oral tablet: 1 tab(s) orally once a day  pantoprazole 40 mg oral delayed release tablet: 1 tab(s) orally once a day (before a meal)  Voice evaluation: at speech and swallow therapy office   albuterol 90 mcg/inh inhalation aerosol: 1 puff(s) inhaled every 4 hours, As Needed  amLODIPine 5 mg oral tablet: 1 tab(s) orally once a day  cefpodoxime 100 mg oral tablet: 1 tab(s) orally 2 times a day  ipratropium-albuterol 0.5 mg-2.5 mg/3 mL inhalation solution: 3 milliliter(s) inhaled every 6 hours as needed for  shortness of breath and/or wheezing  pantoprazole 40 mg oral delayed release tablet: 1 tab(s) orally once a day (before a meal)  predniSONE 20 mg oral tablet: 2 tab(s) orally once a day

## 2023-04-23 NOTE — PROGRESS NOTE ADULT - SUBJECTIVE AND OBJECTIVE BOX
[facility]  Division of Hospital Medicine  Amanda Garduno MD  Pager: [phone]      Patient is a 72y old  Female who presents with a chief complaint of SOB/Wheezing (23 Apr 2023 12:11)      SUBJECTIVE / OVERNIGHT EVENTS: Patient reports breathing has improved a little. Reports raspy voice. Does not have anyone to pick her up today asking if she can leave tomorrow.   ADDITIONAL REVIEW OF SYSTEMS:    MEDICATIONS  (STANDING):  albuterol/ipratropium for Nebulization 3 milliLiter(s) Nebulizer every 6 hours  amLODIPine   Tablet 5 milliGRAM(s) Oral daily  enoxaparin Injectable 40 milliGRAM(s) SubCutaneous every 24 hours  pantoprazole    Tablet 40 milliGRAM(s) Oral before breakfast  predniSONE   Tablet 40 milliGRAM(s) Oral daily    MEDICATIONS  (PRN):  acetaminophen     Tablet .. 650 milliGRAM(s) Oral every 6 hours PRN Temp greater or equal to 38C (100.4F), Mild Pain (1 - 3)  albuterol    90 MICROgram(s) HFA Inhaler 1 Puff(s) Inhalation every 4 hours PRN Shortness of Breath and/or Wheezing      CAPILLARY BLOOD GLUCOSE        I&O's Summary    22 Apr 2023 07:01  -  23 Apr 2023 07:00  --------------------------------------------------------  IN: 380 mL / OUT: 0 mL / NET: 380 mL        PHYSICAL EXAM:  Vital Signs Last 24 Hrs  T(C): 37 (23 Apr 2023 11:38), Max: 37 (23 Apr 2023 11:38)  T(F): 98.6 (23 Apr 2023 11:38), Max: 98.6 (23 Apr 2023 11:38)  HR: 92 (23 Apr 2023 11:38) (71 - 92)  BP: 140/80 (23 Apr 2023 11:38) (140/80 - 149/89)  BP(mean): --  RR: 17 (23 Apr 2023 11:38) (17 - 17)  SpO2: 97% (23 Apr 2023 11:38) (96% - 99%)    Parameters below as of 23 Apr 2023 04:55  Patient On (Oxygen Delivery Method): room air    CONSTITUTIONAL: NAD, well-developed, pleasant  RESPIRATORY: CTAB   CARDIOVASCULAR: Regular rate and rhythm, normal S1 and S2, no murmur/rub/gallop; No lower extremity edema; Peripheral pulses are 2+ bilaterally  ABDOMEN: Nontender to palpation, normoactive bowel sounds, no rebound/guarding; No hepatosplenomegaly  MUSCLOSKELETAL: no clubbing or cyanosis of digits; no joint swelling or tenderness to palpation  PSYCH: A+O to person, place, and time; affect appropriate    LABS:                        12.6   10.16 )-----------( 249      ( 23 Apr 2023 06:54 )             40.9     04-23    137  |  102  |  20  ----------------------------<  99  3.8   |  23  |  0.84    Ca    8.9      23 Apr 2023 06:54                  RADIOLOGY & ADDITIONAL TESTS:  Results Reviewed:   Imaging Personally Reviewed:  Electrocardiogram Personally Reviewed:    COORDINATION OF CARE:  Care Discussed with Consultants/Other Providers [Y/N]:  Prior or Outpatient Records Reviewed [Y/N]:   [facility]  Division of Hospital Medicine  Amanda Garduno MD  Pager: [phone]      Patient is a 72y old  Female who presents with a chief complaint of SOB/Wheezing (23 Apr 2023 12:11)      SUBJECTIVE / OVERNIGHT EVENTS: Patient reports breathing has improved a little. Reports raspy voice. Does not have anyone to pick her up today asking if she can leave tomorrow.   ADDITIONAL REVIEW OF SYSTEMS:    MEDICATIONS  (STANDING):  albuterol/ipratropium for Nebulization 3 milliLiter(s) Nebulizer every 6 hours  amLODIPine   Tablet 5 milliGRAM(s) Oral daily  enoxaparin Injectable 40 milliGRAM(s) SubCutaneous every 24 hours  pantoprazole    Tablet 40 milliGRAM(s) Oral before breakfast  predniSONE   Tablet 40 milliGRAM(s) Oral daily    MEDICATIONS  (PRN):  acetaminophen     Tablet .. 650 milliGRAM(s) Oral every 6 hours PRN Temp greater or equal to 38C (100.4F), Mild Pain (1 - 3)  albuterol    90 MICROgram(s) HFA Inhaler 1 Puff(s) Inhalation every 4 hours PRN Shortness of Breath and/or Wheezing      CAPILLARY BLOOD GLUCOSE        I&O's Summary    22 Apr 2023 07:01  -  23 Apr 2023 07:00  --------------------------------------------------------  IN: 380 mL / OUT: 0 mL / NET: 380 mL        PHYSICAL EXAM:  Vital Signs Last 24 Hrs  T(C): 37 (23 Apr 2023 11:38), Max: 37 (23 Apr 2023 11:38)  T(F): 98.6 (23 Apr 2023 11:38), Max: 98.6 (23 Apr 2023 11:38)  HR: 92 (23 Apr 2023 11:38) (71 - 92)  BP: 140/80 (23 Apr 2023 11:38) (140/80 - 149/89)  BP(mean): --  RR: 17 (23 Apr 2023 11:38) (17 - 17)  SpO2: 97% (23 Apr 2023 11:38) (96% - 99%)    Parameters below as of 23 Apr 2023 04:55  Patient On (Oxygen Delivery Method): room air    CONSTITUTIONAL: NAD, well-developed, pleasant  RESPIRATORY: CTAB   CARDIOVASCULAR: Regular rate and rhythm, normal S1 and S2, no murmur/rub/gallop; No lower extremity edema; Peripheral pulses are 2+ bilaterally  ABDOMEN: Nontender to palpation, normoactive bowel sounds, no rebound/guarding; No hepatosplenomegaly  MUSCLOSKELETAL: no clubbing or cyanosis of digits; no joint swelling or tenderness to palpation  PSYCH: A+O to person, place, and time; affect appropriate    LABS:                        12.6   10.16 )-----------( 249      ( 23 Apr 2023 06:54 )             40.9     04-23    137  |  102  |  20  ----------------------------<  99  3.8   |  23  |  0.84    Ca    8.9      23 Apr 2023 06:54                  RADIOLOGY & ADDITIONAL TESTS:  Results Reviewed:   Imaging Personally Reviewed:  Electrocardiogram Personally Reviewed:    COORDINATION OF CARE:  Care Discussed with Consultants/Other Providers [Y/N]:  Prior or Outpatient Records Reviewed [Y/N]:   [facility]  Division of Hospital Medicine  Amanda Garduno MD  Pager: [phone]      Patient is a 72y old  Female who presents with a chief complaint of SOB/Wheezing (23 Apr 2023 12:11)      SUBJECTIVE / OVERNIGHT EVENTS: Patient reports breathing has improved a little. Reports raspy voice. Does not have anyone to pick her up today asking if she can leave tomorrow.   ADDITIONAL REVIEW OF SYSTEMS:    MEDICATIONS  (STANDING):  albuterol/ipratropium for Nebulization 3 milliLiter(s) Nebulizer every 6 hours  amLODIPine   Tablet 5 milliGRAM(s) Oral daily  enoxaparin Injectable 40 milliGRAM(s) SubCutaneous every 24 hours  pantoprazole    Tablet 40 milliGRAM(s) Oral before breakfast  predniSONE   Tablet 40 milliGRAM(s) Oral daily    MEDICATIONS  (PRN):  acetaminophen     Tablet .. 650 milliGRAM(s) Oral every 6 hours PRN Temp greater or equal to 38C (100.4F), Mild Pain (1 - 3)  albuterol    90 MICROgram(s) HFA Inhaler 1 Puff(s) Inhalation every 4 hours PRN Shortness of Breath and/or Wheezing      CAPILLARY BLOOD GLUCOSE        I&O's Summary    22 Apr 2023 07:01  -  23 Apr 2023 07:00  --------------------------------------------------------  IN: 380 mL / OUT: 0 mL / NET: 380 mL        PHYSICAL EXAM:  Vital Signs Last 24 Hrs  T(C): 37 (23 Apr 2023 11:38), Max: 37 (23 Apr 2023 11:38)  T(F): 98.6 (23 Apr 2023 11:38), Max: 98.6 (23 Apr 2023 11:38)  HR: 92 (23 Apr 2023 11:38) (71 - 92)  BP: 140/80 (23 Apr 2023 11:38) (140/80 - 149/89)  BP(mean): --  RR: 17 (23 Apr 2023 11:38) (17 - 17)  SpO2: 97% (23 Apr 2023 11:38) (96% - 99%)    Parameters below as of 23 Apr 2023 04:55  Patient On (Oxygen Delivery Method): room air    CONSTITUTIONAL: NAD, well-developed, pleasant  RESPIRATORY: CTAB   CARDIOVASCULAR: Regular rate and rhythm, normal S1 and S2, no murmur/rub/gallop; No lower extremity edema; Peripheral pulses are 2+ bilaterally  ABDOMEN: Nontender to palpation, normoactive bowel sounds, no rebound/guarding; No hepatosplenomegaly  MUSCLOSKELETAL: no clubbing or cyanosis of digits; no joint swelling or tenderness to palpation  PSYCH: A+O to person, place, and time; affect appropriate    LABS:                        12.6   10.16 )-----------( 249      ( 23 Apr 2023 06:54 )             40.9     04-23    137  |  102  |  20  ----------------------------<  99  3.8   |  23  |  0.84    Ca    8.9      23 Apr 2023 06:54                  RADIOLOGY & ADDITIONAL TESTS:  Results Reviewed:   Imaging Personally Reviewed:  Electrocardiogram Personally Reviewed:    COORDINATION OF CARE:  Care Discussed with Consultants/Other Providers [Y/N]:  Prior or Outpatient Records Reviewed [Y/N]:

## 2023-04-24 ENCOUNTER — TRANSCRIPTION ENCOUNTER (OUTPATIENT)
Age: 73
End: 2023-04-24

## 2023-04-24 LAB
ANION GAP SERPL CALC-SCNC: 15 MMOL/L — HIGH (ref 7–14)
BUN SERPL-MCNC: 20 MG/DL — SIGNIFICANT CHANGE UP (ref 7–23)
CALCIUM SERPL-MCNC: 9 MG/DL — SIGNIFICANT CHANGE UP (ref 8.4–10.5)
CHLORIDE SERPL-SCNC: 102 MMOL/L — SIGNIFICANT CHANGE UP (ref 98–107)
CO2 SERPL-SCNC: 20 MMOL/L — LOW (ref 22–31)
CREAT SERPL-MCNC: 0.74 MG/DL — SIGNIFICANT CHANGE UP (ref 0.5–1.3)
CULTURE RESULTS: SIGNIFICANT CHANGE UP
EGFR: 87 ML/MIN/1.73M2 — SIGNIFICANT CHANGE UP
GLUCOSE SERPL-MCNC: 113 MG/DL — HIGH (ref 70–99)
HCT VFR BLD CALC: 43.2 % — SIGNIFICANT CHANGE UP (ref 34.5–45)
HGB BLD-MCNC: 13.6 G/DL — SIGNIFICANT CHANGE UP (ref 11.5–15.5)
MCHC RBC-ENTMCNC: 27 PG — SIGNIFICANT CHANGE UP (ref 27–34)
MCHC RBC-ENTMCNC: 31.5 GM/DL — LOW (ref 32–36)
MCV RBC AUTO: 85.7 FL — SIGNIFICANT CHANGE UP (ref 80–100)
NRBC # BLD: 0 /100 WBCS — SIGNIFICANT CHANGE UP (ref 0–0)
NRBC # FLD: 0 K/UL — SIGNIFICANT CHANGE UP (ref 0–0)
NT-PROBNP SERPL-SCNC: 7 PG/ML — SIGNIFICANT CHANGE UP
PLATELET # BLD AUTO: 271 K/UL — SIGNIFICANT CHANGE UP (ref 150–400)
POTASSIUM SERPL-MCNC: 4.6 MMOL/L — SIGNIFICANT CHANGE UP (ref 3.5–5.3)
POTASSIUM SERPL-SCNC: 4.6 MMOL/L — SIGNIFICANT CHANGE UP (ref 3.5–5.3)
RBC # BLD: 5.04 M/UL — SIGNIFICANT CHANGE UP (ref 3.8–5.2)
RBC # FLD: 13.2 % — SIGNIFICANT CHANGE UP (ref 10.3–14.5)
SODIUM SERPL-SCNC: 137 MMOL/L — SIGNIFICANT CHANGE UP (ref 135–145)
SPECIMEN SOURCE: SIGNIFICANT CHANGE UP
WBC # BLD: 14.11 K/UL — HIGH (ref 3.8–10.5)
WBC # FLD AUTO: 14.11 K/UL — HIGH (ref 3.8–10.5)

## 2023-04-24 PROCEDURE — 99233 SBSQ HOSP IP/OBS HIGH 50: CPT

## 2023-04-24 PROCEDURE — 93306 TTE W/DOPPLER COMPLETE: CPT | Mod: 26

## 2023-04-24 PROCEDURE — 99222 1ST HOSP IP/OBS MODERATE 55: CPT | Mod: GC

## 2023-04-24 RX ORDER — CEFTRIAXONE 500 MG/1
1000 INJECTION, POWDER, FOR SOLUTION INTRAMUSCULAR; INTRAVENOUS EVERY 24 HOURS
Refills: 0 | Status: DISCONTINUED | OUTPATIENT
Start: 2023-04-24 | End: 2023-04-25

## 2023-04-24 RX ADMIN — ENOXAPARIN SODIUM 40 MILLIGRAM(S): 100 INJECTION SUBCUTANEOUS at 05:53

## 2023-04-24 RX ADMIN — CEFTRIAXONE 100 MILLIGRAM(S): 500 INJECTION, POWDER, FOR SOLUTION INTRAMUSCULAR; INTRAVENOUS at 21:41

## 2023-04-24 RX ADMIN — AMLODIPINE BESYLATE 5 MILLIGRAM(S): 2.5 TABLET ORAL at 04:15

## 2023-04-24 RX ADMIN — Medication 3 MILLILITER(S): at 04:05

## 2023-04-24 RX ADMIN — Medication 650 MILLIGRAM(S): at 12:35

## 2023-04-24 RX ADMIN — Medication 3 MILLILITER(S): at 22:18

## 2023-04-24 RX ADMIN — Medication 40 MILLIGRAM(S): at 04:13

## 2023-04-24 RX ADMIN — Medication 100 MILLIGRAM(S): at 21:42

## 2023-04-24 RX ADMIN — ALBUTEROL 1 PUFF(S): 90 AEROSOL, METERED ORAL at 13:26

## 2023-04-24 RX ADMIN — Medication 100 MILLIGRAM(S): at 13:25

## 2023-04-24 RX ADMIN — Medication 3 MILLILITER(S): at 10:24

## 2023-04-24 RX ADMIN — Medication 650 MILLIGRAM(S): at 13:05

## 2023-04-24 RX ADMIN — PANTOPRAZOLE SODIUM 40 MILLIGRAM(S): 20 TABLET, DELAYED RELEASE ORAL at 04:15

## 2023-04-24 RX ADMIN — Medication 3 MILLILITER(S): at 15:33

## 2023-04-24 RX ADMIN — Medication 100 MILLIGRAM(S): at 04:15

## 2023-04-24 NOTE — DISCHARGE NOTE NURSING/CASE MANAGEMENT/SOCIAL WORK - NSSCCARECORD_GEN_ALL_CORE
[facility]

## 2023-04-24 NOTE — PROGRESS NOTE ADULT - PROBLEM SELECTOR PLAN 4
- Worsening L hip pain when ambulating, on examination able to range L hip but has some pain on palpation of the L hip  - Denies any falls or injuries  - xrays of the pelvis/L hip w/o fracture  - PT eval (pt ambulates with walker) - home no needs   - Pain control as needed
- Worsening L hip pain when ambulating, on examination able to range L hip but has some pain on palpation of the L hip  - Denies any falls or injuries  - xrays of the pelvis/L hip w/o fracture  - PT eval (pt ambulates with walker) - home no needs   - Pain control as needed
- Worsening L hip pain when ambulating, on examination able to range L hip but has some pain on palpation of the L hip  - Denies any falls or injuries  - xrays of the pelvis/L hip w/o fracture  - PT eval (pt ambulates with walker)  - Pain control as needed
- Worsening L hip pain when ambulating, on examination able to range L hip but has some pain on palpation of the L hip  - Denies any falls or injuries  - xrays of the pelvis/L hip w/o fracture  - PT eval (pt ambulates with walker) - home no needs   - Pain control as needed

## 2023-04-24 NOTE — CONSULT NOTE ADULT - SUBJECTIVE AND OBJECTIVE BOX
HPI:    72f h/o Bell's Palsy (R side of her face), Essential HTN, Hepatitis C (reports treatment for ~6months within the past 10 years but unsure if she cleared her infection or not), Seizures (not on AEDs currently, states her last seizure was a long time ago), and ?Asthma (reports no official diagnosis of asthma but was told she had issues with her vocal cords) who presents to the ED BIBEMS c/o SOB and wheezing x5-6 days. States that she has been having significant wheezing and SOB for the past 5-6 days with an associated non-productive cough. Went to her pulmonologist for evaluation but was sent to [facility] yesterday with worsening SOB, and received solumedrol 125mg IVP x1, nebulized epinephrine, and magnesium 2g IVPB x1.  She was evaluated by Critical Care there and was transferred to [facility] for ENT eval.     Patient experienced a similar episode two months ago, where she presented to [place] and was intubated for airway protection. She was transferred to [facility] MICU, evaluated by ENT who reported no abnormal airway fidnings and was extubated at that time. She was discharged to rehab and then home.     In the ED, she was tachycardic to 120, afebrile, BP WNL, satting 99% on room air. Slightly tachypneic to 22. She received 3 duoneb and an atrovent treatment. She  was evaluated by ENT and underwent laryngoscopy, showing widely patent airway, no swelling or supraglottic mass. Vocal fold mobile but small posterior gap. 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 Recommended for CT neck w / contrast. She was also evaluated by the MICU and deemed not to require the ICU at this time.     Patient currently states her SOB is improved but is still having a raspy voice (states this is a chronic issue for her and it comes and goes, recently has been present while she has been having her SOB/wheezing). Reports a food stuck sensation in her throat but denies any dysphagia. Also reports worsening pain in her L hip with ambulation (no falls/trauma), states this is a long standing issue but seems to have worsened over the past 1-2 weeks. Reporting new urinary frequency, unclear on timing but denies dysuria or hematuria.         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 No weight loss. No fatigue or generalised malaise.  Eyes: No itching or discharge from the eyes  ENT: No ear pain. No ear discharge. No nasal congestion. No post nasal drip. No epistaxis. No throat pain. No sore throat. No difficulty swallowing.   CV: + chest pain. No palpitations. No lightheadedness or dizziness.   Resp: No dyspnea at rest. No dyspnea on exertion. No orthopnea. + wheezing. No cough. No stridor. No sputum production. No chest pain with respiration.      [ ] All other systems negative  [ ] Unable to assess ROS because ________    OBJECTIVE:  ICU Vital Signs Last 24 Hrs  T(C): 36.9 (24 Apr 2023 11:03), Max: 36.9 (24 Apr 2023 11:03)  T(F): 98.4 (24 Apr 2023 11:03), Max: 98.4 (24 Apr 2023 11:03)  HR: 84 (24 Apr 2023 11:03) (84 - 93)  BP: 138/78 (24 Apr 2023 11:03) (138/78 - 178/93)  BP(mean): --  ABP: --  ABP(mean): --  RR: 22 (24 Apr 2023 12:45) (18 - 22)  SpO2: 96% (24 Apr 2023 12:45) (96% - 100%)    O2 Parameters below as of 24 Apr 2023 12:45  Patient On (Oxygen Delivery Method): room air              CAPILLARY BLOOD GLUCOSE          PHYSICAL EXAM:  General: Awake, alert, oriented X 3.   HEENT: Atraumatic, normocephalic.                  No tonsillar or pharyngeal exudates.  Lymph Nodes: No palpable lymphadenopathy  Neck: No JVD. No carotid bruit.   Respiratory: wheezing from upper airway, resolved   Cardiovascular: S1 S2 normal. No murmurs, rubs or gallops.   Abdomen: Soft, non-tender, non-distended. No organomegaly.  Extremities: Warm to touch. Peripheral pulse palpable. No pedal edema.       HOSPITAL MEDICATIONS:  MEDICATIONS  (STANDING):  albuterol/ipratropium for Nebulization 3 milliLiter(s) Nebulizer every 6 hours  amLODIPine   Tablet 5 milliGRAM(s) Oral daily  benzonatate 100 milliGRAM(s) Oral every 8 hours  cefTRIAXone   IVPB 1000 milliGRAM(s) IV Intermittent every 24 hours  enoxaparin Injectable 40 milliGRAM(s) SubCutaneous every 24 hours  pantoprazole    Tablet 40 milliGRAM(s) Oral before breakfast  predniSONE   Tablet 40 milliGRAM(s) Oral daily    MEDICATIONS  (PRN):  acetaminophen     Tablet .. 650 milliGRAM(s) Oral every 6 hours PRN Temp greater or equal to 38C (100.4F), Mild Pain (1 - 3)  albuterol    90 MICROgram(s) HFA Inhaler 1 Puff(s) Inhalation every 4 hours PRN Shortness of Breath and/or Wheezing  guaiFENesin Oral Liquid (Sugar-Free) 200 milliGRAM(s) Oral every 6 hours PRN Cough      LABS:                        13.6   14.11 )-----------( 271      ( 24 Apr 2023 07:25 )             43.2     04-24    137  |  102  |  20  ----------------------------<  113<H>  4.6   |  20<L>  |  0.74    Ca    9.0      24 Apr 2023 07:25                MICROBIOLOGY:     RADIOLOGY:  [ ] Reviewed and interpreted by me    Point of Care Ultrasound Findings;    PFT:    EKG: HPI:    72f h/o Bell's Palsy (R side of her face), Essential HTN, Hepatitis C (reports treatment for ~6months within the past 10 years but unsure if she cleared her infection or not), Seizures (not on AEDs currently, states her last seizure was a long time ago), and ?Asthma (reports no official diagnosis of asthma but was told she had issues with her vocal cords) who presents to the ED BIBEMS c/o SOB and wheezing x5-6 days. States that she has been having significant wheezing and SOB for the past 5-6 days with an associated non-productive cough. Went to her pulmonologist for evaluation but was sent to [facility] yesterday with worsening SOB, and received solumedrol 125mg IVP x1, nebulized epinephrine, and magnesium 2g IVPB x1.  She was evaluated by Critical Care there and was transferred to [facility] for ENT eval.     Patient experienced a similar episode two months ago, where she presented to [place] and was intubated for airway protection. She was transferred to [facility] MICU, evaluated by ENT who reported no abnormal airway fidnings and was extubated at that time. She was discharged to rehab and then home.     In the ED, she was tachycardic to 120, afebrile, BP WNL, satting 99% on room air. Slightly tachypneic to 22. She received 3 duoneb and an atrovent treatment. She  was evaluated by ENT and underwent laryngoscopy, showing widely patent airway, no swelling or supraglottic mass. Vocal fold mobile but small posterior gap. 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 Recommended for CT neck w / contrast. She was also evaluated by the MICU and deemed not to require the ICU at this time.     Patient currently states her SOB is improved but is still having a raspy voice (states this is a chronic issue for her and it comes and goes, recently has been present while she has been having her SOB/wheezing). Reports a food stuck sensation in her throat but denies any dysphagia. Also reports worsening pain in her L hip with ambulation (no falls/trauma), states this is a long standing issue but seems to have worsened over the past 1-2 weeks. Reporting new urinary frequency, unclear on timing but denies dysuria or hematuria.         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 No weight loss. No fatigue or generalised malaise.  Eyes: No itching or discharge from the eyes  ENT: No ear pain. No ear discharge. No nasal congestion. No post nasal drip. No epistaxis. No throat pain. No sore throat. No difficulty swallowing.   CV: + chest pain. No palpitations. No lightheadedness or dizziness.   Resp: No dyspnea at rest. No dyspnea on exertion. No orthopnea. + wheezing. No cough. No stridor. No sputum production. No chest pain with respiration.      [ ] All other systems negative  [ ] Unable to assess ROS because ________    OBJECTIVE:  ICU Vital Signs Last 24 Hrs  T(C): 36.9 (24 Apr 2023 11:03), Max: 36.9 (24 Apr 2023 11:03)  T(F): 98.4 (24 Apr 2023 11:03), Max: 98.4 (24 Apr 2023 11:03)  HR: 84 (24 Apr 2023 11:03) (84 - 93)  BP: 138/78 (24 Apr 2023 11:03) (138/78 - 178/93)  BP(mean): --  ABP: --  ABP(mean): --  RR: 22 (24 Apr 2023 12:45) (18 - 22)  SpO2: 96% (24 Apr 2023 12:45) (96% - 100%)    O2 Parameters below as of 24 Apr 2023 12:45  Patient On (Oxygen Delivery Method): room air              CAPILLARY BLOOD GLUCOSE          PHYSICAL EXAM:  General: Awake, alert, oriented X 3.   HEENT: Atraumatic, normocephalic.                  No tonsillar or pharyngeal exudates.  Lymph Nodes: No palpable lymphadenopathy  Neck: No JVD. No carotid bruit.   Respiratory: wheezing from upper airway, resolved   Cardiovascular: S1 S2 normal. No murmurs, rubs or gallops.   Abdomen: Soft, non-tender, non-distended. No organomegaly.  Extremities: Warm to touch. Peripheral pulse palpable. No pedal edema.       HOSPITAL MEDICATIONS:  MEDICATIONS  (STANDING):  albuterol/ipratropium for Nebulization 3 milliLiter(s) Nebulizer every 6 hours  amLODIPine   Tablet 5 milliGRAM(s) Oral daily  benzonatate 100 milliGRAM(s) Oral every 8 hours  cefTRIAXone   IVPB 1000 milliGRAM(s) IV Intermittent every 24 hours  enoxaparin Injectable 40 milliGRAM(s) SubCutaneous every 24 hours  pantoprazole    Tablet 40 milliGRAM(s) Oral before breakfast  predniSONE   Tablet 40 milliGRAM(s) Oral daily    MEDICATIONS  (PRN):  acetaminophen     Tablet .. 650 milliGRAM(s) Oral every 6 hours PRN Temp greater or equal to 38C (100.4F), Mild Pain (1 - 3)  albuterol    90 MICROgram(s) HFA Inhaler 1 Puff(s) Inhalation every 4 hours PRN Shortness of Breath and/or Wheezing  guaiFENesin Oral Liquid (Sugar-Free) 200 milliGRAM(s) Oral every 6 hours PRN Cough      LABS:                        13.6   14.11 )-----------( 271      ( 24 Apr 2023 07:25 )             43.2     04-24    137  |  102  |  20  ----------------------------<  113<H>  4.6   |  20<L>  |  0.74    Ca    9.0      24 Apr 2023 07:25                MICROBIOLOGY:     RADIOLOGY:  [ ] Reviewed and interpreted by me    Point of Care Ultrasound Findings;    PFT:    EKG: HPI:    72f h/o Bell's Palsy (R side of her face), Essential HTN, Hepatitis C (reports treatment for ~6months within the past 10 years but unsure if she cleared her infection or not), Seizures (not on AEDs currently, states her last seizure was a long time ago), and ?Asthma (reports no official diagnosis of asthma but was told she had issues with her vocal cords) who presents to the ED BIBEMS c/o SOB and wheezing x5-6 days. States that she has been having significant wheezing and SOB for the past 5-6 days with an associated non-productive cough. Went to her pulmonologist for evaluation but was sent to [facility] yesterday with worsening SOB, and received solumedrol 125mg IVP x1, nebulized epinephrine, and magnesium 2g IVPB x1.  She was evaluated by Critical Care there and was transferred to [facility] for ENT eval.     Patient experienced a similar episode two months ago, where she presented to [place] and was intubated for airway protection. She was transferred to [facility] MICU, evaluated by ENT who reported no abnormal airway fidnings and was extubated at that time. She was discharged to rehab and then home.     In the ED, she was tachycardic to 120, afebrile, BP WNL, satting 99% on room air. Slightly tachypneic to 22. She received 3 duoneb and an atrovent treatment. She  was evaluated by ENT and underwent laryngoscopy, showing widely patent airway, no swelling or supraglottic mass. Vocal fold mobile but small posterior gap. 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 Recommended for CT neck w / contrast. She was also evaluated by the MICU and deemed not to require the ICU at this time.     Patient currently states her SOB is improved but is still having a raspy voice (states this is a chronic issue for her and it comes and goes, recently has been present while she has been having her SOB/wheezing). Reports a food stuck sensation in her throat but denies any dysphagia. Also reports worsening pain in her L hip with ambulation (no falls/trauma), states this is a long standing issue but seems to have worsened over the past 1-2 weeks. Reporting new urinary frequency, unclear on timing but denies dysuria or hematuria.         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 No weight loss. No fatigue or generalised malaise.  Eyes: No itching or discharge from the eyes  ENT: No ear pain. No ear discharge. No nasal congestion. No post nasal drip. No epistaxis. No throat pain. No sore throat. No difficulty swallowing.   CV: + chest pain. No palpitations. No lightheadedness or dizziness.   Resp: No dyspnea at rest. No dyspnea on exertion. No orthopnea. + wheezing. No cough. No stridor. No sputum production. No chest pain with respiration.      [ ] All other systems negative  [ ] Unable to assess ROS because ________    OBJECTIVE:  ICU Vital Signs Last 24 Hrs  T(C): 36.9 (24 Apr 2023 11:03), Max: 36.9 (24 Apr 2023 11:03)  T(F): 98.4 (24 Apr 2023 11:03), Max: 98.4 (24 Apr 2023 11:03)  HR: 84 (24 Apr 2023 11:03) (84 - 93)  BP: 138/78 (24 Apr 2023 11:03) (138/78 - 178/93)  BP(mean): --  ABP: --  ABP(mean): --  RR: 22 (24 Apr 2023 12:45) (18 - 22)  SpO2: 96% (24 Apr 2023 12:45) (96% - 100%)    O2 Parameters below as of 24 Apr 2023 12:45  Patient On (Oxygen Delivery Method): room air              CAPILLARY BLOOD GLUCOSE          PHYSICAL EXAM:  General: Awake, alert, oriented X 3.   HEENT: Atraumatic, normocephalic.                  No tonsillar or pharyngeal exudates.  Lymph Nodes: No palpable lymphadenopathy  Neck: No JVD. No carotid bruit.   Respiratory: wheezing from upper airway, resolved   Cardiovascular: S1 S2 normal. No murmurs, rubs or gallops.   Abdomen: Soft, non-tender, non-distended. No organomegaly.  Extremities: Warm to touch. Peripheral pulse palpable. No pedal edema.       HOSPITAL MEDICATIONS:  MEDICATIONS  (STANDING):  albuterol/ipratropium for Nebulization 3 milliLiter(s) Nebulizer every 6 hours  amLODIPine   Tablet 5 milliGRAM(s) Oral daily  benzonatate 100 milliGRAM(s) Oral every 8 hours  cefTRIAXone   IVPB 1000 milliGRAM(s) IV Intermittent every 24 hours  enoxaparin Injectable 40 milliGRAM(s) SubCutaneous every 24 hours  pantoprazole    Tablet 40 milliGRAM(s) Oral before breakfast  predniSONE   Tablet 40 milliGRAM(s) Oral daily    MEDICATIONS  (PRN):  acetaminophen     Tablet .. 650 milliGRAM(s) Oral every 6 hours PRN Temp greater or equal to 38C (100.4F), Mild Pain (1 - 3)  albuterol    90 MICROgram(s) HFA Inhaler 1 Puff(s) Inhalation every 4 hours PRN Shortness of Breath and/or Wheezing  guaiFENesin Oral Liquid (Sugar-Free) 200 milliGRAM(s) Oral every 6 hours PRN Cough      LABS:                        13.6   14.11 )-----------( 271      ( 24 Apr 2023 07:25 )             43.2     04-24    137  |  102  |  20  ----------------------------<  113<H>  4.6   |  20<L>  |  0.74    Ca    9.0      24 Apr 2023 07:25                MICROBIOLOGY:     RADIOLOGY:  [ ] Reviewed and interpreted by me    Point of Care Ultrasound Findings;    PFT:    EKG: CHIEF COMPLAINT:  Wheezing    HPI:  72f h/o Bell's Palsy (R side of her face), Essential HTN, Hepatitis C (reports treatment for ~6months within the past 10 years but unsure if she cleared her infection or not), Seizures (not on AEDs currently, states her last seizure was a long time ago), and ?Asthma (reports no official diagnosis of asthma but was told she had issues with her vocal cords) who presents to the ED BIBEMS c/o SOB and wheezing x5-6 days. States that she has been having significant wheezing and SOB for the past 5-6 days with an associated non-productive cough. Went to her pulmonologist for evaluation but was sent to [facility] yesterday with worsening SOB, and received solumedrol 125mg IVP x1, nebulized epinephrine, and magnesium 2g IVPB x1.  She was evaluated by Critical Care there and was transferred to [facility] for ENT eval.     Patient experienced a similar episode two months ago, where she presented to [place] and was intubated for airway protection. She was transferred to [facility] MICU, evaluated by ENT who reported no abnormal airway fidnings and was extubated at that time. She was discharged to rehab and then home.     In the ED, she was tachycardic to 120, afebrile, BP WNL, satting 99% on room air. Slightly tachypneic to 22. She received 3 duoneb and an atrovent treatment. She  was evaluated by ENT and underwent laryngoscopy, showing widely patent airway, no swelling or supraglottic mass. Vocal fold mobile but small posterior gap. 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 Recommended for CT neck w / contrast. She was also evaluated by the MICU and deemed not to require the ICU at this time.     Patient currently states her SOB is improved but is still having a raspy voice (states this is a chronic issue for her and it comes and goes, recently has been present while she has been having her SOB/wheezing). Reports a food stuck sensation in her throat but denies any dysphagia. Also reports worsening pain in her L hip with ambulation (no falls/trauma), states this is a long standing issue but seems to have worsened over the past 1-2 weeks. Reporting new urinary frequency, unclear on timing but denies dysuria or hematuria.         PAST MEDICAL & SURGICAL HISTORY:  Asthma      HTN (hypertension)      No significant past surgical history          FAMILY HISTORY:  No pertinent family history in first degree relatives        SOCIAL HISTORY:  Smoking: none  EtOH Use:  Marital Status:  Occupation: PCA in [employer]  Exposures:  Recent Travel:    Allergies    No Known Allergies    Intolerances        HOME MEDICATIONS:  albuterol 90 mcg/inh inhalation aerosol: 1 puff(s) inhaled every 4 hours, As Needed (20 Apr 2023 03:40)  amLODIPine 5 mg oral tablet: 1 tab(s) orally once a day (20 Apr 2023 03:40)  pantoprazole 40 mg oral delayed release tablet: 1 tab(s) orally once a day (before a meal) (20 Apr 2023 03:40)    REVIEW OF SYSTEMS:  CONSTITUTIONAL: No fever, weight loss, or fatigue  EYES: No eye pain, visual disturbances, or discharge  ENMT:  No difficulty hearing, tinnitus, vertigo; No sinus or throat pain  NECK: No pain or stiffness  RESPIRATORY: Cough, SOB, and wheezing. No chills or hemoptysis  CARDIOVASCULAR: No chest pain, palpitations, dizziness, or leg swelling  GASTROINTESTINAL: No abdominal or epigastric pain. No nausea, vomiting, or hematemesis; No diarrhea or constipation. No melena or hematochezia.  GENITOURINARY: No dysuria, frequency, hematuria, or incontinence  NEUROLOGICAL: No headaches, memory loss, loss of strength, numbness, or tremors  SKIN: No itching, burning, rashes, or lesions   LYMPH NODES: No enlarged glands  ENDOCRINE: No heat or cold intolerance; No hair loss  MUSCULOSKELETAL: No joint pain or swelling; No muscle, back, or extremity pain  PSYCHIATRIC: No depression, anxiety, mood swings, or difficulty sleeping  HEME/LYMPH: No easy bruising, or bleeding gums  ALLERGY AND IMMUNOLOGIC: No hives or eczema    OBJECTIVE:  ICU Vital Signs Last 24 Hrs  T(C): 36.9 (24 Apr 2023 11:03), Max: 36.9 (24 Apr 2023 11:03)  T(F): 98.4 (24 Apr 2023 11:03), Max: 98.4 (24 Apr 2023 11:03)  HR: 84 (24 Apr 2023 11:03) (84 - 93)  BP: 138/78 (24 Apr 2023 11:03) (138/78 - 178/93)  BP(mean): --  ABP: --  ABP(mean): --  RR: 22 (24 Apr 2023 12:45) (18 - 22)  SpO2: 96% (24 Apr 2023 12:45) (96% - 100%)    O2 Parameters below as of 24 Apr 2023 12:45  Patient On (Oxygen Delivery Method): room air              CAPILLARY BLOOD GLUCOSE          PHYSICAL EXAM:  General: Awake, alert, oriented X 3.   HEENT: Atraumatic, normocephalic.    Lymph Nodes: No palpable lymphadenopathy  Neck: No carotid bruit. Supple.  Respiratory: Inspiratory and expiratory wheezing loudest over neck. Resolved spontaneously during exam.   Cardiovascular: S1 S2 normal. No murmurs, rubs or gallops.   Abdomen: Soft, non-tender, non-distended. No organomegaly.  Skin: Warm, intact  Extremities: Warm to touch. Peripheral pulse palpable. No pedal edema.  Neuro: A&Ox3  Psych: Appears distressed.       HOSPITAL MEDICATIONS:  MEDICATIONS  (STANDING):  albuterol/ipratropium for Nebulization 3 milliLiter(s) Nebulizer every 6 hours  amLODIPine   Tablet 5 milliGRAM(s) Oral daily  benzonatate 100 milliGRAM(s) Oral every 8 hours  cefTRIAXone   IVPB 1000 milliGRAM(s) IV Intermittent every 24 hours  enoxaparin Injectable 40 milliGRAM(s) SubCutaneous every 24 hours  pantoprazole    Tablet 40 milliGRAM(s) Oral before breakfast  predniSONE   Tablet 40 milliGRAM(s) Oral daily    MEDICATIONS  (PRN):  acetaminophen     Tablet .. 650 milliGRAM(s) Oral every 6 hours PRN Temp greater or equal to 38C (100.4F), Mild Pain (1 - 3)  albuterol    90 MICROgram(s) HFA Inhaler 1 Puff(s) Inhalation every 4 hours PRN Shortness of Breath and/or Wheezing  guaiFENesin Oral Liquid (Sugar-Free) 200 milliGRAM(s) Oral every 6 hours PRN Cough      LABS:                        13.6   14.11 )-----------( 271      ( 24 Apr 2023 07:25 )             43.2     04-24    137  |  102  |  20  ----------------------------<  113<H>  4.6   |  20<L>  |  0.74    Ca    9.0      24 Apr 2023 07:25                MICROBIOLOGY:     RADIOLOGY:  [x] Reviewed and interpreted by me  CXR 4/20/23: No consolidation or effusion CHIEF COMPLAINT:  Wheezing    HPI:  72f h/o Bell's Palsy (R side of her face), Essential HTN, Hepatitis C (reports treatment for ~6months within the past 10 years but unsure if she cleared her infection or not), Seizures (not on AEDs currently, states her last seizure was a long time ago), and ?Asthma (reports no official diagnosis of asthma but was told she had issues with her vocal cords) who presents to the ED BIBEMS c/o SOB and wheezing x5-6 days. States that she has been having significant wheezing and SOB for the past 5-6 days with an associated non-productive cough. Went to her pulmonologist for evaluation but was sent to [facility] yesterday with worsening SOB, and received solumedrol 125mg IVP x1, nebulized epinephrine, and magnesium 2g IVPB x1.  She was evaluated by Critical Care there and was transferred to [facility] for ENT eval.     Patient experienced a similar episode two months ago, where she presented to [place] and was intubated for airway protection. She was transferred to [facility] MICU, evaluated by ENT who reported no abnormal airway fidnings and was extubated at that time. She was discharged to rehab and then home.     In the ED, she was tachycardic to 120, afebrile, BP WNL, satting 99% on room air. Slightly tachypneic to 22. She received 3 duoneb and an atrovent treatment. She  was evaluated by ENT and underwent laryngoscopy, showing widely patent airway, no swelling or supraglottic mass. Vocal fold mobile but small posterior gap. 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 Recommended for CT neck w / contrast. She was also evaluated by the MICU and deemed not to require the ICU at this time.     Patient currently states her SOB is improved but is still having a raspy voice (states this is a chronic issue for her and it comes and goes, recently has been present while she has been having her SOB/wheezing). Reports a food stuck sensation in her throat but denies any dysphagia. Also reports worsening pain in her L hip with ambulation (no falls/trauma), states this is a long standing issue but seems to have worsened over the past 1-2 weeks. Reporting new urinary frequency, unclear on timing but denies dysuria or hematuria.         PAST MEDICAL & SURGICAL HISTORY:  Asthma      HTN (hypertension)      No significant past surgical history          FAMILY HISTORY:  No pertinent family history in first degree relatives        SOCIAL HISTORY:  Smoking: none  EtOH Use:  Marital Status:  Occupation: PCA in the [employer]  Exposures:  Recent Travel:    Allergies    No Known Allergies    Intolerances        HOME MEDICATIONS:  albuterol 90 mcg/inh inhalation aerosol: 1 puff(s) inhaled every 4 hours, As Needed (20 Apr 2023 03:40)  amLODIPine 5 mg oral tablet: 1 tab(s) orally once a day (20 Apr 2023 03:40)  pantoprazole 40 mg oral delayed release tablet: 1 tab(s) orally once a day (before a meal) (20 Apr 2023 03:40)    REVIEW OF SYSTEMS:  CONSTITUTIONAL: No fever, weight loss, or fatigue  EYES: No eye pain, visual disturbances, or discharge  ENMT:  No difficulty hearing, tinnitus, vertigo; No sinus or throat pain  NECK: No pain or stiffness  RESPIRATORY: Cough, SOB, and wheezing. No chills or hemoptysis  CARDIOVASCULAR: No chest pain, palpitations, dizziness, or leg swelling  GASTROINTESTINAL: No abdominal or epigastric pain. No nausea, vomiting, or hematemesis; No diarrhea or constipation. No melena or hematochezia.  GENITOURINARY: No dysuria, frequency, hematuria, or incontinence  NEUROLOGICAL: No headaches, memory loss, loss of strength, numbness, or tremors  SKIN: No itching, burning, rashes, or lesions   LYMPH NODES: No enlarged glands  ENDOCRINE: No heat or cold intolerance; No hair loss  MUSCULOSKELETAL: No joint pain or swelling; No muscle, back, or extremity pain  PSYCHIATRIC: No depression, anxiety, mood swings, or difficulty sleeping  HEME/LYMPH: No easy bruising, or bleeding gums  ALLERGY AND IMMUNOLOGIC: No hives or eczema    OBJECTIVE:  ICU Vital Signs Last 24 Hrs  T(C): 36.9 (24 Apr 2023 11:03), Max: 36.9 (24 Apr 2023 11:03)  T(F): 98.4 (24 Apr 2023 11:03), Max: 98.4 (24 Apr 2023 11:03)  HR: 84 (24 Apr 2023 11:03) (84 - 93)  BP: 138/78 (24 Apr 2023 11:03) (138/78 - 178/93)  BP(mean): --  ABP: --  ABP(mean): --  RR: 22 (24 Apr 2023 12:45) (18 - 22)  SpO2: 96% (24 Apr 2023 12:45) (96% - 100%)    O2 Parameters below as of 24 Apr 2023 12:45  Patient On (Oxygen Delivery Method): room air              CAPILLARY BLOOD GLUCOSE          PHYSICAL EXAM:  General: Awake, alert, oriented X 3.   HEENT: Atraumatic, normocephalic.    Lymph Nodes: No palpable lymphadenopathy  Neck: No carotid bruit. Supple.  Respiratory: Inspiratory and expiratory wheezing loudest over neck. Resolved spontaneously during exam.   Cardiovascular: S1 S2 normal. No murmurs, rubs or gallops.   Abdomen: Soft, non-tender, non-distended. No organomegaly.  Skin: Warm, intact  Extremities: Warm to touch. Peripheral pulse palpable. No pedal edema.  Neuro: A&Ox3  Psych: Appears distressed.       HOSPITAL MEDICATIONS:  MEDICATIONS  (STANDING):  albuterol/ipratropium for Nebulization 3 milliLiter(s) Nebulizer every 6 hours  amLODIPine   Tablet 5 milliGRAM(s) Oral daily  benzonatate 100 milliGRAM(s) Oral every 8 hours  cefTRIAXone   IVPB 1000 milliGRAM(s) IV Intermittent every 24 hours  enoxaparin Injectable 40 milliGRAM(s) SubCutaneous every 24 hours  pantoprazole    Tablet 40 milliGRAM(s) Oral before breakfast  predniSONE   Tablet 40 milliGRAM(s) Oral daily    MEDICATIONS  (PRN):  acetaminophen     Tablet .. 650 milliGRAM(s) Oral every 6 hours PRN Temp greater or equal to 38C (100.4F), Mild Pain (1 - 3)  albuterol    90 MICROgram(s) HFA Inhaler 1 Puff(s) Inhalation every 4 hours PRN Shortness of Breath and/or Wheezing  guaiFENesin Oral Liquid (Sugar-Free) 200 milliGRAM(s) Oral every 6 hours PRN Cough      LABS:                        13.6   14.11 )-----------( 271      ( 24 Apr 2023 07:25 )             43.2     04-24    137  |  102  |  20  ----------------------------<  113<H>  4.6   |  20<L>  |  0.74    Ca    9.0      24 Apr 2023 07:25                MICROBIOLOGY:     RADIOLOGY:  [x] Reviewed and interpreted by me  CXR 4/20/23: No consolidation or effusion CHIEF COMPLAINT:  Wheezing    HPI:  72f h/o Bell's Palsy (R side of her face), Essential HTN, Hepatitis C (reports treatment for ~6months within the past 10 years but unsure if she cleared her infection or not), Seizures (not on AEDs currently, states her last seizure was a long time ago), and ?Asthma (reports no official diagnosis of asthma but was told she had issues with her vocal cords) who presents to the ED BIBEMS c/o SOB and wheezing x5-6 days. States that she has been having significant wheezing and SOB for the past 5-6 days with an associated non-productive cough. Went to her pulmonologist for evaluation but was sent to [facility] yesterday with worsening SOB, and received solumedrol 125mg IVP x1, nebulized epinephrine, and magnesium 2g IVPB x1.  She was evaluated by Critical Care there and was transferred to [facility] for ENT eval.     Patient experienced a similar episode two months ago, where she presented to [place] and was intubated for airway protection. She was transferred to [facility] MICU, evaluated by ENT who reported no abnormal airway fidnings and was extubated at that time. She was discharged to rehab and then home.     In the ED, she was tachycardic to 120, afebrile, BP WNL, satting 99% on room air. Slightly tachypneic to 22. She received 3 duoneb and an atrovent treatment. She  was evaluated by ENT and underwent laryngoscopy, showing widely patent airway, no swelling or supraglottic mass. Vocal fold mobile but small posterior gap. 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 Recommended for CT neck w / contrast. She was also evaluated by the MICU and deemed not to require the ICU at this time.     Patient currently states her SOB is improved but is still having a raspy voice (states this is a chronic issue for her and it comes and goes, recently has been present while she has been having her SOB/wheezing). Reports a food stuck sensation in her throat but denies any dysphagia. Also reports worsening pain in her L hip with ambulation (no falls/trauma), states this is a long standing issue but seems to have worsened over the past 1-2 weeks. Reporting new urinary frequency, unclear on timing but denies dysuria or hematuria.         PAST MEDICAL & SURGICAL HISTORY:  Asthma      HTN (hypertension)      No significant past surgical history          FAMILY HISTORY:  No pertinent family history in first degree relatives        SOCIAL HISTORY:  Smoking: none  EtOH Use:  Marital Status:  Occupation: PCA in [employer]  Exposures:  Recent Travel:    Allergies    No Known Allergies    Intolerances        HOME MEDICATIONS:  albuterol 90 mcg/inh inhalation aerosol: 1 puff(s) inhaled every 4 hours, As Needed (20 Apr 2023 03:40)  amLODIPine 5 mg oral tablet: 1 tab(s) orally once a day (20 Apr 2023 03:40)  pantoprazole 40 mg oral delayed release tablet: 1 tab(s) orally once a day (before a meal) (20 Apr 2023 03:40)    REVIEW OF SYSTEMS:  CONSTITUTIONAL: No fever, weight loss, or fatigue  EYES: No eye pain, visual disturbances, or discharge  ENMT:  No difficulty hearing, tinnitus, vertigo; No sinus or throat pain  NECK: No pain or stiffness  RESPIRATORY: Cough, SOB, and wheezing. No chills or hemoptysis  CARDIOVASCULAR: No chest pain, palpitations, dizziness, or leg swelling  GASTROINTESTINAL: No abdominal or epigastric pain. No nausea, vomiting, or hematemesis; No diarrhea or constipation. No melena or hematochezia.  GENITOURINARY: No dysuria, frequency, hematuria, or incontinence  NEUROLOGICAL: No headaches, memory loss, loss of strength, numbness, or tremors  SKIN: No itching, burning, rashes, or lesions   LYMPH NODES: No enlarged glands  ENDOCRINE: No heat or cold intolerance; No hair loss  MUSCULOSKELETAL: No joint pain or swelling; No muscle, back, or extremity pain  PSYCHIATRIC: No depression, anxiety, mood swings, or difficulty sleeping  HEME/LYMPH: No easy bruising, or bleeding gums  ALLERGY AND IMMUNOLOGIC: No hives or eczema    OBJECTIVE:  ICU Vital Signs Last 24 Hrs  T(C): 36.9 (24 Apr 2023 11:03), Max: 36.9 (24 Apr 2023 11:03)  T(F): 98.4 (24 Apr 2023 11:03), Max: 98.4 (24 Apr 2023 11:03)  HR: 84 (24 Apr 2023 11:03) (84 - 93)  BP: 138/78 (24 Apr 2023 11:03) (138/78 - 178/93)  BP(mean): --  ABP: --  ABP(mean): --  RR: 22 (24 Apr 2023 12:45) (18 - 22)  SpO2: 96% (24 Apr 2023 12:45) (96% - 100%)    O2 Parameters below as of 24 Apr 2023 12:45  Patient On (Oxygen Delivery Method): room air              CAPILLARY BLOOD GLUCOSE          PHYSICAL EXAM:  General: Awake, alert, oriented X 3.   HEENT: Atraumatic, normocephalic.    Lymph Nodes: No palpable lymphadenopathy  Neck: No carotid bruit. Supple.  Respiratory: Inspiratory and expiratory wheezing loudest over neck. Resolved spontaneously during exam.   Cardiovascular: S1 S2 normal. No murmurs, rubs or gallops.   Abdomen: Soft, non-tender, non-distended. No organomegaly.  Skin: Warm, intact  Extremities: Warm to touch. Peripheral pulse palpable. No pedal edema.  Neuro: A&Ox3  Psych: Appears distressed.       HOSPITAL MEDICATIONS:  MEDICATIONS  (STANDING):  albuterol/ipratropium for Nebulization 3 milliLiter(s) Nebulizer every 6 hours  amLODIPine   Tablet 5 milliGRAM(s) Oral daily  benzonatate 100 milliGRAM(s) Oral every 8 hours  cefTRIAXone   IVPB 1000 milliGRAM(s) IV Intermittent every 24 hours  enoxaparin Injectable 40 milliGRAM(s) SubCutaneous every 24 hours  pantoprazole    Tablet 40 milliGRAM(s) Oral before breakfast  predniSONE   Tablet 40 milliGRAM(s) Oral daily    MEDICATIONS  (PRN):  acetaminophen     Tablet .. 650 milliGRAM(s) Oral every 6 hours PRN Temp greater or equal to 38C (100.4F), Mild Pain (1 - 3)  albuterol    90 MICROgram(s) HFA Inhaler 1 Puff(s) Inhalation every 4 hours PRN Shortness of Breath and/or Wheezing  guaiFENesin Oral Liquid (Sugar-Free) 200 milliGRAM(s) Oral every 6 hours PRN Cough      LABS:                        13.6   14.11 )-----------( 271      ( 24 Apr 2023 07:25 )             43.2     04-24    137  |  102  |  20  ----------------------------<  113<H>  4.6   |  20<L>  |  0.74    Ca    9.0      24 Apr 2023 07:25                MICROBIOLOGY:     RADIOLOGY:  [x] Reviewed and interpreted by me  CXR 4/20/23: No consolidation or effusion

## 2023-04-24 NOTE — PROGRESS NOTE ADULT - PROBLEM SELECTOR PLAN 7
Diet: Reports food stuck sensation but no actual dysphagia -> passed dysphagia screen  DVT: lovenox subQ  Activity: OOB to chair/with assistance, increase as tolerated, PT eval    Fall and aspiration precautions
Diet: Reports food stuck sensation but no actual dysphagia -> passed dysphagia screen  DVT: lovenox subQ  Activity: OOB to chair/with assistance, increase as tolerated, PT eval  Dispo mi 4/24   Fall and aspiration precautions
Diet: Reports food stuck sensation but no actual dysphagia -> passed dysphagia screen  DVT: lovenox subQ  Activity: OOB to chair/with assistance, increase as tolerated, PT eval  Dispo mi 4/24   Fall and aspiration precautions
Diet: Reports food stuck sensation but no actual dysphagia -> passed dysphagia screen  DVT: lovenox subQ  Activity: OOB to chair/with assistance, increase as tolerated, PT eval    Fall and aspiration precautions

## 2023-04-24 NOTE — DISCHARGE NOTE NURSING/CASE MANAGEMENT/SOCIAL WORK - PATIENT PORTAL LINK FT
You can access the FollowMyHealth Patient Portal offered by [facility] by registering at the following website: [email or web address]. By joining [employer]’s FollowMyHealth portal, you will also be able to view your health information using other applications (apps) compatible with our system. You can access the FollowMyHealth Patient Portal offered by [facility] by registering at the following website: [email or web address]. By joining [employer]’s FollowMyHealth portal, you will also be able to view your health information using other applications (apps) compatible with our system. You can access the FollowMyHealth Patient Portal offered by [facility] by registering at the following website: [email or web address]. By joining [employer]’s FollowMyHealth portal, you will also be able to view your health information using other applications (apps) compatible with our system.

## 2023-04-24 NOTE — PROGRESS NOTE ADULT - ASSESSMENT
72f h/o asthma, Bell's Palsy, essential HTN, hepatitis C, seizures transferred to [facility] for ENT eval for SOB and wheezing.  72f h/o asthma, Bell's Palsy, essential HTN, hepatitis C, seizures transferred to [facility] for ENT eval for SOB and wheezing.  72f h/o asthma, Bell's Palsy, essential HTN, hepatitis C, seizures transferred to [facility] for ENT eval for SOB and wheezing.

## 2023-04-24 NOTE — PROGRESS NOTE ADULT - PROBLEM SELECTOR PLAN 2
CP with nebulizer treatment, no chest pain with exertion, no palpitations, no syncope. EKG no ST-T changes.   - Will obtain echo
2/2 asthma exacerbation   Continue to monitor

## 2023-04-24 NOTE — PROGRESS NOTE ADULT - SUBJECTIVE AND OBJECTIVE BOX
Hospitalist Progress Note  Avis Arzate MD Pager # [phone]    OVERNIGHT EVENTS: JC    SUBJECTIVE / INTERVAL HPI: Patient seen and examined at bedside. Pt. reports ongoing wheezing and shortness of breath. She says she is unsure if she feels better and is concerned about going home.      VITAL SIGNS:  Vital Signs Last 24 Hrs  T(C): 36.9 (24 Apr 2023 11:03), Max: 36.9 (24 Apr 2023 11:03)  T(F): 98.4 (24 Apr 2023 11:03), Max: 98.4 (24 Apr 2023 11:03)  HR: 84 (24 Apr 2023 11:03) (84 - 93)  BP: 138/78 (24 Apr 2023 11:03) (138/78 - 178/93)  BP(mean): --  RR: 22 (24 Apr 2023 12:45) (18 - 22)  SpO2: 96% (24 Apr 2023 12:45) (96% - 100%)    Parameters below as of 24 Apr 2023 12:45  Patient On (Oxygen Delivery Method): room air        PHYSICAL EXAM:    General: Uncomfortable appearing female - coughing/resting in bed   HEENT: NC/AT; PERRL, anicteric sclera; MMM  Neck: supple  Cardiovascular: +S1/S2; RRR  Respiratory: Diffuse wheezing auscultated bilaterally, coughing - no crackles. Not in respiratory distress. No accessory muscle use.   Gastrointestinal: soft, NT/ND; +BSx4  Extremities: WWP; no edema, clubbing or cyanosis  Vascular: 2+ radial, DP/PT pulses B/L  Neurological: AAOx3; no focal deficits    MEDICATIONS:  MEDICATIONS  (STANDING):  albuterol/ipratropium for Nebulization 3 milliLiter(s) Nebulizer every 6 hours  amLODIPine   Tablet 5 milliGRAM(s) Oral daily  benzonatate 100 milliGRAM(s) Oral every 8 hours  enoxaparin Injectable 40 milliGRAM(s) SubCutaneous every 24 hours  pantoprazole    Tablet 40 milliGRAM(s) Oral before breakfast  predniSONE   Tablet 40 milliGRAM(s) Oral daily    MEDICATIONS  (PRN):  acetaminophen     Tablet .. 650 milliGRAM(s) Oral every 6 hours PRN Temp greater or equal to 38C (100.4F), Mild Pain (1 - 3)  albuterol    90 MICROgram(s) HFA Inhaler 1 Puff(s) Inhalation every 4 hours PRN Shortness of Breath and/or Wheezing  guaiFENesin Oral Liquid (Sugar-Free) 200 milliGRAM(s) Oral every 6 hours PRN Cough      ALLERGIES:  Allergies    No Known Allergies    Intolerances        LABS:                        13.6   14.11 )-----------( 271      ( 24 Apr 2023 07:25 )             43.2     04-24    137  |  102  |  20  ----------------------------<  113<H>  4.6   |  20<L>  |  0.74    Ca    9.0      24 Apr 2023 07:25          CAPILLARY BLOOD GLUCOSE          RADIOLOGY & ADDITIONAL TESTS: Reviewed.    ASSESSMENT:    PLAN:  Hospitalist Progress Note  Avis Arzate MD Pager # [phone]    OVERNIGHT EVENTS: JC    SUBJECTIVE / INTERVAL HPI: Patient seen and examined at bedside. Pt. reports ongoing wheezing and shortness of breath. She says she is unsure if she feels better and is concerned about going home.      VITAL SIGNS:  Vital Signs Last 24 Hrs  T(C): 36.9 (24 Apr 2023 11:03), Max: 36.9 (24 Apr 2023 11:03)  T(F): 98.4 (24 Apr 2023 11:03), Max: 98.4 (24 Apr 2023 11:03)  HR: 84 (24 Apr 2023 11:03) (84 - 93)  BP: 138/78 (24 Apr 2023 11:03) (138/78 - 178/93)  BP(mean): --  RR: 22 (24 Apr 2023 12:45) (18 - 22)  SpO2: 96% (24 Apr 2023 12:45) (96% - 100%)    Parameters below as of 24 Apr 2023 12:45  Patient On (Oxygen Delivery Method): room air        PHYSICAL EXAM:    General: Uncomfortable appearing female - coughing/resting in bed   HEENT: NC/AT; PERRL, anicteric sclera; MMM  Neck: supple  Cardiovascular: +S1/S2; RRR  Respiratory: Diffuse wheezing auscultated bilaterally, coughing - no crackles. Not in respiratory distress. No accessory muscle use.   Gastrointestinal: soft, NT/ND; +BSx4  Extremities: WWP; no edema, clubbing or cyanosis  Vascular: 2+ radial, DP/PT pulses B/L  Neurological: AAOx3; no focal deficits    MEDICATIONS:  MEDICATIONS  (STANDING):  albuterol/ipratropium for Nebulization 3 milliLiter(s) Nebulizer every 6 hours  amLODIPine   Tablet 5 milliGRAM(s) Oral daily  benzonatate 100 milliGRAM(s) Oral every 8 hours  enoxaparin Injectable 40 milliGRAM(s) SubCutaneous every 24 hours  pantoprazole    Tablet 40 milliGRAM(s) Oral before breakfast  predniSONE   Tablet 40 milliGRAM(s) Oral daily    MEDICATIONS  (PRN):  acetaminophen     Tablet .. 650 milliGRAM(s) Oral every 6 hours PRN Temp greater or equal to 38C (100.4F), Mild Pain (1 - 3)  albuterol    90 MICROgram(s) HFA Inhaler 1 Puff(s) Inhalation every 4 hours PRN Shortness of Breath and/or Wheezing  guaiFENesin Oral Liquid (Sugar-Free) 200 milliGRAM(s) Oral every 6 hours PRN Cough      ALLERGIES:  Allergies    No Known Allergies    Intolerances        LABS:                        13.6   14.11 )-----------( 271      ( 24 Apr 2023 07:25 )             43.2     04-24    137  |  102  |  20  ----------------------------<  113<H>  4.6   |  20<L>  |  0.74    Ca    9.0      24 Apr 2023 07:25          CAPILLARY BLOOD GLUCOSE          RADIOLOGY & ADDITIONAL TESTS: Reviewed.    ASSESSMENT:    PLAN:  Hospitalist Progress Note  Avis Arzate MD Pager # [phone]    OVERNIGHT EVENTS: JC    SUBJECTIVE / INTERVAL HPI: Patient seen and examined at bedside. Pt. reports ongoing wheezing and shortness of breath. She says she is unsure if she feels better and is concerned about going home.      VITAL SIGNS:  Vital Signs Last 24 Hrs  T(C): 36.9 (24 Apr 2023 11:03), Max: 36.9 (24 Apr 2023 11:03)  T(F): 98.4 (24 Apr 2023 11:03), Max: 98.4 (24 Apr 2023 11:03)  HR: 84 (24 Apr 2023 11:03) (84 - 93)  BP: 138/78 (24 Apr 2023 11:03) (138/78 - 178/93)  BP(mean): --  RR: 22 (24 Apr 2023 12:45) (18 - 22)  SpO2: 96% (24 Apr 2023 12:45) (96% - 100%)    Parameters below as of 24 Apr 2023 12:45  Patient On (Oxygen Delivery Method): room air        PHYSICAL EXAM:    General: Uncomfortable appearing female - coughing/resting in bed   HEENT: NC/AT; PERRL, anicteric sclera; MMM  Neck: supple  Cardiovascular: +S1/S2; RRR  Respiratory: Diffuse wheezing auscultated bilaterally, coughing - no crackles. Not in respiratory distress. No accessory muscle use.   Gastrointestinal: soft, NT/ND; +BSx4  Extremities: WWP; no edema, clubbing or cyanosis  Vascular: 2+ radial, DP/PT pulses B/L  Neurological: AAOx3; no focal deficits    MEDICATIONS:  MEDICATIONS  (STANDING):  albuterol/ipratropium for Nebulization 3 milliLiter(s) Nebulizer every 6 hours  amLODIPine   Tablet 5 milliGRAM(s) Oral daily  benzonatate 100 milliGRAM(s) Oral every 8 hours  enoxaparin Injectable 40 milliGRAM(s) SubCutaneous every 24 hours  pantoprazole    Tablet 40 milliGRAM(s) Oral before breakfast  predniSONE   Tablet 40 milliGRAM(s) Oral daily    MEDICATIONS  (PRN):  acetaminophen     Tablet .. 650 milliGRAM(s) Oral every 6 hours PRN Temp greater or equal to 38C (100.4F), Mild Pain (1 - 3)  albuterol    90 MICROgram(s) HFA Inhaler 1 Puff(s) Inhalation every 4 hours PRN Shortness of Breath and/or Wheezing  guaiFENesin Oral Liquid (Sugar-Free) 200 milliGRAM(s) Oral every 6 hours PRN Cough      ALLERGIES:  Allergies    No Known Allergies    Intolerances        LABS:                        13.6   14.11 )-----------( 271      ( 24 Apr 2023 07:25 )             43.2     04-24    137  |  102  |  20  ----------------------------<  113<H>  4.6   |  20<L>  |  0.74    Ca    9.0      24 Apr 2023 07:25          CAPILLARY BLOOD GLUCOSE          RADIOLOGY & ADDITIONAL TESTS: Reviewed.    ASSESSMENT:    PLAN:

## 2023-04-24 NOTE — DISCHARGE NOTE NURSING/CASE MANAGEMENT/SOCIAL WORK - CASE MANAGER'S NAME
Skye Pike [facility]  Skye Pike [facility]  Skye Pike [facility]

## 2023-04-24 NOTE — CONSULT NOTE ADULT - ASSESSMENT
72f h/o ????asthma, Bell's Palsy, essential HTN, hepatitis C, seizures, hx intubations for stridor/sob presents to the ED as transfer from [place] for ENT evaluation for 5 days of stridor and shorntess of breath. ENT noted paradoxical vocal fold motion +/- muscle tension dysphonia. CT Neck wnl. CXR wnl.     Patients symptoms are likely due to vocal cord dysfunction. At bedside with pursed lip technique patient had significant recovery from wheezing episode    #Wheezing  #VC Dysfunction    Recommendations:  - Counselled thoroughly on breathing techniques.   - Low suspicion for an acute asthma exacerbation, consider early discontinuation of steroids.   - Discussion on limiting psychosocial stressors, may benefit from psych evaluation.  72f h/o ????asthma, Bell's Palsy, essential HTN, hepatitis C, seizures, hx intubations for stridor/sob presents to the ED as transfer from [place] for ENT evaluation for 5 days of stridor and shorntess of breath. ENT noted paradoxical vocal fold motion +/- muscle tension dysphonia. CT Neck wnl. CXR wnl.     Patients symptoms are likely due to vocal cord dysfunction. At bedside with pursed lip technique patient had significant recovery from wheezing episode    #Wheezing  #VC Dysfunction    Recommendations:  - Counselled thoroughly on breathing techniques.   - Low suspicion for an acute asthma exacerbation, consider early discontinuation of steroids.   - Discussion on limiting psychosocial stressors, may benefit from psych evaluation.  72f h/o ????asthma, Bell's Palsy, essential HTN, hepatitis C, seizures, hx intubations for stridor/sob presents to the ED as transfer from [place] for ENT evaluation for 5 days of stridor and shorntess of breath. ENT noted paradoxical vocal fold motion +/- muscle tension dysphonia. CT Neck wnl. CXR wnl.     Patients symptoms are likely due to vocal cord dysfunction. At bedside with pursed lip technique patient had significant recovery from wheezing episode    #Wheezing  #VC Dysfunction    Recommendations:  - Counselled thoroughly on breathing techniques.   - Low suspicion for an acute asthma exacerbation, consider early discontinuation of steroids.   - Discussion on limiting psychosocial stressors, may benefit from psych evaluation.  72 year old female with possible asthma, Bell's Palsy, essential HTN, hepatitis C, seizures, hx intubations for stridor/sob presents to the ED as transfer from [place] for ENT evaluation for 5 days of stridor and shortness of breath. ENT noted paradoxical vocal fold motion +/- muscle tension dysphonia. CT Neck wnl. CXR wnl.     Patients symptoms are likely due to vocal cord dysfunction. At bedside with pursed lip technique patient had significant recovery from wheezing episode    #Wheezing  Paradoxical VC  Dysfunction    Recommendations:  - Counselled thoroughly on breathing techniques.   - Low suspicion for an acute asthma exacerbation, consider early discontinuation of steroids.   - Discussion on limiting psychosocial stressors, may benefit from psych evaluation.  72 year old female with possible asthma, Bell's Palsy, essential HTN, hepatitis C, seizures, hx intubations for stridor/sob presents to the ED as transfer from [place] for ENT evaluation for 5 days of stridor and shortness of breath. ENT noted paradoxical vocal fold motion +/- muscle tension dysphonia. CT Neck wnl. CXR wnl.     Patients symptoms are likely due to vocal cord dysfunction. At bedside with pursed lip technique patient had significant recovery from wheezing episode    #Wheezing  Paradoxical VC  Dysfunction    Recommendations:  - Counselled thoroughly on breathing techniques.   - Low suspicion for an acute asthma exacerbation, consider early discontinuation of steroids.   - Discussion on limiting psychosocial stressors, may benefit from psych evaluation.  72 year old female with possible asthma, Bell's Palsy, essential HTN, hepatitis C, seizures, hx intubations for stridor/sob presents to the ED as transfer from [place] for ENT evaluation for 5 days of stridor and shortness of breath. ENT noted paradoxical vocal fold motion +/- muscle tension dysphonia. CT Neck wnl. CXR wnl.     Patients symptoms are likely due to vocal cord dysfunction. At bedside with pursed lip technique patient had significant recovery from wheezing episode    #Wheezing  Paradoxical VC  Dysfunction    Recommendations:  - Counselled thoroughly on breathing techniques.   - Low suspicion for an acute asthma exacerbation, consider early discontinuation of steroids.   - Discussion on limiting psychosocial stressors, may benefit from psych evaluation.

## 2023-04-24 NOTE — DISCHARGE NOTE NURSING/CASE MANAGEMENT/SOCIAL WORK - SOCIAL WORKER'S NAME
[facility]  [facility]  [facility]

## 2023-04-24 NOTE — DISCHARGE NOTE NURSING/CASE MANAGEMENT/SOCIAL WORK - NSDCPEFALRISK_GEN_ALL_CORE
For information on Fall & Injury Prevention, visit: [email or web address]/[email or web address]-[email or web address]-health-and-mobility OR  [email or web address]://www.[email or web address]/[email or web address]-prevention-tips-[email or web address]-injury OR  https://www.cdc.gov/steadi/patient.html For information on Fall & Injury Prevention, visit: https://www.[facility]/news/fall-prevention-protects-and-maintains-health-and-mobility OR  https://www.[facility]/news/fall-prevention-tips-to-avoid-injury OR  https://www.cdc.gov/steadi/patient.html For information on Fall & Injury Prevention, visit: https://www.[facility]/news/fall-prevention-protects-and-maintains-[email or web address]-mobility OR  https://www.[facility]/news/fall-prevention-tips-to-avoid-injury OR  https://www.cdc.gov/steadi/patient.html

## 2023-04-24 NOTE — PROGRESS NOTE ADULT - PROBLEM SELECTOR PLAN 1
Pt. transferred from [facility] after intubated for wheezing/stridor and concern for airway. Extubated in the OR with ENT given concern for abnormality within the airway. Pt. deemed stable but transferred over the [facility] for further eval. ENT performed a scop which did not show any airway abnormality. CT neck did not see any masses. Swallow eval + cineesophagogram wnl. Pt. still with significant wheezing and reporting SOB/discomfort. Coughing. Pt. on RA.   - S/p IV steroids and transitioned to oral prednisone/   - Given ongoing wheezing and discomfort - pulmonary consulted to further evaluate Pt. transferred from [facility] after intubated for wheezing/stridor and concern for airway. Extubated in the OR with ENT given concern for abnormality within the airway. Pt. deemed stable but transferred over the [facility] for further eval. ENT performed a scop which did not show any airway abnormality. CT neck did not see any masses. Swallow eval + cineesophagogram wnl. Pt. still with significant wheezing and reporting SOB/discomfort. Coughing. Pt. on RA.   - S/p IV steroids and transitioned to oral prednisone/   - Given ongoing wheezing and discomfort - pulmonary consulted to further evaluate

## 2023-04-24 NOTE — CONSULT NOTE ADULT - ATTENDING COMMENTS
72 year old female here with wheezing stridor, and shortness of breath. ENT exam shows some evidence of paradoxical vocal fold motion. CT neck negative for acute pathology. 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 Patient had episode of stridor at time of exam which resolved on its own with breathing technique. She appears depressed. Noting stress related to her inability to work and running out of PTO / vacation days.    Low suspicion for active asthma exacerbation. Consider early discontinuation.  Consider psychiatric evaluation  Outpatient follow up with her pulmonologist    Discussed with primary team

## 2023-04-24 NOTE — PROGRESS NOTE ADULT - PROBLEM SELECTOR PLAN 3
- Reports urinary frequency without dysuria or hematuria  - UA w/+leukocyte esterase, if patient develops dysuria or fever initiate antibiotics
- Reports urinary frequency without dysuria or hematuria  - UA w/+leukocyte esterase, if patient develops dysuria or fever initiate antibiotics
Had reported urinary frequency. UA positive. UCx not sent.   - Will treat with ceftriaxone for 3 days. Can transition to cefpodoxime if discharged.
- Reports urinary frequency without dysuria or hematuria  - UA w/+leukocyte esterase, if patient develops dysuria or fever initiate antibiotics

## 2023-04-24 NOTE — PROGRESS NOTE ADULT - TIME BILLING
Review of laboratory data, radiology results, consultants' recommendations, documentation in [place], discussion with patient/advanced care providers and interdisciplinary staff (such as , social workers, etc). Interventions were performed as documented above. Review of laboratory data, radiology results, consultants' recommendations, documentation in [place], discussion with patient/advanced care providers and interdisciplinary staff (such as , social workers, etc). Interventions were performed as documented above. Review of laboratory data, radiology results, consultants' recommendations, documentation in [place], discussion with patient/advanced care providers and interdisciplinary staff (such as , social workers, etc). Interventions were performed as documented above.

## 2023-04-25 VITALS
DIASTOLIC BLOOD PRESSURE: 69 MMHG | SYSTOLIC BLOOD PRESSURE: 150 MMHG | HEART RATE: 85 BPM | TEMPERATURE: 98 F | OXYGEN SATURATION: 96 % | RESPIRATION RATE: 19 BRPM

## 2023-04-25 LAB
ANION GAP SERPL CALC-SCNC: 13 MMOL/L — SIGNIFICANT CHANGE UP (ref 7–14)
BUN SERPL-MCNC: 23 MG/DL — SIGNIFICANT CHANGE UP (ref 7–23)
CALCIUM SERPL-MCNC: 8.9 MG/DL — SIGNIFICANT CHANGE UP (ref 8.4–10.5)
CHLORIDE SERPL-SCNC: 103 MMOL/L — SIGNIFICANT CHANGE UP (ref 98–107)
CO2 SERPL-SCNC: 21 MMOL/L — LOW (ref 22–31)
CREAT SERPL-MCNC: 0.89 MG/DL — SIGNIFICANT CHANGE UP (ref 0.5–1.3)
CULTURE RESULTS: SIGNIFICANT CHANGE UP
EGFR: 70 ML/MIN/1.73M2 — SIGNIFICANT CHANGE UP
GLUCOSE SERPL-MCNC: 98 MG/DL — SIGNIFICANT CHANGE UP (ref 70–99)
HCT VFR BLD CALC: 40.6 % — SIGNIFICANT CHANGE UP (ref 34.5–45)
HGB BLD-MCNC: 12.9 G/DL — SIGNIFICANT CHANGE UP (ref 11.5–15.5)
MAGNESIUM SERPL-MCNC: 2.1 MG/DL — SIGNIFICANT CHANGE UP (ref 1.6–2.6)
MCHC RBC-ENTMCNC: 26.6 PG — LOW (ref 27–34)
MCHC RBC-ENTMCNC: 31.8 GM/DL — LOW (ref 32–36)
MCV RBC AUTO: 83.7 FL — SIGNIFICANT CHANGE UP (ref 80–100)
NRBC # BLD: 0 /100 WBCS — SIGNIFICANT CHANGE UP (ref 0–0)
NRBC # FLD: 0 K/UL — SIGNIFICANT CHANGE UP (ref 0–0)
PHOSPHATE SERPL-MCNC: 4 MG/DL — SIGNIFICANT CHANGE UP (ref 2.5–4.5)
PLATELET # BLD AUTO: 262 K/UL — SIGNIFICANT CHANGE UP (ref 150–400)
POTASSIUM SERPL-MCNC: 4.1 MMOL/L — SIGNIFICANT CHANGE UP (ref 3.5–5.3)
POTASSIUM SERPL-SCNC: 4.1 MMOL/L — SIGNIFICANT CHANGE UP (ref 3.5–5.3)
RBC # BLD: 4.85 M/UL — SIGNIFICANT CHANGE UP (ref 3.8–5.2)
RBC # FLD: 13.3 % — SIGNIFICANT CHANGE UP (ref 10.3–14.5)
SODIUM SERPL-SCNC: 137 MMOL/L — SIGNIFICANT CHANGE UP (ref 135–145)
SPECIMEN SOURCE: SIGNIFICANT CHANGE UP
WBC # BLD: 12.99 K/UL — HIGH (ref 3.8–10.5)
WBC # FLD AUTO: 12.99 K/UL — HIGH (ref 3.8–10.5)

## 2023-04-25 PROCEDURE — 99232 SBSQ HOSP IP/OBS MODERATE 35: CPT | Mod: GC

## 2023-04-25 PROCEDURE — 99239 HOSP IP/OBS DSCHRG MGMT >30: CPT

## 2023-04-25 RX ORDER — IPRATROPIUM/ALBUTEROL SULFATE 18-103MCG
3 AEROSOL WITH ADAPTER (GRAM) INHALATION
Qty: 360 | Refills: 0
Start: 2023-04-25 | End: 2023-05-24

## 2023-04-25 RX ORDER — IPRATROPIUM/ALBUTEROL SULFATE 18-103MCG
3 AEROSOL WITH ADAPTER (GRAM) INHALATION
Qty: 1 | Refills: 0
Start: 2023-04-25 | End: 2023-05-24

## 2023-04-25 RX ORDER — CEFPODOXIME PROXETIL 100 MG
1 TABLET ORAL
Qty: 6 | Refills: 0
Start: 2023-04-25 | End: 2023-04-27

## 2023-04-25 RX ADMIN — Medication 3 MILLILITER(S): at 04:50

## 2023-04-25 RX ADMIN — Medication 100 MILLIGRAM(S): at 14:22

## 2023-04-25 RX ADMIN — Medication 100 MILLIGRAM(S): at 05:23

## 2023-04-25 RX ADMIN — PANTOPRAZOLE SODIUM 40 MILLIGRAM(S): 20 TABLET, DELAYED RELEASE ORAL at 05:23

## 2023-04-25 RX ADMIN — ENOXAPARIN SODIUM 40 MILLIGRAM(S): 100 INJECTION SUBCUTANEOUS at 05:22

## 2023-04-25 RX ADMIN — AMLODIPINE BESYLATE 5 MILLIGRAM(S): 2.5 TABLET ORAL at 05:23

## 2023-04-25 RX ADMIN — Medication 40 MILLIGRAM(S): at 05:23

## 2023-04-25 NOTE — PROGRESS NOTE ADULT - ASSESSMENT
72 year old female with possible asthma, Bell's Palsy, essential HTN, hepatitis C, seizures, hx intubations for stridor/sob presents to the ED as transfer from [place] for ENT evaluation for 5 days of stridor and shortness of breath. ENT noted paradoxical vocal fold motion +/- muscle tension dysphonia. CT Neck wnl. CXR wnl.     Patients symptoms are likely due to vocal cord dysfunction. At bedside with pursed lip technique patient had significant recovery from wheezing episode    #Wheezing  Paradoxical VC  Dysfunction    Recommendations:  - Counselled thoroughly on breathing techniques.   - Low suspicion for an acute asthma exacerbation, consider early discontinuation of steroids.   - Discussion on limiting psychosocial stressors, may benefit from psych evaluation.     Pulmonary team to sign off, please call back with questions.  72 year old female with possible asthma, Bell's Palsy, essential HTN, hepatitis C, seizures, hx intubations for stridor/sob presents to the ED as transfer from [place] for ENT evaluation for 5 days of stridor and shortness of breath. ENT noted paradoxical vocal fold motion +/- muscle tension dysphonia. CT Neck wnl. CXR wnl.     Patients symptoms are likely due to vocal cord dysfunction. At bedside with pursed lip technique patient had significant recovery from wheezing episode    #Wheezing  Paradoxical VC  Dysfunction    Recommendations:  - Counselled thoroughly on breathing techniques.   - Low suspicion for an acute asthma exacerbation, consider early discontinuation of steroids.   - Discussion on limiting psychosocial stressors, may benefit from psych evaluation.     Pulmonary team to sign off, please call back with questions.  72 year old female with possible asthma, Bell's Palsy, essential HTN, hepatitis C, seizures, hx intubations for stridor/sob presents to the ED as transfer from [place] for ENT evaluation for 5 days of stridor and shortness of breath. ENT noted paradoxical vocal fold motion +/- muscle tension dysphonia. CT Neck wnl. CXR wnl.     Patients symptoms are likely due to vocal cord dysfunction. At bedside with pursed lip technique patient had significant recovery from wheezing episode    #Wheezing  Paradoxical VC  Dysfunction    Recommendations:  - Counselled thoroughly on breathing techniques.   - Low suspicion for an acute asthma exacerbation, consider early discontinuation of steroids.   - Discussion on limiting psychosocial stressors, may benefit from psych evaluation.     Pulmonary team to sign off, please call back with questions.  72 year old female with possible asthma, Bell's Palsy, essential HTN, hepatitis C, seizures, hx intubations for stridor/sob presents to the ED as transfer from [place] for ENT evaluation for 5 days of stridor and shortness of breath. ENT noted paradoxical vocal fold motion +/- muscle tension dysphonia. CT Neck wnl. CXR wnl.     Patients symptoms are likely due to vocal cord dysfunction. At bedside with pursed lip technique patient had significant recovery from wheezing episode    #Wheezing  # Paradoxical VC Motion    Recommendations:  - Counselled thoroughly on breathing techniques.   - Low suspicion for an acute asthma exacerbation, consider early discontinuation of steroids.   - Discussion on limiting psychosocial stressors, may benefit from psych evaluation.     Pulmonary team to sign off, please call back with questions.  72 year old female with possible asthma, Bell's Palsy, essential HTN, hepatitis C, seizures, hx intubations for stridor/sob presents to the ED as transfer from [place] for ENT evaluation for 5 days of stridor and shortness of breath. ENT noted paradoxical vocal fold motion +/- muscle tension dysphonia. CT Neck wnl. CXR wnl.     Patients symptoms are likely due to vocal cord dysfunction. At bedside with pursed lip technique patient had significant recovery from wheezing episode    #Wheezing  # Paradoxical VC Motion    Recommendations:  - Counselled thoroughly on breathing techniques.   - Low suspicion for an acute asthma exacerbation, consider early discontinuation of steroids.   - Discussion on limiting psychosocial stressors, may benefit from psych evaluation.     Pulmonary team to sign off, please call back with questions.  72 year old female with possible asthma, Bell's Palsy, essential HTN, hepatitis C, seizures, hx intubations for stridor/sob presents to the ED as transfer from [place] for ENT evaluation for 5 days of stridor and shortness of breath. ENT noted paradoxical vocal fold motion +/- muscle tension dysphonia. CT Neck wnl. CXR wnl.     Patients symptoms are likely due to vocal cord dysfunction. At bedside with pursed lip technique patient had significant recovery from wheezing episode    #Wheezing  # Paradoxical VC Motion    Recommendations:  - Counselled thoroughly on breathing techniques.   - Low suspicion for an acute asthma exacerbation, consider early discontinuation of steroids.   - Discussion on limiting psychosocial stressors, may benefit from psych evaluation.     Pulmonary team to sign off, please call back with questions.  72 year old female with possible asthma, Bell's Palsy, essential HTN, hepatitis C, seizures, hx intubations for stridor/sob presents to the ED as transfer from [place] for ENT evaluation for 5 days of stridor and shortness of breath. ENT noted paradoxical vocal fold motion +/- muscle tension dysphonia. CT Neck wnl. CXR wnl.     Patients symptoms are likely due to vocal cord dysfunction. At bedside with pursed lip technique patient had significant recovery from wheezing episode    #Shortness of breath / Wheezing  # Paradoxical VC Motion    Recommendations:  - Counselled thoroughly on breathing techniques.   - Low suspicion for an acute asthma exacerbation, consider early discontinuation of steroids.   - Discussion on limiting psychosocial stressors, may benefit from psych evaluation.     Pulmonary team to sign off, please call back with questions.  72 year old female with possible asthma, Bell's Palsy, essential HTN, hepatitis C, seizures, hx intubations for stridor/sob presents to the ED as transfer from [place] for ENT evaluation for 5 days of stridor and shortness of breath. ENT noted paradoxical vocal fold motion +/- muscle tension dysphonia. CT Neck wnl. CXR wnl.     Patients symptoms are likely due to vocal cord dysfunction. At bedside with pursed lip technique patient had significant recovery from wheezing episode    #Shortness of breath / Wheezing  # Paradoxical VC Motion    Recommendations:  - Counselled thoroughly on breathing techniques.   - Low suspicion for an acute asthma exacerbation, consider early discontinuation of steroids.   - Discussion on limiting psychosocial stressors, may benefit from psych evaluation.     Pulmonary team to sign off, please call back with questions.  72 year old female with possible asthma, Bell's Palsy, essential HTN, hepatitis C, seizures, hx intubations for stridor/sob presents to the ED as transfer from [place] for ENT evaluation for 5 days of stridor and shortness of breath. ENT noted paradoxical vocal fold motion +/- muscle tension dysphonia. CT Neck wnl. CXR wnl.     Patients symptoms are likely due to vocal cord dysfunction. At bedside with pursed lip technique patient had significant recovery from wheezing episode    #Shortness of breath / Wheezing  # Paradoxical VC Motion    Recommendations:  - Counselled thoroughly on breathing techniques.   - Low suspicion for an acute asthma exacerbation, consider early discontinuation of steroids.   - Discussion on limiting psychosocial stressors, may benefit from psych evaluation.     Pulmonary team to sign off, please call back with questions.

## 2023-04-25 NOTE — PROGRESS NOTE ADULT - SUBJECTIVE AND OBJECTIVE BOX
CHIEF COMPLAINT:    Interval Events: Pt seen and examined at bedside.     REVIEW OF SYSTEMS:  Constitutional: No fevers or chills. No weight loss. No fatigue or generalised malaise.  Eyes: No itching or discharge from the eyes  ENT: No ear pain. No ear discharge. No nasal congestion. No post nasal drip. No epistaxis. No throat pain. No sore throat. No difficulty swallowing.   CV: No chest pain. No palpitations. No lightheadedness or dizziness.   Resp: No dyspnea at rest. No dyspnea on exertion. No orthopnea. No wheezing. No cough. No stridor. No     OBJECTIVE:  ICU Vital Signs Last 24 Hrs  T(C): 36.8 (25 Apr 2023 04:29), Max: 36.9 (24 Apr 2023 11:03)  T(F): 98.2 (25 Apr 2023 04:29), Max: 98.4 (24 Apr 2023 11:03)  HR: 85 (25 Apr 2023 04:29) (80 - 85)  BP: 132/70 (25 Apr 2023 04:29) (132/70 - 138/78)  BP(mean): --  ABP: --  ABP(mean): --  RR: 19 (25 Apr 2023 04:29) (18 - 22)  SpO2: 98% (25 Apr 2023 04:29) (96% - 100%)    O2 Parameters below as of 25 Apr 2023 04:29  Patient On (Oxygen Delivery Method): room air              CAPILLARY BLOOD GLUCOSE          PHYSICAL EXAM:  General: Awake, alert, oriented X 3.   HEENT: Atraumatic, normocephalic.    Lymph Nodes: No palpable lymphadenopathy  Neck: No carotid bruit. Supple.  Respiratory: Inspiratory and expiratory wheezing loudest over neck. Resolved spontaneously during exam.   Cardiovascular: S1 S2 normal. No murmurs, rubs or gallops.   Abdomen: Soft, non-tender, non-distended. No organomegaly.    HOSPITAL MEDICATIONS:  MEDICATIONS  (STANDING):  albuterol/ipratropium for Nebulization 3 milliLiter(s) Nebulizer every 6 hours  amLODIPine   Tablet 5 milliGRAM(s) Oral daily  benzonatate 100 milliGRAM(s) Oral every 8 hours  cefTRIAXone   IVPB 1000 milliGRAM(s) IV Intermittent every 24 hours  enoxaparin Injectable 40 milliGRAM(s) SubCutaneous every 24 hours  pantoprazole    Tablet 40 milliGRAM(s) Oral before breakfast  predniSONE   Tablet 40 milliGRAM(s) Oral daily    MEDICATIONS  (PRN):  acetaminophen     Tablet .. 650 milliGRAM(s) Oral every 6 hours PRN Temp greater or equal to 38C (100.4F), Mild Pain (1 - 3)  albuterol    90 MICROgram(s) HFA Inhaler 1 Puff(s) Inhalation every 4 hours PRN Shortness of Breath and/or Wheezing  guaiFENesin Oral Liquid (Sugar-Free) 200 milliGRAM(s) Oral every 6 hours PRN Cough      LABS:                        12.9   12.99 )-----------( 262      ( 25 Apr 2023 07:20 )             40.6     04-24    137  |  102  |  20  ----------------------------<  113<H>  4.6   |  20<L>  |  0.74    Ca    9.0      24 Apr 2023 07:25                MICROBIOLOGY:     RADIOLOGY:  [ ] Reviewed and interpreted by me    Point of Care Ultrasound Findings:    PFT:    EKG: CHIEF COMPLAINT: Wheezing    Interval Events:   Pt seen and examined at bedside.   Feels OK this morning    REVIEW OF SYSTEMS:  Constitutional: No fevers or chills. No weight loss. No fatigue or generalised malaise.  Eyes: No itching or discharge from the eyes  ENT: No ear pain. No ear discharge. No nasal congestion. No post nasal drip. No epistaxis. No throat pain. No sore throat. No difficulty swallowing.   CV: No chest pain. No palpitations. No lightheadedness or dizziness.   Resp: No dyspnea at rest. No dyspnea on exertion. No orthopnea. Intermittent episodes of wheezing. No cough. No stridor.  GI: No abd pain, nausea, vomiting    OBJECTIVE:  ICU Vital Signs Last 24 Hrs  T(C): 36.8 (25 Apr 2023 04:29), Max: 36.9 (24 Apr 2023 11:03)  T(F): 98.2 (25 Apr 2023 04:29), Max: 98.4 (24 Apr 2023 11:03)  HR: 85 (25 Apr 2023 04:29) (80 - 85)  BP: 132/70 (25 Apr 2023 04:29) (132/70 - 138/78)  BP(mean): --  ABP: --  ABP(mean): --  RR: 19 (25 Apr 2023 04:29) (18 - 22)  SpO2: 98% (25 Apr 2023 04:29) (96% - 100%)    O2 Parameters below as of 25 Apr 2023 04:29  Patient On (Oxygen Delivery Method): room air              CAPILLARY BLOOD GLUCOSE          PHYSICAL EXAM:  General: Awake, alert, oriented X 3.   HEENT: Atraumatic, normocephalic.    Lymph Nodes: No palpable lymphadenopathy  Neck: No carotid bruit. Supple.  Respiratory: Inspiratory and expiratory wheezing loudest over neck. Resolved spontaneously during exam.   Cardiovascular: S1 S2 normal. No murmurs, rubs or gallops.   Abdomen: Soft, non-tender, non-distended. No organomegaly.  Neuro: [address] MEDICATIONS:  MEDICATIONS  (STANDING):  albuterol/ipratropium for Nebulization 3 milliLiter(s) Nebulizer every 6 hours  amLODIPine   Tablet 5 milliGRAM(s) Oral daily  benzonatate 100 milliGRAM(s) Oral every 8 hours  cefTRIAXone   IVPB 1000 milliGRAM(s) IV Intermittent every 24 hours  enoxaparin Injectable 40 milliGRAM(s) SubCutaneous every 24 hours  pantoprazole    Tablet 40 milliGRAM(s) Oral before breakfast  predniSONE   Tablet 40 milliGRAM(s) Oral daily    MEDICATIONS  (PRN):  acetaminophen     Tablet .. 650 milliGRAM(s) Oral every 6 hours PRN Temp greater or equal to 38C (100.4F), Mild Pain (1 - 3)  albuterol    90 MICROgram(s) HFA Inhaler 1 Puff(s) Inhalation every 4 hours PRN Shortness of Breath and/or Wheezing  guaiFENesin Oral Liquid (Sugar-Free) 200 milliGRAM(s) Oral every 6 hours PRN Cough      LABS:                        12.9   12.99 )-----------( 262      ( 25 Apr 2023 07:20 )             40.6     04-24    137  |  102  |  20  ----------------------------<  113<H>  4.6   |  20<L>  |  0.74    Ca    9.0      24 Apr 2023 07:25           CHIEF COMPLAINT: Wheezing    Interval Events:   Pt seen and examined at bedside.   Feels OK this morning    REVIEW OF SYSTEMS:  Constitutional: No fevers or chills. No weight loss. No fatigue or generalised malaise.  Eyes: No itching or discharge from the eyes  ENT: No ear pain. No ear discharge. No nasal congestion. No post nasal drip. No epistaxis. No throat pain. No sore throat. No difficulty swallowing.   CV: No chest pain. No palpitations. No lightheadedness or dizziness.   Resp: No dyspnea at rest. No dyspnea on exertion. No orthopnea. Intermittent episodes of wheezing. No cough. No stridor.  GI: No abd pain, nausea, vomiting    OBJECTIVE:  ICU Vital Signs Last 24 Hrs  T(C): 36.8 (25 Apr 2023 04:29), Max: 36.9 (24 Apr 2023 11:03)  T(F): 98.2 (25 Apr 2023 04:29), Max: 98.4 (24 Apr 2023 11:03)  HR: 85 (25 Apr 2023 04:29) (80 - 85)  BP: 132/70 (25 Apr 2023 04:29) (132/70 - 138/78)  BP(mean): --  ABP: --  ABP(mean): --  RR: 19 (25 Apr 2023 04:29) (18 - 22)  SpO2: 98% (25 Apr 2023 04:29) (96% - 100%)    O2 Parameters below as of 25 Apr 2023 04:29  Patient On (Oxygen Delivery Method): room air              CAPILLARY BLOOD GLUCOSE          PHYSICAL EXAM:  General: Awake, alert, oriented X 3.   HEENT: Atraumatic, normocephalic.    Lymph Nodes: No palpable lymphadenopathy  Neck: No carotid bruit. Supple.  Respiratory: Inspiratory and expiratory wheezing loudest over neck. Resolved spontaneously during exam.   Cardiovascular: S1 S2 normal. No murmurs, rubs or gallops.   Abdomen: Soft, non-tender, non-distended. No organomegaly.  Neuro: [address] MEDICATIONS:  MEDICATIONS  (STANDING):  albuterol/ipratropium for Nebulization 3 milliLiter(s) Nebulizer every 6 hours  amLODIPine   Tablet 5 milliGRAM(s) Oral daily  benzonatate 100 milliGRAM(s) Oral every 8 hours  cefTRIAXone   IVPB 1000 milliGRAM(s) IV Intermittent every 24 hours  enoxaparin Injectable 40 milliGRAM(s) SubCutaneous every 24 hours  pantoprazole    Tablet 40 milliGRAM(s) Oral before breakfast  predniSONE   Tablet 40 milliGRAM(s) Oral daily    MEDICATIONS  (PRN):  acetaminophen     Tablet .. 650 milliGRAM(s) Oral every 6 hours PRN Temp greater or equal to 38C (100.4F), Mild Pain (1 - 3)  albuterol    90 MICROgram(s) HFA Inhaler 1 Puff(s) Inhalation every 4 hours PRN Shortness of Breath and/or Wheezing  guaiFENesin Oral Liquid (Sugar-Free) 200 milliGRAM(s) Oral every 6 hours PRN Cough      LABS:                        12.9   12.99 )-----------( 262      ( 25 Apr 2023 07:20 )             40.6     04-24    137  |  102  |  20  ----------------------------<  113<H>  4.6   |  20<L>  |  0.74    Ca    9.0      24 Apr 2023 07:25           CHIEF COMPLAINT: Wheezing    Interval Events:   Pt seen and examined at bedside.   Feels OK this morning    REVIEW OF SYSTEMS:  Constitutional: No fevers or chills. No weight loss. No fatigue or generalised malaise.  Eyes: No itching or discharge from the eyes  ENT: No ear pain. No ear discharge. No nasal congestion. No post nasal drip. No epistaxis. No throat pain. No sore throat. No difficulty swallowing.   CV: No chest pain. No palpitations. No lightheadedness or dizziness.   Resp: No dyspnea at rest. No dyspnea on exertion. No orthopnea. Intermittent episodes of wheezing. No cough. No stridor.  GI: No abd pain, nausea, vomiting    OBJECTIVE:  ICU Vital Signs Last 24 Hrs  T(C): 36.8 (25 Apr 2023 04:29), Max: 36.9 (24 Apr 2023 11:03)  T(F): 98.2 (25 Apr 2023 04:29), Max: 98.4 (24 Apr 2023 11:03)  HR: 85 (25 Apr 2023 04:29) (80 - 85)  BP: 132/70 (25 Apr 2023 04:29) (132/70 - 138/78)  BP(mean): --  ABP: --  ABP(mean): --  RR: 19 (25 Apr 2023 04:29) (18 - 22)  SpO2: 98% (25 Apr 2023 04:29) (96% - 100%)    O2 Parameters below as of 25 Apr 2023 04:29  Patient On (Oxygen Delivery Method): room air              CAPILLARY BLOOD GLUCOSE          PHYSICAL EXAM:  General: Awake, alert, oriented X 3.   HEENT: Atraumatic, normocephalic.    Lymph Nodes: No palpable lymphadenopathy  Neck: No carotid bruit. Supple.  Respiratory: Inspiratory and expiratory wheezing loudest over neck. Resolved spontaneously during exam.   Cardiovascular: S1 S2 normal. No murmurs, rubs or gallops.   Abdomen: Soft, non-tender, non-distended. No organomegaly.  Neuro: [address] MEDICATIONS:  MEDICATIONS  (STANDING):  albuterol/ipratropium for Nebulization 3 milliLiter(s) Nebulizer every 6 hours  amLODIPine   Tablet 5 milliGRAM(s) Oral daily  benzonatate 100 milliGRAM(s) Oral every 8 hours  cefTRIAXone   IVPB 1000 milliGRAM(s) IV Intermittent every 24 hours  enoxaparin Injectable 40 milliGRAM(s) SubCutaneous every 24 hours  pantoprazole    Tablet 40 milliGRAM(s) Oral before breakfast  predniSONE   Tablet 40 milliGRAM(s) Oral daily    MEDICATIONS  (PRN):  acetaminophen     Tablet .. 650 milliGRAM(s) Oral every 6 hours PRN Temp greater or equal to 38C (100.4F), Mild Pain (1 - 3)  albuterol    90 MICROgram(s) HFA Inhaler 1 Puff(s) Inhalation every 4 hours PRN Shortness of Breath and/or Wheezing  guaiFENesin Oral Liquid (Sugar-Free) 200 milliGRAM(s) Oral every 6 hours PRN Cough      LABS:                        12.9   12.99 )-----------( 262      ( 25 Apr 2023 07:20 )             40.6     04-24    137  |  102  |  20  ----------------------------<  113<H>  4.6   |  20<L>  |  0.74    Ca    9.0      24 Apr 2023 07:25

## 2023-04-25 NOTE — PROGRESS NOTE ADULT - ATTENDING COMMENTS
72 year old female here with wheezing, stridor, and shortness of breath. ENT exam shows some evidence of paradoxical vocal fold motion. CT neck negative for acute pathology. 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 Respiratory status stable this morning.    Low suspicion for active asthma exacerbation. Consider early discontinuation of steroids.   Close outpatient follow up with her pulmonologist    Plan discussed with patient and primary team

## 2023-05-02 ENCOUNTER — APPOINTMENT (OUTPATIENT)
Dept: OTOLARYNGOLOGY | Facility: CLINIC | Age: 73
End: 2023-05-02
Payer: COMMERCIAL

## 2023-05-02 ENCOUNTER — NON-APPOINTMENT (OUTPATIENT)
Age: 73
End: 2023-05-02

## 2023-05-02 VITALS — DIASTOLIC BLOOD PRESSURE: 88 MMHG | SYSTOLIC BLOOD PRESSURE: 157 MMHG | HEART RATE: 89 BPM

## 2023-05-02 VITALS — BODY MASS INDEX: 36.76 KG/M2 | HEIGHT: 64.5 IN | WEIGHT: 218 LBS

## 2023-05-02 DIAGNOSIS — R49.0 DYSPHONIA: ICD-10-CM

## 2023-05-02 DIAGNOSIS — G51.0 BELL'S PALSY: ICD-10-CM

## 2023-05-02 PROCEDURE — 31575 DIAGNOSTIC LARYNGOSCOPY: CPT

## 2023-05-02 PROCEDURE — 99072 ADDL SUPL MATRL&STAF TM PHE: CPT

## 2023-05-02 PROCEDURE — 99203 OFFICE O/P NEW LOW 30 MIN: CPT | Mod: 25

## 2023-05-02 RX ORDER — TRIAMCINOLONE ACETONIDE 1 MG/G
0.1 CREAM TOPICAL
Refills: 0 | Status: DISCONTINUED | COMMUNITY
End: 2023-05-02

## 2023-05-02 RX ORDER — LOSARTAN POTASSIUM 50 MG/1
50 TABLET, FILM COATED ORAL
Qty: 30 | Refills: 0 | Status: DISCONTINUED | COMMUNITY
Start: 2018-04-23 | End: 2023-05-02

## 2023-05-02 RX ORDER — NYSTATIN 100000 1/G
100000 POWDER TOPICAL
Qty: 30 | Refills: 0 | Status: DISCONTINUED | COMMUNITY
Start: 2018-03-30 | End: 2023-05-02

## 2023-05-02 RX ORDER — NAPROXEN 500 MG/1
500 TABLET ORAL
Qty: 30 | Refills: 0 | Status: DISCONTINUED | COMMUNITY
Start: 2018-05-07 | End: 2023-05-02

## 2023-05-02 RX ORDER — AMLODIPINE BESYLATE 5 MG/1
5 TABLET ORAL
Refills: 0 | Status: ACTIVE | COMMUNITY

## 2023-05-02 RX ORDER — PREDNISONE 20 MG/1
20 TABLET ORAL
Qty: 24 | Refills: 0 | Status: DISCONTINUED | COMMUNITY
Start: 2018-07-10 | End: 2023-05-02

## 2023-05-02 RX ORDER — KETOCONAZOLE 20 MG/G
2 CREAM TOPICAL
Qty: 120 | Refills: 0 | Status: DISCONTINUED | COMMUNITY
Start: 2018-06-19 | End: 2023-05-02

## 2023-05-02 NOTE — REVIEW OF SYSTEMS
[Seasonal Allergies] : seasonal allergies [Problem Snoring] : problem snoring [Hoarseness] : hoarseness [Throat Pain] : throat pain [Shortness Of Breath] : shortness of breath [Wheezing] : wheezing [Negative] : Heme/Lymph [FreeTextEntry1] : muscle aches, headache

## 2023-05-02 NOTE — PHYSICAL EXAM
[FreeTextEntry1] : obese with right facial pareses grade 4/5 [] : septum deviated to the left [Normal] : mucosa is normal [Midline] : trachea located in midline position

## 2023-05-02 NOTE — HISTORY OF PRESENT ILLNESS
[de-identified] : 72 yr old female was hospitalized at [facility] in Feb with wheezing, was intubated for 4 days.\par 2/24 extubated in the OR by Dr Mcghee without difficulty, normal appearing larynx\par \par readmitted in April for wheezing, intubation not needed\par \par c/o hoarseness since prior to 1st hospitalization\par -dysphagia\par +cigs stopped about 10 yrs ago\par +hx Bigler palsy right 21 yrs old

## 2023-06-06 ENCOUNTER — APPOINTMENT (OUTPATIENT)
Dept: OTOLARYNGOLOGY | Facility: CLINIC | Age: 73
End: 2023-06-06
Payer: COMMERCIAL

## 2023-06-06 PROCEDURE — 31579 LARYNGOSCOPY TELESCOPIC: CPT | Mod: GN

## 2023-06-06 NOTE — ASSESSMENT
[FreeTextEntry1] : The patient participated in a videostroboscopy this AM. Please see full report which is scanned into the AEHR.\par \par Kyra Herrera MA, CCC-SLP\par Speech-Language Pathologist

## 2023-06-11 NOTE — ED PROVIDER NOTE - NSICDXPASTMEDICALHX_GEN_ALL_CORE_FT
36.8 PAST MEDICAL HISTORY:  Asthma     H/O Bell's palsy     Hepatitis C     HTN (hypertension)     Seizure

## 2023-06-26 ENCOUNTER — APPOINTMENT (OUTPATIENT)
Dept: OTOLARYNGOLOGY | Facility: CLINIC | Age: 73
End: 2023-06-26

## 2023-07-15 NOTE — PHYSICAL THERAPY INITIAL EVALUATION ADULT - NSPTDMEREC_GEN_A_CORE
Family Medicine Discharge Summary    PATIENT  Selena is a 53 year old female admitted for   Chief Complaint   Patient presents with   • Abdominal Pain   .    Admission Date:  7/12/2023  9:20 AM    Discharge Date:  7/15/2023     PCP:  Chioma Otoole MD    Consultants:  IP Consult Orders (From admission, onward)     Start     Ordered    07/12/23 1434  Inpatient consult to Urology  ONE TIME        Provider:  Deonte Last MD    07/12/23 1433                Primary Diagnosis:  <principal problem not specified>    Discharge Diagnoses:  Active Hospital Problems    Diagnosis    • Nephrolithiasis      7/23 L ureteral nephrolithiasis, mild L hydroureteronephrosis, perinephric fat stranding: Ceftriaxone pending urine culture, Flomax, DiIaudid, Zofran, IVF, Urology/ [place]: cysto, L ureteroscopy, stone manip, stent placment         MEDICATIONS  Medication Changes:  - continue home regimen of PO dilaudid  - start flomax 0.8 mg daily till stent removal   - continue pyridium x 1 day at time of discharge (total 2 day course)  - no antibiotics indicated.     Discharge Medications:     Summary of your Discharge Medications      Take these Medications      Details   acetaminophen 500 MG tablet  Commonly known as: TYLENOL   Take 1,000 mg by mouth every 6 hours as needed for Pain.     buPROPion  MG 24 hr tablet  Commonly known as: WELLBUTRIN XL   Take one tablet by mouth daily     cyclobenzaprine 10 MG tablet  Commonly known as: FLEXERIL   Take 1 tablet by mouth 2 times daily as needed for Muscle spasms.     docusate sodium-sennosides 50-8.6 MG per tablet  Commonly known as: SENOKOT S   Take 2 tablets by mouth in the morning and 2 tablets in the evening.     HYDROmorphone 4 MG tablet  Commonly known as: DILAUDID  Start taking on: July 24, 2023   Do not start before July 24, 2023. Take 2 tablets 5 times daily for pain; may take additional 1 tab per day     ibuprofen 200 MG tablet  Commonly known as: MOTRIN   Take 600 mg  by mouth every 6 hours as needed for Pain.     levothyroxine 100 MCG tablet   Take 1 tablet by mouth daily.     naLOXone 4 MG/0.1ML nasal liquid  Commonly known as: NARCAN   Call 911. Spray the content of 1 device into 1 nostril. May repeat with 2nd device in alternate nostril if no response in 2-3 minutes.     NuvaRing 0.12-0.015 MG/24HR vaginal ring   Generic drug: etonogestrel-ethinyl estradiol     phenazopyridine 200 MG tablet  Commonly known as: PYRIDIUM   Take 1 tablet by mouth in the morning and 1 tablet at noon and 1 tablet in the evening. Take with meals. Do all this for 1 day.     SF 5000 Plus 1.1 % dental cream   Generic drug: sodium fluoride  PUT A PEA SIZED AMOUNT ON TOOTH BRUSH AT NIGHT. DO NOT RINSE, EAT OR DRINK FOR TWO HOURS AFTER     tamsulosin 0.4 MG Cap  Commonly known as: FLOMAX   Take 2 capsules by mouth daily after a meal. Do not start before July 15, 2023.     Vitamin D 125 MCG (5000 UT) Cap   Take 125 mcg by mouth daily.            FOLLOW-UP INSTRUCTIONS  Labs pending at discharge:   Pending Labs     Order Current Status    Calculi Analysis In process           Labs/imaging needed to be repeated outpatient:  None     Upcoming physician appointments:  - needs follow up appointment with Dr. Last office in 1 week for stent removal.   - follow up with PCP for post hospital follow up     Other instructions:  ---------------------------------------------------------------------------------------------------------------------------------------------------------------------------------------------------------------------------------  Vitals:  Temp:  [98.4 °F (36.9 °C)-98.8 °F (37.1 °C)] 98.4 °F (36.9 °C)  Heart Rate:  [72-80] 80  Resp:  [16-18] 18  BP: (119-142)/(71-74) 119/71    Physical Exam At Time of Discharge  Physical Exam  Constitutional:       Appearance: Normal appearance.   HENT:      Head: Normocephalic.      Right Ear: External ear normal.      Left Ear: External ear normal.       Mono 0.3 - 0.9 K/mcL 1.0 (H)   Absolute Eos 0.0 - 0.5 K/mcL 0.1   Absolute Baso 0.0 - 0.3 K/mcL 0.1   Immature Granulocytes % 0   Absolute Immature Granulocytes 0.0 - 0.2 K/mcL 0.0   (L): Data is abnormally low  (H): Data is abnormally high    Significant Imaging:  FL UROGRAM RETROGRADE INTRAOPERATIVE   Final Result   1.  Intraoperative retrograde urogram as above.       Electronically Signed by: KOKI JACKSON M.D.    Signed on: 7/13/2023 5:09 PM    Workstation ID: [id number]      CT ABDOMEN PELVIS W CONTRAST - IV contrast only   Final Result      Obstructing 4 mm calculus in the distal left ureter proximal to the UVJ   resulting in mild left hydroureteronephrosis.  There is a delayed left   nephrogram.  Left perinephric fat stranding and edema/free fluid are   present extending anterior to Gerota's fascia into the anterior pararenal   space abutting the tail of the pancreas, spleen, and duodenum.  No   loculated fluid collections seen to suggest an abscess.      Thickening versus are distention of the proximal transverse colon.  The   distal colon is underdistended.  Moderate colonic stool burden seen in the   proximal colon.      Hepatomegaly.      Electronically Signed by: Blas Guzmán DO    Signed on: 7/12/2023 11:55 AM    Workstation ID: [id number]          Pathology:  None   Procedures performed during hospitalization:  CYSTOSCOPY LEFT URETEROSCOPY,STONE MANIPULATION AND EXTRACTION,laser lithotripsy STENT PLACEMENT - with Dr. Last on 7/13/2023    Code Status:   Code Status: Full Resuscitation     Judith Sinclair MD   Family Medicine  07/15/23 9:53 AM         No DME needs

## 2023-09-11 NOTE — ED PROVIDER NOTE - NS ED MD EM SELECTION
77528 Detailed Epidermal Autograft Text: The defect edges were debeveled with a #15 scalpel blade.  Given the location of the defect, shape of the defect and the proximity to free margins an epidermal autograft was deemed most appropriate.  Using a sterile surgical marker, the primary defect shape was transferred to the donor site. The epidermal graft was then harvested.  The skin graft was then placed in the primary defect and oriented appropriately.

## 2023-11-01 NOTE — ED ADULT TRIAGE NOTE - HEART RATE (BEATS/MIN)
91 I called and spoke with pt. She is going to her urology apt in one hour. She wishes to speak with them there. Addressed all questions.

## 2023-11-30 NOTE — ED PROVIDER NOTE - PRINCIPAL DIAGNOSIS
follow up on:  complex medical decision making related to goals of care    [facility] and Palliative Consult Service:  Gloria Dolan DO: cell ([phone])  Ramon Rocha MD: cell ([phone])  Rigoberto Merchant NP: cell ([phone])   Colby Marsh LMSW: cell ([phone])   Anahi Alexandre NP: via [employer] Teams    Can contact any Palliative Team member via [employer] Teams for consults and questions      OVERNIGHT EVENTS: None    Patient examined at bedside this afternoon.  Remains alert and oriented x 2 to 3.  Still c/o moderate drowsiness/lethargy, but dizziness much improved.  Was able to walk down the hallway with walker and PT earlier today w/o major orthostasis symptoms.  Denies pain, chest pain, SOB, nausea, or vomiting.  Reports abdominal distension as stable.  Otherwise has no acute complaints.      Present Symptoms: Mild, Moderate, Severe  Pain:  Denies, 0/10  Fatigue:  moderate (lethargy/somnolence)  Nausea:  denies  Lack of Appetite:  denies  SOB:  denies  Depression:  denies  Anxiety:  denies  Constipation:  denies  Review of Systems:   All other systems reviewed and are negative      MEDICATIONS  (STANDING):  aspirin enteric coated 81 milliGRAM(s) Oral daily  atorvastatin 40 milliGRAM(s) Oral at bedtime  dorzolamide 2% Ophthalmic Solution 1 Drop(s) Both EYES three times a day  droxidopa 100 milliGRAM(s) Oral three times a day  enoxaparin Injectable 40 milliGRAM(s) SubCutaneous every 24 hours  folic acid 1 milliGRAM(s) Oral daily  glycopyrrolate 1 milliGRAM(s) Oral two times a day  latanoprost 0.005% Ophthalmic Solution 1 Drop(s) Both EYES at bedtime  midodrine. 20 milliGRAM(s) Oral three times a day  nystatin Powder 1 Application(s) Topical every 12 hours  pantoprazole    Tablet 40 milliGRAM(s) Oral before breakfast  senna 2 Tablet(s) Oral at bedtime  thiamine 100 milliGRAM(s) Oral daily    MEDICATIONS  (PRN):  melatonin 5 milliGRAM(s) Oral at bedtime PRN Insomnia  ondansetron Injectable 4 milliGRAM(s) IV Push every 8 hours PRN Nausea and/or Vomiting      PHYSICAL EXAM:  Vital Signs Last 24 Hrs  T(C): 37.1 (30 Nov 2023 20:21), Max: 37.1 (30 Nov 2023 20:21)  T(F): 98.7 (30 Nov 2023 20:21), Max: 98.7 (30 Nov 2023 20:21)  HR: 66 (30 Nov 2023 20:21) (59 - 69)  BP: 164/62 (30 Nov 2023 20:21) (97/58 - 164/62)  BP(mean): --  RR: 18 (30 Nov 2023 20:21) (18 - 18)  SpO2: 98% (30 Nov 2023 20:21) (95% - 99%)    Parameters below as of 30 Nov 2023 20:21  Patient On (Oxygen Delivery Method): room air        General: alert  oriented x _2 to 3 ___    + mld temporal wasting, sl cachectic, NAD    Palliative Performance Scale/Karnofsky Score:  30%  http://npcrc.org/files/news/palliative_performance_scale_ppsv2.pdf    HEENT:  EOMI anicteric, pharynx clear, MM moist  Lungs:  clear to auscultation, respirations unlabored, minimal oropharyngeal congestion/secretions noted  CV: RRR,  normal S1 and S2, no murmur  GI: soft non distended non tender + normal bowel sounds  Musculoskeletal: mild weakness x4 in all extremities, mostly bedbound, tr edema of LE bilaterally   Skin: no abnormal skin lesions or DU noted  Neuro: no focal deficits, + mild cognitive impairment, + dysphagia  Oral intake ability:  moderate capability, on minced and moist diet with mildly thickened liquids      LABS:                          10.7   5.81  )-----------( 159      ( 29 Nov 2023 06:05 )             32.3     11-29    143  |  112<H>  |  13  ----------------------------<  91  3.5   |  29  |  0.85    Ca    7.7<L>      29 Nov 2023 06:05      Albumin: 1.7 g/dL (11.16.23 @ 05:05)      Urinalysis Basic - ( 29 Nov 2023 06:05 )    Color: x / Appearance: x / SG: x / pH: x  Gluc: 91 mg/dL / Ketone: x  / Bili: x / Urobili: x   Blood: x / Protein: x / Nitrite: x   Leuk Esterase: x / RBC: x / WBC x   Sq Epi: x / Non Sq Epi: x / Bacteria: x        RADIOLOGY & ADDITIONAL STUDIES: Back pain of lumbar region with sciatica

## 2023-12-07 RX ORDER — AMLODIPINE BESYLATE 2.5 MG/1
1 TABLET ORAL
Qty: 0 | Refills: 0 | DISCHARGE

## 2023-12-14 NOTE — ED PROVIDER NOTE - CROS ED GI ALL NEG
negative... [FreeTextEntry1] : Patient examined and evaluated at this time. Continue local wound care and offloading. Pt to continue with HBOT. Spent 20 minutes for patient care and medical decision making. Patient to follow up in 1 week.

## 2024-05-16 NOTE — CDI QUERY NOTE - NSCDISEPSISTXTBX_GEN_ALL_CORE_HH
Per ID documentation : Patient with  KLPN sepsis/R ureteral stone w/ hydro/UTI  On admit temp= 100.3, HR= 90, RR= 18  WBC= 7.3 > 16.8 on 3/25  Lactate= 2.3  BC + Growth in aerobic bottle: Gram Negative Rods -  Klebsiella pneumoniae: Detec    Please clarify if the above clinical indicators are indicative of a diagnosis ?  a) KLPN Sepsis present and resolving ?  b) No clinical indication for Sepsis ?  c) + Bacteremia ?  d) Other ? Please clarify Instructions: This plan will send the code FBSD to the PM system.  DO NOT or CHANGE the price. Price (Do Not Change): 0.00 Detail Level: Simple

## 2024-07-01 ENCOUNTER — EMERGENCY (EMERGENCY)
Facility: HOSPITAL | Age: 74
LOS: 0 days | Discharge: HOME | End: 2024-07-01
Attending: STUDENT IN AN ORGANIZED HEALTH CARE EDUCATION/TRAINING PROGRAM
Payer: MEDICARE

## 2024-07-01 VITALS
OXYGEN SATURATION: 96 % | DIASTOLIC BLOOD PRESSURE: 75 MMHG | TEMPERATURE: 98 F | HEART RATE: 77 BPM | SYSTOLIC BLOOD PRESSURE: 146 MMHG | RESPIRATION RATE: 18 BRPM

## 2024-07-01 VITALS
SYSTOLIC BLOOD PRESSURE: 145 MMHG | WEIGHT: 228.62 LBS | OXYGEN SATURATION: 97 % | DIASTOLIC BLOOD PRESSURE: 87 MMHG | HEIGHT: 64 IN | TEMPERATURE: 97 F | HEART RATE: 94 BPM | RESPIRATION RATE: 18 BRPM

## 2024-07-01 PROBLEM — I10 ESSENTIAL (PRIMARY) HYPERTENSION: Chronic | Status: ACTIVE | Noted: 2023-02-23

## 2024-07-01 PROBLEM — J45.909 UNSPECIFIED ASTHMA, UNCOMPLICATED: Chronic | Status: ACTIVE | Noted: 2023-02-23

## 2024-07-01 LAB
ALBUMIN SERPL ELPH-MCNC: 3.5 G/DL — SIGNIFICANT CHANGE UP (ref 3.3–5)
ALP SERPL-CCNC: 90 U/L — SIGNIFICANT CHANGE UP (ref 40–120)
ALT FLD-CCNC: 17 U/L — SIGNIFICANT CHANGE UP (ref 12–78)
ANION GAP SERPL CALC-SCNC: 4 MMOL/L — LOW (ref 5–17)
AST SERPL-CCNC: 19 U/L — SIGNIFICANT CHANGE UP (ref 15–37)
BASOPHILS # BLD AUTO: 0.05 K/UL — SIGNIFICANT CHANGE UP (ref 0–0.2)
BASOPHILS NFR BLD AUTO: 0.7 % — SIGNIFICANT CHANGE UP (ref 0–2)
BILIRUB SERPL-MCNC: 0.5 MG/DL — SIGNIFICANT CHANGE UP (ref 0.2–1.2)
BUN SERPL-MCNC: 14 MG/DL — SIGNIFICANT CHANGE UP (ref 7–23)
CALCIUM SERPL-MCNC: 9.4 MG/DL — SIGNIFICANT CHANGE UP (ref 8.5–10.1)
CHLORIDE SERPL-SCNC: 110 MMOL/L — HIGH (ref 96–108)
CO2 SERPL-SCNC: 28 MMOL/L — SIGNIFICANT CHANGE UP (ref 22–31)
CREAT SERPL-MCNC: 0.84 MG/DL — SIGNIFICANT CHANGE UP (ref 0.5–1.3)
D DIMER BLD IA.RAPID-MCNC: 162 NG/ML DDU — SIGNIFICANT CHANGE UP
EGFR: 74 ML/MIN/1.73M2 — SIGNIFICANT CHANGE UP
EOSINOPHIL # BLD AUTO: 0.2 K/UL — SIGNIFICANT CHANGE UP (ref 0–0.5)
EOSINOPHIL NFR BLD AUTO: 2.9 % — SIGNIFICANT CHANGE UP (ref 0–6)
GLUCOSE SERPL-MCNC: 108 MG/DL — HIGH (ref 70–99)
HCT VFR BLD CALC: 43.3 % — SIGNIFICANT CHANGE UP (ref 34.5–45)
HGB BLD-MCNC: 13.6 G/DL — SIGNIFICANT CHANGE UP (ref 11.5–15.5)
IMM GRANULOCYTES NFR BLD AUTO: 0.1 % — SIGNIFICANT CHANGE UP (ref 0–0.9)
LYMPHOCYTES # BLD AUTO: 2.23 K/UL — SIGNIFICANT CHANGE UP (ref 1–3.3)
LYMPHOCYTES # BLD AUTO: 32 % — SIGNIFICANT CHANGE UP (ref 13–44)
MAGNESIUM SERPL-MCNC: 2.1 MG/DL — SIGNIFICANT CHANGE UP (ref 1.6–2.6)
MCHC RBC-ENTMCNC: 26.7 PG — LOW (ref 27–34)
MCHC RBC-ENTMCNC: 31.4 GM/DL — LOW (ref 32–36)
MCV RBC AUTO: 85.1 FL — SIGNIFICANT CHANGE UP (ref 80–100)
MONOCYTES # BLD AUTO: 0.49 K/UL — SIGNIFICANT CHANGE UP (ref 0–0.9)
MONOCYTES NFR BLD AUTO: 7 % — SIGNIFICANT CHANGE UP (ref 2–14)
NEUTROPHILS # BLD AUTO: 3.98 K/UL — SIGNIFICANT CHANGE UP (ref 1.8–7.4)
NEUTROPHILS NFR BLD AUTO: 57.3 % — SIGNIFICANT CHANGE UP (ref 43–77)
PLATELET # BLD AUTO: 231 K/UL — SIGNIFICANT CHANGE UP (ref 150–400)
POTASSIUM SERPL-MCNC: 4.4 MMOL/L — SIGNIFICANT CHANGE UP (ref 3.5–5.3)
POTASSIUM SERPL-SCNC: 4.4 MMOL/L — SIGNIFICANT CHANGE UP (ref 3.5–5.3)
PROT SERPL-MCNC: 8 GM/DL — SIGNIFICANT CHANGE UP (ref 6–8.3)
RBC # BLD: 5.09 M/UL — SIGNIFICANT CHANGE UP (ref 3.8–5.2)
RBC # FLD: 13 % — SIGNIFICANT CHANGE UP (ref 10.3–14.5)
SODIUM SERPL-SCNC: 142 MMOL/L — SIGNIFICANT CHANGE UP (ref 135–145)
TROPONIN I, HIGH SENSITIVITY RESULT: 6.13 NG/L — SIGNIFICANT CHANGE UP
TROPONIN I, HIGH SENSITIVITY RESULT: 6.87 NG/L — SIGNIFICANT CHANGE UP
WBC # BLD: 6.96 K/UL — SIGNIFICANT CHANGE UP (ref 3.8–10.5)
WBC # FLD AUTO: 6.96 K/UL — SIGNIFICANT CHANGE UP (ref 3.8–10.5)

## 2024-07-01 PROCEDURE — 71045 X-RAY EXAM CHEST 1 VIEW: CPT

## 2024-07-01 PROCEDURE — 85025 COMPLETE CBC W/AUTO DIFF WBC: CPT

## 2024-07-01 PROCEDURE — 85379 FIBRIN DEGRADATION QUANT: CPT

## 2024-07-01 PROCEDURE — 83735 ASSAY OF MAGNESIUM: CPT

## 2024-07-01 PROCEDURE — 99285 EMERGENCY DEPT VISIT HI MDM: CPT

## 2024-07-01 PROCEDURE — 93010 ELECTROCARDIOGRAM REPORT: CPT

## 2024-07-01 PROCEDURE — 84484 ASSAY OF TROPONIN QUANT: CPT

## 2024-07-01 PROCEDURE — 99285 EMERGENCY DEPT VISIT HI MDM: CPT | Mod: 25

## 2024-07-01 PROCEDURE — 80053 COMPREHEN METABOLIC PANEL: CPT

## 2024-07-01 PROCEDURE — 36415 COLL VENOUS BLD VENIPUNCTURE: CPT

## 2024-07-01 PROCEDURE — 71045 X-RAY EXAM CHEST 1 VIEW: CPT | Mod: 26

## 2024-07-01 PROCEDURE — 93005 ELECTROCARDIOGRAM TRACING: CPT

## 2024-07-01 PROCEDURE — 36000 PLACE NEEDLE IN VEIN: CPT

## 2024-07-01 RX ORDER — SODIUM CHLORIDE 0.9 % (FLUSH) 0.9 %
1000 SYRINGE (ML) INJECTION ONCE
Refills: 0 | Status: COMPLETED | OUTPATIENT
Start: 2024-07-01 | End: 2024-07-01

## 2024-07-01 RX ADMIN — Medication 1000 MILLILITER(S): at 13:37

## 2024-09-07 ENCOUNTER — INPATIENT (INPATIENT)
Facility: HOSPITAL | Age: 74
LOS: 4 days | Discharge: HOME | DRG: 690 | End: 2024-09-12
Attending: STUDENT IN AN ORGANIZED HEALTH CARE EDUCATION/TRAINING PROGRAM | Admitting: STUDENT IN AN ORGANIZED HEALTH CARE EDUCATION/TRAINING PROGRAM
Payer: MEDICARE

## 2024-09-07 VITALS
TEMPERATURE: 99 F | OXYGEN SATURATION: 99 % | DIASTOLIC BLOOD PRESSURE: 76 MMHG | HEIGHT: 68 IN | SYSTOLIC BLOOD PRESSURE: 115 MMHG | RESPIRATION RATE: 21 BRPM | HEART RATE: 109 BPM | WEIGHT: 231.04 LBS

## 2024-09-07 LAB
ALBUMIN SERPL ELPH-MCNC: 3.2 G/DL — LOW (ref 3.3–5)
ALP SERPL-CCNC: 81 U/L — SIGNIFICANT CHANGE UP (ref 40–120)
ALT FLD-CCNC: 18 U/L — SIGNIFICANT CHANGE UP (ref 12–78)
ANION GAP SERPL CALC-SCNC: 7 MMOL/L — SIGNIFICANT CHANGE UP (ref 5–17)
APPEARANCE UR: ABNORMAL
AST SERPL-CCNC: 21 U/L — SIGNIFICANT CHANGE UP (ref 15–37)
BACTERIA # UR AUTO: ABNORMAL /HPF
BASOPHILS # BLD AUTO: 0.06 K/UL — SIGNIFICANT CHANGE UP (ref 0–0.2)
BASOPHILS NFR BLD AUTO: 0.5 % — SIGNIFICANT CHANGE UP (ref 0–2)
BILIRUB SERPL-MCNC: 0.5 MG/DL — SIGNIFICANT CHANGE UP (ref 0.2–1.2)
BILIRUB UR-MCNC: NEGATIVE — SIGNIFICANT CHANGE UP
BUN SERPL-MCNC: 15 MG/DL — SIGNIFICANT CHANGE UP (ref 7–23)
CALCIUM SERPL-MCNC: 9.2 MG/DL — SIGNIFICANT CHANGE UP (ref 8.5–10.1)
CAST: 0 /LPF — SIGNIFICANT CHANGE UP (ref 0–4)
CHLORIDE SERPL-SCNC: 109 MMOL/L — HIGH (ref 96–108)
CO2 SERPL-SCNC: 24 MMOL/L — SIGNIFICANT CHANGE UP (ref 22–31)
COLOR SPEC: YELLOW — SIGNIFICANT CHANGE UP
CREAT SERPL-MCNC: 0.81 MG/DL — SIGNIFICANT CHANGE UP (ref 0.5–1.3)
DIFF PNL FLD: NEGATIVE — SIGNIFICANT CHANGE UP
EGFR: 77 ML/MIN/1.73M2 — SIGNIFICANT CHANGE UP
EOSINOPHIL # BLD AUTO: 0.09 K/UL — SIGNIFICANT CHANGE UP (ref 0–0.5)
EOSINOPHIL NFR BLD AUTO: 0.8 % — SIGNIFICANT CHANGE UP (ref 0–6)
FLUAV AG NPH QL: SIGNIFICANT CHANGE UP
FLUBV AG NPH QL: SIGNIFICANT CHANGE UP
GLUCOSE SERPL-MCNC: 112 MG/DL — HIGH (ref 70–99)
GLUCOSE UR QL: NEGATIVE MG/DL — SIGNIFICANT CHANGE UP
HCT VFR BLD CALC: 42.8 % — SIGNIFICANT CHANGE UP (ref 34.5–45)
HGB BLD-MCNC: 13.4 G/DL — SIGNIFICANT CHANGE UP (ref 11.5–15.5)
IMM GRANULOCYTES NFR BLD AUTO: 0.8 % — SIGNIFICANT CHANGE UP (ref 0–0.9)
KETONES UR-MCNC: NEGATIVE MG/DL — SIGNIFICANT CHANGE UP
LEUKOCYTE ESTERASE UR-ACNC: ABNORMAL
LYMPHOCYTES # BLD AUTO: 1.4 K/UL — SIGNIFICANT CHANGE UP (ref 1–3.3)
LYMPHOCYTES # BLD AUTO: 11.8 % — LOW (ref 13–44)
MCHC RBC-ENTMCNC: 26.6 PG — LOW (ref 27–34)
MCHC RBC-ENTMCNC: 31.3 GM/DL — LOW (ref 32–36)
MCV RBC AUTO: 84.9 FL — SIGNIFICANT CHANGE UP (ref 80–100)
MONOCYTES # BLD AUTO: 0.71 K/UL — SIGNIFICANT CHANGE UP (ref 0–0.9)
MONOCYTES NFR BLD AUTO: 6 % — SIGNIFICANT CHANGE UP (ref 2–14)
NEUTROPHILS # BLD AUTO: 9.56 K/UL — HIGH (ref 1.8–7.4)
NEUTROPHILS NFR BLD AUTO: 80.1 % — HIGH (ref 43–77)
NITRITE UR-MCNC: POSITIVE
NT-PROBNP SERPL-SCNC: 467 PG/ML — HIGH (ref 0–125)
PH UR: 7.5 — SIGNIFICANT CHANGE UP (ref 5–8)
PLATELET # BLD AUTO: 204 K/UL — SIGNIFICANT CHANGE UP (ref 150–400)
POTASSIUM SERPL-MCNC: 4.1 MMOL/L — SIGNIFICANT CHANGE UP (ref 3.5–5.3)
POTASSIUM SERPL-SCNC: 4.1 MMOL/L — SIGNIFICANT CHANGE UP (ref 3.5–5.3)
PROT SERPL-MCNC: 7.5 GM/DL — SIGNIFICANT CHANGE UP (ref 6–8.3)
PROT UR-MCNC: SIGNIFICANT CHANGE UP MG/DL
RBC # BLD: 5.04 M/UL — SIGNIFICANT CHANGE UP (ref 3.8–5.2)
RBC # FLD: 13.1 % — SIGNIFICANT CHANGE UP (ref 10.3–14.5)
RBC CASTS # UR COMP ASSIST: 3 /HPF — SIGNIFICANT CHANGE UP (ref 0–4)
RSV RNA NPH QL NAA+NON-PROBE: SIGNIFICANT CHANGE UP
SARS-COV-2 RNA SPEC QL NAA+PROBE: SIGNIFICANT CHANGE UP
SODIUM SERPL-SCNC: 140 MMOL/L — SIGNIFICANT CHANGE UP (ref 135–145)
SP GR SPEC: 1.02 — SIGNIFICANT CHANGE UP (ref 1–1.03)
SQUAMOUS # UR AUTO: 1 /HPF — SIGNIFICANT CHANGE UP (ref 0–5)
TROPONIN I, HIGH SENSITIVITY RESULT: 5.96 NG/L — SIGNIFICANT CHANGE UP
UROBILINOGEN FLD QL: 1 MG/DL — SIGNIFICANT CHANGE UP (ref 0.2–1)
WBC # BLD: 11.91 K/UL — HIGH (ref 3.8–10.5)
WBC # FLD AUTO: 11.91 K/UL — HIGH (ref 3.8–10.5)
WBC UR QL: 33 /HPF — HIGH (ref 0–5)

## 2024-09-07 PROCEDURE — 99285 EMERGENCY DEPT VISIT HI MDM: CPT

## 2024-09-07 PROCEDURE — 74177 CT ABD & PELVIS W/CONTRAST: CPT | Mod: 26,MC

## 2024-09-07 PROCEDURE — 93010 ELECTROCARDIOGRAM REPORT: CPT

## 2024-09-07 PROCEDURE — 71045 X-RAY EXAM CHEST 1 VIEW: CPT | Mod: 26

## 2024-09-07 RX ORDER — ACETAMINOPHEN 325 MG/1
1000 TABLET ORAL ONCE
Refills: 0 | Status: COMPLETED | OUTPATIENT
Start: 2024-09-07 | End: 2024-09-07

## 2024-09-07 RX ORDER — SODIUM CHLORIDE 9 MG/ML
2000 INJECTION INTRAMUSCULAR; INTRAVENOUS; SUBCUTANEOUS ONCE
Refills: 0 | Status: COMPLETED | OUTPATIENT
Start: 2024-09-07 | End: 2024-09-07

## 2024-09-07 RX ORDER — ONDANSETRON 2 MG/ML
4 INJECTION, SOLUTION INTRAMUSCULAR; INTRAVENOUS ONCE
Refills: 0 | Status: COMPLETED | OUTPATIENT
Start: 2024-09-07 | End: 2024-09-07

## 2024-09-07 RX ADMIN — ACETAMINOPHEN 400 MILLIGRAM(S): 325 TABLET ORAL at 21:50

## 2024-09-07 RX ADMIN — Medication 1000 MILLIGRAM(S): at 23:27

## 2024-09-07 RX ADMIN — ONDANSETRON 4 MILLIGRAM(S): 2 INJECTION, SOLUTION INTRAMUSCULAR; INTRAVENOUS at 22:06

## 2024-09-07 RX ADMIN — SODIUM CHLORIDE 2000 MILLILITER(S): 9 INJECTION INTRAMUSCULAR; INTRAVENOUS; SUBCUTANEOUS at 23:27

## 2024-09-07 NOTE — ED ADULT NURSE NOTE - OBJECTIVE STATEMENT
Pt presents to the ED c.o chest pain. Pt reports chest pain started 6AM this morning along with medial back pain. Pt reports feeling nauseous, vomited mucus only, denies diarrhea. Pt reports she was unable to eat or drink anything today. Pt denies palpitations, SOB at this time.

## 2024-09-07 NOTE — ED ADULT TRIAGE NOTE - CHIEF COMPLAINT QUOTE
Ambulance to ER from home with c/o chest pain starting this morning. Patient has hx of HTN; no medication taken prior to arrival. EKG completed in triage.

## 2024-09-07 NOTE — ED PROVIDER NOTE - CLINICAL SUMMARY MEDICAL DECISION MAKING FREE TEXT BOX
74-year-old female from home presenting with complaints of chest and abdominal pain starting at 6 AM this morning associated with nausea and vomiting.  Patient has not had a bowel movement this morning patient denies surgical history.  Patient denies shortness of breath diaphoresis.  The chest pain is nonpleuritic nonexertional.  Patient decided to come to the ED because the chest pain did not go away.  Patient did not take any pain medication at home.  Denies fevers denies diarrhea denies blood in stool.  Review of systems otherwise negative    Vital signs noted for tachycardia.  Temperature 99.1 will check rectal blood pressure within normal limits  Exam notable for clear lungs and chest.  Generalized abdominal tenderness.  No rebound or guarding.  Otherwise in no distress    Rule out infection,  abdominal infection/pathology/SBO given no bowel movement and generalized tenderness we will get CT abdomen.  Labs urine  Zofran and fluids  Pain control as needed  Reassess

## 2024-09-07 NOTE — ED PROVIDER NOTE - PHYSICAL EXAMINATION
Constitutional: NAD, alert, verbal  HEENT: NCAT, EOMi, PERRL  Cardiac: RRR no MRG  Resp: clear, no wheezing or crackles  GI: mild generalized ttp  Neuro: ALMENDAREZ  Skin: No rashes

## 2024-09-07 NOTE — ED ADULT NURSE NOTE - NSFALLUNIVINTERV_ED_ALL_ED
none Bed/Stretcher in lowest position, wheels locked, appropriate side rails in place/Call bell, personal items and telephone in reach/Instruct patient to call for assistance before getting out of bed/chair/stretcher/Non-slip footwear applied when patient is off stretcher/[place] to call system/Physically safe environment - no spills, clutter or unnecessary equipment/Purposeful proactive rounding/Room/bathroom lighting operational, light cord in reach

## 2024-09-07 NOTE — ED ADULT NURSE NOTE - PATIENT'S SEXUAL ORIENTATION
Detail Level: Detailed Quality 226: Preventive Care And Screening: Tobacco Use: Screening And Cessation Intervention: Patient screened for tobacco use and is an ex/non-smoker Withheld/Decline to Answer

## 2024-09-08 DIAGNOSIS — N39.0 URINARY TRACT INFECTION, SITE NOT SPECIFIED: ICD-10-CM

## 2024-09-08 LAB
ALBUMIN SERPL ELPH-MCNC: 2.8 G/DL — LOW (ref 3.3–5)
ALP SERPL-CCNC: 74 U/L — SIGNIFICANT CHANGE UP (ref 40–120)
ALT FLD-CCNC: 14 U/L — SIGNIFICANT CHANGE UP (ref 12–78)
ANION GAP SERPL CALC-SCNC: 3 MMOL/L — LOW (ref 5–17)
AST SERPL-CCNC: 12 U/L — LOW (ref 15–37)
BILIRUB SERPL-MCNC: 0.6 MG/DL — SIGNIFICANT CHANGE UP (ref 0.2–1.2)
BUN SERPL-MCNC: 12 MG/DL — SIGNIFICANT CHANGE UP (ref 7–23)
CALCIUM SERPL-MCNC: 8.5 MG/DL — SIGNIFICANT CHANGE UP (ref 8.5–10.1)
CHLORIDE SERPL-SCNC: 112 MMOL/L — HIGH (ref 96–108)
CO2 SERPL-SCNC: 26 MMOL/L — SIGNIFICANT CHANGE UP (ref 22–31)
CREAT SERPL-MCNC: 0.7 MG/DL — SIGNIFICANT CHANGE UP (ref 0.5–1.3)
EGFR: 92 ML/MIN/1.73M2 — SIGNIFICANT CHANGE UP
GLUCOSE SERPL-MCNC: 123 MG/DL — HIGH (ref 70–99)
HCT VFR BLD CALC: 36.3 % — SIGNIFICANT CHANGE UP (ref 34.5–45)
HGB BLD-MCNC: 11.4 G/DL — LOW (ref 11.5–15.5)
MCHC RBC-ENTMCNC: 26.8 PG — LOW (ref 27–34)
MCHC RBC-ENTMCNC: 31.4 GM/DL — LOW (ref 32–36)
MCV RBC AUTO: 85.2 FL — SIGNIFICANT CHANGE UP (ref 80–100)
PLATELET # BLD AUTO: 201 K/UL — SIGNIFICANT CHANGE UP (ref 150–400)
POTASSIUM SERPL-MCNC: 3.6 MMOL/L — SIGNIFICANT CHANGE UP (ref 3.5–5.3)
POTASSIUM SERPL-SCNC: 3.6 MMOL/L — SIGNIFICANT CHANGE UP (ref 3.5–5.3)
PROT SERPL-MCNC: 6.6 GM/DL — SIGNIFICANT CHANGE UP (ref 6–8.3)
RBC # BLD: 4.26 M/UL — SIGNIFICANT CHANGE UP (ref 3.8–5.2)
RBC # FLD: 13.2 % — SIGNIFICANT CHANGE UP (ref 10.3–14.5)
SODIUM SERPL-SCNC: 141 MMOL/L — SIGNIFICANT CHANGE UP (ref 135–145)
WBC # BLD: 11.83 K/UL — HIGH (ref 3.8–10.5)
WBC # FLD AUTO: 11.83 K/UL — HIGH (ref 3.8–10.5)

## 2024-09-08 PROCEDURE — 80048 BASIC METABOLIC PNL TOTAL CA: CPT

## 2024-09-08 PROCEDURE — 97162 PT EVAL MOD COMPLEX 30 MIN: CPT | Mod: GP

## 2024-09-08 PROCEDURE — 94640 AIRWAY INHALATION TREATMENT: CPT

## 2024-09-08 PROCEDURE — 97530 THERAPEUTIC ACTIVITIES: CPT | Mod: GP

## 2024-09-08 PROCEDURE — 80053 COMPREHEN METABOLIC PANEL: CPT

## 2024-09-08 PROCEDURE — 85027 COMPLETE CBC AUTOMATED: CPT

## 2024-09-08 PROCEDURE — 36415 COLL VENOUS BLD VENIPUNCTURE: CPT

## 2024-09-08 PROCEDURE — 97116 GAIT TRAINING THERAPY: CPT | Mod: GP

## 2024-09-08 PROCEDURE — 0241U: CPT

## 2024-09-08 PROCEDURE — 99223 1ST HOSP IP/OBS HIGH 75: CPT

## 2024-09-08 RX ORDER — ACETAMINOPHEN 325 MG/1
650 TABLET ORAL EVERY 6 HOURS
Refills: 0 | Status: DISCONTINUED | OUTPATIENT
Start: 2024-09-08 | End: 2024-09-08

## 2024-09-08 RX ORDER — ACETAMINOPHEN 325 MG/1
650 TABLET ORAL EVERY 6 HOURS
Refills: 0 | Status: DISCONTINUED | OUTPATIENT
Start: 2024-09-08 | End: 2024-09-12

## 2024-09-08 RX ORDER — FLUTICASONE PROPIONATE AND SALMETEROL 250; 50 UG/1; UG/1
1 POWDER RESPIRATORY (INHALATION)
Refills: 0 | Status: DISCONTINUED | OUTPATIENT
Start: 2024-09-08 | End: 2024-09-12

## 2024-09-08 RX ORDER — ATENOLOL 100 MG
50 TABLET ORAL DAILY
Refills: 0 | Status: DISCONTINUED | OUTPATIENT
Start: 2024-09-08 | End: 2024-09-12

## 2024-09-08 RX ORDER — LOSARTAN POTASSIUM 50 MG/1
50 TABLET ORAL DAILY
Refills: 0 | Status: DISCONTINUED | OUTPATIENT
Start: 2024-09-08 | End: 2024-09-12

## 2024-09-08 RX ORDER — LOSARTAN POTASSIUM 50 MG/1
1 TABLET ORAL
Refills: 0 | DISCHARGE

## 2024-09-08 RX ORDER — DOXAZOSIN MESYLATE 1 MG
1 TABLET ORAL DAILY
Refills: 0 | Status: DISCONTINUED | OUTPATIENT
Start: 2024-09-08 | End: 2024-09-12

## 2024-09-08 RX ORDER — HEPARIN SODIUM,BOVINE 1000/ML
5000 VIAL (ML) INJECTION EVERY 8 HOURS
Refills: 0 | Status: DISCONTINUED | OUTPATIENT
Start: 2024-09-08 | End: 2024-09-12

## 2024-09-08 RX ORDER — ATENOLOL 100 MG
1 TABLET ORAL
Refills: 0 | DISCHARGE

## 2024-09-08 RX ORDER — SODIUM CHLORIDE 9 MG/ML
1000 INJECTION INTRAMUSCULAR; INTRAVENOUS; SUBCUTANEOUS
Refills: 0 | Status: DISCONTINUED | OUTPATIENT
Start: 2024-09-08 | End: 2024-09-08

## 2024-09-08 RX ORDER — FLUTICASONE PROPIONATE AND SALMETEROL 250; 50 UG/1; UG/1
1 POWDER RESPIRATORY (INHALATION)
Refills: 0 | DISCHARGE

## 2024-09-08 RX ORDER — KETOROLAC TROMETHAMINE 30 MG/ML
15 INJECTION, SOLUTION INTRAMUSCULAR ONCE
Refills: 0 | Status: DISCONTINUED | OUTPATIENT
Start: 2024-09-08 | End: 2024-09-08

## 2024-09-08 RX ORDER — KETOROLAC TROMETHAMINE 30 MG/ML
15 INJECTION, SOLUTION INTRAMUSCULAR EVERY 8 HOURS
Refills: 0 | Status: DISCONTINUED | OUTPATIENT
Start: 2024-09-08 | End: 2024-09-12

## 2024-09-08 RX ORDER — OMEPRAZOLE 40 MG/1
1 CAPSULE, DELAYED RELEASE ORAL
Refills: 0 | DISCHARGE

## 2024-09-08 RX ORDER — PANTOPRAZOLE SODIUM 40 MG
40 TABLET, DELAYED RELEASE (ENTERIC COATED) ORAL
Refills: 0 | Status: DISCONTINUED | OUTPATIENT
Start: 2024-09-08 | End: 2024-09-12

## 2024-09-08 RX ORDER — ONDANSETRON 2 MG/ML
4 INJECTION, SOLUTION INTRAMUSCULAR; INTRAVENOUS EVERY 8 HOURS
Refills: 0 | Status: DISCONTINUED | OUTPATIENT
Start: 2024-09-08 | End: 2024-09-12

## 2024-09-08 RX ORDER — DOXAZOSIN MESYLATE 1 MG
1 TABLET ORAL
Refills: 0 | DISCHARGE

## 2024-09-08 RX ADMIN — LOSARTAN POTASSIUM 50 MILLIGRAM(S): 50 TABLET ORAL at 09:14

## 2024-09-08 RX ADMIN — ACETAMINOPHEN 650 MILLIGRAM(S): 325 TABLET ORAL at 20:49

## 2024-09-08 RX ADMIN — Medication 50 MILLIGRAM(S): at 11:06

## 2024-09-08 RX ADMIN — ACETAMINOPHEN 650 MILLIGRAM(S): 325 TABLET ORAL at 20:19

## 2024-09-08 RX ADMIN — Medication 1 MILLIGRAM(S): at 11:06

## 2024-09-08 RX ADMIN — Medication 5000 UNIT(S): at 21:14

## 2024-09-08 RX ADMIN — SODIUM CHLORIDE 100 MILLILITER(S): 9 INJECTION INTRAMUSCULAR; INTRAVENOUS; SUBCUTANEOUS at 13:24

## 2024-09-08 RX ADMIN — Medication 40 MILLIGRAM(S): at 09:14

## 2024-09-08 RX ADMIN — KETOROLAC TROMETHAMINE 15 MILLIGRAM(S): 30 INJECTION, SOLUTION INTRAMUSCULAR at 04:30

## 2024-09-08 RX ADMIN — ACETAMINOPHEN 650 MILLIGRAM(S): 325 TABLET ORAL at 09:13

## 2024-09-08 RX ADMIN — ACETAMINOPHEN 1000 MILLIGRAM(S): 325 TABLET ORAL at 01:16

## 2024-09-08 RX ADMIN — Medication 5000 UNIT(S): at 13:24

## 2024-09-08 RX ADMIN — SODIUM CHLORIDE 100 MILLILITER(S): 9 INJECTION INTRAMUSCULAR; INTRAVENOUS; SUBCUTANEOUS at 01:19

## 2024-09-08 RX ADMIN — Medication 20 MILLIGRAM(S): at 21:14

## 2024-09-08 RX ADMIN — Medication 5000 UNIT(S): at 04:30

## 2024-09-08 RX ADMIN — KETOROLAC TROMETHAMINE 15 MILLIGRAM(S): 30 INJECTION, SOLUTION INTRAMUSCULAR at 14:11

## 2024-09-08 RX ADMIN — FLUTICASONE PROPIONATE AND SALMETEROL 1 DOSE(S): 250; 50 POWDER RESPIRATORY (INHALATION) at 20:58

## 2024-09-08 NOTE — PATIENT PROFILE ADULT - FALL HARM RISK - HARM RISK INTERVENTIONS

## 2024-09-08 NOTE — ED ADULT NURSE REASSESSMENT NOTE - NS ED NURSE REASSESS COMMENT FT1
Patient resting in ED stretcher. Pt A+Ox4, NAD. Pt profile completed. IVF infusing as per ordered. VSS. Pt states pain has reduced from previous assessment. Patient updated on plan of care. Awaiting bed assignment and further orders. Care continues as ordered.

## 2024-09-08 NOTE — H&P ADULT - ASSESSMENT
sepsis secondary to UTI  -culture sent  -IVF   -Rocephin  -monitor     Nausea, vomiting, lower abdominal pain  -likely secondary to complicated UTI, ct abdomen and pelvis shows no pyelo    asthma  -stable  -on advair bid   -on albuterol     history of bells pasly  -chronic, no new neuro symptoms     HTN (hypertension).   -on losartan 50 mg qd  -on atenolol 50 mg qd  -on doxazosin 1 mg qd    DVT ppx  -heparin sc     history of seizure disorder  -not on AEDs    history of treated hep c     Hyperlipidemia  -on lipitor 20 mg

## 2024-09-08 NOTE — PATIENT PROFILE ADULT - FOOD INSECURITY
Congestive Heart Failure [address]      Referring Provider: MASON Hansen  Primary Care Physician: Arlyn Alfaro MD   Cardiologist: Dr. Monica Park  Nephrologist:       HISTORY OF PRESENT ILLNESS:     Gabriel Vidal is a 64 y.o. (1963) male with a history of HFpEF, most recent EF:  Lab Results   Component Value Date    LVEF 63 2023    LVEFMODE Echo 2022         He presents to the CHF clinic for ongoing evaluation and monitoring of heart failure.     In the CHF clinic today he denies any adverse symptoms except:  [] Dizziness or lightheadedness   [] Syncope or near syncope  [] Recent Fall  [x] Shortness of breath at rest   [x] Dyspnea with exertion  [] Decline in functional capacity (unable to perform activities they had previously been able to do)  [x] Fatigue   [x] Orthopnea-must sleep sitting up slightly about 45 degrees with 2 pillows  [] PND  [] Nocturnal cough  [] Hemoptysis  [x] Chest pain, pressure, or discomfort-not new per patient, is on right side and intermittent, says it has been reported to physicians  [] Palpitations  [] Abdominal distention  [] Abdominal bloating  [] Early satiety   [] Blood in stool   [] Diarrhea  [] Constipation  [] Nausea/Vomiting  [] Decreased urinary response to oral diuretic   [] Scrotal swelling   [] Lower extremity edema  [] Used PRN doses of oral diuretic   [] Weight gain    Wt Readings from Last 3 Encounters:   23 128 lb (58.1 kg)   23 128 lb (58.1 kg)   23 127 lb (57.6 kg)           SOCIAL HISTORY:  [x] Denies tobacco, alcohol or illicit drug abuse  [] Tobacco use:  [] ETOH use:  [] Illicit drug use:        MEDICATIONS:    Allergies   Allergen Reactions    Lisinopril     Other      Other reaction(s): ABD PAIN    Tramadol     Ibuprofen Nausea And Vomiting    Sulfa Antibiotics Nausea And Vomiting       Current Outpatient Medications:     HYDROcodone-acetaminophen ([address]) no

## 2024-09-08 NOTE — PATIENT PROFILE ADULT - OVER THE PAST TWO WEEKS HAVE YOU FELT DOWN, DEPRESSED OR HOPELESS?
58-year-old male past medical history CAD with proximal LAD stent compliant with aspirin and Plavix, presents to ED complaining of 4 to 5 days of subtle left-sided chest pain and left arm pain.  Went for 2 mile walk earlier today without issue.  When he got home he was seated on the couch and felt symptoms that were associated with some lightheadedness.  When patient needed prior stent last year he was not experiencing chest pain but was presyncopal.  Lightheadedness today concern patient so he came to ED.  Cardiologist Dr. Dereck Manuel.  Patient reports normal stress echo earlier this year.  Patient is well-appearing and in no acute distress in triage.    Patient was seen as a QPA patient. The patient will be seen and further worked up in the main emergency department and their care will be completed by the main emergency department team along with a thorough physical exam. Receiving team will follow up on labs, analgesia, any clinical imaging, reassess and disposition as clinically indicated, all decisions regarding the progression of care will be made at their discretion. - Chantelle GILBERT
no

## 2024-09-08 NOTE — H&P ADULT - NSHPPHYSICALEXAM_GEN_ALL_CORE
Constitutional: awake, alert, no acute distress  HEENT: NCAT, EOMI  Neck: supple, no jvd   Cardiac: RRR no MRG  Resp: symmetrical expansion, no labored breathing, no wheezing or crackles  GI: soft non tender non distended, obese   Neuro: bells palsy, chronic, moves all extremities   Skin: Normal temp noted   Psych: normal mood and affect

## 2024-09-08 NOTE — H&P ADULT - HISTORY OF PRESENT ILLNESS
this is a 74-year-old female  with pmh of htn, bells palsy, asthma, htn, hep c, prior history of seizures not on AEDs. Patient presents today for lower abdominal pain on both sides, epigastric pain, nausea, non biliary non bloody vomiting. she did not have chest pain, she had lower abdominal and epigastric pain. denies sob or fevers or chills.     in ER, she was found to have tachycardia, positive UA. and fever 100.8 , wbc 11    ct abdomen and pelvis done and showed no acute findings    pt is being admitted for sepsis secondary to complicated UTI

## 2024-09-08 NOTE — H&P ADULT - NSICDXPASTMEDICALHX_GEN_ALL_CORE_FT
PAST MEDICAL HISTORY:  Asthma     Asthma, unspecified asthma severity, unspecified whether complicated, unspecified whether persistent     Bell's palsy     Essential hypertension     Hepatitis C     HTN (hypertension)     Seizure

## 2024-09-09 LAB
ANION GAP SERPL CALC-SCNC: 4 MMOL/L — LOW (ref 5–17)
BUN SERPL-MCNC: 16 MG/DL — SIGNIFICANT CHANGE UP (ref 7–23)
CALCIUM SERPL-MCNC: 8.7 MG/DL — SIGNIFICANT CHANGE UP (ref 8.5–10.1)
CHLORIDE SERPL-SCNC: 111 MMOL/L — HIGH (ref 96–108)
CO2 SERPL-SCNC: 24 MMOL/L — SIGNIFICANT CHANGE UP (ref 22–31)
CREAT SERPL-MCNC: 0.84 MG/DL — SIGNIFICANT CHANGE UP (ref 0.5–1.3)
EGFR: 74 ML/MIN/1.73M2 — SIGNIFICANT CHANGE UP
GLUCOSE SERPL-MCNC: 93 MG/DL — SIGNIFICANT CHANGE UP (ref 70–99)
HCT VFR BLD CALC: 37.3 % — SIGNIFICANT CHANGE UP (ref 34.5–45)
HGB BLD-MCNC: 11.5 G/DL — SIGNIFICANT CHANGE UP (ref 11.5–15.5)
MCHC RBC-ENTMCNC: 26.7 PG — LOW (ref 27–34)
MCHC RBC-ENTMCNC: 30.8 GM/DL — LOW (ref 32–36)
MCV RBC AUTO: 86.7 FL — SIGNIFICANT CHANGE UP (ref 80–100)
PLATELET # BLD AUTO: 187 K/UL — SIGNIFICANT CHANGE UP (ref 150–400)
POTASSIUM SERPL-MCNC: 4 MMOL/L — SIGNIFICANT CHANGE UP (ref 3.5–5.3)
POTASSIUM SERPL-SCNC: 4 MMOL/L — SIGNIFICANT CHANGE UP (ref 3.5–5.3)
RBC # BLD: 4.3 M/UL — SIGNIFICANT CHANGE UP (ref 3.8–5.2)
RBC # FLD: 13.4 % — SIGNIFICANT CHANGE UP (ref 10.3–14.5)
SODIUM SERPL-SCNC: 139 MMOL/L — SIGNIFICANT CHANGE UP (ref 135–145)
WBC # BLD: 12.6 K/UL — HIGH (ref 3.8–10.5)
WBC # FLD AUTO: 12.6 K/UL — HIGH (ref 3.8–10.5)

## 2024-09-09 PROCEDURE — 99232 SBSQ HOSP IP/OBS MODERATE 35: CPT

## 2024-09-09 RX ADMIN — Medication 40 MILLIGRAM(S): at 10:50

## 2024-09-09 RX ADMIN — Medication 2000 MILLIGRAM(S): at 00:16

## 2024-09-09 RX ADMIN — FLUTICASONE PROPIONATE AND SALMETEROL 1 DOSE(S): 250; 50 POWDER RESPIRATORY (INHALATION) at 21:02

## 2024-09-09 RX ADMIN — ACETAMINOPHEN 650 MILLIGRAM(S): 325 TABLET ORAL at 20:08

## 2024-09-09 RX ADMIN — Medication 5000 UNIT(S): at 21:11

## 2024-09-09 RX ADMIN — FLUTICASONE PROPIONATE AND SALMETEROL 1 DOSE(S): 250; 50 POWDER RESPIRATORY (INHALATION) at 07:24

## 2024-09-09 RX ADMIN — LOSARTAN POTASSIUM 50 MILLIGRAM(S): 50 TABLET ORAL at 10:49

## 2024-09-09 RX ADMIN — Medication 50 MILLIGRAM(S): at 10:49

## 2024-09-09 RX ADMIN — Medication 1 MILLIGRAM(S): at 10:50

## 2024-09-09 RX ADMIN — KETOROLAC TROMETHAMINE 15 MILLIGRAM(S): 30 INJECTION, SOLUTION INTRAMUSCULAR at 10:57

## 2024-09-09 RX ADMIN — Medication 20 MILLIGRAM(S): at 21:11

## 2024-09-09 RX ADMIN — Medication 5000 UNIT(S): at 13:52

## 2024-09-09 RX ADMIN — Medication 5000 UNIT(S): at 06:03

## 2024-09-09 NOTE — PHYSICAL THERAPY INITIAL EVALUATION ADULT - PERTINENT HX OF CURRENT PROBLEM, REHAB EVAL
74-year-old female  with pmh of htn, bells palsy, asthma, htn, hep c, prior history of seizures not on AEDs. Patient presents today for lower abdominal pain on both sides, epigastric pain, nausea, non biliary non bloody vomiting. In ER, she was found to have tachycardia, positive UA.     CTAP: No acute finding in the abdomen or pelvis. Chronic findings similar to prior include nodular contour of the liver suggestive of cirrhosis, bilateral adrenal adenomas, nonobstructing right renal stones, and colonic diverticulosis.

## 2024-09-09 NOTE — CONSULT NOTE ADULT - SUBJECTIVE AND OBJECTIVE BOX
Patient is a 74y old  Female who presents with a chief complaint of abdominal pain, nausea and vomiting (08 Sep 2024 15:59)      HPI:  74-year-old female  with pmh of htn, bells palsy, asthma, htn, hep c, prior history of seizures not on AEDs. Patient presents today for lower abdominal pain on both sides, epigastric pain, nausea, non biliary non bloody vomiting. she did not have chest pain, she had lower abdominal and epigastric pain. denies sob or fevers or chills.     in ER, she was found to have tachycardia, positive UA. and fever 100.8 , wbc 11    ct abdomen and pelvis done and showed no acute findings    pt is being admitted for sepsis secondary to complicated UTI  (08 Sep 2024 00:32)      PAST MEDICAL & SURGICAL HISTORY:  Bell's palsy      Hepatitis C      Asthma, unspecified asthma severity, unspecified whether complicated, unspecified whether persistent      Essential hypertension      Seizure      Asthma      HTN (hypertension)      No significant past surgical history      No significant past surgical history          PREVIOUS DIAGNOSTIC TESTING:      MEDICATIONS  (STANDING):  atenolol  Tablet 50 milliGRAM(s) Oral daily  atorvastatin 20 milliGRAM(s) Oral at bedtime  cefTRIAXone Injectable. 2000 milliGRAM(s) IV Push every 24 hours  doxazosin 1 milliGRAM(s) Oral daily  fluticasone propionate/ salmeterol 250-50 MICROgram(s) Diskus 1 Dose(s) Inhalation two times a day  heparin   Injectable 5000 Unit(s) SubCutaneous every 8 hours  losartan 50 milliGRAM(s) Oral daily  pantoprazole    Tablet 40 milliGRAM(s) Oral before breakfast    MEDICATIONS  (PRN):  acetaminophen     Tablet .. 650 milliGRAM(s) Oral every 6 hours PRN Mild Pain (1 - 3)  albuterol    90 MICROgram(s) HFA Inhaler 1 Puff(s) Inhalation every 4 hours PRN Shortness of Breath  ketorolac   Injectable 15 milliGRAM(s) IV Push every 8 hours PRN Severe Pain (7 - 10)  ketorolac   Injectable 15 milliGRAM(s) IV Push every 8 hours PRN Moderate Pain (4 - 6)  ondansetron   Disintegrating Tablet 4 milliGRAM(s) Oral every 8 hours PRN Nausea and/or Vomiting      FAMILY HISTORY:  Family history of breast cancer (Sibling)    No pertinent family history in first degree relatives        SOCIAL HISTORY:  ***    REVIEW OF SYSTEM:  Pertinent items are noted in HPI.    Vital Signs Last 24 Hrs  T(C): 37.4 (09 Sep 2024 08:15), Max: 37.4 (09 Sep 2024 08:15)  T(F): 99.3 (09 Sep 2024 08:15), Max: 99.3 (09 Sep 2024 08:15)  HR: 94 (09 Sep 2024 08:15) (81 - 94)  BP: 145/83 (09 Sep 2024 08:15) (123/59 - 145/83)  BP(mean): --  RR: 19 (09 Sep 2024 08:15) (18 - 19)  SpO2: 94% (09 Sep 2024 08:15) (94% - 96%)    Parameters below as of 09 Sep 2024 08:15  Patient On (Oxygen Delivery Method): room air        I&O's Summary    PHYSICAL EXAM  General Appearance: cooperative, no acute distress,   HEENT: PERRL, conjunctiva clear, EOM's intact, non injected pharynx, no exudate, TM   normal  Neck: Supple, , no adenopathy, thyroid: not enlarged, no carotid bruit or JVD  Back: Symmetric, no  tenderness,no soft tissue tenderness  Lungs: Clear to auscultation bilateral,no adventitious breath sounds, normal   expiratory phase  Heart: Regular rate and rhythm, S1, S2 normal, no murmur, rub or gallop  Abdomen: Soft, non-tender, bowel sounds active , no hepatosplenomegaly  Extremities: no cyanosis or edema, no joint swelling  Skin: Skin color, texture normal, no rashes   Neurologic: Alert and oriented X3 , cranial nerves intact, sensory and motor normal,    ECG:    LABS:                          11.4   11.83 )-----------( 201      ( 08 Sep 2024 07:00 )             36.3     09-09    139  |  111<H>  |  16  ----------------------------<  93  4.0   |  24  |  0.84    Ca    8.7      09 Sep 2024 06:59    TPro  6.6  /  Alb  2.8<L>  /  TBili  0.6  /  DBili  x   /  AST  12<L>  /  ALT  14  /  AlkPhos  74  09-08              Urinalysis Basic - ( 09 Sep 2024 06:59 )    Color: x / Appearance: x / SG: x / pH: x  Gluc: 93 mg/dL / Ketone: x  / Bili: x / Urobili: x   Blood: x / Protein: x / Nitrite: x   Leuk Esterase: x / RBC: x / WBC x   Sq Epi: x / Non Sq Epi: x / Bacteria: x    < from: CT Abdomen and Pelvis w/ IV Cont (09.07.24 @ 22:50) >  PROCEDURE:  CT of the Abdomen and Pelvis was performed.  Sagittal and coronal reformats were performed.    FINDINGS:  LOWER CHEST: Within normal limits.    LIVER: Mildly enlarged, nodular contour, similar to prior. No focal   lesions are evident.  BILE DUCTS: Normal caliber.  GALLBLADDER: Distended. No visible stones or adjacent inflammation.  SPLEEN: Within normal limits.  PANCREAS: Within normal limits.  ADRENALS: Bilateral adenomas similar to prior.  KIDNEYS/URETERS: No hydronephrosis or concerning mass. Nonobstructing   stones lower pole right kidney, largest 8 mm, similar to prior.    BLADDER: Within normal limits.  REPRODUCTIVE ORGANS: Hysterectomy.    BOWEL: No bowel obstruction. Appendix is normal. Colonic diverticulosis,   no diverticulitis.  PERITONEUM/RETROPERITONEUM: Within normal limits.  VESSELS: Atherosclerotic changes. No abdominal aortic aneurysm.  LYMPH NODES: Mild portal caval adenopathy similar to prior.  ABDOMINAL WALL: Within normal limits.  BONES: Degenerative changes. No fracture or aggressive osseous lesion.    IMPRESSION:  Noacute finding in the abdomen or pelvis.    Chronic findings similar to prior include nodular contour of the liver   suggestive of cirrhosis, bilateral adrenal adenomas, nonobstructing right   renal stones, and colonic diverticulosis.    < end of copied text >  < from: Xray Chest 1 View- PORTABLE-Urgent (09.07.24 @ 21:04) >      INTERPRETATION:  CLINICAL INFORMATION: Chest pain.    TECHNIQUE: Single portable view of the chest.    COMPARISON: Chest x-ray 7/1/2024.    FINDINGS:    Clear lungs.  No pneumothorax or large pleural effusion.  Heart size within normal limits.    IMPRESSION:    Clear lungs.    < end of copied text >          RADIOLOGY & ADDITIONAL STUDIES:

## 2024-09-09 NOTE — CONSULT NOTE ADULT - ASSESSMENT
74-year-old female  with pmh of htn, bells palsy, asthma, htn, hep c, prior history of seizures not on AEDs. Patient presents today for lower abdominal pain on both sides, epigastric pain, nausea, non biliary non bloody vomiting. she did not have chest pain, she had lower abdominal and epigastric pain. denies sob or fevers or chills.     in ER, she was found to have tachycardia, positive UA. and fever 100.8 , wbc 11    ct abdomen and pelvis done and showed no acute findings    pt is being admitted for sepsis secondary to complicated UTI  (08 Sep 2024 00:32)    Assessment / plan;  Abd pain, diverticulosis / nephrolithiasis  Asthma with mil bronchospasm  hx VC dysfunction  resp status appears stable now  r/o sepsis / UTI    GI eval in progress  cont with bronchodilator rx  will fu with you

## 2024-09-09 NOTE — PHYSICAL THERAPY INITIAL EVALUATION ADULT - ADDITIONAL COMMENTS
Pt reprots she lives in an apartment 20 steps to negotiate, resides with her sister, no recent falls, has DME which she uses, and completes ADLs independently. -information from Apr 2023 Pt reports she lives in an apartment 13 steps to negotiate, resides with her sister, no recent falls, has DME which she uses, and completes ADLs independently. -information from Apr 2023 confirmed this admission.

## 2024-09-10 PROCEDURE — 99232 SBSQ HOSP IP/OBS MODERATE 35: CPT

## 2024-09-10 RX ADMIN — Medication 1 MILLIGRAM(S): at 14:59

## 2024-09-10 RX ADMIN — Medication 5000 UNIT(S): at 17:12

## 2024-09-10 RX ADMIN — Medication 1 PUFF(S): at 09:54

## 2024-09-10 RX ADMIN — Medication 5000 UNIT(S): at 05:06

## 2024-09-10 RX ADMIN — FLUTICASONE PROPIONATE AND SALMETEROL 1 DOSE(S): 250; 50 POWDER RESPIRATORY (INHALATION) at 09:53

## 2024-09-10 RX ADMIN — Medication 20 MILLIGRAM(S): at 21:01

## 2024-09-10 RX ADMIN — Medication 2000 MILLIGRAM(S): at 00:06

## 2024-09-10 RX ADMIN — Medication 10 MILLIGRAM(S): at 17:13

## 2024-09-10 RX ADMIN — LOSARTAN POTASSIUM 50 MILLIGRAM(S): 50 TABLET ORAL at 14:59

## 2024-09-10 RX ADMIN — Medication 5000 UNIT(S): at 21:00

## 2024-09-10 RX ADMIN — Medication 296 MILLILITER(S): at 17:13

## 2024-09-10 RX ADMIN — Medication 50 MILLIGRAM(S): at 14:58

## 2024-09-10 RX ADMIN — Medication 40 MILLIGRAM(S): at 14:59

## 2024-09-10 RX ADMIN — Medication 1 PUFF(S): at 05:54

## 2024-09-11 LAB
-  AMOXICILLIN/CLAVULANIC ACID: SIGNIFICANT CHANGE UP
-  AMPICILLIN/SULBACTAM: SIGNIFICANT CHANGE UP
-  AMPICILLIN: SIGNIFICANT CHANGE UP
-  AZTREONAM: SIGNIFICANT CHANGE UP
-  CEFAZOLIN: SIGNIFICANT CHANGE UP
-  CEFEPIME: SIGNIFICANT CHANGE UP
-  CEFOXITIN: SIGNIFICANT CHANGE UP
-  CEFTRIAXONE: SIGNIFICANT CHANGE UP
-  CEFUROXIME: SIGNIFICANT CHANGE UP
-  CIPROFLOXACIN: SIGNIFICANT CHANGE UP
-  ERTAPENEM: SIGNIFICANT CHANGE UP
-  GENTAMICIN: SIGNIFICANT CHANGE UP
-  IMIPENEM: SIGNIFICANT CHANGE UP
-  LEVOFLOXACIN: SIGNIFICANT CHANGE UP
-  MEROPENEM: SIGNIFICANT CHANGE UP
-  NITROFURANTOIN: SIGNIFICANT CHANGE UP
-  PIPERACILLIN/TAZOBACTAM: SIGNIFICANT CHANGE UP
-  TOBRAMYCIN: SIGNIFICANT CHANGE UP
-  TRIMETHOPRIM/SULFAMETHOXAZOLE: SIGNIFICANT CHANGE UP
CULTURE RESULTS: ABNORMAL
HCT VFR BLD CALC: 35 % — SIGNIFICANT CHANGE UP (ref 34.5–45)
HGB BLD-MCNC: 11.1 G/DL — LOW (ref 11.5–15.5)
MCHC RBC-ENTMCNC: 27.1 PG — SIGNIFICANT CHANGE UP (ref 27–34)
MCHC RBC-ENTMCNC: 31.7 GM/DL — LOW (ref 32–36)
MCV RBC AUTO: 85.6 FL — SIGNIFICANT CHANGE UP (ref 80–100)
METHOD TYPE: SIGNIFICANT CHANGE UP
ORGANISM # SPEC MICROSCOPIC CNT: ABNORMAL
ORGANISM # SPEC MICROSCOPIC CNT: SIGNIFICANT CHANGE UP
PLATELET # BLD AUTO: 216 K/UL — SIGNIFICANT CHANGE UP (ref 150–400)
RBC # BLD: 4.09 M/UL — SIGNIFICANT CHANGE UP (ref 3.8–5.2)
RBC # FLD: 13 % — SIGNIFICANT CHANGE UP (ref 10.3–14.5)
SPECIMEN SOURCE: SIGNIFICANT CHANGE UP
WBC # BLD: 8.66 K/UL — SIGNIFICANT CHANGE UP (ref 3.8–10.5)
WBC # FLD AUTO: 8.66 K/UL — SIGNIFICANT CHANGE UP (ref 3.8–10.5)

## 2024-09-11 PROCEDURE — 99232 SBSQ HOSP IP/OBS MODERATE 35: CPT

## 2024-09-11 RX ADMIN — Medication 2000 MILLIGRAM(S): at 00:17

## 2024-09-11 RX ADMIN — Medication 1 MILLIGRAM(S): at 10:34

## 2024-09-11 RX ADMIN — Medication 5000 UNIT(S): at 22:03

## 2024-09-11 RX ADMIN — Medication 5000 UNIT(S): at 05:08

## 2024-09-11 RX ADMIN — Medication 20 MILLIGRAM(S): at 22:03

## 2024-09-11 RX ADMIN — ACETAMINOPHEN 650 MILLIGRAM(S): 325 TABLET ORAL at 22:19

## 2024-09-11 RX ADMIN — FLUTICASONE PROPIONATE AND SALMETEROL 1 DOSE(S): 250; 50 POWDER RESPIRATORY (INHALATION) at 10:28

## 2024-09-11 RX ADMIN — LOSARTAN POTASSIUM 50 MILLIGRAM(S): 50 TABLET ORAL at 10:34

## 2024-09-11 RX ADMIN — Medication 5000 UNIT(S): at 16:40

## 2024-09-11 RX ADMIN — Medication 40 MILLIGRAM(S): at 10:34

## 2024-09-11 RX ADMIN — Medication 50 MILLIGRAM(S): at 10:34

## 2024-09-11 RX ADMIN — FLUTICASONE PROPIONATE AND SALMETEROL 1 DOSE(S): 250; 50 POWDER RESPIRATORY (INHALATION) at 20:23

## 2024-09-12 ENCOUNTER — TRANSCRIPTION ENCOUNTER (OUTPATIENT)
Age: 74
End: 2024-09-12

## 2024-09-12 VITALS
OXYGEN SATURATION: 95 % | TEMPERATURE: 98 F | HEART RATE: 77 BPM | RESPIRATION RATE: 18 BRPM | DIASTOLIC BLOOD PRESSURE: 55 MMHG | SYSTOLIC BLOOD PRESSURE: 126 MMHG

## 2024-09-12 LAB
FLUAV AG NPH QL: SIGNIFICANT CHANGE UP
FLUBV AG NPH QL: SIGNIFICANT CHANGE UP
RSV RNA NPH QL NAA+NON-PROBE: SIGNIFICANT CHANGE UP
SARS-COV-2 RNA SPEC QL NAA+PROBE: SIGNIFICANT CHANGE UP

## 2024-09-12 PROCEDURE — 99239 HOSP IP/OBS DSCHRG MGMT >30: CPT

## 2024-09-12 RX ORDER — CEFPODOXIME PROXETIL 100 MG/5ML
1 GRANULE, FOR SUSPENSION ORAL
Qty: 10 | Refills: 0
Start: 2024-09-12 | End: 2024-09-16

## 2024-09-12 RX ORDER — LIDOCAINE/BENZALKONIUM/ALCOHOL
1 SOLUTION, NON-ORAL TOPICAL ONCE
Refills: 0 | Status: COMPLETED | OUTPATIENT
Start: 2024-09-12 | End: 2024-09-12

## 2024-09-12 RX ADMIN — Medication 1 PUFF(S): at 10:21

## 2024-09-12 RX ADMIN — Medication 1 MILLIGRAM(S): at 10:03

## 2024-09-12 RX ADMIN — Medication 2000 MILLIGRAM(S): at 01:38

## 2024-09-12 RX ADMIN — Medication 40 MILLIGRAM(S): at 10:03

## 2024-09-12 RX ADMIN — Medication 50 MILLIGRAM(S): at 10:03

## 2024-09-12 RX ADMIN — LOSARTAN POTASSIUM 50 MILLIGRAM(S): 50 TABLET ORAL at 10:03

## 2024-09-12 RX ADMIN — Medication 5000 UNIT(S): at 05:48

## 2024-09-12 RX ADMIN — FLUTICASONE PROPIONATE AND SALMETEROL 1 DOSE(S): 250; 50 POWDER RESPIRATORY (INHALATION) at 10:23

## 2024-09-12 RX ADMIN — Medication 1 PATCH: at 16:16

## 2024-09-12 RX ADMIN — Medication 5000 UNIT(S): at 13:41

## 2024-09-12 NOTE — PROGRESS NOTE ADULT - REASON FOR ADMISSION
abdominal pain, nausea and vomiting

## 2024-09-12 NOTE — DISCHARGE NOTE PROVIDER - PROVIDER TOKENS
PROVIDER:[TOKEN:[[id number]],FOLLOWUP:[1 week]],PROVIDER:[TOKEN:[[id number]],FOLLOWUP:[1 week]]

## 2024-09-12 NOTE — PROGRESS NOTE ADULT - ASSESSMENT
74-year-old female  with pmh of htn, bells palsy, asthma, htn, hep c, prior history of seizures not on AEDs. Patient presents today for lower abdominal pain on both sides, epigastric pain, nausea, non biliary non bloody vomiting. she did not have chest pain, she had lower abdominal and epigastric pain. denies sob or fevers or chills.     in ER, she was found to have tachycardia, positive UA. and fever 100.8 , wbc 11    ct abdomen and pelvis done and showed no acute findings    pt is being admitted for sepsis secondary to complicated UTI  (08 Sep 2024 00:32)    Assessment / plan;  Abd pain, diverticulosis / nephrolithiasis  Asthma with mil bronchospasm  hx VC dysfunction  resp status appears stable now  r/o sepsis / UTI    GI eval in progress  cont with bronchodilator rx  will fu as outpt now  
74-year-old female  with pmh of htn, bells palsy, asthma, htn, hep c, prior history of seizures not on AEDs. Patient presents today for lower abdominal pain on both sides, epigastric pain, nausea, non biliary non bloody vomiting. she did not have chest pain, she had lower abdominal and epigastric pain. denies sob or fevers or chills.     in ER, she was found to have tachycardia, positive UA. and fever 100.8 , wbc 11    ct abdomen and pelvis done and showed no acute findings    pt is being admitted for sepsis secondary to complicated UTI  (08 Sep 2024 00:32)    Assessment / plan;  Abd pain, diverticulosis / nephrolithiasis  Asthma with mil bronchospasm  hx VC dysfunction  resp status appears stable now  r/o sepsis / UTI    GI eval in progress  cont with bronchodilator rx  will fu as outpt now  complete antibiotic course  DC planning as per primary team  all recent data discussed with her today  
Sepsis secondary to UTI  -UCX + E coli, sensitivities pending   -IV Rocephin    Nausea, vomiting- improved  -on regular diet     Lower abdominal pain  -likely secondary to UTI, ct abdomen and pelvis shows no pyelo or any other acute process   -on pain meds as needed     Asthma  -stable  -on advair bid   -on albuterol     History of bells pasly  -chronic, no new neuro symptoms     HTN (hypertension)  -on losartan 50 mg qd  -on atenolol 50 mg qd  -on doxazosin 1 mg qd    History of seizure disorder  -not on AEDs    History of treated hep c and Stable Liver cirrhosis     Hyperlipidemia  -on lipitor 20 mg     DVT ppx  -heparin sc     Full code   
Sepsis secondary to UTI  -UCX + E coli, sensitivities noted   -IV Rocephin    Nausea, vomiting- improved  -on regular diet     Lower abdominal pain/subcostal pain  -likely secondary to UTI, ct abdomen and pelvis shows no pyelo or any other acute process   -CXR as above  -Lung exam benign   -Examined skin, no rashes present   -on pain meds as needed     Asthma  -stable  -on advair bid   -on albuterol     History of bells pasly  -chronic, no new neuro symptoms     HTN (hypertension)  -on losartan 50 mg qd  -on atenolol 50 mg qd  -on doxazosin 1 mg qd    History of seizure disorder  -not on AEDs    History of treated hep c and Stable Liver cirrhosis     Hyperlipidemia  -on lipitor 20 mg     DVT ppx  -heparin sc     Full code       DISPO: d/c planning for tomorrow 
A/P:    Sepsis secondary to UTI  -IV Rocephin  -follow cultures       Nausea, vomiting- improved  -on regular diet     Lower abdominal pain  -likely secondary to UTI, ct abdomen and pelvis shows no pyelo or any other acute process   -on pain meds as needed       Asthma  -stable  -on advair bid   -on albuterol     History of bells pasly  -chronic, no new neuro symptoms     HTN (hypertension).   -on losartan 50 mg qd  -on atenolol 50 mg qd  -on doxazosin 1 mg qd    History of seizure disorder  -not on AEDs    History of treated hep c and Stable Liver cirrhosis     Hyperlipidemia  -on lipitor 20 mg     DVT ppx  -heparin sc     Full code   
74-year-old female  with pmh of htn, bells palsy, asthma, htn, hep c, prior history of seizures not on AEDs. Patient presents today for lower abdominal pain on both sides, epigastric pain, nausea, non biliary non bloody vomiting. she did not have chest pain, she had lower abdominal and epigastric pain. denies sob or fevers or chills.     in ER, she was found to have tachycardia, positive UA. and fever 100.8 , wbc 11    ct abdomen and pelvis done and showed no acute findings    pt is being admitted for sepsis secondary to complicated UTI  (08 Sep 2024 00:32)    Assessment / plan;  Abd pain, diverticulosis / nephrolithiasis  Asthma with mil bronchospasm  hx VC dysfunction  resp status appears stable now  r/o sepsis / UTI    GI eval in progress  cont with bronchodilator rx  will fu with you  
Sepsis secondary to UTI  -IV Rocephin  -follow cultures     Nausea, vomiting- improved  -on regular diet     Lower abdominal pain  -likely secondary to UTI, ct abdomen and pelvis shows no pyelo or any other acute process   -on pain meds as needed     Asthma  -stable  -on advair bid   -on albuterol     History of bells pasly  -chronic, no new neuro symptoms     HTN (hypertension)  -on losartan 50 mg qd  -on atenolol 50 mg qd  -on doxazosin 1 mg qd    History of seizure disorder  -not on AEDs    History of treated hep c and Stable Liver cirrhosis     Hyperlipidemia  -on lipitor 20 mg     DVT ppx  -heparin sc     Full code

## 2024-09-12 NOTE — DISCHARGE NOTE NURSING/CASE MANAGEMENT/SOCIAL WORK - NSDCFUADDAPPT_GEN_ALL_CORE_FT
APPTS ARE READY TO BE MADE: [X] YES    Best Family or Patient Contact (if needed):    Additional Information about above appointments (if needed):    1: Dr. Barron

## 2024-09-12 NOTE — DISCHARGE NOTE PROVIDER - NSDCCPCAREPLAN_GEN_ALL_CORE_FT
PRINCIPAL DISCHARGE DIAGNOSIS  Diagnosis: Complicated UTI (urinary tract infection)  Assessment and Plan of Treatment: Treated for urinary tract infection, found to have E Coli in urine culture, continue with vantin for 5 more days      SECONDARY DISCHARGE DIAGNOSES  Diagnosis: Kidney stone  Assessment and Plan of Treatment: Found to have kidney stones on the right, non obstructing, continue doxazosin, follow up with urology.

## 2024-09-12 NOTE — DISCHARGE NOTE PROVIDER - NSDCFUADDAPPT_GEN_ALL_CORE_FT
APPTS ARE READY TO BE MADE: [X] YES    Best Family or Patient Contact (if needed):    Additional Information about above appointments (if needed):    1: Dr. Barron    APPTS ARE READY TO BE MADE: [X] YES    Best Family or Patient Contact (if needed):    Additional Information about above appointments (if needed):    1: Dr. Barron     Met with patient face to face and provided the patient with provider referral information, however patient prefers to schedule the appointments on their own.

## 2024-09-12 NOTE — PROGRESS NOTE ADULT - SUBJECTIVE AND OBJECTIVE BOX
HPI: 74-year-old female  with pmh of htn, bells palsy, asthma, htn, hep c, prior history of seizures not on AEDs, presented  for lower abdominal pain on both sides, epigastric pain, nausea, non biliary non bloody vomiting,   in ER, she was found to have tachycardia, positive UA. and fever 100.8 , wbc 11, ct abdomen and pelvis done and showed no acute findings, pt is being admitted for sepsis secondary to complicated UTI  (08 Sep 2024 00:32)    Subjective: pt seen today, c/o pain epigastric region and b/l subcostal regions, no N/V, tolerating orally.     REVIEW OF SYSTEMS:    CONSTITUTIONAL: No weakness, fevers or chills  EYES/ENT: No visual changes;  No vertigo or throat pain   NECK: No pain or stiffness  RESPIRATORY: No cough, wheezing, hemoptysis; No shortness of breath  CARDIOVASCULAR: No chest pain or palpitations  GASTROINTESTINAL: + abdominal pain +epigastric pain. No nausea, vomiting, or hematemesis; No diarrhea or constipation. No melena or hematochezia.  GENITOURINARY: No dysuria, frequency or hematuria  NEUROLOGICAL: No numbness or weakness  SKIN: No itching, rashes        MEDICATIONS  (STANDING):  atenolol  Tablet 50 milliGRAM(s) Oral daily  atorvastatin 20 milliGRAM(s) Oral at bedtime  cefTRIAXone Injectable. 2000 milliGRAM(s) IV Push every 24 hours  doxazosin 1 milliGRAM(s) Oral daily  fluticasone propionate/ salmeterol 250-50 MICROgram(s) Diskus 1 Dose(s) Inhalation two times a day  heparin   Injectable 5000 Unit(s) SubCutaneous every 8 hours  losartan 50 milliGRAM(s) Oral daily  pantoprazole    Tablet 40 milliGRAM(s) Oral before breakfast    MEDICATIONS  (PRN):  acetaminophen     Tablet .. 650 milliGRAM(s) Oral every 6 hours PRN Mild Pain (1 - 3)  albuterol    90 MICROgram(s) HFA Inhaler 1 Puff(s) Inhalation every 4 hours PRN Shortness of Breath  ketorolac   Injectable 15 milliGRAM(s) IV Push every 8 hours PRN Severe Pain (7 - 10)  ketorolac   Injectable 15 milliGRAM(s) IV Push every 8 hours PRN Moderate Pain (4 - 6)  ondansetron   Disintegrating Tablet 4 milliGRAM(s) Oral every 8 hours PRN Nausea and/or Vomiting      Vital Signs Last 24 Hrs  T(C): 37.4 (09 Sep 2024 08:15), Max: 37.4 (09 Sep 2024 08:15)  T(F): 99.3 (09 Sep 2024 08:15), Max: 99.3 (09 Sep 2024 08:15)  HR: 94 (09 Sep 2024 08:15) (81 - 94)  BP: 145/83 (09 Sep 2024 08:15) (123/59 - 145/83)  RR: 19 (09 Sep 2024 08:15) (18 - 19)  SpO2: 94% (09 Sep 2024 08:15) (94% - 96%)    Parameters below as of 09 Sep 2024 08:15  Patient On (Oxygen Delivery Method): room air      PHYSICAL EXAM:  GENERAL: NAD, lying in bed comfortably  HEAD:  Atraumatic, Normocephalic  EYES: conjunctiva and sclera clear  ENT: Moist mucous membranes  NECK: Supple, No JVD  CHEST/LUNG: Clear to auscultation bilaterally; No rales, rhonchi, wheezing. Unlabored respirations  HEART: Regular rate and rhythm; No murmurs  ABDOMEN: Bowel sounds present; Soft, Nontender, Nondistended.   EXTREMITIES:  2+ Peripheral Pulses, brisk capillary refill. No clubbing, cyanosis, or edema  NERVOUS SYSTEM:  Alert & Oriented X3, speech clear. No deficits   MSK: FROM all 4 extremities, full and equal strength      LABS:                          11.5   12.60 )-----------( 187      ( 09 Sep 2024 06:59 )             37.3     09 Sep 2024 06:59    139    |  111    |  16     ----------------------------<  93     4.0     |  24     |  0.84     Ca    8.7        09 Sep 2024 06:59    TPro  6.6    /  Alb  2.8    /  TBili  0.6    /  DBili  x      /  AST  12     /  ALT  14     /  AlkPhos  74     08 Sep 2024 07:00    LIVER FUNCTIONS - ( 08 Sep 2024 07:00 )  Alb: 2.8 g/dL / Pro: 6.6 gm/dL / ALK PHOS: 74 U/L / ALT: 14 U/L / AST: 12 U/L / GGT: x             CAPILLARY BLOOD GLUCOSE            Urinalysis Basic - ( 09 Sep 2024 06:59 )    Color: x / Appearance: x / SG: x / pH: x  Gluc: 93 mg/dL / Ketone: x  / Bili: x / Urobili: x   Blood: x / Protein: x / Nitrite: x   Leuk Esterase: x / RBC: x / WBC x   Sq Epi: x / Non Sq Epi: x / Bacteria: x        RADIOLOGY:      < from: CT Abdomen and Pelvis w/ IV Cont (09.07.24 @ 22:50) >  IMPRESSION:  Noacute finding in the abdomen or pelvis.    Chronic findings similar to prior include nodular contour of the liver   suggestive of cirrhosis, bilateral adrenal adenomas, nonobstructing right   renal stones, and colonic diverticulosis.        
Patient is seen and examined. Chart is reviewed. No fever this am.    Gen: No fever, chills, weakness  ENT: No visual changes or throat pain  Neck: No pain or stiffness  Respiratory: No cough or wheezing  Cardiovascular: No chest pain or palpitations  Gastrointestinal: No abdominal pain, nausea, vomiting, constipation, or diarrhea  Hematologic: No easy bleeding or bruising  Neurologic: No numbness or focal weakness  Psych: No depression or insomnia  Skin: No rash or itching      MEDICATIONS  (STANDING):  atenolol  Tablet 50 milliGRAM(s) Oral daily  atorvastatin 20 milliGRAM(s) Oral at bedtime  doxazosin 1 milliGRAM(s) Oral daily  fluticasone propionate/ salmeterol 250-50 MICROgram(s) Diskus 1 Dose(s) Inhalation two times a day  heparin   Injectable 5000 Unit(s) SubCutaneous every 8 hours  losartan 50 milliGRAM(s) Oral daily  pantoprazole    Tablet 40 milliGRAM(s) Oral before breakfast    MEDICATIONS  (PRN):  acetaminophen     Tablet .. 650 milliGRAM(s) Oral every 6 hours PRN Mild Pain (1 - 3)  albuterol    90 MICROgram(s) HFA Inhaler 1 Puff(s) Inhalation every 4 hours PRN Shortness of Breath  ketorolac   Injectable 15 milliGRAM(s) IV Push every 8 hours PRN Severe Pain (7 - 10)  ketorolac   Injectable 15 milliGRAM(s) IV Push every 8 hours PRN Moderate Pain (4 - 6)  ondansetron   Disintegrating Tablet 4 milliGRAM(s) Oral every 8 hours PRN Nausea and/or Vomiting      Vital Signs Last 24 Hrs  T(C): 37.3 (08 Sep 2024 15:39), Max: 38.2 (07 Sep 2024 23:45)  T(F): 99.1 (08 Sep 2024 15:39), Max: 100.8 (07 Sep 2024 23:45)  HR: 81 (08 Sep 2024 15:39) (81 - 109)  BP: 123/59 (08 Sep 2024 15:39) (115/76 - 157/85)  BP(mean): --  RR: 18 (08 Sep 2024 15:39) (18 - 21)  SpO2: 95% (08 Sep 2024 15:39) (95% - 99%)    Parameters below as of 08 Sep 2024 15:39  Patient On (Oxygen Delivery Method): room air        GEN: NAD, comfortable, resting in bed  HEENT: NC/AT, EOMI, PERRLA, MMM, clear conjunctiva and sclera, normal hearing, no nasal discharge, throat clear, no thrush, normal dentition.   NECK: supple, no JVD, no LAD, no thyromegaly  BACK:  ROM intact, no spinal/paraspinal tenderness  CV: S1S2, RRR, no mumur  RESP: good air movement, CTABL, no rales, rhonchi or wheezing, respirations unlabored  ABD: +BS, soft, ND, NT, no guarding, no rigidity, no HSM  EXT: +2 radial and pedal pulses, no edema, no calve tenderness  SKIN: No visible Rashes or Ulcers  MSK: ROM intact in all extremities  NEURO:  sensory and CN 2-12 Grossly intact, no motor deficits, no, saddle anesthesia, AOx3  PSYCH: normal behavior         LABS:                          11.4   11.83 )-----------( 201      ( 08 Sep 2024 07:00 )             36.3     08 Sep 2024 07:00    141    |  112    |  12     ----------------------------<  123    3.6     |  26     |  0.70     Ca    8.5        08 Sep 2024 07:00    TPro  6.6    /  Alb  2.8    /  TBili  0.6    /  DBili  x      /  AST  12     /  ALT  14     /  AlkPhos  74     08 Sep 2024 07:00    LIVER FUNCTIONS - ( 08 Sep 2024 07:00 )  Alb: 2.8 g/dL / Pro: 6.6 gm/dL / ALK PHOS: 74 U/L / ALT: 14 U/L / AST: 12 U/L / GGT: x             CAPILLARY BLOOD GLUCOSE            Urinalysis Basic - ( 08 Sep 2024 07:00 )    Color: x / Appearance: x / SG: x / pH: x  Gluc: 123 mg/dL / Ketone: x  / Bili: x / Urobili: x   Blood: x / Protein: x / Nitrite: x   Leuk Esterase: x / RBC: x / WBC x   Sq Epi: x / Non Sq Epi: x / Bacteria: x        RADIOLOGY:        
Patient is a 74y old  Female who presents with a chief complaint of abdominal pain, nausea and vomiting (08 Sep 2024 15:59)      HPI:  74-year-old female  with pmh of htn, bells palsy, asthma, htn, hep c, prior history of seizures not on AEDs. Patient presents today for lower abdominal pain on both sides, epigastric pain, nausea, non biliary non bloody vomiting. she did not have chest pain, she had lower abdominal and epigastric pain. denies sob or fevers or chills.     in ER, she was found to have tachycardia, positive UA. and fever 100.8 , wbc 11    ct abdomen and pelvis done and showed no acute findings    pt is being admitted for sepsis secondary to complicated UTI  (08 Sep 2024 00:32)      PAST MEDICAL & SURGICAL HISTORY:  Bell's palsy      Hepatitis C      Asthma, unspecified asthma severity, unspecified whether complicated, unspecified whether persistent      Essential hypertension      Seizure      Asthma      HTN (hypertension)      No significant past surgical history      No significant past surgical history          MEDICATIONS  (STANDING):  atenolol  Tablet 50 milliGRAM(s) Oral daily  atorvastatin 20 milliGRAM(s) Oral at bedtime  cefTRIAXone Injectable. 2000 milliGRAM(s) IV Push every 24 hours  doxazosin 1 milliGRAM(s) Oral daily  fluticasone propionate/ salmeterol 250-50 MICROgram(s) Diskus 1 Dose(s) Inhalation two times a day  heparin   Injectable 5000 Unit(s) SubCutaneous every 8 hours  losartan 50 milliGRAM(s) Oral daily  pantoprazole    Tablet 40 milliGRAM(s) Oral before breakfast      MEDICATIONS  (PRN):  acetaminophen     Tablet .. 650 milliGRAM(s) Oral every 6 hours PRN Mild Pain (1 - 3)  albuterol    90 MICROgram(s) HFA Inhaler 1 Puff(s) Inhalation every 4 hours PRN Shortness of Breath  ketorolac   Injectable 15 milliGRAM(s) IV Push every 8 hours PRN Severe Pain (7 - 10)  ketorolac   Injectable 15 milliGRAM(s) IV Push every 8 hours PRN Moderate Pain (4 - 6)  ondansetron   Disintegrating Tablet 4 milliGRAM(s) Oral every 8 hours PRN Nausea and/or Vomiting      FAMILY HISTORY:  Family history of breast cancer (Sibling)    No pertinent family history in first degree relatives      Vital Signs Last 24 Hrs  T(C): 37.3 (10 Sep 2024 20:41), Max: 37.8 (10 Sep 2024 15:50)  T(F): 99.1 (10 Sep 2024 20:41), Max: 100 (10 Sep 2024 15:50)  HR: 85 (10 Sep 2024 20:41) (80 - 88)  BP: 114/61 (10 Sep 2024 20:41) (114/61 - 147/82)  BP(mean): --  RR: 18 (10 Sep 2024 20:41) (18 - 18)  SpO2: 96% (10 Sep 2024 20:41) (95% - 96%)    Parameters below as of 10 Sep 2024 20:41  Patient On (Oxygen Delivery Method): room air          PHYSICAL EXAM  General Appearance: cooperative, no acute distress,   HEENT: PERRL, conjunctiva clear, EOM's intact, non injected pharynx, no exudate, TM   normal  Neck: Supple, , no adenopathy, thyroid: not enlarged, no carotid bruit or JVD  Back: Symmetric, no  tenderness,no soft tissue tenderness  Lungs: Clear to auscultation bilateral,no adventitious breath sounds, normal   expiratory phase  Heart: Regular rate and rhythm, S1, S2 normal, no murmur, rub or gallop  Abdomen: Soft, non-tender, bowel sounds active , no hepatosplenomegaly  Extremities: no cyanosis or edema, no joint swelling  Skin: Skin color, texture normal, no rashes   Neurologic: Alert and oriented X3 , cranial nerves intact, sensory and motor normal,    ECG:    LABS:                          11.4   11.83 )-----------( 201      ( 08 Sep 2024 07:00 )             36.3     09-09    139  |  111<H>  |  16  ----------------------------<  93  4.0   |  24  |  0.84    Ca    8.7      09 Sep 2024 06:59    TPro  6.6  /  Alb  2.8<L>  /  TBili  0.6  /  DBili  x   /  AST  12<L>  /  ALT  14  /  AlkPhos  74  09-08              Urinalysis Basic - ( 09 Sep 2024 06:59 )    Color: x / Appearance: x / SG: x / pH: x  Gluc: 93 mg/dL / Ketone: x  / Bili: x / Urobili: x   Blood: x / Protein: x / Nitrite: x   Leuk Esterase: x / RBC: x / WBC x   Sq Epi: x / Non Sq Epi: x / Bacteria: x    < from: CT Abdomen and Pelvis w/ IV Cont (09.07.24 @ 22:50) >  PROCEDURE:  CT of the Abdomen and Pelvis was performed.  Sagittal and coronal reformats were performed.    FINDINGS:  LOWER CHEST: Within normal limits.    LIVER: Mildly enlarged, nodular contour, similar to prior. No focal   lesions are evident.  BILE DUCTS: Normal caliber.  GALLBLADDER: Distended. No visible stones or adjacent inflammation.  SPLEEN: Within normal limits.  PANCREAS: Within normal limits.  ADRENALS: Bilateral adenomas similar to prior.  KIDNEYS/URETERS: No hydronephrosis or concerning mass. Nonobstructing   stones lower pole right kidney, largest 8 mm, similar to prior.    BLADDER: Within normal limits.  REPRODUCTIVE ORGANS: Hysterectomy.    BOWEL: No bowel obstruction. Appendix is normal. Colonic diverticulosis,   no diverticulitis.  PERITONEUM/RETROPERITONEUM: Within normal limits.  VESSELS: Atherosclerotic changes. No abdominal aortic aneurysm.  LYMPH NODES: Mild portal caval adenopathy similar to prior.  ABDOMINAL WALL: Within normal limits.  BONES: Degenerative changes. No fracture or aggressive osseous lesion.    IMPRESSION:  Noacute finding in the abdomen or pelvis.    Chronic findings similar to prior include nodular contour of the liver   suggestive of cirrhosis, bilateral adrenal adenomas, nonobstructing right   renal stones, and colonic diverticulosis.    < end of copied text >  < from: Xray Chest 1 View- PORTABLE-Urgent (09.07.24 @ 21:04) >      INTERPRETATION:  CLINICAL INFORMATION: Chest pain.    TECHNIQUE: Single portable view of the chest.    COMPARISON: Chest x-ray 7/1/2024.    FINDINGS:    Clear lungs.  No pneumothorax or large pleural effusion.  Heart size within normal limits.    IMPRESSION:    Clear lungs.    < end of copied text >          RADIOLOGY & ADDITIONAL STUDIES:    
Patient is a 74y old  Female who presents with a chief complaint of abdominal pain, nausea and vomiting (08 Sep 2024 15:59)      HPI:  74-year-old female  with pmh of htn, bells palsy, asthma, htn, hep c, prior history of seizures not on AEDs. Patient presents today for lower abdominal pain on both sides, epigastric pain, nausea, non biliary non bloody vomiting. she did not have chest pain, she had lower abdominal and epigastric pain. denies sob or fevers or chills.     in ER, she was found to have tachycardia, positive UA. and fever 100.8 , wbc 11    ct abdomen and pelvis done and showed no acute findings    pt is being admitted for sepsis secondary to complicated UTI  (08 Sep 2024 00:32)      PAST MEDICAL & SURGICAL HISTORY:  Bell's palsy      Hepatitis C      Asthma, unspecified asthma severity, unspecified whether complicated, unspecified whether persistent      Essential hypertension      Seizure      Asthma      HTN (hypertension)      No significant past surgical history      No significant past surgical history          MEDICATIONS  (STANDING):  atenolol  Tablet 50 milliGRAM(s) Oral daily  atorvastatin 20 milliGRAM(s) Oral at bedtime  cefTRIAXone Injectable. 2000 milliGRAM(s) IV Push every 24 hours  doxazosin 1 milliGRAM(s) Oral daily  fluticasone propionate/ salmeterol 250-50 MICROgram(s) Diskus 1 Dose(s) Inhalation two times a day  heparin   Injectable 5000 Unit(s) SubCutaneous every 8 hours  losartan 50 milliGRAM(s) Oral daily  pantoprazole    Tablet 40 milliGRAM(s) Oral before breakfast      MEDICATIONS  (PRN):  acetaminophen     Tablet .. 650 milliGRAM(s) Oral every 6 hours PRN Mild Pain (1 - 3)  albuterol    90 MICROgram(s) HFA Inhaler 1 Puff(s) Inhalation every 4 hours PRN Shortness of Breath  ketorolac   Injectable 15 milliGRAM(s) IV Push every 8 hours PRN Severe Pain (7 - 10)  ketorolac   Injectable 15 milliGRAM(s) IV Push every 8 hours PRN Moderate Pain (4 - 6)  ondansetron   Disintegrating Tablet 4 milliGRAM(s) Oral every 8 hours PRN Nausea and/or Vomiting      FAMILY HISTORY:  Family history of breast cancer (Sibling)    No pertinent family history in first degree relatives      Vital Signs Last 24 Hrs  T(C): 37.3 (11 Sep 2024 21:59), Max: 37.3 (11 Sep 2024 21:59)  T(F): 99.1 (11 Sep 2024 21:59), Max: 99.1 (11 Sep 2024 21:59)  HR: 85 (11 Sep 2024 21:59) (76 - 85)  BP: 154/77 (11 Sep 2024 21:59) (130/79 - 154/77)  BP(mean): --  RR: 18 (11 Sep 2024 21:59) (18 - 18)  SpO2: 96% (11 Sep 2024 21:59) (96% - 97%)    Parameters below as of 11 Sep 2024 21:59  Patient On (Oxygen Delivery Method): room air          PHYSICAL EXAM  General Appearance: cooperative, no acute distress,   HEENT: PERRL, conjunctiva clear, EOM's intact, non injected pharynx, no exudate, TM   normal  Neck: Supple, , no adenopathy, thyroid: not enlarged, no carotid bruit or JVD  Back: Symmetric, no  tenderness,no soft tissue tenderness  Lungs: Clear to auscultation bilateral,no adventitious breath sounds, normal   expiratory phase  Heart: Regular rate and rhythm, S1, S2 normal, no murmur, rub or gallop  Abdomen: Soft, non-tender, bowel sounds active , no hepatosplenomegaly  Extremities: no cyanosis or edema, no joint swelling  Skin: Skin color, texture normal, no rashes   Neurologic: Alert and oriented X3 , cranial nerves intact, sensory and motor normal,    ECG:    LABS:                          11.4   11.83 )-----------( 201      ( 08 Sep 2024 07:00 )             36.3     09-09    139  |  111<H>  |  16  ----------------------------<  93  4.0   |  24  |  0.84    Ca    8.7      09 Sep 2024 06:59    TPro  6.6  /  Alb  2.8<L>  /  TBili  0.6  /  DBili  x   /  AST  12<L>  /  ALT  14  /  AlkPhos  74  09-08              Urinalysis Basic - ( 09 Sep 2024 06:59 )    Color: x / Appearance: x / SG: x / pH: x  Gluc: 93 mg/dL / Ketone: x  / Bili: x / Urobili: x   Blood: x / Protein: x / Nitrite: x   Leuk Esterase: x / RBC: x / WBC x   Sq Epi: x / Non Sq Epi: x / Bacteria: x    < from: CT Abdomen and Pelvis w/ IV Cont (09.07.24 @ 22:50) >  PROCEDURE:  CT of the Abdomen and Pelvis was performed.  Sagittal and coronal reformats were performed.    FINDINGS:  LOWER CHEST: Within normal limits.    LIVER: Mildly enlarged, nodular contour, similar to prior. No focal   lesions are evident.  BILE DUCTS: Normal caliber.  GALLBLADDER: Distended. No visible stones or adjacent inflammation.  SPLEEN: Within normal limits.  PANCREAS: Within normal limits.  ADRENALS: Bilateral adenomas similar to prior.  KIDNEYS/URETERS: No hydronephrosis or concerning mass. Nonobstructing   stones lower pole right kidney, largest 8 mm, similar to prior.    BLADDER: Within normal limits.  REPRODUCTIVE ORGANS: Hysterectomy.    BOWEL: No bowel obstruction. Appendix is normal. Colonic diverticulosis,   no diverticulitis.  PERITONEUM/RETROPERITONEUM: Within normal limits.  VESSELS: Atherosclerotic changes. No abdominal aortic aneurysm.  LYMPH NODES: Mild portal caval adenopathy similar to prior.  ABDOMINAL WALL: Within normal limits.  BONES: Degenerative changes. No fracture or aggressive osseous lesion.    IMPRESSION:  Noacute finding in the abdomen or pelvis.    Chronic findings similar to prior include nodular contour of the liver   suggestive of cirrhosis, bilateral adrenal adenomas, nonobstructing right   renal stones, and colonic diverticulosis.    < end of copied text >  < from: Xray Chest 1 View- PORTABLE-Urgent (09.07.24 @ 21:04) >      INTERPRETATION:  CLINICAL INFORMATION: Chest pain.    TECHNIQUE: Single portable view of the chest.    COMPARISON: Chest x-ray 7/1/2024.    FINDINGS:    Clear lungs.  No pneumothorax or large pleural effusion.  Heart size within normal limits.    IMPRESSION:    Clear lungs.    < end of copied text >          RADIOLOGY & ADDITIONAL STUDIES:    
Patient is a 74y old  Female who presents with a chief complaint of abdominal pain, nausea and vomiting (08 Sep 2024 15:59)      HPI:  74-year-old female  with pmh of htn, bells palsy, asthma, htn, hep c, prior history of seizures not on AEDs. Patient presents today for lower abdominal pain on both sides, epigastric pain, nausea, non biliary non bloody vomiting. she did not have chest pain, she had lower abdominal and epigastric pain. denies sob or fevers or chills.     in ER, she was found to have tachycardia, positive UA. and fever 100.8 , wbc 11    ct abdomen and pelvis done and showed no acute findings    pt is being admitted for sepsis secondary to complicated UTI  (08 Sep 2024 00:32)      PAST MEDICAL & SURGICAL HISTORY:  Bell's palsy      Hepatitis C      Asthma, unspecified asthma severity, unspecified whether complicated, unspecified whether persistent      Essential hypertension      Seizure      Asthma      HTN (hypertension)      No significant past surgical history      No significant past surgical history          PREVIOUS DIAGNOSTIC TESTING:      MEDICATIONS  (STANDING):  atenolol  Tablet 50 milliGRAM(s) Oral daily  atorvastatin 20 milliGRAM(s) Oral at bedtime  cefTRIAXone Injectable. 2000 milliGRAM(s) IV Push every 24 hours  doxazosin 1 milliGRAM(s) Oral daily  fluticasone propionate/ salmeterol 250-50 MICROgram(s) Diskus 1 Dose(s) Inhalation two times a day  heparin   Injectable 5000 Unit(s) SubCutaneous every 8 hours  losartan 50 milliGRAM(s) Oral daily  pantoprazole    Tablet 40 milliGRAM(s) Oral before breakfast    MEDICATIONS  (PRN):  acetaminophen     Tablet .. 650 milliGRAM(s) Oral every 6 hours PRN Mild Pain (1 - 3)  albuterol    90 MICROgram(s) HFA Inhaler 1 Puff(s) Inhalation every 4 hours PRN Shortness of Breath  ketorolac   Injectable 15 milliGRAM(s) IV Push every 8 hours PRN Severe Pain (7 - 10)  ketorolac   Injectable 15 milliGRAM(s) IV Push every 8 hours PRN Moderate Pain (4 - 6)  ondansetron   Disintegrating Tablet 4 milliGRAM(s) Oral every 8 hours PRN Nausea and/or Vomiting      FAMILY HISTORY:  Family history of breast cancer (Sibling)    No pertinent family history in first degree relatives        SOCIAL HISTORY:  ***    REVIEW OF SYSTEM:  Pertinent items are noted in HPI.    Vital Signs Last 24 Hrs  T(C): 36.8 (10 Sep 2024 05:10), Max: 38.2 (09 Sep 2024 20:01)  T(F): 98.2 (10 Sep 2024 05:10), Max: 100.8 (09 Sep 2024 20:01)  HR: 78 (10 Sep 2024 05:54) (78 - 94)  BP: 131/52 (09 Sep 2024 20:01) (131/52 - 145/83)  BP(mean): --  RR: 17 (09 Sep 2024 20:01) (17 - 19)  SpO2: 95% (10 Sep 2024 05:54) (93% - 95%)    Parameters below as of 10 Sep 2024 05:54  Patient On (Oxygen Delivery Method): room air      PHYSICAL EXAM  General Appearance: cooperative, no acute distress,   HEENT: PERRL, conjunctiva clear, EOM's intact, non injected pharynx, no exudate, TM   normal  Neck: Supple, , no adenopathy, thyroid: not enlarged, no carotid bruit or JVD  Back: Symmetric, no  tenderness,no soft tissue tenderness  Lungs: Clear to auscultation bilateral,no adventitious breath sounds, normal   expiratory phase  Heart: Regular rate and rhythm, S1, S2 normal, no murmur, rub or gallop  Abdomen: Soft, non-tender, bowel sounds active , no hepatosplenomegaly  Extremities: no cyanosis or edema, no joint swelling  Skin: Skin color, texture normal, no rashes   Neurologic: Alert and oriented X3 , cranial nerves intact, sensory and motor normal,    ECG:    LABS:                          11.4   11.83 )-----------( 201      ( 08 Sep 2024 07:00 )             36.3     09-09    139  |  111<H>  |  16  ----------------------------<  93  4.0   |  24  |  0.84    Ca    8.7      09 Sep 2024 06:59    TPro  6.6  /  Alb  2.8<L>  /  TBili  0.6  /  DBili  x   /  AST  12<L>  /  ALT  14  /  AlkPhos  74  09-08              Urinalysis Basic - ( 09 Sep 2024 06:59 )    Color: x / Appearance: x / SG: x / pH: x  Gluc: 93 mg/dL / Ketone: x  / Bili: x / Urobili: x   Blood: x / Protein: x / Nitrite: x   Leuk Esterase: x / RBC: x / WBC x   Sq Epi: x / Non Sq Epi: x / Bacteria: x    < from: CT Abdomen and Pelvis w/ IV Cont (09.07.24 @ 22:50) >  PROCEDURE:  CT of the Abdomen and Pelvis was performed.  Sagittal and coronal reformats were performed.    FINDINGS:  LOWER CHEST: Within normal limits.    LIVER: Mildly enlarged, nodular contour, similar to prior. No focal   lesions are evident.  BILE DUCTS: Normal caliber.  GALLBLADDER: Distended. No visible stones or adjacent inflammation.  SPLEEN: Within normal limits.  PANCREAS: Within normal limits.  ADRENALS: Bilateral adenomas similar to prior.  KIDNEYS/URETERS: No hydronephrosis or concerning mass. Nonobstructing   stones lower pole right kidney, largest 8 mm, similar to prior.    BLADDER: Within normal limits.  REPRODUCTIVE ORGANS: Hysterectomy.    BOWEL: No bowel obstruction. Appendix is normal. Colonic diverticulosis,   no diverticulitis.  PERITONEUM/RETROPERITONEUM: Within normal limits.  VESSELS: Atherosclerotic changes. No abdominal aortic aneurysm.  LYMPH NODES: Mild portal caval adenopathy similar to prior.  ABDOMINAL WALL: Within normal limits.  BONES: Degenerative changes. No fracture or aggressive osseous lesion.    IMPRESSION:  Noacute finding in the abdomen or pelvis.    Chronic findings similar to prior include nodular contour of the liver   suggestive of cirrhosis, bilateral adrenal adenomas, nonobstructing right   renal stones, and colonic diverticulosis.    < end of copied text >  < from: Xray Chest 1 View- PORTABLE-Urgent (09.07.24 @ 21:04) >      INTERPRETATION:  CLINICAL INFORMATION: Chest pain.    TECHNIQUE: Single portable view of the chest.    COMPARISON: Chest x-ray 7/1/2024.    FINDINGS:    Clear lungs.  No pneumothorax or large pleural effusion.  Heart size within normal limits.    IMPRESSION:    Clear lungs.    < end of copied text >          RADIOLOGY & ADDITIONAL STUDIES:    
HPI: 74-year-old female  with pmh of htn, bells palsy, asthma, htn, hep c, prior history of seizures not on AEDs, presented  for lower abdominal pain on both sides, epigastric pain, nausea, non biliary non bloody vomiting,   in ER, she was found to have tachycardia, positive UA. and fever 100.8 , wbc 11, ct abdomen and pelvis done and showed no acute findings, pt is being admitted for sepsis secondary to complicated UTI  (08 Sep 2024 00:32)    9/9: pt seen today, c/o pain epigastric region and b/l subcostal regions, no N/V, tolerating orally.   9/10: pt seen this AM, feels better, c/o constipation for ~ 5 days now      REVIEW OF SYSTEMS:    CONSTITUTIONAL: No weakness, fevers or chills  EYES/ENT: No visual changes;  No vertigo or throat pain   NECK: No pain or stiffness  RESPIRATORY: No cough, wheezing, hemoptysis; No shortness of breath  CARDIOVASCULAR: No chest pain or palpitations  GASTROINTESTINAL: + abdominal pain +epigastric pain. No nausea, vomiting, or hematemesis; No diarrhea or constipation. No melena or hematochezia.  GENITOURINARY: No dysuria, frequency or hematuria  NEUROLOGICAL: No numbness or weakness  SKIN: No itching, rashes        MEDICATIONS  (STANDING):  atenolol  Tablet 50 milliGRAM(s) Oral daily  atorvastatin 20 milliGRAM(s) Oral at bedtime  cefTRIAXone Injectable. 2000 milliGRAM(s) IV Push every 24 hours  doxazosin 1 milliGRAM(s) Oral daily  fluticasone propionate/ salmeterol 250-50 MICROgram(s) Diskus 1 Dose(s) Inhalation two times a day  heparin   Injectable 5000 Unit(s) SubCutaneous every 8 hours  losartan 50 milliGRAM(s) Oral daily  pantoprazole    Tablet 40 milliGRAM(s) Oral before breakfast    MEDICATIONS  (PRN):  acetaminophen     Tablet .. 650 milliGRAM(s) Oral every 6 hours PRN Mild Pain (1 - 3)  albuterol    90 MICROgram(s) HFA Inhaler 1 Puff(s) Inhalation every 4 hours PRN Shortness of Breath  ketorolac   Injectable 15 milliGRAM(s) IV Push every 8 hours PRN Severe Pain (7 - 10)  ketorolac   Injectable 15 milliGRAM(s) IV Push every 8 hours PRN Moderate Pain (4 - 6)  ondansetron   Disintegrating Tablet 4 milliGRAM(s) Oral every 8 hours PRN Nausea and/or Vomiting      Vital Signs Last 24 Hrs  T(C): 37.8 (10 Sep 2024 15:50), Max: 38.2 (09 Sep 2024 20:01)  T(F): 100 (10 Sep 2024 15:50), Max: 100.8 (09 Sep 2024 20:01)  HR: 88 (10 Sep 2024 15:50) (78 - 89)  BP: 147/82 (10 Sep 2024 15:50) (131/52 - 147/82)  RR: 18 (10 Sep 2024 15:50) (17 - 18)  SpO2: 95% (10 Sep 2024 15:50) (93% - 95%)    Parameters below as of 10 Sep 2024 15:50  Patient On (Oxygen Delivery Method): room air        PHYSICAL EXAM:  GENERAL: NAD, lying in bed comfortably  HEAD:  Atraumatic, Normocephalic  EYES: conjunctiva and sclera clear  ENT: Moist mucous membranes  NECK: Supple, No JVD  CHEST/LUNG: Clear to auscultation bilaterally; No rales, rhonchi, wheezing. Unlabored respirations  HEART: Regular rate and rhythm; No murmurs  ABDOMEN: Bowel sounds present; Soft, Nontender, Nondistended.   EXTREMITIES:  2+ Peripheral Pulses, brisk capillary refill. No clubbing, cyanosis, or edema  NERVOUS SYSTEM:  Alert & Oriented X3, speech clear. No deficits   MSK: FROM all 4 extremities, full and equal strength      LABS:                                     11.5   12.60 )-----------( 187      ( 09 Sep 2024 06:59 )             37.3   09-09    139  |  111<H>  |  16  ----------------------------<  93  4.0   |  24  |  0.84    Ca    8.7      09 Sep 2024 06:59              Urinalysis Basic - ( 09 Sep 2024 06:59 )    Color: x / Appearance: x / SG: x / pH: x  Gluc: 93 mg/dL / Ketone: x  / Bili: x / Urobili: x   Blood: x / Protein: x / Nitrite: x   Leuk Esterase: x / RBC: x / WBC x   Sq Epi: x / Non Sq Epi: x / Bacteria: x        RADIOLOGY:      < from: CT Abdomen and Pelvis w/ IV Cont (09.07.24 @ 22:50) >  IMPRESSION:  Noacute finding in the abdomen or pelvis.    Chronic findings similar to prior include nodular contour of the liver   suggestive of cirrhosis, bilateral adrenal adenomas, nonobstructing right   renal stones, and colonic diverticulosis.        
HPI: 74-year-old female  with pmh of htn, bells palsy, asthma, htn, hep c, prior history of seizures not on AEDs, presented  for lower abdominal pain on both sides, epigastric pain, nausea, non biliary non bloody vomiting,   in ER, she was found to have tachycardia, positive UA. and fever 100.8 , wbc 11, ct abdomen and pelvis done and showed no acute findings, pt is being admitted for sepsis secondary to complicated UTI  (08 Sep 2024 00:32)    9/9: pt seen today, c/o pain epigastric region and b/l subcostal regions, no N/V, tolerating orally.   9/10: pt seen this AM, feels better, c/o constipation for ~ 5 days now  9/11: no overnight events reported, c/o pain to b/l subcostal region      REVIEW OF SYSTEMS:    CONSTITUTIONAL: No weakness, fevers or chills  EYES/ENT: No visual changes;  No vertigo or throat pain   NECK: No pain or stiffness  RESPIRATORY: No cough, wheezing, hemoptysis; No shortness of breath  CARDIOVASCULAR: No chest pain or palpitations  GASTROINTESTINAL: + abdominal pain +epigastric pain. No nausea, vomiting, or hematemesis; No diarrhea or constipation. No melena or hematochezia.  GENITOURINARY: No dysuria, frequency or hematuria  NEUROLOGICAL: No numbness or weakness  SKIN: No itching, rashes        MEDICATIONS  (STANDING):  atenolol  Tablet 50 milliGRAM(s) Oral daily  atorvastatin 20 milliGRAM(s) Oral at bedtime  cefTRIAXone Injectable. 2000 milliGRAM(s) IV Push every 24 hours  doxazosin 1 milliGRAM(s) Oral daily  fluticasone propionate/ salmeterol 250-50 MICROgram(s) Diskus 1 Dose(s) Inhalation two times a day  heparin   Injectable 5000 Unit(s) SubCutaneous every 8 hours  losartan 50 milliGRAM(s) Oral daily  pantoprazole    Tablet 40 milliGRAM(s) Oral before breakfast    MEDICATIONS  (PRN):  acetaminophen     Tablet .. 650 milliGRAM(s) Oral every 6 hours PRN Mild Pain (1 - 3)  albuterol    90 MICROgram(s) HFA Inhaler 1 Puff(s) Inhalation every 4 hours PRN Shortness of Breath  ketorolac   Injectable 15 milliGRAM(s) IV Push every 8 hours PRN Severe Pain (7 - 10)  ketorolac   Injectable 15 milliGRAM(s) IV Push every 8 hours PRN Moderate Pain (4 - 6)  ondansetron   Disintegrating Tablet 4 milliGRAM(s) Oral every 8 hours PRN Nausea and/or Vomiting      Vital Signs Last 24 Hrs  T(C): 36.9 (11 Sep 2024 16:26), Max: 37.3 (10 Sep 2024 20:41)  T(F): 98.4 (11 Sep 2024 16:26), Max: 99.1 (10 Sep 2024 20:41)  HR: 76 (11 Sep 2024 16:26) (69 - 85)  BP: 130/79 (11 Sep 2024 16:26) (114/61 - 139/80)  RR: 18 (11 Sep 2024 16:26) (18 - 18)  SpO2: 97% (11 Sep 2024 16:26) (90% - 97%)    Parameters below as of 11 Sep 2024 16:26  Patient On (Oxygen Delivery Method): room air    PHYSICAL EXAM:  GENERAL: NAD, lying in bed comfortably  HEAD:  Atraumatic, Normocephalic  EYES: conjunctiva and sclera clear  ENT: Moist mucous membranes  NECK: Supple, No JVD  CHEST/LUNG: Clear to auscultation bilaterally; No rales, rhonchi, wheezing. Unlabored respirations  HEART: Regular rate and rhythm; No murmurs  ABDOMEN: Bowel sounds present; Soft, Nontender, Nondistended.   EXTREMITIES:  2+ Peripheral Pulses, brisk capillary refill. No clubbing, cyanosis, or edema  NERVOUS SYSTEM:  Alert & Oriented X3, speech clear. No deficits   MSK: FROM all 4 extremities, full and equal strength      LABS:                                     11.5   12.60 )-----------( 187      ( 09 Sep 2024 06:59 )             37.3   09-09    139  |  111<H>  |  16  ----------------------------<  93  4.0   |  24  |  0.84    Ca    8.7      09 Sep 2024 06:59              Urinalysis Basic - ( 09 Sep 2024 06:59 )    Color: x / Appearance: x / SG: x / pH: x  Gluc: 93 mg/dL / Ketone: x  / Bili: x / Urobili: x   Blood: x / Protein: x / Nitrite: x   Leuk Esterase: x / RBC: x / WBC x   Sq Epi: x / Non Sq Epi: x / Bacteria: x        RADIOLOGY:      < from: CT Abdomen and Pelvis w/ IV Cont (09.07.24 @ 22:50) >  IMPRESSION:  Noacute finding in the abdomen or pelvis.    Chronic findings similar to prior include nodular contour of the liver   suggestive of cirrhosis, bilateral adrenal adenomas, nonobstructing right   renal stones, and colonic diverticulosis.    < from: Xray Chest 1 View- PORTABLE-Urgent (09.07.24 @ 21:04) >  INTERPRETATION:  CLINICAL INFORMATION: Chest pain.    TECHNIQUE: Single portable view of the chest.    COMPARISON: Chest x-ray 7/1/2024.    FINDINGS:    Clear lungs.  No pneumothorax or large pleural effusion.  Heart size within normal limits.    IMPRESSION:    Clear lungs.    < end of copied text >

## 2024-09-12 NOTE — DISCHARGE NOTE PROVIDER - HOSPITAL COURSE
74-year-old female  with pmh of htn, bells palsy, asthma, htn, hep c, prior history of seizures not on AEDs, presented  for lower abdominal pain on both sides, epigastric pain, nausea, non biliary non bloody vomiting,   in ER, she was found to have tachycardia, positive UA. and fever 100.8 , wbc 11, ct abdomen and pelvis done and showed no acute findings, pt is being admitted for sepsis secondary to complicated UTI  (08 Sep 2024 00:32)    9/9: pt seen today, c/o pain epigastric region and b/l subcostal regions, no N/V, tolerating orally.   9/10: pt seen this AM, feels better, c/o constipation for ~ 5 days now  9/11: no overnight events reported, c/o pain to b/l subcostal region  9/12: pt seen today, pain is better, coughing today with runny nose, RVP/Covid negative    Vital Signs Last 24 Hrs  T(C): 37 (12 Sep 2024 09:35), Max: 37.3 (11 Sep 2024 21:59)  T(F): 98.6 (12 Sep 2024 09:35), Max: 99.1 (11 Sep 2024 21:59)  HR: 82 (12 Sep 2024 09:35) (76 - 85)  BP: 152/87 (12 Sep 2024 09:35) (130/79 - 154/77)  RR: 18 (12 Sep 2024 09:35) (18 - 18)  SpO2: 93% (12 Sep 2024 09:35) (93% - 97%)    Parameters below as of 12 Sep 2024 09:35  Patient On (Oxygen Delivery Method): room air    PHYSICAL EXAM:  GENERAL: NAD  CHEST/LUNG: Clear to auscultation bilaterally; No rales, rhonchi, wheezing. Unlabored respirations  HEART: Regular rate and rhythm; No murmurs  ABDOMEN: Bowel sounds present; Soft, Nontender, Nondistended.   EXTREMITIES:  2+ Peripheral Pulses, brisk capillary refill. No clubbing, cyanosis, or edema  NERVOUS SYSTEM:  Alert & Oriented X3, speech clear. No deficits   MSK: FROM all 4 extremities, full and equal strength      Acute complicated UTI  -No sepsis POA, SIRS criteria not met on admission, no lactate drawn, pt had T 100.8F, 4 hrs prior was tachycardia with RR 21/min  -UCX + E coli, sensitivities noted   -IV Rocephin - day #5  -nonobstructing right renal stones    Nausea, vomiting- improved  -on regular diet     Lower abdominal pain/subcostal pain  -likely secondary to UTI, ct abdomen and pelvis shows no pyelo or any other acute process   -CXR as above  -Lung exam benign   -Examined skin, no rashes present   -on pain meds as needed     Asthma  -stable  -on advair bid   -on albuterol     History of bells pasly  -chronic, no new neuro symptoms     HTN (hypertension)  -on losartan 50 mg qd  -on atenolol 50 mg qd  -on doxazosin 1 mg qd    History of seizure disorder  -not on AEDs    History of treated hep c and Stable Liver cirrhosis     Hyperlipidemia  -on lipitor 20 mg     DVT ppx  -heparin sc     Full code       < from: CT Abdomen and Pelvis w/ IV Cont (09.07.24 @ 22:50) >  IMPRESSION:  Noacute finding in the abdomen or pelvis.    Chronic findings similar to prior include nodular contour of the liver   suggestive of cirrhosis, bilateral adrenal adenomas, nonobstructing right   renal stones, and colonic diverticulosis.

## 2024-09-12 NOTE — DISCHARGE NOTE NURSING/CASE MANAGEMENT/SOCIAL WORK - PATIENT PORTAL LINK FT
You can access the FollowMyHealth Patient Portal offered by [facility] by registering at the following website: [email or web address]. By joining [employer]’s FollowMyHealth portal, you will also be able to view your health information using other applications (apps) compatible with our system.

## 2024-09-12 NOTE — DISCHARGE NOTE PROVIDER - NSDCMRMEDTOKEN_GEN_ALL_CORE_FT
albuterol 90 mcg/inh inhalation aerosol: 1 puff(s) inhaled every 4 hours, As Needed  atenolol 50 mg oral tablet: 1 tab(s) orally once a day  atorvastatin 20 mg oral tablet: 1 tab(s) orally once a day  cefpodoxime 200 mg oral tablet: 1 tab(s) orally 2 times a day  doxazosin 1 mg oral tablet: 1 tab(s) orally once a day  fluticasone-salmeterol 250 mcg-50 mcg inhalation powder: 1 inhaled 2 times a day  losartan 50 mg oral tablet: 1 tab(s) orally once a day  omeprazole 40 mg oral delayed release capsule: 1 cap(s) orally once a day

## 2024-09-12 NOTE — DISCHARGE NOTE PROVIDER - CARE PROVIDER_API CALL
Joey Harper  Family Medicine  [address] [place] [address]  Phone: [phone]  Fax: [phone]  Follow Up Time: 1 week    Chato Barron)  Urology  [address] [place] [address]  Phone: [phone]  Fax: [phone]  Follow Up Time: 1 week

## 2024-09-13 LAB
CULTURE RESULTS: SIGNIFICANT CHANGE UP
CULTURE RESULTS: SIGNIFICANT CHANGE UP
SPECIMEN SOURCE: SIGNIFICANT CHANGE UP
SPECIMEN SOURCE: SIGNIFICANT CHANGE UP

## 2024-09-18 DIAGNOSIS — A41.9 SEPSIS, UNSPECIFIED ORGANISM: ICD-10-CM

## 2024-09-18 DIAGNOSIS — K57.30 DIVERTICULOSIS OF LARGE INTESTINE WITHOUT PERFORATION OR ABSCESS WITHOUT BLEEDING: ICD-10-CM

## 2024-09-18 DIAGNOSIS — R11.2 NAUSEA WITH VOMITING, UNSPECIFIED: ICD-10-CM

## 2024-09-18 DIAGNOSIS — E78.5 HYPERLIPIDEMIA, UNSPECIFIED: ICD-10-CM

## 2024-09-18 DIAGNOSIS — B96.20 UNSPECIFIED ESCHERICHIA COLI [E. COLI] AS THE CAUSE OF DISEASES CLASSIFIED ELSEWHERE: ICD-10-CM

## 2024-09-18 DIAGNOSIS — N39.0 URINARY TRACT INFECTION, SITE NOT SPECIFIED: ICD-10-CM

## 2024-09-18 DIAGNOSIS — Z86.19 PERSONAL HISTORY OF OTHER INFECTIOUS AND PARASITIC DISEASES: ICD-10-CM

## 2024-09-18 DIAGNOSIS — N20.0 CALCULUS OF KIDNEY: ICD-10-CM

## 2024-09-18 DIAGNOSIS — J98.01 ACUTE BRONCHOSPASM: ICD-10-CM

## 2024-09-18 DIAGNOSIS — K74.60 UNSPECIFIED CIRRHOSIS OF LIVER: ICD-10-CM

## 2024-09-18 DIAGNOSIS — J45.909 UNSPECIFIED ASTHMA, UNCOMPLICATED: ICD-10-CM

## 2024-09-18 DIAGNOSIS — I10 ESSENTIAL (PRIMARY) HYPERTENSION: ICD-10-CM

## 2024-09-18 DIAGNOSIS — G51.0 BELL'S PALSY: ICD-10-CM

## 2024-10-07 NOTE — H&P ADULT - REASON FOR ADMISSION
The patient called and stated that her right breast has a new rash. The area is warm to the touch and has some slight itching. The patient denied any fever or other infection symptoms. The patient requested to come and be seen tomorrow instead of today for the new concern. RN sent a message to the PSR requesting that they make an appointment.    SOB/Wheezing

## 2024-12-18 NOTE — ED ADULT TRIAGE NOTE - NS ED NURSE DIRECT TO ROOM YN
Chief Complaint   Patient presents with    Cough     Deep cough at night, congestion x2days        HISTORY OF PRESENT ILLNESS: Patient is a 6 y.o. male who presents today with his parents, parent and patient provide history. He has had a cough and congestion for the past two days. Denies fever, ear pain. He has tried OTC cough medication. He is otherwise a generally healthy child without chronic medical conditions, does not take daily medications, vaccinations are up to date and deny further pertinent medical history.     Patient Active Problem List    Diagnosis Date Noted    Ear infection 12/18/2024    Sleep concern 10/18/2024    Ear pulling, bilateral 10/18/2024    Behavior concern 10/18/2024    Tympanostomy tube check 06/13/2024    Speech abnormality 06/13/2024    Feeding difficulty 01/25/2021    Genitourinary bleeding 2020    Request for circumcision 2020       Allergies:Patient has no known allergies.    No current [facility]-ordered outpatient medications on file.     Current Facility-Administered Medications Ordered in Epic   Medication Dose Route Frequency Provider Last Rate Last Admin    sodium fluoride varnish 5% dental use only   Dental Q90 DAYS Ezra Rodríguez M.D.   Given at 03/22/24 1609       History reviewed. No pertinent past medical history.    Tobacco Use    Passive exposure: Never (dad smokes outside)   Vaping Use    Vaping status: Never Used       Family Status   Relation Name Status    MGMo  Alive        Copied from mother's family history at birth    MGFa  Alive        Copied from mother's family history at birth    Virgie Rojo Alive, age 29y        Copied from mother's family history at birth   No partnership data on file     Family History   Problem Relation Age of Onset    Lung Disease Maternal Grandmother         COPD  (Copied from mother's family history at birth)       ROS:  Review of Systems   Constitutional: Negative for fever, reduction in appetite, reduction in  "activity level.   HENT: Negative for ear pulling or pain, nosebleeds, congestion.    Eyes: Negative for ocular drainage.   Neuro: Negative for neurological changes, HA.   Respiratory: Negative for cough, visible sputum production, signs of respiratory distress or wheezing.    Cardiovascular: Negative for cyanosis or syncope.   Gastrointestinal: Negative for nausea, vomiting or diarrhea. No change in bowel pattern.   Genitourinary: Negative for change in urinary pattern.  Musculoskeletal: Negative for falls, joint pain, back pain, myalgias.   Skin: Negative for rash.     Exam:  Pulse 123   Temp 37 °C (98.6 °F) (Temporal)   Resp 26   Ht 0.991 m (3' 3\")   Wt 15.9 kg (35 lb)   SpO2 96%   General: well nourished, well developed male in NAD, playful and engaged, non-toxic.  Head: normocephalic, atraumatic  Eyes: PERRLA, no conjunctival injection or drainage, lids normal.  Ears: normal shape and symmetry, no tenderness, no discharge. External canals are without any significant edema or erythema. Tympanic membranes are without any inflammation, no effusion. Right side with tube in place.   Nose: symmetrical without tenderness, + discharge.  Mouth: moist mucosa, reasonable hygiene, no erythema, exudates or tonsillar enlargement.  Lymph: no cervical adenopathy, no supraclavicular adenopathy.   Neck: no masses, range of motion within normal limits, no tracheal deviation.   Neuro: neurologically appropriate for age. No sensory deficit.   Pulmonary: no distress, chest is symmetrical with respiration, no wheezes, crackles, or rhonchi.  Cardiovascular: regular rate and rhythm, no edema  Musculoskeletal: no clubbing, appropriate muscle tone, gait is stable.  Skin: warm, dry, intact, no clubbing, no cyanosis, no rashes.         Assessment/Plan:  1. Upper respiratory infection with cough and congestion              Discussed symptoms most likely viral, self limiting illness. Did not see any evidence of a bacterial process. " Pathogenesis of viral infections discussed including typical length and natural progression.   Symptomatic care discussed at length - nasal saline/sinus rinse, encourage fluids, honey/[place]  for cough, humidifier, may prefer to sleep at incline.  Follow up if symptoms persist/worsen, new symptoms develop (fever, ear pain, etc) or any other concerns arise.  Instructed to return to clinic or nearest emergency department for any change in condition, further concerns, or worsening of symptoms.  Parent states understanding of the plan of care and discharge instructions.  Instructed to make an appointment, for follow up, with their primary care provider.         Please note that this dictation was created using voice recognition software. I have made every reasonable attempt to correct obvious errors, but I expect that there are errors of grammar and possibly content that I did not discover before finalizing the note.      SAVANNA Thornton.     Yes

## 2025-03-07 ENCOUNTER — EMERGENCY (EMERGENCY)
Facility: HOSPITAL | Age: 75
LOS: 0 days | Discharge: HOME | End: 2025-03-07
Attending: EMERGENCY MEDICINE
Payer: COMMERCIAL

## 2025-03-07 VITALS
HEART RATE: 87 BPM | OXYGEN SATURATION: 100 % | RESPIRATION RATE: 18 BRPM | SYSTOLIC BLOOD PRESSURE: 137 MMHG | DIASTOLIC BLOOD PRESSURE: 86 MMHG

## 2025-03-07 VITALS — HEIGHT: 64 IN

## 2025-03-07 DIAGNOSIS — Z88.5 ALLERGY STATUS TO NARCOTIC AGENT: ICD-10-CM

## 2025-03-07 DIAGNOSIS — R10.13 EPIGASTRIC PAIN: ICD-10-CM

## 2025-03-07 DIAGNOSIS — I10 ESSENTIAL (PRIMARY) HYPERTENSION: ICD-10-CM

## 2025-03-07 LAB
ALBUMIN SERPL ELPH-MCNC: 3.4 G/DL — SIGNIFICANT CHANGE UP (ref 3.3–5)
ALP SERPL-CCNC: 215 U/L — HIGH (ref 40–120)
ALT FLD-CCNC: 302 U/L — HIGH (ref 12–78)
ANION GAP SERPL CALC-SCNC: 6 MMOL/L — SIGNIFICANT CHANGE UP (ref 5–17)
APPEARANCE UR: CLEAR — SIGNIFICANT CHANGE UP
APTT BLD: 32.7 SEC — SIGNIFICANT CHANGE UP (ref 24.5–35.6)
AST SERPL-CCNC: 174 U/L — HIGH (ref 15–37)
BASOPHILS # BLD AUTO: 0.06 K/UL — SIGNIFICANT CHANGE UP (ref 0–0.2)
BASOPHILS NFR BLD AUTO: 0.8 % — SIGNIFICANT CHANGE UP (ref 0–2)
BILIRUB SERPL-MCNC: 1.2 MG/DL — SIGNIFICANT CHANGE UP (ref 0.2–1.2)
BILIRUB UR-MCNC: NEGATIVE — SIGNIFICANT CHANGE UP
BUN SERPL-MCNC: 12 MG/DL — SIGNIFICANT CHANGE UP (ref 7–23)
CALCIUM SERPL-MCNC: 9.3 MG/DL — SIGNIFICANT CHANGE UP (ref 8.5–10.1)
CHLORIDE SERPL-SCNC: 105 MMOL/L — SIGNIFICANT CHANGE UP (ref 96–108)
CO2 SERPL-SCNC: 28 MMOL/L — SIGNIFICANT CHANGE UP (ref 22–31)
COLOR SPEC: YELLOW — SIGNIFICANT CHANGE UP
CREAT SERPL-MCNC: 0.99 MG/DL — SIGNIFICANT CHANGE UP (ref 0.5–1.3)
DIFF PNL FLD: NEGATIVE — SIGNIFICANT CHANGE UP
EGFR: 61 ML/MIN/1.73M2 — SIGNIFICANT CHANGE UP
EOSINOPHIL # BLD AUTO: 0.15 K/UL — SIGNIFICANT CHANGE UP (ref 0–0.5)
EOSINOPHIL NFR BLD AUTO: 2.1 % — SIGNIFICANT CHANGE UP (ref 0–6)
GLUCOSE SERPL-MCNC: 134 MG/DL — HIGH (ref 70–99)
GLUCOSE UR QL: NEGATIVE MG/DL — SIGNIFICANT CHANGE UP
HCT VFR BLD CALC: 42.8 % — SIGNIFICANT CHANGE UP (ref 34.5–45)
HGB BLD-MCNC: 13.5 G/DL — SIGNIFICANT CHANGE UP (ref 11.5–15.5)
HIV 1 & 2 AB SERPL IA.RAPID: SIGNIFICANT CHANGE UP
IMM GRANULOCYTES # BLD AUTO: 0.03 K/UL — SIGNIFICANT CHANGE UP (ref 0–0.07)
IMM GRANULOCYTES NFR BLD AUTO: 0.4 % — SIGNIFICANT CHANGE UP (ref 0–0.9)
INR BLD: 1.09 RATIO — SIGNIFICANT CHANGE UP (ref 0.85–1.16)
KETONES UR-MCNC: NEGATIVE MG/DL — SIGNIFICANT CHANGE UP
LEUKOCYTE ESTERASE UR-ACNC: NEGATIVE — SIGNIFICANT CHANGE UP
LIDOCAIN IGE QN: 23 U/L — SIGNIFICANT CHANGE UP (ref 13–75)
LYMPHOCYTES # BLD AUTO: 1.48 K/UL — SIGNIFICANT CHANGE UP (ref 1–3.3)
LYMPHOCYTES NFR BLD AUTO: 20.3 % — SIGNIFICANT CHANGE UP (ref 13–44)
MAGNESIUM SERPL-MCNC: 2.2 MG/DL — SIGNIFICANT CHANGE UP (ref 1.6–2.6)
MCHC RBC-ENTMCNC: 26.8 PG — LOW (ref 27–34)
MCHC RBC-ENTMCNC: 31.5 G/DL — LOW (ref 32–36)
MCV RBC AUTO: 85.1 FL — SIGNIFICANT CHANGE UP (ref 80–100)
MONOCYTES # BLD AUTO: 0.51 K/UL — SIGNIFICANT CHANGE UP (ref 0–0.9)
MONOCYTES NFR BLD AUTO: 7 % — SIGNIFICANT CHANGE UP (ref 2–14)
NEUTROPHILS # BLD AUTO: 5.05 K/UL — SIGNIFICANT CHANGE UP (ref 1.8–7.4)
NEUTROPHILS NFR BLD AUTO: 69.4 % — SIGNIFICANT CHANGE UP (ref 43–77)
NITRITE UR-MCNC: NEGATIVE — SIGNIFICANT CHANGE UP
NRBC # BLD AUTO: 0 K/UL — SIGNIFICANT CHANGE UP (ref 0–0)
NRBC # FLD: 0 K/UL — SIGNIFICANT CHANGE UP (ref 0–0)
NRBC BLD AUTO-RTO: 0 /100 WBCS — SIGNIFICANT CHANGE UP (ref 0–0)
NT-PROBNP SERPL-SCNC: 89 PG/ML — SIGNIFICANT CHANGE UP (ref 0–125)
PH UR: 5 — SIGNIFICANT CHANGE UP (ref 5–8)
PLATELET # BLD AUTO: 226 K/UL — SIGNIFICANT CHANGE UP (ref 150–400)
PMV BLD: 10.3 FL — SIGNIFICANT CHANGE UP (ref 7–13)
POTASSIUM SERPL-MCNC: 4.1 MMOL/L — SIGNIFICANT CHANGE UP (ref 3.5–5.3)
POTASSIUM SERPL-SCNC: 4.1 MMOL/L — SIGNIFICANT CHANGE UP (ref 3.5–5.3)
PROT SERPL-MCNC: 7.9 GM/DL — SIGNIFICANT CHANGE UP (ref 6–8.3)
PROT UR-MCNC: SIGNIFICANT CHANGE UP MG/DL
PROTHROM AB SERPL-ACNC: 12.5 SEC — SIGNIFICANT CHANGE UP (ref 9.9–13.4)
RBC # BLD: 5.03 M/UL — SIGNIFICANT CHANGE UP (ref 3.8–5.2)
RBC # FLD: 13.2 % — SIGNIFICANT CHANGE UP (ref 10.3–14.5)
SODIUM SERPL-SCNC: 139 MMOL/L — SIGNIFICANT CHANGE UP (ref 135–145)
SP GR SPEC: >=1.099 (ref 1–1.03)
TROPONIN I, HIGH SENSITIVITY RESULT: 5.68 NG/L — SIGNIFICANT CHANGE UP
UROBILINOGEN FLD QL: 0.2 MG/DL — SIGNIFICANT CHANGE UP (ref 0.2–1)
WBC # BLD: 7.28 K/UL — SIGNIFICANT CHANGE UP (ref 3.8–10.5)
WBC # FLD AUTO: 7.28 K/UL — SIGNIFICANT CHANGE UP (ref 3.8–10.5)

## 2025-03-07 PROCEDURE — 96374 THER/PROPH/DIAG INJ IV PUSH: CPT | Mod: XU

## 2025-03-07 PROCEDURE — 83735 ASSAY OF MAGNESIUM: CPT

## 2025-03-07 PROCEDURE — 85025 COMPLETE CBC W/AUTO DIFF WBC: CPT

## 2025-03-07 PROCEDURE — 74174 CTA ABD&PLVS W/CONTRAST: CPT | Mod: 26

## 2025-03-07 PROCEDURE — 99285 EMERGENCY DEPT VISIT HI MDM: CPT | Mod: 25

## 2025-03-07 PROCEDURE — 84484 ASSAY OF TROPONIN QUANT: CPT

## 2025-03-07 PROCEDURE — 85610 PROTHROMBIN TIME: CPT

## 2025-03-07 PROCEDURE — 99285 EMERGENCY DEPT VISIT HI MDM: CPT

## 2025-03-07 PROCEDURE — 86803 HEPATITIS C AB TEST: CPT

## 2025-03-07 PROCEDURE — 36415 COLL VENOUS BLD VENIPUNCTURE: CPT

## 2025-03-07 PROCEDURE — 93005 ELECTROCARDIOGRAM TRACING: CPT

## 2025-03-07 PROCEDURE — 71045 X-RAY EXAM CHEST 1 VIEW: CPT | Mod: 26

## 2025-03-07 PROCEDURE — 83880 ASSAY OF NATRIURETIC PEPTIDE: CPT

## 2025-03-07 PROCEDURE — 83690 ASSAY OF LIPASE: CPT

## 2025-03-07 PROCEDURE — 81003 URINALYSIS AUTO W/O SCOPE: CPT

## 2025-03-07 PROCEDURE — 87521 HEPATITIS C PROBE&RVRS TRNSC: CPT

## 2025-03-07 PROCEDURE — 85730 THROMBOPLASTIN TIME PARTIAL: CPT

## 2025-03-07 PROCEDURE — 71045 X-RAY EXAM CHEST 1 VIEW: CPT

## 2025-03-07 PROCEDURE — 96375 TX/PRO/DX INJ NEW DRUG ADDON: CPT | Mod: XU

## 2025-03-07 PROCEDURE — 86703 HIV-1/HIV-2 1 RESULT ANTBDY: CPT

## 2025-03-07 PROCEDURE — 93010 ELECTROCARDIOGRAM REPORT: CPT

## 2025-03-07 PROCEDURE — 71275 CT ANGIOGRAPHY CHEST: CPT | Mod: 26

## 2025-03-07 PROCEDURE — 80053 COMPREHEN METABOLIC PANEL: CPT

## 2025-03-07 PROCEDURE — 71275 CT ANGIOGRAPHY CHEST: CPT | Mod: MC

## 2025-03-07 PROCEDURE — 74174 CTA ABD&PLVS W/CONTRAST: CPT | Mod: MC

## 2025-03-07 RX ORDER — MAGNESIUM, ALUMINUM HYDROXIDE 200-200 MG
30 TABLET,CHEWABLE ORAL ONCE
Refills: 0 | Status: COMPLETED | OUTPATIENT
Start: 2025-03-07 | End: 2025-03-07

## 2025-03-07 RX ORDER — ACETAMINOPHEN 500 MG/5ML
1000 LIQUID (ML) ORAL ONCE
Refills: 0 | Status: COMPLETED | OUTPATIENT
Start: 2025-03-07 | End: 2025-03-07

## 2025-03-07 RX ORDER — ONDANSETRON HCL/PF 4 MG/2 ML
4 VIAL (ML) INJECTION ONCE
Refills: 0 | Status: COMPLETED | OUTPATIENT
Start: 2025-03-07 | End: 2025-03-07

## 2025-03-07 RX ORDER — KETOROLAC TROMETHAMINE 30 MG/ML
15 INJECTION, SOLUTION INTRAMUSCULAR; INTRAVENOUS ONCE
Refills: 0 | Status: DISCONTINUED | OUTPATIENT
Start: 2025-03-07 | End: 2025-03-07

## 2025-03-07 RX ADMIN — Medication 20 MILLIGRAM(S): at 12:30

## 2025-03-07 RX ADMIN — Medication 400 MILLIGRAM(S): at 09:35

## 2025-03-07 RX ADMIN — KETOROLAC TROMETHAMINE 15 MILLIGRAM(S): 30 INJECTION, SOLUTION INTRAMUSCULAR; INTRAVENOUS at 13:07

## 2025-03-07 RX ADMIN — Medication 4 MILLIGRAM(S): at 11:40

## 2025-03-07 RX ADMIN — Medication 30 MILLILITER(S): at 12:31

## 2025-03-07 NOTE — ED PROVIDER NOTE - PHYSICAL EXAMINATION
Constitutional: moderate distress AAOx3  Eyes: PERRLA EOMI  Head: Normocephalic atraumatic  Mouth: MMM  Cardiac: regular rate normal pulses no LE swelling  Resp: unlabored breathing clear b/l   GI: Abd s/nd + epigastric ttp no rebound or guarding no cvat  Neuro: grossly normal and intact cn2-12 intact normal strength sensation coordination gait nih-0  Skin: No visible rashes

## 2025-03-07 NOTE — ED ADULT NURSE NOTE - OBJECTIVE STATEMENT
The patient is a 74y Female complaining of abd pain. Pt endorsing epigastric pain radiating to the back. Pt is A & O x4, GCS 15. 20g IV inserted labs drawn. Pt placed on SPO2 and cardiac monitor, EKG completed.

## 2025-03-07 NOTE — ED PROVIDER NOTE - CLINICAL SUMMARY MEDICAL DECISION MAKING FREE TEXT BOX
74-year-old female history of hypertension hep C presents to the emergency department for abdominal pain.  Patient states symptoms started 4 days ago associated with chest pain abdominal pain that radiates to the back.  Patient states pain is severe constant no shortness of breath no numbness weakness slurred speech change in vision.  States that she has not taken anything for the pain.  Here patient in moderate distress states pain is radiating from her epigastric region to her chest into her back.  Normal peripheral pulses cranial nerves II through XII intact normal strength sensation coordination NIH is 0 patient has tenderness to palpation in the epigastric region of her abdomen.  Given pain rating to the back  and moderate distress will workup to rule out dissection possibly pancreatitis ulcer gallbladder disease will check labs CT symptom control reassess will workup to rule out ACS as well. 74-year-old female history of hypertension hep C presents to the emergency department for abdominal pain.  Patient states symptoms started 4 days ago associated with chest pain abdominal pain that radiates to the back.  Patient states pain is severe constant no shortness of breath no numbness weakness slurred speech change in vision.  States that she has not taken anything for the pain.  Here patient in moderate distress states pain is radiating from her epigastric region to her chest into her back.  Normal peripheral pulses cranial nerves II through XII intact normal strength sensation coordination NIH is 0 patient has tenderness to palpation in the epigastric region of her abdomen.  Given pain rating to the back  and moderate distress will workup to rule out dissection possibly pancreatitis ulcer gallbladder disease will check labs CT symptom control reassess will workup to rule out ACS as well.  [facility] labs and imaging reviewed by myself.  CT without any acute findings.  Patient instructed on incidental findings and need for follow-up.  Patient reexamined she is feeling better no midline spinal tenderness abdomen soft nontender neurological exam is normal.  patient does have discomfort in the bilateral lumbar musculature.  On CT she does have degenerative changes in the spine and hips her symptoms are likely muscular. comfortable going home and following up with gastroenterologist will DC with follow-up and strict return precautions  No risk factors or findings concerning for epidural abscess, diskitis, vertebral osteo, cord compression, cauda equina, vertebral fracture or louis malignancy, AAA, or pyelonephritis.  Patient instructed to consider further imaging and workup through their primary care physician as an outpatient, if symptoms persist.

## 2025-03-07 NOTE — ED ADULT TRIAGE NOTE - CHIEF COMPLAINT QUOTE
pt presents to the ED for abdominal pain and back pain x 4 days denies injury, NVD, urinary symptoms. no other complaints at this time.

## 2025-03-07 NOTE — ED PROVIDER NOTE - NSFOLLOWUPINSTRUCTIONS_ED_ALL_ED_FT
1. return for worsening symptoms or anything concerning to you  2. take all home meds as prescribed  3. follow up with your pmd call to make an appointment  4. If you cannot secure follow up with your PMD or specialist within 24 hours and you have non-emergent questions or concerns please call the [facility] ([phone]) in order to speak with an emergency physician open 24 hours/day, 7 days/week.  5. follow up with gastroenterology see attached  6. Take Tylenol 650 mg every 6 hours as needed for pain.  7. use lido patch  at pharmacy  8. take protonix as directed    Acute Abdominal Pain    WHAT YOU NEED TO KNOW:    The cause of your abdominal pain may not be found. If a cause is found, treatment will depend on what the cause is.     DISCHARGE INSTRUCTIONS:    Return to the emergency department if:     You vomit blood or cannot stop vomiting.      You have blood in your bowel movement or it looks like tar.       You have bleeding from your rectum.       Your abdomen is larger than usual, more painful, and hard.       You have severe pain in your abdomen.       You stop passing gas and having bowel movements.       You feel weak, dizzy, or faint.    Contact your healthcare provider if:     You have a fever.      You have new signs and symptoms.      Your symptoms do not get better with treatment.       You have questions or concerns about your condition or care.    Medicines may be given to decrease pain, treat an infection, and manage your symptoms. Take your medicine as directed. Call your healthcare provider if you think your medicine is not helping or if you have side effects. Tell him if you are allergic to any medicine. Keep a list of the medicines, vitamins, and herbs you take. Include the amounts, and when and why you take them. Bring the list or the pill bottles to follow-up visits. Carry your medicine list with you in case of an emergency.    Manage your symptoms:     Apply heat on your abdomen for 20 to 30 minutes every 2 hours for as many days as directed. Heat helps decrease pain and muscle spasms.       Manage your stress. Stress may cause abdominal pain. Your healthcare provider may recommend relaxation techniques and deep breathing exercises to help decrease your stress. Your healthcare provider may recommend you talk to someone about your stress or anxiety, such as a counselor or a trusted friend. Get plenty of sleep and exercise regularly.       Limit or do not drink alcohol. Alcohol can make your abdominal pain worse. Ask your healthcare provider if it is safe for you to drink alcohol. Also ask how much is safe for you to drink.       Do not smoke. Nicotine and other chemicals in cigarettes can damage your esophagus and stomach. Ask your healthcare provider for information if you currently smoke and need help to quit. E-cigarettes or smokeless tobacco still contain nicotine. Talk to your healthcare provider before you use these products.     Make changes to the food you eat as directed: Do not eat foods that cause abdominal pain or other symptoms. Eat small meals more often.     Eat more high-fiber foods if you are constipated. High-fiber foods include fruits, vegetables, whole-grain foods, and legumes.       Do not eat foods that cause gas if you have bloating. Examples include broccoli, cabbage, and cauliflower. Do not drink soda or carbonated drinks, because these may also cause gas.       Do not eat foods or drinks that contain sorbitol or fructose if you have diarrhea and bloating. Some examples are fruit juices, candy, jelly, and sugar-free gum.       Do not eat high-fat foods, such as fried foods, cheeseburgers, hot dogs, and desserts.      Limit or do not drink caffeine. Caffeine may make symptoms, such as heart burn or nausea, worse.       Drink plenty of liquids to prevent dehydration from diarrhea or vomiting. Ask your healthcare provider how much liquid to drink each day and which liquids are best for you.     Follow up with your healthcare provider as directed: Write down your questions so you remember to ask them during your visits.

## 2025-03-07 NOTE — ED PROVIDER NOTE - PATIENT PORTAL LINK FT
You can access the FollowMyHealth Patient Portal offered by [facility] by registering at the following website: [email or web address]. By joining [employer]’s FollowMyHealth portal, you will also be able to view your health information using other applications (apps) compatible with our system.

## 2025-03-07 NOTE — ED PROVIDER NOTE - OBJECTIVE STATEMENT
74-year-old female history of hypertension hep C presents to the emergency department for abdominal pain.  Patient states symptoms started 4 days ago associated with chest pain abdominal pain that radiates to the back.  Patient states pain is severe constant no shortness of breath no numbness weakness slurred speech change in vision.  States that she has not taken anything for the pain.  Here patient in moderate distress states pain is radiating from her epigastric region to her chest into her back.  Normal peripheral pulses cranial nerves II through XII intact normal strength sensation coordination NIH is 0 patient has tenderness to palpation in the epigastric region of her abdomen.  Given pain rating to the back  and moderate distress will workup to rule out dissection possibly pancreatitis ulcer gallbladder disease will check labs CT symptom control reassess will workup to rule out ACS as well.

## 2025-03-07 NOTE — ED PROVIDER NOTE - CARE PROVIDER_API CALL
Vonnie Stevens  Gastroenterology  [address] [place] [address], [facility] [[email or web address]] [place] [address]  Phone: [phone]  Fax: [phone]  Follow Up Time: 1-3 Days

## 2025-03-08 LAB
HCV AB S/CO SERPL IA: 15.01 S/CO — HIGH (ref 0–0.79)
HCV AB SERPL-IMP: REACTIVE

## 2025-03-10 LAB
HCV RNA FLD QL NAA+PROBE: SIGNIFICANT CHANGE UP
HCV RNA SPEC QL PROBE+SIG AMP: SIGNIFICANT CHANGE UP

## 2025-03-11 ENCOUNTER — APPOINTMENT (OUTPATIENT)
Dept: GASTROENTEROLOGY | Facility: CLINIC | Age: 75
End: 2025-03-11
Payer: OTHER GOVERNMENT

## 2025-03-11 ENCOUNTER — NON-APPOINTMENT (OUTPATIENT)
Age: 75
End: 2025-03-11

## 2025-03-11 VITALS
DIASTOLIC BLOOD PRESSURE: 92 MMHG | WEIGHT: 220 LBS | BODY MASS INDEX: 37.56 KG/M2 | SYSTOLIC BLOOD PRESSURE: 138 MMHG | HEIGHT: 64 IN

## 2025-03-11 DIAGNOSIS — R10.9 UNSPECIFIED ABDOMINAL PAIN: ICD-10-CM

## 2025-03-11 DIAGNOSIS — R13.10 DYSPHAGIA, UNSPECIFIED: ICD-10-CM

## 2025-03-11 DIAGNOSIS — R10.13 EPIGASTRIC PAIN: ICD-10-CM

## 2025-03-11 DIAGNOSIS — K74.60 UNSPECIFIED CIRRHOSIS OF LIVER: ICD-10-CM

## 2025-03-11 DIAGNOSIS — Z12.11 ENCOUNTER FOR SCREENING FOR MALIGNANT NEOPLASM OF COLON: ICD-10-CM

## 2025-03-11 DIAGNOSIS — R79.89 OTHER SPECIFIED ABNORMAL FINDINGS OF BLOOD CHEMISTRY: ICD-10-CM

## 2025-03-11 PROCEDURE — 99204 OFFICE O/P NEW MOD 45 MIN: CPT

## 2025-03-11 RX ORDER — LOSARTAN POTASSIUM 50 MG/1
50 TABLET, FILM COATED ORAL
Refills: 0 | Status: ACTIVE | COMMUNITY

## 2025-03-11 RX ORDER — TOPIRAMATE 25 MG/1
25 TABLET, FILM COATED ORAL
Refills: 0 | Status: ACTIVE | COMMUNITY

## 2025-03-11 RX ORDER — SODIUM SULFATE, POTASSIUM SULFATE AND MAGNESIUM SULFATE 1.6; 3.13; 17.5 G/177ML; G/177ML; G/177ML
17.5-3.13-1.6 SOLUTION ORAL
Qty: 2 | Refills: 0 | Status: ACTIVE | COMMUNITY
Start: 2025-03-11 | End: 1900-01-01

## 2025-03-11 RX ORDER — PANTOPRAZOLE 40 MG/1
40 TABLET, DELAYED RELEASE ORAL
Qty: 90 | Refills: 0 | Status: ACTIVE | COMMUNITY
Start: 2025-03-11 | End: 1900-01-01

## 2025-03-11 RX ORDER — LOSARTAN POTASSIUM AND HYDROCHLOROTHIAZIDE 12.5; 1 MG/1; MG/1
100-12.5 TABLET ORAL
Refills: 0 | Status: ACTIVE | COMMUNITY

## 2025-03-11 RX ORDER — ATENOLOL 50 MG/1
50 TABLET ORAL
Refills: 0 | Status: ACTIVE | COMMUNITY

## 2025-03-11 RX ORDER — ATORVASTATIN CALCIUM 20 MG/1
20 TABLET, FILM COATED ORAL
Refills: 0 | Status: ACTIVE | COMMUNITY

## 2025-03-12 PROBLEM — R79.89 ELEVATED LFTS: Status: ACTIVE | Noted: 2025-03-12

## 2025-03-12 LAB
AFP-TM SERPL-MCNC: 4.6 NG/ML
ALBUMIN SERPL ELPH-MCNC: 4 G/DL
ALP BLD-CCNC: 154 U/L
ALT SERPL-CCNC: 66 U/L
ANION GAP SERPL CALC-SCNC: 12 MMOL/L
AST SERPL-CCNC: 26 U/L
BILIRUB SERPL-MCNC: 0.6 MG/DL
BUN SERPL-MCNC: 12 MG/DL
CALCIUM SERPL-MCNC: 9.4 MG/DL
CHLORIDE SERPL-SCNC: 105 MMOL/L
CO2 SERPL-SCNC: 26 MMOL/L
CREAT SERPL-MCNC: 0.77 MG/DL
EGFRCR SERPLBLD CKD-EPI 2021: 82 ML/MIN/1.73M2
GLUCOSE SERPL-MCNC: 119 MG/DL
HCT VFR BLD CALC: 40.4 %
HGB BLD-MCNC: 12.6 G/DL
INR PPP: 1 RATIO
MCHC RBC-ENTMCNC: 26.6 PG
MCHC RBC-ENTMCNC: 31.2 G/DL
MCV RBC AUTO: 85.2 FL
PLATELET # BLD AUTO: 239 K/UL
POTASSIUM SERPL-SCNC: 4.3 MMOL/L
PROT SERPL-MCNC: 7.2 G/DL
PT BLD: 11.8 SEC
RBC # BLD: 4.74 M/UL
RBC # FLD: 13 %
SODIUM SERPL-SCNC: 144 MMOL/L
WBC # FLD AUTO: 6.03 K/UL

## 2025-03-14 LAB
HCV RNA SERPL NAA+PROBE-LOG IU: NOT DETECTED LOGIU/ML
HEPC RNA INTERP: NOT DETECTED

## 2025-03-19 ENCOUNTER — APPOINTMENT (OUTPATIENT)
Dept: ULTRASOUND IMAGING | Facility: CLINIC | Age: 75
End: 2025-03-19
Payer: OTHER GOVERNMENT

## 2025-03-19 ENCOUNTER — OUTPATIENT (OUTPATIENT)
Dept: OUTPATIENT SERVICES | Facility: HOSPITAL | Age: 75
LOS: 1 days | End: 2025-03-19
Payer: COMMERCIAL

## 2025-03-19 DIAGNOSIS — A04.8 OTHER SPECIFIED BACTERIAL INTESTINAL INFECTIONS: ICD-10-CM

## 2025-03-19 DIAGNOSIS — R79.89 OTHER SPECIFIED ABNORMAL FINDINGS OF BLOOD CHEMISTRY: ICD-10-CM

## 2025-03-19 LAB — H PYLORI AG STL QL: POSITIVE

## 2025-03-19 PROCEDURE — 76705 ECHO EXAM OF ABDOMEN: CPT

## 2025-03-19 PROCEDURE — 76705 ECHO EXAM OF ABDOMEN: CPT | Mod: 26

## 2025-03-19 RX ORDER — TETRACYCLINE HYDROCHLORIDE 500 MG/1
500 CAPSULE ORAL EVERY 6 HOURS
Qty: 56 | Refills: 0 | Status: ACTIVE | COMMUNITY
Start: 2025-03-19 | End: 1900-01-01

## 2025-03-19 RX ORDER — BISMUTH SUBSALICYLATE 262 MG/1
262 TABLET, CHEWABLE ORAL 4 TIMES DAILY
Qty: 112 | Refills: 0 | Status: ACTIVE | COMMUNITY
Start: 2025-03-19 | End: 1900-01-01

## 2025-03-19 RX ORDER — METRONIDAZOLE 500 MG/1
500 TABLET ORAL EVERY 6 HOURS
Qty: 56 | Refills: 0 | Status: ACTIVE | COMMUNITY
Start: 2025-03-19 | End: 1900-01-01

## 2025-04-03 ENCOUNTER — OUTPATIENT (OUTPATIENT)
Dept: OUTPATIENT SERVICES | Facility: HOSPITAL | Age: 75
LOS: 1 days | End: 2025-04-03
Payer: COMMERCIAL

## 2025-04-03 ENCOUNTER — APPOINTMENT (OUTPATIENT)
Dept: MRI IMAGING | Facility: CLINIC | Age: 75
End: 2025-04-03
Payer: COMMERCIAL

## 2025-04-03 ENCOUNTER — NON-APPOINTMENT (OUTPATIENT)
Age: 75
End: 2025-04-03

## 2025-04-03 DIAGNOSIS — Z00.8 ENCOUNTER FOR OTHER GENERAL EXAMINATION: ICD-10-CM

## 2025-04-03 PROCEDURE — 74183 MRI ABD W/O CNTR FLWD CNTR: CPT

## 2025-04-03 PROCEDURE — A9585: CPT

## 2025-04-03 PROCEDURE — 74183 MRI ABD W/O CNTR FLWD CNTR: CPT | Mod: 26

## 2025-04-10 ENCOUNTER — LABORATORY RESULT (OUTPATIENT)
Age: 75
End: 2025-04-10

## 2025-04-10 ENCOUNTER — NON-APPOINTMENT (OUTPATIENT)
Age: 75
End: 2025-04-10

## 2025-04-11 NOTE — ED ADULT TRIAGE NOTE - STATUS:
[facility]  [address]  [place] [address]  [phone]    Patient Name:  Buddy Carreon  YOB: 1971    Buddy Carreon, was evaluated through a synchronous (real-time) audio-video encounter. The patient (or guardian if applicable) is aware that this is a billable service, which includes applicable co-pays. This Virtual Visit was conducted with patient's (and/or legal guardian's) consent. Patient identification was verified, and a caregiver was present when appropriate.   The patient was located at Other: in car, in [place], [place]   Provider was located at Home (Appt Dept State): [place]  Confirm you are appropriately licensed, registered, or certified to deliver care in the state where the patient is located as indicated above. If you are not or unsure, please re-schedule the visit: Yes, I confirm.          --JOSESITO Hendrix - CNP on 4/14/2025 at 10:02 AM             Office Visit 4/14/2025    CHIEF COMPLAINT:    Chief Complaint   Patient presents with    CPAP/BiPAP    Sleep Apnea    1 Year Follow Up       HISTORY OF PRESENT ILLNESS:  Patient is being seen today via virtual visit.     Patient was diagnosed with sleep apnea in 2012, PSG 8/8/2012 with AHI 21.6 events per hour with desaturations to 76%. He is prescribed auto CPAP 12 to 14 cm using a nasal mask. Download reveals 99% compliance over the past year with average nightly use 8.5 hours. AHI is 2.4/hr with average pressure used 13.1-13.9 cm. He feels the mask is fitting well. No issues with the pressure.     He states that he will wake up around 2 am and has trouble going back to sleep. He is awake for 10-15 min and then can go back to sleep. He gets up at 6 am. He tried melatonin 10mg extended release but didn't notice much improvement. He states that he continues to wake feeling rested and has energy throughout the day. He doesn't feel the waking in the night is a problem. He denies any  Applied

## 2025-05-05 NOTE — DIETITIAN INITIAL EVALUATION ADULT - HEIGHT FOR BMI (INCHES)
Orthopaedic Surgery - Office Note  Ryan Montoya (68 y.o. male)   : 1957   MRN: [id number]  Encounter Date: 2025    Assessment / Plan  Left knee osteoarthritis with degenerative medial meniscus tear  Left hip osteoarthritis  Assessment & Plan  Lumbar spine pain    Orders:    XR spine lumbar 2 or 3 views injury; Future    Primary osteoarthritis of one hip, left  Referral placed for spine and pain management for an intra-articular left hip injection  Orders:    XR pelvis ap only 1 or 2 vw; Future    FL spine and pain procedure; Future    Primary osteoarthritis of left knee    Discussed operative vs non-operative treatments  Viscosupplementation prior authorization placed.  Discussed this will take about 1 to 2 weeks for authorization.  My office will reach out once the prior authorization is approved and we will set up the appointment for the injection at that time  Referral placed for spine and pain management for any intra-articular left hip injection  Follow-up for Visco supplementation injection  Follow-up:  No follow-ups on file.  Orders:    Injection Procedure Prior Authorization; Future              Chief Complaint / Date of Onset  Chronic left knee pain  Injury Mechanism / Date  None  Surgery / Date  None    History of Present Illness   Ryan Montoya is a 68 y.o. male who presents for follow-up for left knee primary osteoarthritis with degenerative medial meniscus tear.  He reports good symptom relief for 1 to 2 days from previous cortisone injection on 2025 by Dr. Khoury at [facility].  He reports having mild symptom relief through physical therapy.  He reports today having some hip pain with that pain in the groin area.  He also describes nerve related radiating symptoms down his leg to about mid tibia.    Treatment Summary  Medications / Modalities  Tylenol  Bracing / Immobilization  None  Physical Therapy  Yes -started on 2025  Injections  CSI with Dr. Khoury at [facility] on  2/20/2025  Prior Surgeries  None  Other Treatments  None    Employment / Current Status  Retired    Sport / Organization / Current Status  Active      Review of Systems  Pertinent items are noted in HPI.  All other systems were reviewed and are negative.      Physical Exam  Ht 6' (1.829 m)   Wt 90.7 kg (200 lb)   BMI 27.12 kg/m²   Cons: Appears well.  No apparent distress.  Psych: Alert. Oriented x3.  Mood and affect normal.  Eyes: PERRLA, EOMI  Resp: Normal effort.  No audible wheezing or stridor.  CV: Palpable pulse.  No discernable arrhythmia.  No LE edema.  Lymph:  No palpable cervical, axillary, or inguinal lymphadenopathy.  Skin: Warm.  No palpable masses.  No visible lesions.  Neuro: Normal muscle tone.  Normal and symmetric DTR's.     Left Knee Exam  Alignment:  Normal knee alignment.  Inspection:  No swelling. No ecchymosis.  Palpation:   Medial joint line tenderness.  ROM:  Knee Extension 0. Knee Flexion 120.  Strength:  5/5 quadriceps and hamstrings.  Stability:  No objective knee instability. Stable Varus / Valgus stress, Lachman, and Posterior drawer.  Tests:  No pertinent positive or negative tests.  Patella:  Patella tracks centrally with crepitus.  Neurovascular:  Sensation intact in DP/SP/Weller/Sa/T nerve distributions.  2+ DP & PT pulses.  Gait:  Steady.       Studies Reviewed  I have personally reviewed pertinent films in PACS.  X-rays of lumbar spine -osteoarthritis with osteophyte formation at multiple levels of lumbar spine.  X-ray AP pelvis -left hip osteoarthritis with joint space narrowing and osteophyte formation.  CAM lesion present.      Procedures  No procedures today.    Medical, Surgical, Family, and Social History  The patient's medical history, family history, and social history, were reviewed and updated as appropriate.    Past Medical History:   Diagnosis Date    Acute intractable headache 11/03/2021    Acute maxillary sinusitis     13 dec 2012    ROSENDO (acute kidney injury) (HCC)  06/08/2018 2018.     Bone spur     toe    BPPV (benign paroxysmal positional vertigo) 10/24/2021    Cataract 02/28/2022    History of transfusion     Hyperlipidemia     Hypertension     Kidney stone     PAD (peripheral artery disease) ([facility]) 05/22/2023    PVD (peripheral vascular disease) ([facility])     Retinal detachment 11/21/2013    Sleep apnea     does not use cpap    TIA (transient ischemic attack) 11/03/2021       Past Surgical History:   Procedure Laterality Date    APPENDECTOMY      CATARACT EXTRACTION Bilateral     INCISION AND DRAINAGE OF WOUND Right 1/15/2025    Procedure: INCISION AND DRAINAGE (I&D) RIGHT HAND;  Surgeon: Marky Richardson MD;  [facility];  Service: Orthopedics    IR LOWER EXTREMITY ANGIOGRAM  11/8/2023    KIDNEY STONE SURGERY      multiple surgery in the past    [facility] SLCTV CATHJ 3RD+ ORD SLCTV ABDL PEL/LXTR BRNCH Right 11/8/2023    Procedure: ARTERIOGRAM;  Surgeon: Lucho Reynoso MD;  [facility];  Service: Vascular    VA TEAEC W/WO PATCH GRAFT COMMON FEMORAL Left 08/14/2023    Procedure: ENDARTERECTOMY ARTERIAL FEMORAL WITH TISSUES PATCH AGIOPLASTY;  Surgeon: Lucho Reynoso MD;  Location[facility];  Service: Vascular    VA TEAEC W/WO PATCH GRAFT COMMON FEMORAL Right 11/8/2023    Procedure: RIGHT FEMORAL ENDARTERECTOMY WITH PATCH ANGIOPLASTY, RIGHT COMMON ILIAC INTRAVASCULAR LITHOTRIPSY, RIGHT EXTERNAL ILIAC INTRAVASCULAR LITHOTRIPSY, AORTAGRAM, LEFT COMMON ILIAC INTRAVASCULAR LITHOTRIPSY;  Surgeon: Lucho Reynoso MD;  [facility];  Service: Vascular    RETINAL DETACHMENT SURGERY Bilateral     SEPTOPLASTY      TONSILLECTOMY  2000    meg Vidal       Family History   Problem Relation Age of Onset    Stroke Mother     No Known Problems Father        Social History     Occupational History    Occupation: attendant for [employer]     Comment: bus maintenance (/)   Tobacco Use    Smoking status: Every  Day     Current packs/day: 0.50     Average packs/day: 0.5 packs/day for 40.0 years (20.0 ttl pk-yrs)     Types: Cigarettes    Smokeless tobacco: Never    Tobacco comments:     exposure to 2nd hand smoke  advised not to smoke prior to procedure   Vaping Use    Vaping status: Never Used   Substance and Sexual Activity    Alcohol use: Yes     Alcohol/week: 0.0 standard drinks of alcohol     Comment: social    Drug use: Yes     Types: Marijuana     Comment: once a week advised not to smoke prior to procedure    Sexual activity: Yes     Partners: Female       Allergies   Allergen Reactions    Amlodipine Edema    Eszopiclone Other (See Comments)     Sleep walking    Zolpidem Other (See Comments)     Other reaction(s): sleepwalking.         Current Outpatient Medications:     acetaminophen (TYLENOL) 500 mg tablet, Take 500 mg by mouth every 6 (six) hours as needed for mild pain, Disp: , Rfl:     ALPRAZolam (XANAX) 0.25 mg tablet, Take 1 tablet (0.25 mg total) by mouth daily at bedtime as needed for anxiety, Disp: 15 tablet, Rfl: 0    aspirin 81 mg chewable tablet, Chew 1 tablet (81 mg total) daily, Disp: , Rfl:     diltiazem (DILACOR XR) 120 MG 24 hr capsule, Take 1 capsule (120 mg total) by mouth daily, Disp: 90 capsule, Rfl: 1    gabapentin (Neurontin) 300 mg capsule, Take 1 capsule (300 mg total) by mouth 2 (two) times a day, Disp: 90 capsule, Rfl: 3    losartan (COZAAR) 100 MG tablet, TAKE 1 TABLET BY MOUTH EVERY DAY, Disp: 90 tablet, Rfl: 1    Melatonin 10 MG TABS, Take 10 mg by mouth Pt takes 10mg at bedtime., Disp: , Rfl:     rosuvastatin (CRESTOR) 40 MG tablet, Take 1 tablet (40 mg total) by mouth daily, Disp: 90 tablet, Rfl: 3    traZODone (DESYREL) 50 mg tablet, TAKE 2 TABLETS (100 MG TOTAL) BY MOUTH DAILY AT BEDTIME, Disp: 180 tablet, Rfl: 1    triamcinolone (KENALOG) 0.1 % cream, Apply topically 2 (two) times a day For up to two weeks to hands and forearms after completing efudex therapy to help with healing.,  Disp: 80 g, Rfl: 0    fluorouracil (EFUDEX) 5 % cream, Apply twice a day for two to three weeks on bilateral arms and tho weeks for face (Patient not taking: Reported on 10/8/2024), Disp: 40 g, Rfl: 1    naloxone (NARCAN) 4 mg/0.1 mL nasal spray, Administer 1 spray into a nostril. If no response after 2-3 minutes, give another dose in the other nostril using a new spray., Disp: 1 each, Rfl: 1    oxyCODONE (ROXICODONE) 5 immediate release tablet, Take 1 tablet (5 mg total) by mouth every 6 (six) hours as needed for severe pain for up to 10 doses Max Daily Amount: 20 mg (Patient not taking: Reported on 1/28/2025), Disp: 10 tablet, Rfl: 0      Rafa Ferguson    Scribe Attestation      I,:  Rafa Ferguson am acting as a scribe while in the presence of the attending physician.:       I,:  Sheldon Montesinos MD personally performed the services described in this documentation    as scribed in my presence.:             5

## 2025-06-16 ENCOUNTER — EMERGENCY (EMERGENCY)
Facility: HOSPITAL | Age: 75
LOS: 0 days | Discharge: HOME | End: 2025-06-16
Attending: EMERGENCY MEDICINE
Payer: MEDICARE

## 2025-06-16 VITALS
RESPIRATION RATE: 18 BRPM | HEART RATE: 109 BPM | SYSTOLIC BLOOD PRESSURE: 121 MMHG | OXYGEN SATURATION: 100 % | TEMPERATURE: 98 F | DIASTOLIC BLOOD PRESSURE: 62 MMHG

## 2025-06-16 VITALS
TEMPERATURE: 99 F | SYSTOLIC BLOOD PRESSURE: 167 MMHG | WEIGHT: 220.68 LBS | HEART RATE: 98 BPM | DIASTOLIC BLOOD PRESSURE: 90 MMHG

## 2025-06-16 DIAGNOSIS — M54.9 DORSALGIA, UNSPECIFIED: ICD-10-CM

## 2025-06-16 DIAGNOSIS — R30.0 DYSURIA: ICD-10-CM

## 2025-06-16 DIAGNOSIS — J45.901 UNSPECIFIED ASTHMA WITH (ACUTE) EXACERBATION: ICD-10-CM

## 2025-06-16 DIAGNOSIS — Z88.5 ALLERGY STATUS TO NARCOTIC AGENT: ICD-10-CM

## 2025-06-16 DIAGNOSIS — R06.02 SHORTNESS OF BREATH: ICD-10-CM

## 2025-06-16 DIAGNOSIS — M54.50 LOW BACK PAIN, UNSPECIFIED: ICD-10-CM

## 2025-06-16 DIAGNOSIS — R56.9 UNSPECIFIED CONVULSIONS: ICD-10-CM

## 2025-06-16 LAB
ADD ON TEST-SPECIMEN IN LAB: SIGNIFICANT CHANGE UP
ALBUMIN SERPL ELPH-MCNC: 3.3 G/DL — SIGNIFICANT CHANGE UP (ref 3.3–5)
ALP SERPL-CCNC: 88 U/L — SIGNIFICANT CHANGE UP (ref 40–120)
ALT FLD-CCNC: 20 U/L — SIGNIFICANT CHANGE UP (ref 12–78)
ANION GAP SERPL CALC-SCNC: 3 MMOL/L — LOW (ref 5–17)
APPEARANCE UR: CLEAR — SIGNIFICANT CHANGE UP
AST SERPL-CCNC: 21 U/L — SIGNIFICANT CHANGE UP (ref 15–37)
BASOPHILS # BLD AUTO: 0.05 K/UL — SIGNIFICANT CHANGE UP (ref 0–0.2)
BASOPHILS NFR BLD AUTO: 0.9 % — SIGNIFICANT CHANGE UP (ref 0–2)
BILIRUB SERPL-MCNC: 0.3 MG/DL — SIGNIFICANT CHANGE UP (ref 0.2–1.2)
BILIRUB UR-MCNC: NEGATIVE — SIGNIFICANT CHANGE UP
BUN SERPL-MCNC: 13 MG/DL — SIGNIFICANT CHANGE UP (ref 7–23)
CALCIUM SERPL-MCNC: 9.1 MG/DL — SIGNIFICANT CHANGE UP (ref 8.5–10.1)
CHLORIDE SERPL-SCNC: 111 MMOL/L — HIGH (ref 96–108)
CO2 SERPL-SCNC: 27 MMOL/L — SIGNIFICANT CHANGE UP (ref 22–31)
COLOR SPEC: YELLOW — SIGNIFICANT CHANGE UP
CREAT SERPL-MCNC: 0.91 MG/DL — SIGNIFICANT CHANGE UP (ref 0.5–1.3)
DIFF PNL FLD: NEGATIVE — SIGNIFICANT CHANGE UP
EGFR: 67 ML/MIN/1.73M2 — SIGNIFICANT CHANGE UP
EGFR: 67 ML/MIN/1.73M2 — SIGNIFICANT CHANGE UP
EOSINOPHIL # BLD AUTO: 0.17 K/UL — SIGNIFICANT CHANGE UP (ref 0–0.5)
EOSINOPHIL NFR BLD AUTO: 2.9 % — SIGNIFICANT CHANGE UP (ref 0–6)
FLUAV AG NPH QL: SIGNIFICANT CHANGE UP
FLUBV AG NPH QL: SIGNIFICANT CHANGE UP
GLUCOSE SERPL-MCNC: 110 MG/DL — HIGH (ref 70–99)
GLUCOSE UR QL: NEGATIVE MG/DL — SIGNIFICANT CHANGE UP
HCT VFR BLD CALC: 40 % — SIGNIFICANT CHANGE UP (ref 34.5–45)
HGB BLD-MCNC: 12.4 G/DL — SIGNIFICANT CHANGE UP (ref 11.5–15.5)
IMM GRANULOCYTES # BLD AUTO: 0.03 K/UL — SIGNIFICANT CHANGE UP (ref 0–0.07)
IMM GRANULOCYTES NFR BLD AUTO: 0.5 % — SIGNIFICANT CHANGE UP (ref 0–0.9)
KETONES UR QL: NEGATIVE MG/DL — SIGNIFICANT CHANGE UP
LACTATE SERPL-SCNC: 1.4 MMOL/L — SIGNIFICANT CHANGE UP (ref 0.7–2)
LEUKOCYTE ESTERASE UR-ACNC: NEGATIVE — SIGNIFICANT CHANGE UP
LYMPHOCYTES # BLD AUTO: 1.84 K/UL — SIGNIFICANT CHANGE UP (ref 1–3.3)
LYMPHOCYTES NFR BLD AUTO: 31.3 % — SIGNIFICANT CHANGE UP (ref 13–44)
MCHC RBC-ENTMCNC: 26.8 PG — LOW (ref 27–34)
MCHC RBC-ENTMCNC: 31 G/DL — LOW (ref 32–36)
MCV RBC AUTO: 86.4 FL — SIGNIFICANT CHANGE UP (ref 80–100)
MONOCYTES # BLD AUTO: 0.39 K/UL — SIGNIFICANT CHANGE UP (ref 0–0.9)
MONOCYTES NFR BLD AUTO: 6.6 % — SIGNIFICANT CHANGE UP (ref 2–14)
NEUTROPHILS # BLD AUTO: 3.4 K/UL — SIGNIFICANT CHANGE UP (ref 1.8–7.4)
NEUTROPHILS NFR BLD AUTO: 57.8 % — SIGNIFICANT CHANGE UP (ref 43–77)
NITRITE UR-MCNC: NEGATIVE — SIGNIFICANT CHANGE UP
NRBC # BLD AUTO: 0 K/UL — SIGNIFICANT CHANGE UP (ref 0–0)
NRBC # FLD: 0 K/UL — SIGNIFICANT CHANGE UP (ref 0–0)
NRBC BLD AUTO-RTO: 0 /100 WBCS — SIGNIFICANT CHANGE UP (ref 0–0)
NT-PROBNP SERPL-SCNC: 265 PG/ML — HIGH (ref 0–125)
PH UR: 6 — SIGNIFICANT CHANGE UP (ref 5–8)
PLATELET # BLD AUTO: 239 K/UL — SIGNIFICANT CHANGE UP (ref 150–400)
PMV BLD: 10 FL — SIGNIFICANT CHANGE UP (ref 7–13)
POTASSIUM SERPL-MCNC: 3.9 MMOL/L — SIGNIFICANT CHANGE UP (ref 3.5–5.3)
POTASSIUM SERPL-SCNC: 3.9 MMOL/L — SIGNIFICANT CHANGE UP (ref 3.5–5.3)
PROT SERPL-MCNC: 7.4 GM/DL — SIGNIFICANT CHANGE UP (ref 6–8.3)
PROT UR-MCNC: NEGATIVE MG/DL — SIGNIFICANT CHANGE UP
RBC # BLD: 4.63 M/UL — SIGNIFICANT CHANGE UP (ref 3.8–5.2)
RBC # FLD: 13.5 % — SIGNIFICANT CHANGE UP (ref 10.3–14.5)
RSV RNA NPH QL NAA+NON-PROBE: SIGNIFICANT CHANGE UP
SARS-COV-2 RNA SPEC QL NAA+PROBE: SIGNIFICANT CHANGE UP
SODIUM SERPL-SCNC: 141 MMOL/L — SIGNIFICANT CHANGE UP (ref 135–145)
SOURCE RESPIRATORY: SIGNIFICANT CHANGE UP
SP GR SPEC: 1.02 — SIGNIFICANT CHANGE UP (ref 1–1.03)
TROPONIN I, HIGH SENSITIVITY RESULT: 4.71 NG/L — SIGNIFICANT CHANGE UP
UROBILINOGEN FLD QL: 1 MG/DL — SIGNIFICANT CHANGE UP (ref 0.2–1)
WBC # BLD: 5.88 K/UL — SIGNIFICANT CHANGE UP (ref 3.8–10.5)
WBC # FLD AUTO: 5.88 K/UL — SIGNIFICANT CHANGE UP (ref 3.8–10.5)

## 2025-06-16 PROCEDURE — 99285 EMERGENCY DEPT VISIT HI MDM: CPT | Mod: FS

## 2025-06-16 PROCEDURE — 83880 ASSAY OF NATRIURETIC PEPTIDE: CPT

## 2025-06-16 PROCEDURE — 74176 CT ABD & PELVIS W/O CONTRAST: CPT | Mod: 26

## 2025-06-16 PROCEDURE — 99285 EMERGENCY DEPT VISIT HI MDM: CPT | Mod: 25

## 2025-06-16 PROCEDURE — 74176 CT ABD & PELVIS W/O CONTRAST: CPT

## 2025-06-16 PROCEDURE — 93005 ELECTROCARDIOGRAM TRACING: CPT

## 2025-06-16 PROCEDURE — 84484 ASSAY OF TROPONIN QUANT: CPT

## 2025-06-16 PROCEDURE — 71046 X-RAY EXAM CHEST 2 VIEWS: CPT | Mod: 26

## 2025-06-16 PROCEDURE — 83605 ASSAY OF LACTIC ACID: CPT

## 2025-06-16 PROCEDURE — 94640 AIRWAY INHALATION TREATMENT: CPT

## 2025-06-16 PROCEDURE — 96375 TX/PRO/DX INJ NEW DRUG ADDON: CPT

## 2025-06-16 PROCEDURE — 85025 COMPLETE CBC W/AUTO DIFF WBC: CPT

## 2025-06-16 PROCEDURE — 36415 COLL VENOUS BLD VENIPUNCTURE: CPT

## 2025-06-16 PROCEDURE — 81003 URINALYSIS AUTO W/O SCOPE: CPT

## 2025-06-16 PROCEDURE — 71046 X-RAY EXAM CHEST 2 VIEWS: CPT

## 2025-06-16 PROCEDURE — 0241U: CPT

## 2025-06-16 PROCEDURE — 80053 COMPREHEN METABOLIC PANEL: CPT

## 2025-06-16 PROCEDURE — 93010 ELECTROCARDIOGRAM REPORT: CPT

## 2025-06-16 PROCEDURE — 96374 THER/PROPH/DIAG INJ IV PUSH: CPT

## 2025-06-16 RX ORDER — IPRATROPIUM BROMIDE AND ALBUTEROL SULFATE .5; 2.5 MG/3ML; MG/3ML
3 SOLUTION RESPIRATORY (INHALATION) ONCE
Refills: 0 | Status: COMPLETED | OUTPATIENT
Start: 2025-06-16 | End: 2025-06-16

## 2025-06-16 RX ORDER — ALBUTEROL SULFATE 2.5 MG/3ML
2 VIAL, NEBULIZER (ML) INHALATION
Qty: 1 | Refills: 0
Start: 2025-06-16

## 2025-06-16 RX ORDER — METHOCARBAMOL 500 MG/1
2 TABLET, FILM COATED ORAL
Qty: 30 | Refills: 0
Start: 2025-06-16

## 2025-06-16 RX ORDER — DIAZEPAM 5 MG/1
5 TABLET ORAL ONCE
Refills: 0 | Status: DISCONTINUED | OUTPATIENT
Start: 2025-06-16 | End: 2025-06-16

## 2025-06-16 RX ORDER — METHYLPREDNISOLONE ACETATE 80 MG/ML
1 INJECTION, SUSPENSION INTRA-ARTICULAR; INTRALESIONAL; INTRAMUSCULAR; SOFT TISSUE
Qty: 1 | Refills: 0
Start: 2025-06-16

## 2025-06-16 RX ORDER — KETOROLAC TROMETHAMINE 30 MG/ML
15 INJECTION, SOLUTION INTRAMUSCULAR; INTRAVENOUS ONCE
Refills: 0 | Status: DISCONTINUED | OUTPATIENT
Start: 2025-06-16 | End: 2025-06-16

## 2025-06-16 RX ORDER — METHYLPREDNISOLONE ACETATE 80 MG/ML
125 INJECTION, SUSPENSION INTRA-ARTICULAR; INTRALESIONAL; INTRAMUSCULAR; SOFT TISSUE ONCE
Refills: 0 | Status: COMPLETED | OUTPATIENT
Start: 2025-06-16 | End: 2025-06-16

## 2025-06-16 RX ORDER — LIDOCAINE HYDROCHLORIDE 20 MG/ML
1 JELLY TOPICAL ONCE
Refills: 0 | Status: COMPLETED | OUTPATIENT
Start: 2025-06-16 | End: 2025-06-16

## 2025-06-16 RX ORDER — LORAZEPAM 4 MG/ML
1 VIAL (ML) INJECTION ONCE
Refills: 0 | Status: DISCONTINUED | OUTPATIENT
Start: 2025-06-16 | End: 2025-06-16

## 2025-06-16 RX ORDER — LIDOCAINE HYDROCHLORIDE 20 MG/ML
1 JELLY TOPICAL
Qty: 1 | Refills: 0
Start: 2025-06-16

## 2025-06-16 RX ORDER — ALBUTEROL SULFATE 2.5 MG/3ML
2 VIAL, NEBULIZER (ML) INHALATION ONCE
Refills: 0 | Status: DISCONTINUED | OUTPATIENT
Start: 2025-06-16 | End: 2025-06-16

## 2025-06-16 RX ORDER — IPRATROPIUM BROMIDE AND ALBUTEROL SULFATE .5; 2.5 MG/3ML; MG/3ML
3 SOLUTION RESPIRATORY (INHALATION) EVERY 6 HOURS
Refills: 0 | Status: DISCONTINUED | OUTPATIENT
Start: 2025-06-16 | End: 2025-06-16

## 2025-06-16 RX ADMIN — LIDOCAINE HYDROCHLORIDE 1 PATCH: 20 JELLY TOPICAL at 11:58

## 2025-06-16 RX ADMIN — IPRATROPIUM BROMIDE AND ALBUTEROL SULFATE 3 MILLILITER(S): .5; 2.5 SOLUTION RESPIRATORY (INHALATION) at 13:22

## 2025-06-16 RX ADMIN — IPRATROPIUM BROMIDE AND ALBUTEROL SULFATE 3 MILLILITER(S): .5; 2.5 SOLUTION RESPIRATORY (INHALATION) at 12:03

## 2025-06-16 RX ADMIN — IPRATROPIUM BROMIDE AND ALBUTEROL SULFATE 3 MILLILITER(S): .5; 2.5 SOLUTION RESPIRATORY (INHALATION) at 13:11

## 2025-06-16 RX ADMIN — DIAZEPAM 5 MILLIGRAM(S): 5 TABLET ORAL at 11:58

## 2025-06-16 RX ADMIN — Medication 1 MILLIGRAM(S): at 13:05

## 2025-06-16 RX ADMIN — Medication 3100 MILLILITER(S): at 10:46

## 2025-06-16 RX ADMIN — IPRATROPIUM BROMIDE AND ALBUTEROL SULFATE 3 MILLILITER(S): .5; 2.5 SOLUTION RESPIRATORY (INHALATION) at 13:01

## 2025-06-16 RX ADMIN — KETOROLAC TROMETHAMINE 15 MILLIGRAM(S): 30 INJECTION, SOLUTION INTRAMUSCULAR; INTRAVENOUS at 10:46

## 2025-06-16 RX ADMIN — METHYLPREDNISOLONE ACETATE 125 MILLIGRAM(S): 80 INJECTION, SUSPENSION INTRA-ARTICULAR; INTRALESIONAL; INTRAMUSCULAR; SOFT TISSUE at 12:21

## 2025-06-16 NOTE — ED ADULT TRIAGE NOTE - NS ED TRIAGE AVPU SCALE
Patient needs to FU in in 1 month    Patient needs to schedule labs nonfasting today or ASAP.     Patient was advised to go to nearest ER immediately / dial 911 with any worsening of the preexisting symptoms / onset of new symptoms or distress / No improvement in next 48 hours.  Call office with any concerns.      chest wall non-tender, breathing is unlabored without accessory muscle use, normal breath sounds Alert-The patient is alert, awake and responds to voice. The patient is oriented to time, place, and person. The triage nurse is able to obtain subjective information.

## 2025-06-16 NOTE — ED PROVIDER NOTE - OBJECTIVE STATEMENT
76 yo female presents with c/o of lower back pain.  Pain began all of a sudden while doing laundry.  Patient reports urinary symptoms Denies any fevers neg nausea

## 2025-06-16 NOTE — ED PROVIDER NOTE - PROGRESS NOTE DETAILS
Patient started to have asthma exacerbation here, given 3 duonebs, improved, went to xray and was wheezing and SOB again upon return. EKG, cardiac monitor, steroids ordered.  Give concern for respiratory status patient care to be upgraded to main ED.  -Catrachita Panda PA-C ED Attending Dr. Dallas, Received patient from intake.  Patient with episode of difficulty breathing and shaking after getting Toradol and 500 cc of fluid.  Patient presents to the emergency department for back pain.  Patient currently satting 100% on room air.   on exam patient with distant breath sounds.    Will re-asses after nebs ED Attending Dr. Dallas, pt with increase shaking, pt states she has had sz in past.  Will give ativan 1 mg IV. ED Attending Dr. Dallas, After IV ativan, pt no longer shaking and appears more comfortable. ED Attending Dr. Dallas, Pt is feeling better.  u/a neg.  Possible back pain with spasm. ED Attending Dr. Dallas, Pt is feeling better.  u/a neg.  Plan ambulation trial and likely d/c

## 2025-06-16 NOTE — ED PROVIDER NOTE - PATIENT PORTAL LINK FT
You can access the FollowMyHealth Patient Portal offered by [facility] by registering at the following website: [email or web address]. By joining [employer]’s FollowMyHealth portal, you will also be able to view your health information using other applications (apps) compatible with our system.

## 2025-06-16 NOTE — ED PROVIDER NOTE - CLINICAL SUMMARY MEDICAL DECISION MAKING FREE TEXT BOX
76 yo female with sudden onset of back pain.  Patient reports urinary symptoms and hx of kidney stones will check labs, urine and treat pain.

## 2025-06-16 NOTE — ED PROVIDER NOTE - NSFOLLOWUPINSTRUCTIONS_ED_ALL_ED_FT
Please call and follow up with your doctor in 1-3 days.  Please call and follow up with your neurologist.    For back pain use Medrol Dosepak, Robaxin, Tylenol, lidocaine pain patch.    For asthma use albuterol 2 puff every 4 hours.    Return to the Emergency Department for worsening or persistent symptoms, and/or ANY NEW OR CONCERNING SYMPTOMS. If you have issues obtaining follow up, please call: [phone] or [phone]  to obtain a doctor or specialist who takes your insurance in your area.    Back Pain    WHAT YOU NEED TO KNOW:    Back pain is common. It can be caused by many conditions, such as arthritis or the breakdown of spinal discs. Your risk for back pain is increased by injuries, lack of activity, or repeated bending and twisting. You may feel sore or stiff on one or both sides of your back. The pain may spread to your buttocks or thighs.    DISCHARGE INSTRUCTIONS:    Return to the emergency department if:     You have pain, numbness, or weakness in one or both legs.      Your pain becomes so severe that you cannot walk.      You cannot control your urine or bowel movements.      You have severe back pain with chest pain.      You have severe back pain, nausea, and vomiting.      You have severe back pain that spreads to your side or genital area.    Contact your healthcare provider if:     You have back pain that does not get better with rest and pain medicine.      You have a fever.      You have pain that worsens when you are on your back or when you rest.      You have pain that worsens when you cough or sneeze.      You lose weight without trying.      You have questions or concerns about your condition or care.    Medicines:     NSAIDs help decrease swelling and pain. This medicine is available with or without a doctor's order. NSAIDs can cause stomach bleeding or kidney problems in certain people. If you take blood thinner medicine, always ask your healthcare provider if NSAIDs are safe for you. Always read the medicine label and follow directions.      Acetaminophen decreases pain and fever. It is available without a doctor's order. Ask how much to take and how often to take it. Follow directions. Read the labels of all other medicines you are using to see if they also contain acetaminophen, or ask your doctor or pharmacist. Acetaminophen can cause liver damage if not taken correctly. Do not use more than 4 grams (4,000 milligrams) total of acetaminophen in one day.       Muscle relaxers help decrease muscle spasms and back pain.      Prescription pain medicine may be given. Ask your healthcare provider how to take this medicine safely. Some prescription pain medicines contain acetaminophen. Do not take other medicines that contain acetaminophen without talking to your healthcare provider. Too much acetaminophen may cause liver damage. Prescription pain medicine may cause constipation. Ask your healthcare provider how to prevent or treat constipation.       Take your medicine as directed. Contact your healthcare provider if you think your medicine is not helping or if you have side effects. Tell him or her if you are allergic to any medicine. Keep a list of the medicines, vitamins, and herbs you take. Include the amounts, and when and why you take them. Bring the list or the pill bottles to follow-up visits. Carry your medicine list with you in case of an emergency.    How to manage your back pain:     Apply ice on your back for 15 to 20 minutes every hour or as directed. Use an ice pack, or put crushed ice in a plastic bag. Cover it with a towel before you apply it to your skin. Ice helps prevent tissue damage and decreases pain.      Apply heat on your back for 20 to 30 minutes every 2 hours for as many days as directed. Heat helps decrease pain and muscle spasms.      Stay active as much as you can without causing more pain. Bed rest could make your back pain worse. Avoid heavy lifting until your pain is gone.      Go to physical therapy as directed. A physical therapist can teach you exercises to help improve movement and strength, and to decrease pain.    Follow up with your healthcare provider in 2 weeks, or as directed: Write down your questions so you remember to ask them during your visits.    Asthma    WHAT YOU NEED TO KNOW:    Asthma is a lung disease that makes breathing difficult. Chronic inflammation and reactions to triggers narrow the airways in the lungs. Asthma can become life-threatening if it is not managed.          DISCHARGE INSTRUCTIONS:    Call your local emergency number (911 in the [place]) if:     You have severe shortness of breath.       Your lips or nails turn blue or gray.       The skin around your neck and ribs pulls in with each breath.      You have shortness of breath, even after you take your short-term medicine as directed.       Your peak flow numbers are in the red zone of your AAP.     Call your doctor if:     You run out of medicine before your next refill is due.      Your symptoms get worse.       You need to take more medicine than usual to control your symptoms.       You have questions or concerns about your condition or care.    Medicines:     Medicines decrease inflammation, open airways, and make it easier to breathe. Medicines may be inhaled, taken as a pill, or injected. Short-term medicines relieve your symptoms quickly. Long-term medicines are used to prevent future attacks. You may also need medicine to help control your allergies. Ask your healthcare provider for more information about the medicine you are given and how to take it safely.      Take your medicine as directed. Contact your healthcare provider if you think your medicine is not helping or if you have side effects. Tell him of her if you are allergic to any medicine. Keep a list of the medicines, vitamins, and herbs you take. Include the amounts, and when and why you take them. Bring the list or the pill bottles to follow-up visits. Carry your medicine list with you in case of an emergency.    Follow up with your healthcare provider as directed: You will need to return to make sure your medicine is working and your symptoms are controlled. You may be referred to an asthma specialist. You may be asked to keep a record of your peak flow values and bring it with you to your appointments. Write down your questions so you remember to ask them during your visits.    Manage your symptoms and prevent future attacks:     Follow your Asthma Action Plan (AAP). This is a written plan that you and your healthcare provider create. It explains which medicine you need and when to change doses if necessary. It also explains how you can monitor symptoms and use a peak flow meter. The meter measures how well your lungs are working.       Manage other health conditions, such as allergies, acid reflux, and sleep apnea.       Identify and avoid triggers. These may include pets, dust mites, mold, and cockroaches.           Do not smoke or be around others who smoke. Nicotine and other chemicals in cigarettes and cigars can cause lung damage. Ask your healthcare provider for information if you currently smoke and need help to quit. E-cigarettes or smokeless tobacco still contain nicotine. Talk to your healthcare provider before you use these products.       Ask about the flu vaccine. The flu can make your asthma worse. You may need a yearly flu shot.    Seizure, Adult  When you have a seizure:    Parts of your body may move.  How aware or awake (conscious) you are may change.  You may shake (convulse).    Some people have symptoms right before a seizure happens. These symptoms may include:    Fear.  Worry (anxiety).  Feeling like you are going to throw up (nausea).  Feeling like the room is spinning (vertigo).  Feeling like you saw or heard something before (german vu).  Odd tastes or smells.  Changes in vision, such as seeing flashing lights or spots.    ImageSeizures usually last from 30 seconds to 2 minutes. Usually, they are not harmful unless they last a long time.    Follow these instructions at home:  Medicines     Take over-the-counter and prescription medicines only as told by your doctor.  Avoid anything that may keep your medicine from working, such as alcohol.  Activity     Do not do any activities that would be dangerous if you had another seizure, like driving or swimming. Wait until your doctor approves.  If you live in the [place] ask your local DMV (department of [employer]) when you can drive.  Rest.  Teaching others     Teach friends and family what to do when you have a seizure. They should:    Lay you on the ground.  Protect your head and body.  Loosen any tight clothing around your neck.  Turn you on your side.  Stay with you until you are better.  Not hold you down.  Not put anything in your mouth.  Know whether or not you need emergency care.    General instructions     Contact your doctor each time you have a seizure.  Avoid anything that gives you seizures.  Keep a seizure diary. Write down:    What you think caused each seizure.  What you remember about each seizure.    Keep all follow-up visits as told by your doctor. This is important.  Contact a doctor if:  You have another seizure.  You have seizures more often.  There is any change in what happens during your seizures.  You continue to have seizures with treatment.  You have symptoms of being sick or having an infection.  Get help right away if:  You have a seizure:    That lasts longer than 5 minutes.  That is different than seizures you had before.  That makes it harder to breathe.  After you hurt your head.    After a seizure, you cannot speak or use a part of your body.  After a seizure, you are confused or have a bad headache.  You have two or more seizures in a row.  You are having seizures more often.  You do not wake up right after a seizure.  You get hurt during a seizure.  In an emergency:     These symptoms may be an emergency. Do not wait to see if the symptoms will go away. Get medical help right away. Call your local emergency services (911 in the [place] Do not drive yourself to the hospital.   This information is not intended to replace advice given to you by your health care provider. Make sure you discuss any questions you have with your health care provider.

## 2025-06-16 NOTE — ED ADULT NURSE REASSESSMENT NOTE - NS ED NURSE REASSESS COMMENT FT1
pt reports improvement in symptoms. respiratory difficulty decreased, appears in no acute distress. SPO2 continues to be 100%.
pt with episode of shaking and seizure-like activity. pt has history of seizures. Dr. Herrera notified, ativan administered as ordered.
assumed care of patient from [facility] AROLDO Chaudhari. pt tachypneic, wheezing, and shaking after receiving toradol and 500cc of fluid. spo2 100% on room air. duonebs to be administered as ordered and reassess.

## 2025-06-16 NOTE — ED ADULT TRIAGE NOTE - CHIEF COMPLAINT QUOTE
Pt BIBEMS from home c/o urinary symptoms. Pt reports RLQ pain, flank pain, and burning when urinating. Denies fevers, chills, n/v/d.

## 2025-06-17 ENCOUNTER — RX RENEWAL (OUTPATIENT)
Age: 75
End: 2025-06-17

## 2025-07-10 NOTE — ED ADULT NURSE NOTE - OBJECTIVE STATEMENT
Patient presents to the ER with complaints lower pelvic pain radiating to lower back. Denies N/V/D, fever. 67
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
## 2025-08-05 ENCOUNTER — APPOINTMENT (OUTPATIENT)
Dept: GASTROENTEROLOGY | Facility: HOSPITAL | Age: 75
End: 2025-08-05

## 2025-08-20 ENCOUNTER — NON-APPOINTMENT (OUTPATIENT)
Age: 75
End: 2025-08-20

## 2025-09-15 ENCOUNTER — RX RENEWAL (OUTPATIENT)
Age: 75
End: 2025-09-15

## (undated) DEVICE — TUBING SUCTION NONCONDUCTIVE 6MM X 12FT

## (undated) DEVICE — DRSG CURITY GAUZE SPONGE 4 X 4" 12-PLY

## (undated) DEVICE — GLV 7 PROTEXIS (WHITE)

## (undated) DEVICE — DRAPE FLUID WARMER 44 X 44"

## (undated) DEVICE — DRAPE INSTRUMENT POUCH 6.75" X 11"

## (undated) DEVICE — DRAPE TOWEL BLUE 17" X 24"

## (undated) DEVICE — DRSG TELFA 3 X 8

## (undated) DEVICE — Device

## (undated) DEVICE — LABELS BLANK W PEN

## (undated) DEVICE — NERVE STIMULATOR/LOCATOR HEAD AND NECK MODEL

## (undated) DEVICE — SUCTION CAUTERY 12FR

## (undated) DEVICE — GOWN LG

## (undated) DEVICE — DRAPE 3/4 SHEET 52X76"

## (undated) DEVICE — GUARD TEETH ADULT

## (undated) DEVICE — POSITIONER STRAP ARMBOARD VELCRO TS-30